# Patient Record
Sex: FEMALE | Race: BLACK OR AFRICAN AMERICAN | HISPANIC OR LATINO | Employment: FULL TIME | URBAN - METROPOLITAN AREA
[De-identification: names, ages, dates, MRNs, and addresses within clinical notes are randomized per-mention and may not be internally consistent; named-entity substitution may affect disease eponyms.]

---

## 2016-04-21 LAB
HPV 16/18: NOT DETECTED
PAP SMEAR: NORMAL

## 2017-06-25 ENCOUNTER — HOSPITAL ENCOUNTER (EMERGENCY)
Facility: OTHER | Age: 27
Discharge: HOME | End: 2017-06-25
Attending: EMERGENCY MEDICINE
Payer: COMMERCIAL

## 2017-06-25 VITALS
DIASTOLIC BLOOD PRESSURE: 59 MMHG | SYSTOLIC BLOOD PRESSURE: 111 MMHG | RESPIRATION RATE: 16 BRPM | WEIGHT: 195 LBS | BODY MASS INDEX: 32.49 KG/M2 | HEART RATE: 82 BPM | TEMPERATURE: 98 F | OXYGEN SATURATION: 98 % | HEIGHT: 65 IN

## 2017-06-25 DIAGNOSIS — N12 PYELONEPHRITIS: Primary | ICD-10-CM

## 2017-06-25 LAB
ALBUMIN SERPL BCP-MCNC: 3.6 G/DL
ALP SERPL-CCNC: 71 U/L
ALT SERPL W/O P-5'-P-CCNC: 14 U/L
ANION GAP SERPL CALC-SCNC: 13 MMOL/L
AST SERPL-CCNC: 14 U/L
B-HCG UR QL: NEGATIVE
BACTERIA #/AREA URNS HPF: ABNORMAL /HPF
BASOPHILS # BLD AUTO: 0.01 K/UL
BASOPHILS NFR BLD: 0.1 %
BILIRUB SERPL-MCNC: 1.5 MG/DL
BILIRUB UR QL STRIP: NEGATIVE
BUN SERPL-MCNC: 9 MG/DL
CALCIUM SERPL-MCNC: 8.9 MG/DL
CHLORIDE SERPL-SCNC: 101 MMOL/L
CLARITY UR: ABNORMAL
CO2 SERPL-SCNC: 21 MMOL/L
COLOR UR: YELLOW
CREAT SERPL-MCNC: 0.9 MG/DL
CTP QC/QA: YES
DIFFERENTIAL METHOD: ABNORMAL
EOSINOPHIL # BLD AUTO: 0 K/UL
EOSINOPHIL NFR BLD: 0 %
ERYTHROCYTE [DISTWIDTH] IN BLOOD BY AUTOMATED COUNT: 12.5 %
EST. GFR  (AFRICAN AMERICAN): >60 ML/MIN/1.73 M^2
EST. GFR  (NON AFRICAN AMERICAN): >60 ML/MIN/1.73 M^2
GLUCOSE SERPL-MCNC: 100 MG/DL
GLUCOSE UR QL STRIP: NEGATIVE
HCT VFR BLD AUTO: 35.4 %
HGB BLD-MCNC: 12.1 G/DL
HGB UR QL STRIP: ABNORMAL
HYALINE CASTS #/AREA URNS LPF: 0 /LPF
KETONES UR QL STRIP: ABNORMAL
LEUKOCYTE ESTERASE UR QL STRIP: ABNORMAL
LYMPHOCYTES # BLD AUTO: 0.9 K/UL
LYMPHOCYTES NFR BLD: 5.9 %
MCH RBC QN AUTO: 29.6 PG
MCHC RBC AUTO-ENTMCNC: 34.2 %
MCV RBC AUTO: 87 FL
MICROSCOPIC COMMENT: ABNORMAL
MONOCYTES # BLD AUTO: 1.5 K/UL
MONOCYTES NFR BLD: 10.5 %
NEUTROPHILS # BLD AUTO: 12.1 K/UL
NEUTROPHILS NFR BLD: 83.2 %
NITRITE UR QL STRIP: POSITIVE
PH UR STRIP: 6 [PH] (ref 5–8)
PLATELET # BLD AUTO: 234 K/UL
PMV BLD AUTO: 10.3 FL
POTASSIUM SERPL-SCNC: 3.5 MMOL/L
PROT SERPL-MCNC: 7.6 G/DL
PROT UR QL STRIP: ABNORMAL
RBC # BLD AUTO: 4.09 M/UL
RBC #/AREA URNS HPF: 3 /HPF (ref 0–4)
SODIUM SERPL-SCNC: 135 MMOL/L
SP GR UR STRIP: <=1.005 (ref 1–1.03)
URN SPEC COLLECT METH UR: ABNORMAL
UROBILINOGEN UR STRIP-ACNC: NEGATIVE EU/DL
WBC # BLD AUTO: 14.52 K/UL
WBC #/AREA URNS HPF: >100 /HPF (ref 0–5)
WBC CLUMPS URNS QL MICRO: ABNORMAL

## 2017-06-25 PROCEDURE — 96361 HYDRATE IV INFUSION ADD-ON: CPT

## 2017-06-25 PROCEDURE — 87086 URINE CULTURE/COLONY COUNT: CPT

## 2017-06-25 PROCEDURE — 87077 CULTURE AEROBIC IDENTIFY: CPT

## 2017-06-25 PROCEDURE — 96375 TX/PRO/DX INJ NEW DRUG ADDON: CPT

## 2017-06-25 PROCEDURE — 87088 URINE BACTERIA CULTURE: CPT

## 2017-06-25 PROCEDURE — 87186 SC STD MICRODIL/AGAR DIL: CPT

## 2017-06-25 PROCEDURE — 81000 URINALYSIS NONAUTO W/SCOPE: CPT

## 2017-06-25 PROCEDURE — 99284 EMERGENCY DEPT VISIT MOD MDM: CPT | Mod: 25

## 2017-06-25 PROCEDURE — 96365 THER/PROPH/DIAG IV INF INIT: CPT

## 2017-06-25 PROCEDURE — 85025 COMPLETE CBC W/AUTO DIFF WBC: CPT

## 2017-06-25 PROCEDURE — 96376 TX/PRO/DX INJ SAME DRUG ADON: CPT

## 2017-06-25 PROCEDURE — 80053 COMPREHEN METABOLIC PANEL: CPT

## 2017-06-25 PROCEDURE — 63600175 PHARM REV CODE 636 W HCPCS: Performed by: PHYSICIAN ASSISTANT

## 2017-06-25 PROCEDURE — 81025 URINE PREGNANCY TEST: CPT | Performed by: EMERGENCY MEDICINE

## 2017-06-25 PROCEDURE — 25500020 PHARM REV CODE 255: Performed by: EMERGENCY MEDICINE

## 2017-06-25 PROCEDURE — 25000003 PHARM REV CODE 250: Performed by: PHYSICIAN ASSISTANT

## 2017-06-25 RX ORDER — KETOROLAC TROMETHAMINE 30 MG/ML
15 INJECTION, SOLUTION INTRAMUSCULAR; INTRAVENOUS
Status: COMPLETED | OUTPATIENT
Start: 2017-06-25 | End: 2017-06-25

## 2017-06-25 RX ORDER — MORPHINE SULFATE 2 MG/ML
2 INJECTION, SOLUTION INTRAMUSCULAR; INTRAVENOUS
Status: COMPLETED | OUTPATIENT
Start: 2017-06-25 | End: 2017-06-25

## 2017-06-25 RX ORDER — SODIUM CHLORIDE 9 MG/ML
1000 INJECTION, SOLUTION INTRAVENOUS
Status: COMPLETED | OUTPATIENT
Start: 2017-06-25 | End: 2017-06-25

## 2017-06-25 RX ORDER — ONDANSETRON 2 MG/ML
4 INJECTION INTRAMUSCULAR; INTRAVENOUS
Status: COMPLETED | OUTPATIENT
Start: 2017-06-25 | End: 2017-06-25

## 2017-06-25 RX ORDER — CIPROFLOXACIN 500 MG/1
500 TABLET ORAL EVERY 12 HOURS
Qty: 20 TABLET | Refills: 0 | Status: SHIPPED | OUTPATIENT
Start: 2017-06-25 | End: 2017-07-09

## 2017-06-25 RX ORDER — ONDANSETRON 4 MG/1
4 TABLET, ORALLY DISINTEGRATING ORAL EVERY 8 HOURS PRN
Qty: 12 TABLET | Refills: 0 | Status: SHIPPED | OUTPATIENT
Start: 2017-06-25 | End: 2017-08-28

## 2017-06-25 RX ORDER — HYDROCODONE BITARTRATE AND ACETAMINOPHEN 5; 325 MG/1; MG/1
1 TABLET ORAL EVERY 4 HOURS PRN
Qty: 8 TABLET | Refills: 0 | Status: SHIPPED | OUTPATIENT
Start: 2017-06-25 | End: 2017-07-03

## 2017-06-25 RX ORDER — IBUPROFEN 600 MG/1
600 TABLET ORAL EVERY 6 HOURS PRN
Qty: 20 TABLET | Refills: 0 | Status: SHIPPED | OUTPATIENT
Start: 2017-06-25 | End: 2017-08-28

## 2017-06-25 RX ADMIN — MORPHINE SULFATE 2 MG: 2 INJECTION, SOLUTION INTRAMUSCULAR; INTRAVENOUS at 01:06

## 2017-06-25 RX ADMIN — SODIUM CHLORIDE 1000 ML: 0.9 INJECTION, SOLUTION INTRAVENOUS at 02:06

## 2017-06-25 RX ADMIN — IOHEXOL 100 ML: 350 INJECTION, SOLUTION INTRAVENOUS at 01:06

## 2017-06-25 RX ADMIN — CEFTRIAXONE 1 G: 1 INJECTION, SOLUTION INTRAVENOUS at 02:06

## 2017-06-25 RX ADMIN — MORPHINE SULFATE 2 MG: 2 INJECTION, SOLUTION INTRAMUSCULAR; INTRAVENOUS at 02:06

## 2017-06-25 RX ADMIN — ONDANSETRON 4 MG: 2 INJECTION, SOLUTION INTRAMUSCULAR; INTRAVENOUS at 12:06

## 2017-06-25 RX ADMIN — KETOROLAC TROMETHAMINE 15 MG: 30 INJECTION, SOLUTION INTRAMUSCULAR at 12:06

## 2017-06-25 RX ADMIN — SODIUM CHLORIDE 1000 ML: 0.9 INJECTION, SOLUTION INTRAVENOUS at 12:06

## 2017-06-25 NOTE — ED NOTES
Pt rounding complete.  Pain 6/10, not requesting medication at this time.  Restroom and comfort needs addressed.  Pt updated on plan of care.  Call light within reach.  Will continue to monitor.  Visitor at bedside.

## 2017-06-25 NOTE — ED TRIAGE NOTES
C/o dysuria with right flank pain radiating to RLQ abdomen x 4 days. + fever with vomiting x 2 days. Tylenol given at Urgent Care pta at clinic. No active vomiting at this time. Denies diarrhea.

## 2017-06-25 NOTE — ED PROVIDER NOTES
Encounter Date: 6/25/2017       History     Chief Complaint   Patient presents with    Dysuria     Patient c/o dysuria, right flank pain, urinary frequency for several days.  Patient went to an Urgent care today and was diagnosed with pylonephritis, told to come to ER and was given a tylenol for a fever of 102.     Patient is 27-year-old female history of lumbar fusion who presents with complaints of right sided flank and lower abdominal pain with fever, nausea, vomiting and dysuria for 2 days prior to arrival.  She reports dysuria symptoms started 4 days ago but 2 days later progressed to flank pain lower abdominal pain with the development of subjective fevers.  Yesterday she checked her temperature noting a fever of 101 at home.  She had an episode of vomiting.  She took a dose of Tylenol and presented to urgent care this morning.  There she was noted to have a temperature of 100.2 was given another dose of Tylenol and redirected to the emergency department for evaluation a pyelonephritis.  Patient has no history of frequent urinary tract infections or pyelonephritis.  Apart from the lumbar fusion with anterior approach she has had no other intra-abdominal surgeries.  She denies irregular vaginal bleeding or vaginal discharge.  She does report constipation over the last 4 days.  She denies chest pain, shortness of breath, headaches, dizziness, syncope.  Currently accompanied by male significant other who is at bedside.          Review of patient's allergies indicates:   Allergen Reactions    Lactose Diarrhea    Gluten protein Itching     Inflammation, bloating, diarrhea and pain all over body       Past Medical History:   Diagnosis Date    Asthma     Chronic back pain     Migraine headache      Past Surgical History:   Procedure Laterality Date    BACK SURGERY  2013     History reviewed. No pertinent family history.  Social History   Substance Use Topics    Smoking status: Never Smoker    Smokeless  tobacco: Never Used    Alcohol use Yes      Comment: 1-3 drinks every 2 weeks     Review of Systems   Constitutional: Positive for fatigue and fever.   HENT: Negative for sore throat.    Respiratory: Negative for shortness of breath.    Cardiovascular: Negative for chest pain.   Gastrointestinal: Positive for abdominal pain, nausea and vomiting. Negative for diarrhea.   Genitourinary: Negative for dysuria.        Right-sided flank pain and lower abdominal pain  Dysuria  Hematuria   Musculoskeletal: Negative for back pain.   Skin: Negative for rash.   Neurological: Negative for weakness.   Hematological: Does not bruise/bleed easily.       Physical Exam     Initial Vitals [06/25/17 1123]   BP Pulse Resp Temp SpO2   112/62 (!) 121 14 99.5 °F (37.5 °C) 95 %      MAP       78.67         Physical Exam    Nursing note and vitals reviewed.  Constitutional: She appears well-developed and well-nourished. She is not diaphoretic. No distress.   Diaphoretic feels hot to touch  Healthy appearing female who appears to be feeling ill.  She is in no acute distress.  She makes good eye contact, speaks in clear full sentences.  She is seen ambulating to the bathroom slowly but without assistance.   HENT:   Head: Normocephalic and atraumatic.   Eyes: Conjunctivae and EOM are normal. Pupils are equal, round, and reactive to light. Right eye exhibits no discharge. Left eye exhibits no discharge. No scleral icterus.   Neck: Normal range of motion. Neck supple.   Cardiovascular: Normal rate, regular rhythm and normal heart sounds. Exam reveals no friction rub.    No murmur heard.  Pulmonary/Chest: Breath sounds normal. She has no wheezes. She has no rhonchi. She has no rales.   Abdominal: Soft. Bowel sounds are normal. She exhibits no mass. There is tenderness. There is no rebound and no guarding.   Right-sided CVA tenderness to percussion  Right lower quadrant tenderness to deep palpation with guarding  Suprapubic tenderness to  palpation  Left-sided tenderness to deep palpation with no guarding  Normal bowel sounds in all 4 quadrants     Musculoskeletal: Normal range of motion. She exhibits no edema or tenderness.   Lymphadenopathy:     She has no cervical adenopathy.   Neurological: She is alert and oriented to person, place, and time. She has normal strength.   Skin: Skin is warm and dry. Capillary refill takes less than 2 seconds. No rash and no abscess noted. No erythema.   Psychiatric: She has a normal mood and affect. Her behavior is normal. Thought content normal.         ED Course   Procedures  Labs Reviewed   URINALYSIS   POCT URINE PREGNANCY         Labs Reviewed   URINALYSIS - Abnormal; Notable for the following:        Result Value    Appearance, UA Hazy (*)     Specific Gravity, UA <=1.005 (*)     Protein, UA 1+ (*)     Ketones, UA 3+ (*)     Occult Blood UA 2+ (*)     Nitrite, UA Positive (*)     Leukocytes, UA 3+ (*)     All other components within normal limits   CBC W/ AUTO DIFFERENTIAL - Abnormal; Notable for the following:     WBC 14.52 (*)     Hematocrit 35.4 (*)     Gran # 12.1 (*)     Lymph # 0.9 (*)     Mono # 1.5 (*)     Gran% 83.2 (*)     Lymph% 5.9 (*)     All other components within normal limits   COMPREHENSIVE METABOLIC PANEL - Abnormal; Notable for the following:     Sodium 135 (*)     CO2 21 (*)     Total Bilirubin 1.5 (*)     All other components within normal limits   URINALYSIS MICROSCOPIC - Abnormal; Notable for the following:     WBC, UA >100 (*)     WBC Clumps, UA Occasional (*)     Bacteria, UA Moderate (*)     All other components within normal limits   CULTURE, URINE   CULTURE, URINE   POCT URINE PREGNANCY     Imaging Results          CT Abdomen Pelvis With Contrast (Final result)  Result time 06/25/17 13:50:48    Final result by Gustavo Tony DO (06/25/17 13:50:48)                 Impression:        1. Findings most consistent with pyelonephritis of the right kidney.    2.  Small amount of  pelvic free fluid which is thought to be physiologic.          Electronically signed by: ANKITA MUNOZ D.O.  Date:     06/25/17  Time:    13:50              Narrative:    CT of the abdomen and pelvis with contrast.    History: Right lower quadrant pain    Comparison: None    Technique:CT of the abdomen and pelvis was acquired helically from the lung bases through the ischial tuberosities status post administration of 100 cc of Omnipaque 350. No oral contrast was administered. Axial and reformatted images were reviewed.     Findings:    ABDOMEN    Lung bases:Unremarkable    Liver/gallbladder/biliary:The liver demonstrates no focal abnormality. The gallbladder is present and unremarkable.No biliary ductal dilation.    Pancreas:The pancreas is unremarkable in appearance.    Spleen:The spleen is not enlarged.    Adrenals:Unremarkable    Kidneys:There is some heterogeneous enhancement and associated with the parenchyma of the upper pole to interpolar region of the right kidney most consistent with pyelonephritis..     Bowel/Mesentery:There is no evidence of bowel obstruction. The appendix is not well-seen although there are no definitive inflammatory changes in the expected location of the appendix. No mesenteric stranding or adenopathy.    Retroperitoneum:No adenopathy.The aorta demonstrates a normal caliber.    PELVIS:    Genitourinary/Reproductive organs:Unremarkable    Adenopathy:None    Free Fluid:A small amount of pelvic free fluid is present which is thought to be in the range of physiologic.    [place] Structures/Soft tissues:Postoperative changes associated with the lower lumbar spine.                                   Medical Decision Making:   ED Management:  Urgent evaluation a 27-year-old female who presents with complaints concerning for pyelonephritis versus acute intra-abdominal infection including appendicitis.  She is afebrile currently at 99.5, nontoxic appearing, tachycardic with heart rate of 121  and blood pressure 112/62.  Physical exam outlined above and reveals concern for right flank pain with CVA tenderness to percussion as well as right lower quadrant pain concerning for possible appendicitis.  Diagnostic labs pending with IV fluids, Toradol, anti-medic given intravenously.  Urinalysis and urine culture pending.  Low threshold for CT of abdomen.  We'll continue to monitor.    UPDATE: CT obtained and reveals evidence of pyelo- no concern for other acute intra-abdominal infection.  She is given a gram of Rocephin IV here in the emergency department and will be discharged with Cipro.  She is given short course of narcotic analgesia as well as NSAIDs and anti-medic.  She is educated on return precautions and verbalizes understanding.  She is encouraged to follow-up with primary care provider one to 2 days for symptom recheck. Case discussed with attending who agrees with plan.  Other:   I have discussed this case with another health care provider.       <> Summary of the Discussion: [facility]                    ED Course     Clinical Impression:   The encounter diagnosis was Pyelonephritis.                           Alana Rodriguez PA-C  06/25/17 1552

## 2017-06-27 LAB — BACTERIA UR CULT: NORMAL

## 2017-06-28 ENCOUNTER — NURSE TRIAGE (OUTPATIENT)
Dept: ADMINISTRATIVE | Facility: CLINIC | Age: 27
End: 2017-06-28

## 2017-06-28 NOTE — TELEPHONE ENCOUNTER
"  Reason for Disposition   [1] Taking antibiotic > 24 hours for UTI (urinary tract or bladder infection) AND [2] fever persists    Answer Assessment - Initial Assessment Questions  1. ANTIBIOTIC: "What antibiotic are you taking?" "How many times per day?"      cipro  2. DURATION: "When was the antibiotic started?"      6/24/17  3. MAIN SYMPTOM: "What is the main symptom you are concerned about?"      Chills, nausea, urine still dark  4. FEVER: "Do you have a fever?" If so, ask: "What is it, how was it measured, and when did it start?"      Chills and temp yesterday of 101  5. OTHER SYMPTOMS: "Do you have any other symptoms?" (e.g., flank pain, vaginal discharge, blood in urine)      As above    Protocols used: ST URINARY TRACT INFECTION ON ANTIBIOTIC FOLLOW-UP CALL - FEMALE-A-    "

## 2017-08-28 ENCOUNTER — HOSPITAL ENCOUNTER (EMERGENCY)
Facility: OTHER | Age: 27
Discharge: HOME | End: 2017-08-28
Attending: EMERGENCY MEDICINE
Payer: MEDICAID

## 2017-08-28 VITALS
RESPIRATION RATE: 18 BRPM | HEIGHT: 66 IN | HEART RATE: 98 BPM | DIASTOLIC BLOOD PRESSURE: 59 MMHG | WEIGHT: 185 LBS | SYSTOLIC BLOOD PRESSURE: 116 MMHG | OXYGEN SATURATION: 100 % | BODY MASS INDEX: 29.73 KG/M2 | TEMPERATURE: 99 F

## 2017-08-28 DIAGNOSIS — N12 PYELONEPHRITIS: Primary | ICD-10-CM

## 2017-08-28 LAB
ALBUMIN SERPL BCP-MCNC: 3.7 G/DL
ALP SERPL-CCNC: 69 U/L
ALT SERPL W/O P-5'-P-CCNC: 16 U/L
ANION GAP SERPL CALC-SCNC: 9 MMOL/L
AST SERPL-CCNC: 17 U/L
B-HCG UR QL: NEGATIVE
BACTERIA #/AREA URNS HPF: ABNORMAL /HPF
BASOPHILS # BLD AUTO: 0.01 K/UL
BASOPHILS NFR BLD: 0.1 %
BILIRUB SERPL-MCNC: 1.4 MG/DL
BILIRUB UR QL STRIP: NEGATIVE
BUN SERPL-MCNC: 9 MG/DL
CALCIUM SERPL-MCNC: 9.4 MG/DL
CHLORIDE SERPL-SCNC: 105 MMOL/L
CLARITY UR: ABNORMAL
CO2 SERPL-SCNC: 23 MMOL/L
COLOR UR: YELLOW
CREAT SERPL-MCNC: 0.9 MG/DL
CTP QC/QA: YES
DIFFERENTIAL METHOD: ABNORMAL
EOSINOPHIL # BLD AUTO: 0 K/UL
EOSINOPHIL NFR BLD: 0.1 %
ERYTHROCYTE [DISTWIDTH] IN BLOOD BY AUTOMATED COUNT: 13 %
EST. GFR  (AFRICAN AMERICAN): >60 ML/MIN/1.73 M^2
EST. GFR  (NON AFRICAN AMERICAN): >60 ML/MIN/1.73 M^2
GLUCOSE SERPL-MCNC: 116 MG/DL
GLUCOSE UR QL STRIP: NEGATIVE
HCT VFR BLD AUTO: 36.6 %
HGB BLD-MCNC: 12.2 G/DL
HGB UR QL STRIP: ABNORMAL
HYALINE CASTS #/AREA URNS LPF: 0 /LPF
KETONES UR QL STRIP: ABNORMAL
LEUKOCYTE ESTERASE UR QL STRIP: ABNORMAL
LIPASE SERPL-CCNC: 26 U/L
LYMPHOCYTES # BLD AUTO: 1.3 K/UL
LYMPHOCYTES NFR BLD: 10.9 %
MCH RBC QN AUTO: 29.5 PG
MCHC RBC AUTO-ENTMCNC: 33.3 G/DL
MCV RBC AUTO: 89 FL
MICROSCOPIC COMMENT: ABNORMAL
MONOCYTES # BLD AUTO: 0.6 K/UL
MONOCYTES NFR BLD: 4.8 %
NEUTROPHILS # BLD AUTO: 9.9 K/UL
NEUTROPHILS NFR BLD: 83.8 %
NITRITE UR QL STRIP: NEGATIVE
PH UR STRIP: 6 [PH] (ref 5–8)
PLATELET # BLD AUTO: 249 K/UL
PMV BLD AUTO: 10.1 FL
POTASSIUM SERPL-SCNC: 3.7 MMOL/L
PROT SERPL-MCNC: 8.2 G/DL
PROT UR QL STRIP: ABNORMAL
RBC # BLD AUTO: 4.13 M/UL
RBC #/AREA URNS HPF: 9 /HPF (ref 0–4)
SODIUM SERPL-SCNC: 137 MMOL/L
SP GR UR STRIP: 1.02 (ref 1–1.03)
SQUAMOUS #/AREA URNS HPF: 11 /HPF
URN SPEC COLLECT METH UR: ABNORMAL
UROBILINOGEN UR STRIP-ACNC: NEGATIVE EU/DL
WBC # BLD AUTO: 11.84 K/UL
WBC #/AREA URNS HPF: 59 /HPF (ref 0–5)

## 2017-08-28 PROCEDURE — 25000003 PHARM REV CODE 250: Performed by: EMERGENCY MEDICINE

## 2017-08-28 PROCEDURE — 81025 URINE PREGNANCY TEST: CPT | Performed by: EMERGENCY MEDICINE

## 2017-08-28 PROCEDURE — 96365 THER/PROPH/DIAG IV INF INIT: CPT

## 2017-08-28 PROCEDURE — 81000 URINALYSIS NONAUTO W/SCOPE: CPT

## 2017-08-28 PROCEDURE — 87088 URINE BACTERIA CULTURE: CPT

## 2017-08-28 PROCEDURE — 63600175 PHARM REV CODE 636 W HCPCS: Performed by: EMERGENCY MEDICINE

## 2017-08-28 PROCEDURE — 87077 CULTURE AEROBIC IDENTIFY: CPT

## 2017-08-28 PROCEDURE — 25500020 PHARM REV CODE 255: Performed by: EMERGENCY MEDICINE

## 2017-08-28 PROCEDURE — 85025 COMPLETE CBC W/AUTO DIFF WBC: CPT

## 2017-08-28 PROCEDURE — 99285 EMERGENCY DEPT VISIT HI MDM: CPT | Mod: 25

## 2017-08-28 PROCEDURE — 83690 ASSAY OF LIPASE: CPT

## 2017-08-28 PROCEDURE — 87186 SC STD MICRODIL/AGAR DIL: CPT

## 2017-08-28 PROCEDURE — 80053 COMPREHEN METABOLIC PANEL: CPT

## 2017-08-28 PROCEDURE — 87086 URINE CULTURE/COLONY COUNT: CPT

## 2017-08-28 PROCEDURE — 96375 TX/PRO/DX INJ NEW DRUG ADDON: CPT

## 2017-08-28 RX ORDER — HYDROCODONE BITARTRATE AND ACETAMINOPHEN 5; 325 MG/1; MG/1
1 TABLET ORAL EVERY 4 HOURS PRN
Qty: 12 TABLET | Refills: 0 | Status: SHIPPED | OUTPATIENT
Start: 2017-08-28 | End: 2019-09-11 | Stop reason: ALTCHOICE

## 2017-08-28 RX ORDER — KETOROLAC TROMETHAMINE 30 MG/ML
15 INJECTION, SOLUTION INTRAMUSCULAR; INTRAVENOUS
Status: COMPLETED | OUTPATIENT
Start: 2017-08-28 | End: 2017-08-28

## 2017-08-28 RX ORDER — FLUCONAZOLE 200 MG/1
200 TABLET ORAL DAILY
Qty: 1 TABLET | Refills: 0 | Status: SHIPPED | OUTPATIENT
Start: 2017-08-28 | End: 2017-08-29

## 2017-08-28 RX ORDER — AMOXICILLIN AND CLAVULANATE POTASSIUM 875; 125 MG/1; MG/1
1 TABLET, FILM COATED ORAL 2 TIMES DAILY
Qty: 14 TABLET | Refills: 0 | Status: SHIPPED | OUTPATIENT
Start: 2017-08-28 | End: 2019-09-11 | Stop reason: ALTCHOICE

## 2017-08-28 RX ORDER — HYDROMORPHONE HYDROCHLORIDE 1 MG/ML
0.5 INJECTION, SOLUTION INTRAMUSCULAR; INTRAVENOUS; SUBCUTANEOUS
Status: COMPLETED | OUTPATIENT
Start: 2017-08-28 | End: 2017-08-28

## 2017-08-28 RX ADMIN — HYDROMORPHONE HYDROCHLORIDE 0.5 MG: 1 INJECTION, SOLUTION INTRAMUSCULAR; INTRAVENOUS; SUBCUTANEOUS at 07:08

## 2017-08-28 RX ADMIN — KETOROLAC TROMETHAMINE 15 MG: 30 INJECTION, SOLUTION INTRAMUSCULAR at 06:08

## 2017-08-28 RX ADMIN — IOHEXOL 100 ML: 350 INJECTION, SOLUTION INTRAVENOUS at 07:08

## 2017-08-28 RX ADMIN — CEFTRIAXONE 2 G: 2 INJECTION, SOLUTION INTRAVENOUS at 06:08

## 2017-08-28 RX ADMIN — SODIUM CHLORIDE 1000 ML: 0.9 INJECTION, SOLUTION INTRAVENOUS at 06:08

## 2017-08-28 NOTE — ED PROVIDER NOTES
"Encounter Date: 8/28/2017    SCRIBE #1 NOTE: I, Toni Fuller, am scribing for, and in the presence of, Dr. Bazan.       History     Chief Complaint   Patient presents with    Flank Pain     + constant right sided flank pain since Wednesday with pain increasing Saturday, + fever 101.9 yesterday reports taking Tylenol with relief. "I recently had a UTI and a kindey infection and this feels like the same pain". + dysuria . denies hematuria at this time.      5:25 PM    Patient is a 27 year old female who presents to the ED with complaint of abdominal pain, which began two days ago. Pain is located throughout abdomen, but is worse in the right upper quadrant. She reports associated fever, chills, headache, nausea, and burning on urination. She denies hematuria, diarrhea, constipation, or shortness of breath. She notes pain is worse with breath. She has a history of kidney infections, and notes that current symptoms feel like past episodes. She reports she had a back surgery that was performed through the abdomen four years ago. She is allergic to dairy and gluten.       The history is provided by the patient.     Review of patient's allergies indicates:   Allergen Reactions    Lactose Diarrhea    Gluten protein Itching     Inflammation, bloating, diarrhea and pain all over body       Past Medical History:   Diagnosis Date    Asthma     Chronic back pain     Migraine headache     Pyelonephritis      Past Surgical History:   Procedure Laterality Date    BACK SURGERY  2013     History reviewed. No pertinent family history.  Social History   Substance Use Topics    Smoking status: Never Smoker    Smokeless tobacco: Never Used    Alcohol use Yes      Comment: 1-3 drinks every 2 weeks     Review of Systems   Constitutional: Positive for chills and fever.   HENT: Negative for sore throat.    Respiratory: Negative for cough and shortness of breath.    Cardiovascular: Negative for chest pain.   Gastrointestinal: " Positive for abdominal pain and nausea.   Genitourinary: Positive for dysuria. Negative for hematuria.   Musculoskeletal: Negative for back pain.   Skin: Negative for rash.   Neurological: Positive for headaches. Negative for weakness.   Psychiatric/Behavioral: Negative for confusion.       Physical Exam     Initial Vitals [08/28/17 1718]   BP Pulse Resp Temp SpO2   126/70 105 18 98.4 °F (36.9 °C) 100 %      MAP       88.67         Physical Exam    Nursing note and vitals reviewed.  Constitutional: She appears well-developed and well-nourished.   Appears non-toxic.   HENT:   Head: Normocephalic and atraumatic.   Mouth/Throat: Oropharynx is clear and moist.   Eyes: Conjunctivae and EOM are normal. Pupils are equal, round, and reactive to light.   Neck: Normal range of motion. Neck supple.   Cardiovascular: Normal rate, regular rhythm and normal heart sounds. Exam reveals no gallop and no friction rub.    No murmur heard.  Pulmonary/Chest: Breath sounds normal. No respiratory distress. She has no wheezes. She has no rhonchi. She has no rales.   Abdominal: Soft.   Tenderness to RUQ, no guarding or rebound.   Musculoskeletal: Normal range of motion.   Extremities are atraumatic.   Neurological: She is alert and oriented to person, place, and time. She has normal strength.   Skin: Skin is warm and dry.   Psychiatric: She has a normal mood and affect. Her behavior is normal. Judgment and thought content normal.         ED Course   Procedures  Labs Reviewed   CBC W/ AUTO DIFFERENTIAL - Abnormal; Notable for the following:        Result Value    Hematocrit 36.6 (*)     Gran # 9.9 (*)     Gran% 83.8 (*)     Lymph% 10.9 (*)     All other components within normal limits   COMPREHENSIVE METABOLIC PANEL - Abnormal; Notable for the following:     Glucose 116 (*)     Total Bilirubin 1.4 (*)     All other components within normal limits   URINALYSIS - Abnormal; Notable for the following:     Appearance, UA Hazy (*)     Protein, UA  1+ (*)     Ketones, UA Trace (*)     Occult Blood UA 1+ (*)     Leukocytes, UA 2+ (*)     All other components within normal limits   URINALYSIS MICROSCOPIC - Abnormal; Notable for the following:     RBC, UA 9 (*)     WBC, UA 59 (*)     Bacteria, UA Moderate (*)     All other components within normal limits   CULTURE, URINE   LIPASE   POCT URINE PREGNANCY             Medical Decision Making:   Initial Assessment:   Patient with history of flank pain and dysuria, but also some RUQ tenderness. I will check basic labs, chest x-ray, and urine. I will reassess.   Clinical Tests:   Lab Tests: Reviewed and Ordered  Radiological Study: Ordered and Reviewed  ED Management:  6:15 PM -Chest x-ray reveals normal heart and normal lungs. Urine infected and white count elevated, I will give dose of rocephin. With her elevated white count and diffuse abdominal tenderness, I will get CT of abdomen.            Scribe Attestation:   Scribe #1: I performed the above scribed service and the documentation accurately describes the services I performed. I attest to the accuracy of the note.    Attending Attestation:           Physician Attestation for Scribe:  Physician Attestation Statement for Scribe #1: I, Dr. Bazan, reviewed documentation, as scribed by Toni Fuller in my presence, and it is both accurate and complete.                 ED Course     Clinical Impression:     1. Pyelonephritis          Disposition:   Disposition: Discharged  Condition: Stable                        Braulio Bazan MD  08/28/17 2057

## 2017-08-28 NOTE — ED TRIAGE NOTES
C/o right flank pain with dysuria onset 6 days ago. Reports intermittent fever at home, last tylenol taken yesterday. Hx of pyelonephritis. Safety precautions established, NAD.

## 2017-08-30 LAB — BACTERIA UR CULT: NORMAL

## 2018-09-15 ENCOUNTER — OFFICE VISIT (OUTPATIENT)
Dept: URGENT CARE | Facility: CLINIC | Age: 28
End: 2018-09-15
Payer: COMMERCIAL

## 2018-09-15 VITALS
OXYGEN SATURATION: 99 % | SYSTOLIC BLOOD PRESSURE: 116 MMHG | HEIGHT: 66 IN | HEART RATE: 75 BPM | TEMPERATURE: 98 F | WEIGHT: 175 LBS | BODY MASS INDEX: 28.12 KG/M2 | DIASTOLIC BLOOD PRESSURE: 82 MMHG | RESPIRATION RATE: 18 BRPM

## 2018-09-15 DIAGNOSIS — J01.40 SUBACUTE PANSINUSITIS: Primary | ICD-10-CM

## 2018-09-15 LAB
CTP QC/QA: YES
S PYO RRNA THROAT QL PROBE: NEGATIVE

## 2018-09-15 PROCEDURE — 99203 OFFICE O/P NEW LOW 30 MIN: CPT | Mod: S$GLB,,, | Performed by: FAMILY MEDICINE

## 2018-09-15 PROCEDURE — 3008F BODY MASS INDEX DOCD: CPT | Mod: CPTII,S$GLB,, | Performed by: FAMILY MEDICINE

## 2018-09-15 PROCEDURE — 87880 STREP A ASSAY W/OPTIC: CPT | Mod: QW,S$GLB,, | Performed by: FAMILY MEDICINE

## 2018-09-15 RX ORDER — PREDNISONE 20 MG/1
20 TABLET ORAL DAILY
Qty: 3 TABLET | Refills: 0 | Status: SHIPPED | OUTPATIENT
Start: 2018-09-15 | End: 2018-09-18

## 2018-09-15 RX ORDER — AMOXICILLIN AND CLAVULANATE POTASSIUM 875; 125 MG/1; MG/1
1 TABLET, FILM COATED ORAL EVERY 12 HOURS
Qty: 14 TABLET | Refills: 0 | Status: SHIPPED | OUTPATIENT
Start: 2018-09-15 | End: 2018-09-22

## 2018-09-15 NOTE — PATIENT INSTRUCTIONS
PLEASE READ YOUR DISCHARGE INSTRUCTIONS ENTIRELY AS IT CONTAINS IMPORTANT INFORMATION.    Try over the counter afrin, but for NO LONGER than 3 days as it can cause rebound congestion. Then you can switch to flonase if you find the nasal sprays are working well.     If you find this dries your nose out or your nose bleeds, try using over the counter nasal saline a few minutes prior to using the flonase to moisten the lining of your nose and throughout the day as needed.     Please take an over the counter antihistamine medication (allegra/Claritin/Zyrtec) of your choice as directed and mucinex-D (if it gives you funny heartbeats you can switch to regular mucinex).     You can try breathe right strips at night to help you breathe.  A cool mist humidifier in bedroom may help with cough and relieve stuffy nose.     Sore throat recommendations: Warm fluids, warm salt water gargles, throat lozenges, tea, honey, soup, rest, hydration.     Sinus rinses DO NOT USE TAP WATER, if you must, water must be a rolling boil for 1 minute, let it cool, then use.  May use distilled water, or over the counter nasal saline rinses.  Vics vapor rub in shower to help open nasal passages.  May use nasal gel to keep passages moisturized.  May use Nasal saline sprays during the day for added relief of congestion.   For those who go to the gym, please do not use the sauna or steam room now to clear sinuses.    During pollen season, change shirt if you are outside for a while when you go in.  Also wash your face.  Do not touch your face with your hands.  Wash your hands often in general while ill, avoid face contact with hands. Good nutrition. Lots of rest. Plenty of fluids    Over the counter you can use Tylenol (acetominophen) or Ibuprofen for your minor aches and pains as long as you have no contraindications.        Please return or see your primary care doctor if you develop new or worsening symptoms.     You must understand that you have  received an Urgent Care treatment only and that you may be released before all of your medical problems are known or treated.                Sinusitis (Antibiotic Treatment)    The sinuses are air-filled spaces within the bones of the face. They connect to the inside of the nose. Sinusitis is an inflammation of the tissue lining the sinus cavity. Sinus inflammation can occur during a cold. It can also be due to allergies to pollens and other particles in the air. Sinusitis can cause symptoms of sinus congestion and fullness. A sinus infection causes fever, headache and facial pain. There is often green or yellow drainage from the nose or into the back of the throat (post-nasal drip). You have been given antibiotics to treat this condition.  Home care:  · Take the full course of antibiotics as instructed. Do not stop taking them, even if you feel better.  · Drink plenty of water, hot tea, and other liquids. This may help thin mucus. It also may promote sinus drainage.  · Heat may help soothe painful areas of the face. Use a towel soaked in hot water. Or,  the shower and direct the hot spray onto your face. Using a vaporizer along with a menthol rub at night may also help.   · An expectorant containing guaifenesin may help thin the mucus and promote drainage from the sinuses.  · Over-the-counter decongestants may be used unless a similar medicine was prescribed. Nasal sprays work the fastest. Use one that contains phenylephrine or oxymetazoline. First blow the nose gently. Then use the spray. Do not use these medicines more often than directed on the label or symptoms may get worse. You may also use tablets containing pseudoephedrine. Avoid products that combine ingredients, because side effects may be increased. Read labels. You can also ask the pharmacist for help. (NOTE: Persons with high blood pressure should not use decongestants. They can raise blood pressure.)  · Over-the-counter antihistamines may help  if allergies contributed to your sinusitis.    · Do not use nasal rinses or irrigation during an acute sinus infection, unless told to by your health care provider. Rinsing may spread the infection to other sinuses.  · Use acetaminophen or ibuprofen to control pain, unless another pain medicine was prescribed. (If you have chronic liver or kidney disease or ever had a stomach ulcer, talk with your doctor before using these medicines. Aspirin should never be used in anyone under 18 years of age who is ill with a fever. It may cause severe liver damage.)  · Don't smoke. This can worsen symptoms.  Follow-up care  Follow up with your healthcare provider or our staff if you are not improving within the next week.  When to seek medical advice  Call your healthcare provider if any of these occur:  · Facial pain or headache becoming more severe  · Stiff neck  · Unusual drowsiness or confusion  · Swelling of the forehead or eyelids  · Vision problems, including blurred or double vision  · Fever of 100.4ºF (38ºC) or higher, or as directed by your healthcare provider  · Seizure  · Breathing problems  · Symptoms not resolving within 10 days  Date Last Reviewed: 4/13/2015  © 2825-7610 [employer]. [address] [place] [address], [place] [address]. All rights reserved. This information is not intended as a substitute for professional medical care. Always follow your healthcare professional's instructions.

## 2018-09-15 NOTE — PROGRESS NOTES
"Subjective:       Patient ID: Carlos Snell is a 28 y.o. female.    Vitals:  height is 5' 6" (1.676 m) and weight is 79.4 kg (175 lb). Her oral temperature is 97.8 °F (36.6 °C). Her blood pressure is 116/82 and her pulse is 75. Her respiration is 18 and oxygen saturation is 99%.     Chief Complaint: URI    Pt states she is experiencing sore throat, cough, sinus pressure, headaches, ear pain for two weeks. Pt states she has taken mucinex and theraflu without relief.       URI    This is a new problem. The current episode started 1 to 4 weeks ago. The problem has been gradually worsening. Associated symptoms include congestion, coughing, ear pain, headaches, sinus pain, a sore throat and swollen glands. Pertinent negatives include no abdominal pain, chest pain, nausea or wheezing.     Review of Systems   Constitution: Negative for chills, fever and malaise/fatigue.   HENT: Positive for congestion, ear pain, sinus pain and sore throat. Negative for hoarse voice.    Eyes: Negative for discharge and redness.   Cardiovascular: Negative for chest pain, dyspnea on exertion and leg swelling.   Respiratory: Positive for cough and sputum production. Negative for shortness of breath and wheezing.    Musculoskeletal: Negative for myalgias.   Gastrointestinal: Negative for abdominal pain and nausea.   Neurological: Positive for headaches.       Objective:      Physical Exam   Constitutional: She is oriented to person, place, and time. She appears well-developed and well-nourished. She is cooperative.  Non-toxic appearance. She does not appear ill. No distress.   HENT:   Head: Normocephalic and atraumatic.   Right Ear: Hearing, tympanic membrane, external ear and ear canal normal. No middle ear effusion.   Left Ear: Hearing, tympanic membrane, external ear and ear canal normal.  No middle ear effusion.   Nose: Mucosal edema present. No rhinorrhea or nasal deformity. No epistaxis. Right sinus exhibits maxillary sinus tenderness " and frontal sinus tenderness. Left sinus exhibits maxillary sinus tenderness and frontal sinus tenderness.   Mouth/Throat: Uvula is midline and mucous membranes are normal. No trismus in the jaw. Normal dentition. No uvula swelling. Posterior oropharyngeal erythema present. No oropharyngeal exudate.   Eyes: Conjunctivae and lids are normal. No scleral icterus.   Sclera clear bilat   Neck: Trachea normal, full passive range of motion without pain and phonation normal. Neck supple.   Cardiovascular: Normal rate, regular rhythm, normal heart sounds, intact distal pulses and normal pulses.   Pulmonary/Chest: Effort normal and breath sounds normal. No respiratory distress. She has no wheezes.   Abdominal: Soft. Normal appearance and bowel sounds are normal. She exhibits no distension. There is no tenderness.   Musculoskeletal: Normal range of motion. She exhibits no edema or deformity.   Neurological: She is alert and oriented to person, place, and time. She exhibits normal muscle tone. Coordination normal.   Skin: Skin is warm, dry and intact. She is not diaphoretic. No pallor.   Psychiatric: She has a normal mood and affect. Her speech is normal and behavior is normal. Judgment and thought content normal. Cognition and memory are normal.   Nursing note and vitals reviewed.      Assessment:       1. Subacute pansinusitis        Plan:         Subacute pansinusitis  -     POCT rapid strep A  -     Cancel: POCT Influenza A/B  -     amoxicillin-clavulanate 875-125mg (AUGMENTIN) 875-125 mg per tablet; Take 1 tablet by mouth every 12 (twelve) hours. for 7 days  Dispense: 14 tablet; Refill: 0  -     predniSONE (DELTASONE) 20 MG tablet; Take 1 tablet (20 mg total) by mouth once daily. for 3 days  Dispense: 3 tablet; Refill: 0      Patient Instructions   PLEASE READ YOUR DISCHARGE INSTRUCTIONS ENTIRELY AS IT CONTAINS IMPORTANT INFORMATION.    Try over the counter afrin, but for NO LONGER than 3 days as it can cause rebound  congestion. Then you can switch to flonase if you find the nasal sprays are working well.     If you find this dries your nose out or your nose bleeds, try using over the counter nasal saline a few minutes prior to using the flonase to moisten the lining of your nose and throughout the day as needed.     Please take an over the counter antihistamine medication (allegra/Claritin/Zyrtec) of your choice as directed and mucinex-D (if it gives you funny heartbeats you can switch to regular mucinex).     You can try breathe right strips at night to help you breathe.  A cool mist humidifier in bedroom may help with cough and relieve stuffy nose.     Sore throat recommendations: Warm fluids, warm salt water gargles, throat lozenges, tea, honey, soup, rest, hydration.     Sinus rinses DO NOT USE TAP WATER, if you must, water must be a rolling boil for 1 minute, let it cool, then use.  May use distilled water, or over the counter nasal saline rinses.  Vics vapor rub in shower to help open nasal passages.  May use nasal gel to keep passages moisturized.  May use Nasal saline sprays during the day for added relief of congestion.   For those who go to the gym, please do not use the sauna or steam room now to clear sinuses.    During pollen season, change shirt if you are outside for a while when you go in.  Also wash your face.  Do not touch your face with your hands.  Wash your hands often in general while ill, avoid face contact with hands. Good nutrition. Lots of rest. Plenty of fluids    Over the counter you can use Tylenol (acetominophen) or Ibuprofen for your minor aches and pains as long as you have no contraindications.        Please return or see your primary care doctor if you develop new or worsening symptoms.     You must understand that you have received an Urgent Care treatment only and that you may be released before all of your medical problems are known or treated.                Sinusitis (Antibiotic  Treatment)    The sinuses are air-filled spaces within the bones of the face. They connect to the inside of the nose. Sinusitis is an inflammation of the tissue lining the sinus cavity. Sinus inflammation can occur during a cold. It can also be due to allergies to pollens and other particles in the air. Sinusitis can cause symptoms of sinus congestion and fullness. A sinus infection causes fever, headache and facial pain. There is often green or yellow drainage from the nose or into the back of the throat (post-nasal drip). You have been given antibiotics to treat this condition.  Home care:  · Take the full course of antibiotics as instructed. Do not stop taking them, even if you feel better.  · Drink plenty of water, hot tea, and other liquids. This may help thin mucus. It also may promote sinus drainage.  · Heat may help soothe painful areas of the face. Use a towel soaked in hot water. Or,  the shower and direct the hot spray onto your face. Using a vaporizer along with a menthol rub at night may also help.   · An expectorant containing guaifenesin may help thin the mucus and promote drainage from the sinuses.  · Over-the-counter decongestants may be used unless a similar medicine was prescribed. Nasal sprays work the fastest. Use one that contains phenylephrine or oxymetazoline. First blow the nose gently. Then use the spray. Do not use these medicines more often than directed on the label or symptoms may get worse. You may also use tablets containing pseudoephedrine. Avoid products that combine ingredients, because side effects may be increased. Read labels. You can also ask the pharmacist for help. (NOTE: Persons with high blood pressure should not use decongestants. They can raise blood pressure.)  · Over-the-counter antihistamines may help if allergies contributed to your sinusitis.    · Do not use nasal rinses or irrigation during an acute sinus infection, unless told to by your health care  provider. Rinsing may spread the infection to other sinuses.  · Use acetaminophen or ibuprofen to control pain, unless another pain medicine was prescribed. (If you have chronic liver or kidney disease or ever had a stomach ulcer, talk with your doctor before using these medicines. Aspirin should never be used in anyone under 18 years of age who is ill with a fever. It may cause severe liver damage.)  · Don't smoke. This can worsen symptoms.  Follow-up care  Follow up with your healthcare provider or our staff if you are not improving within the next week.  When to seek medical advice  Call your healthcare provider if any of these occur:  · Facial pain or headache becoming more severe  · Stiff neck  · Unusual drowsiness or confusion  · Swelling of the forehead or eyelids  · Vision problems, including blurred or double vision  · Fever of 100.4ºF (38ºC) or higher, or as directed by your healthcare provider  · Seizure  · Breathing problems  · Symptoms not resolving within 10 days  Date Last Reviewed: 4/13/2015  © 7911-7172 [employer]. [address] [place] [address], [place] [address]. All rights reserved. This information is not intended as a substitute for professional medical care. Always follow your healthcare professional's instructions.

## 2019-08-28 ENCOUNTER — TELEPHONE (OUTPATIENT)
Dept: OBSTETRICS AND GYNECOLOGY | Facility: CLINIC | Age: 29
End: 2019-08-28

## 2019-08-28 NOTE — TELEPHONE ENCOUNTER
Contacted the patient to r/s 9/24 appointment at 3:45PM. No answer, left voicemail message for the patient to call the clinic back.

## 2019-09-11 ENCOUNTER — OFFICE VISIT (OUTPATIENT)
Dept: URGENT CARE | Facility: CLINIC | Age: 29
End: 2019-09-11
Payer: COMMERCIAL

## 2019-09-11 VITALS
RESPIRATION RATE: 18 BRPM | BODY MASS INDEX: 29.73 KG/M2 | OXYGEN SATURATION: 99 % | HEIGHT: 66 IN | HEART RATE: 91 BPM | WEIGHT: 185 LBS | DIASTOLIC BLOOD PRESSURE: 86 MMHG | SYSTOLIC BLOOD PRESSURE: 131 MMHG | TEMPERATURE: 99 F

## 2019-09-11 DIAGNOSIS — R49.0 DYSPHONIA: ICD-10-CM

## 2019-09-11 DIAGNOSIS — J01.90 ACUTE NON-RECURRENT SINUSITIS, UNSPECIFIED LOCATION: Primary | ICD-10-CM

## 2019-09-11 PROCEDURE — 99214 OFFICE O/P EST MOD 30 MIN: CPT | Mod: S$GLB,,, | Performed by: FAMILY MEDICINE

## 2019-09-11 PROCEDURE — 3008F BODY MASS INDEX DOCD: CPT | Mod: CPTII,S$GLB,, | Performed by: FAMILY MEDICINE

## 2019-09-11 PROCEDURE — 99214 PR OFFICE/OUTPT VISIT, EST, LEVL IV, 30-39 MIN: ICD-10-PCS | Mod: S$GLB,,, | Performed by: FAMILY MEDICINE

## 2019-09-11 PROCEDURE — 3008F PR BODY MASS INDEX (BMI) DOCUMENTED: ICD-10-PCS | Mod: CPTII,S$GLB,, | Performed by: FAMILY MEDICINE

## 2019-09-11 RX ORDER — AMOXICILLIN 500 MG/1
1000 TABLET, FILM COATED ORAL EVERY 12 HOURS
Qty: 40 TABLET | Refills: 0 | Status: SHIPPED | OUTPATIENT
Start: 2019-09-11 | End: 2019-09-21

## 2019-09-11 NOTE — PROGRESS NOTES
"Subjective:       Patient ID: Carlos Snell is a 29 y.o. female.    Vitals:  height is 5' 6" (1.676 m) and weight is 83.9 kg (185 lb). Her tympanic temperature is 98.5 °F (36.9 °C). Her blood pressure is 131/86 and her pulse is 91. Her respiration is 18 and oxygen saturation is 99%.     Chief Complaint: Sinus Problem    Patient presents with c/o sore throat x 2 weeks. Patient complains of congestion and thick yellow sinus drainage. Patient thinks she has a sinus infection. Patient has tried otc theraflu, nsaids, and cough drops-not much improvement. Patient with hx of asthma but she has run out of her inhaler.     Sinus Problem   Associated symptoms include congestion, coughing, sinus pressure and a sore throat. Pertinent negatives include no chills, diaphoresis, ear pain or shortness of breath.       Constitution: Negative for chills, sweating, fatigue and fever.   HENT: Positive for congestion, sinus pain, sinus pressure, sore throat and voice change. Negative for ear pain.    Neck: Positive for painful lymph nodes.   Eyes: Negative for eye redness.   Respiratory: Positive for cough and sputum production. Negative for chest tightness, bloody sputum, COPD, shortness of breath, stridor, wheezing and asthma.    Gastrointestinal: Positive for nausea. Negative for vomiting.   Musculoskeletal: Negative for muscle ache.   Skin: Negative for rash.   Allergic/Immunologic: Negative for seasonal allergies and asthma.   Hematologic/Lymphatic: Positive for swollen lymph nodes.       Objective:      Physical Exam   Constitutional: She is oriented to person, place, and time. She appears well-developed and well-nourished.   HENT:   Head: Normocephalic and atraumatic.   Right Ear: External ear normal.   Left Ear: External ear normal.   Mild erythema of pharynx, Turbinate edema and congestion   Eyes: Pupils are equal, round, and reactive to light. Conjunctivae and EOM are normal.   Neck: Normal range of motion. Neck supple. No " thyromegaly present.   Cardiovascular: Normal rate, regular rhythm, normal heart sounds and intact distal pulses.   Pulmonary/Chest: Effort normal and breath sounds normal.   Lymphadenopathy:     She has no cervical adenopathy.   Neurological: She is alert and oriented to person, place, and time.   Psychiatric: She has a normal mood and affect. Her behavior is normal. Judgment and thought content normal.   Vitals reviewed.      Assessment:       1. Acute non-recurrent sinusitis, unspecified location    2. Dysphonia        Plan:         Acute non-recurrent sinusitis, unspecified location    Dysphonia      Patient Instructions     Sinusitis (Antibiotic Treatment)    The sinuses are air-filled spaces within the bones of the face. They connect to the inside of the nose. Sinusitis is an inflammation of the tissue lining the sinus cavity. Sinus inflammation can occur during a cold. It can also be due to allergies to pollens and other particles in the air. Sinusitis can cause symptoms of sinus congestion and fullness. A sinus infection causes fever, headache and facial pain. There is often green or yellow drainage from the nose or into the back of the throat (post-nasal drip). You have been given antibiotics to treat this condition.  Home care:  · Take the full course of antibiotics as instructed. Do not stop taking them, even if you feel better.  · Drink plenty of water, hot tea, and other liquids. This may help thin mucus. It also may promote sinus drainage.  · Heat may help soothe painful areas of the face. Use a towel soaked in hot water. Or,  the shower and direct the hot spray onto your face. Using a vaporizer along with a menthol rub at night may also help.   · An expectorant containing guaifenesin may help thin the mucus and promote drainage from the sinuses.  · Over-the-counter decongestants may be used unless a similar medicine was prescribed. Nasal sprays work the fastest. Use one that contains  phenylephrine or oxymetazoline. First blow the nose gently. Then use the spray. Do not use these medicines more often than directed on the label or symptoms may get worse. You may also use tablets containing pseudoephedrine. Avoid products that combine ingredients, because side effects may be increased. Read labels. You can also ask the pharmacist for help. (NOTE: Persons with high blood pressure should not use decongestants. They can raise blood pressure.)  · Over-the-counter antihistamines may help if allergies contributed to your sinusitis.    · Do not use nasal rinses or irrigation during an acute sinus infection, unless told to by your health care provider. Rinsing may spread the infection to other sinuses.  · Use acetaminophen or ibuprofen to control pain, unless another pain medicine was prescribed. (If you have chronic liver or kidney disease or ever had a stomach ulcer, talk with your doctor before using these medicines. Aspirin should never be used in anyone under 18 years of age who is ill with a fever. It may cause severe liver damage.)  · Don't smoke. This can worsen symptoms.  Follow-up care  Follow up with your healthcare provider or our staff if you are not improving within the next week.  When to seek medical advice  Call your healthcare provider if any of these occur:  · Facial pain or headache becoming more severe  · Stiff neck  · Unusual drowsiness or confusion  · Swelling of the forehead or eyelids  · Vision problems, including blurred or double vision  · Fever of 100.4ºF (38ºC) or higher, or as directed by your healthcare provider  · Seizure  · Breathing problems  · Symptoms not resolving within 10 days  Date Last Reviewed: 4/13/2015  © 9401-1027 [employer]. [address] [place] [address], [place] [address]. All rights reserved. This information is not intended as a substitute for professional medical care. Always follow your healthcare professional's instructions.

## 2019-09-11 NOTE — LETTER
September 11, 2019      [facility]  [address] [address]  Phone: [phone]  Fax: [phone]       Patient: Carlos Snell   YOB: 1990  Date of Visit: 09/11/2019    To Whom It May Concern:    Hernandez Snell  was at [facility] on 09/11/2019. She may return to work/school on 09/13/19 without restrictions. If you have any questions or concerns, or if I can be of further assistance, please do not hesitate to contact me.    Sincerely,    Taty CORONA MA

## 2019-09-11 NOTE — PATIENT INSTRUCTIONS
Sinusitis (Antibiotic Treatment)    The sinuses are air-filled spaces within the bones of the face. They connect to the inside of the nose. Sinusitis is an inflammation of the tissue lining the sinus cavity. Sinus inflammation can occur during a cold. It can also be due to allergies to pollens and other particles in the air. Sinusitis can cause symptoms of sinus congestion and fullness. A sinus infection causes fever, headache and facial pain. There is often green or yellow drainage from the nose or into the back of the throat (post-nasal drip). You have been given antibiotics to treat this condition.  Home care:  · Take the full course of antibiotics as instructed. Do not stop taking them, even if you feel better.  · Drink plenty of water, hot tea, and other liquids. This may help thin mucus. It also may promote sinus drainage.  · Heat may help soothe painful areas of the face. Use a towel soaked in hot water. Or,  the shower and direct the hot spray onto your face. Using a vaporizer along with a menthol rub at night may also help.   · An expectorant containing guaifenesin may help thin the mucus and promote drainage from the sinuses.  · Over-the-counter decongestants may be used unless a similar medicine was prescribed. Nasal sprays work the fastest. Use one that contains phenylephrine or oxymetazoline. First blow the nose gently. Then use the spray. Do not use these medicines more often than directed on the label or symptoms may get worse. You may also use tablets containing pseudoephedrine. Avoid products that combine ingredients, because side effects may be increased. Read labels. You can also ask the pharmacist for help. (NOTE: Persons with high blood pressure should not use decongestants. They can raise blood pressure.)  · Over-the-counter antihistamines may help if allergies contributed to your sinusitis.    · Do not use nasal rinses or irrigation during an acute sinus infection, unless told to by  your health care provider. Rinsing may spread the infection to other sinuses.  · Use acetaminophen or ibuprofen to control pain, unless another pain medicine was prescribed. (If you have chronic liver or kidney disease or ever had a stomach ulcer, talk with your doctor before using these medicines. Aspirin should never be used in anyone under 18 years of age who is ill with a fever. It may cause severe liver damage.)  · Don't smoke. This can worsen symptoms.  Follow-up care  Follow up with your healthcare provider or our staff if you are not improving within the next week.  When to seek medical advice  Call your healthcare provider if any of these occur:  · Facial pain or headache becoming more severe  · Stiff neck  · Unusual drowsiness or confusion  · Swelling of the forehead or eyelids  · Vision problems, including blurred or double vision  · Fever of 100.4ºF (38ºC) or higher, or as directed by your healthcare provider  · Seizure  · Breathing problems  · Symptoms not resolving within 10 days  Date Last Reviewed: 4/13/2015  © 5173-8042 [employer]. [address] [place] [address], [place] [address]. All rights reserved. This information is not intended as a substitute for professional medical care. Always follow your healthcare professional's instructions.

## 2019-11-01 ENCOUNTER — OFFICE VISIT (OUTPATIENT)
Dept: URGENT CARE | Facility: CLINIC | Age: 29
End: 2019-11-01
Payer: COMMERCIAL

## 2019-11-01 VITALS
OXYGEN SATURATION: 99 % | SYSTOLIC BLOOD PRESSURE: 113 MMHG | HEART RATE: 82 BPM | DIASTOLIC BLOOD PRESSURE: 78 MMHG | BODY MASS INDEX: 29.73 KG/M2 | TEMPERATURE: 99 F | RESPIRATION RATE: 16 BRPM | HEIGHT: 66 IN | WEIGHT: 185 LBS

## 2019-11-01 DIAGNOSIS — J32.0 CHRONIC MAXILLARY SINUSITIS: ICD-10-CM

## 2019-11-01 DIAGNOSIS — L01.00 IMPETIGO: Primary | ICD-10-CM

## 2019-11-01 PROCEDURE — 3008F PR BODY MASS INDEX (BMI) DOCUMENTED: ICD-10-PCS | Mod: CPTII,S$GLB,, | Performed by: PHYSICIAN ASSISTANT

## 2019-11-01 PROCEDURE — 99214 OFFICE O/P EST MOD 30 MIN: CPT | Mod: S$GLB,,, | Performed by: PHYSICIAN ASSISTANT

## 2019-11-01 PROCEDURE — 99214 PR OFFICE/OUTPT VISIT, EST, LEVL IV, 30-39 MIN: ICD-10-PCS | Mod: S$GLB,,, | Performed by: PHYSICIAN ASSISTANT

## 2019-11-01 PROCEDURE — 3008F BODY MASS INDEX DOCD: CPT | Mod: CPTII,S$GLB,, | Performed by: PHYSICIAN ASSISTANT

## 2019-11-01 RX ORDER — CETIRIZINE HYDROCHLORIDE 10 MG/1
TABLET ORAL
COMMUNITY
End: 2022-07-06

## 2019-11-01 RX ORDER — MUPIROCIN 20 MG/G
OINTMENT TOPICAL 3 TIMES DAILY
Qty: 22 G | Refills: 0 | Status: SHIPPED | OUTPATIENT
Start: 2019-11-01 | End: 2019-11-26

## 2019-11-01 RX ORDER — SULFAMETHOXAZOLE AND TRIMETHOPRIM 800; 160 MG/1; MG/1
1 TABLET ORAL 2 TIMES DAILY
Qty: 14 TABLET | Refills: 0 | Status: SHIPPED | OUTPATIENT
Start: 2019-11-01 | End: 2019-11-08

## 2019-11-01 NOTE — PROGRESS NOTES
"Subjective:       Patient ID: Carlos Snell is a 29 y.o. female.    Vitals:  height is 5' 6" (1.676 m) and weight is 83.9 kg (185 lb). Her temperature is 98.6 °F (37 °C). Her blood pressure is 113/78 and her pulse is 82. Her respiration is 16 and oxygen saturation is 99%.     Chief Complaint: Sinus Problem    Patient c/o sinus congestion, PND, and hoarse voice for 3 months. Patient says her symptoms started when the school year started and have been persistent. She was seen last month for the same issue and was put on Amoxil. States that she never got better after taking the antibiotics. About 1 week ago, she noticed a small lesion in her right nostril that has been getting worse. Also has a scab on her chin and chest.    Sinus Problem   This is a recurrent problem. Episode onset: 3 months  The problem has been waxing and waning since onset. There has been no fever. Associated symptoms include congestion, coughing, sinus pressure and a sore throat. Pertinent negatives include no chills, diaphoresis, ear pain or shortness of breath. Past treatments include antibiotics (Mucinex, theraflu, ).       Constitution: Negative for chills, sweating, fatigue and fever.   HENT: Positive for congestion, postnasal drip, sinus pressure, sore throat and voice change. Negative for ear pain and sinus pain.    Neck: Negative for painful lymph nodes.   Eyes: Negative for eye redness.   Respiratory: Positive for cough. Negative for chest tightness, sputum production, bloody sputum, COPD, shortness of breath, stridor, wheezing and asthma.    Gastrointestinal: Negative for nausea and vomiting.   Musculoskeletal: Negative for muscle ache.   Skin: Positive for erythema. Negative for rash.   Allergic/Immunologic: Negative for seasonal allergies and asthma.   Hematologic/Lymphatic: Negative for swollen lymph nodes.       Objective:      Physical Exam   Constitutional: She is oriented to person, place, and time. She appears well-developed and " well-nourished. She is cooperative.  Non-toxic appearance. She does not have a sickly appearance. She does not appear ill. No distress.   HENT:   Head: Normocephalic and atraumatic.       Right Ear: Hearing, tympanic membrane, external ear and ear canal normal.   Left Ear: Hearing, tympanic membrane, external ear and ear canal normal.   Nose: Mucosal edema present. No rhinorrhea or nasal deformity. No epistaxis. Right sinus exhibits maxillary sinus tenderness. Right sinus exhibits no frontal sinus tenderness. Left sinus exhibits maxillary sinus tenderness. Left sinus exhibits no frontal sinus tenderness.       Mouth/Throat: Uvula is midline, oropharynx is clear and moist and mucous membranes are normal. No trismus in the jaw. Normal dentition. No uvula swelling. No oropharyngeal exudate, posterior oropharyngeal edema or posterior oropharyngeal erythema.   Eyes: Conjunctivae and lids are normal. No scleral icterus.   Neck: Trachea normal, full passive range of motion without pain and phonation normal. Neck supple. No neck rigidity. No edema and no erythema present.   Cardiovascular: Normal rate, regular rhythm, normal heart sounds, intact distal pulses and normal pulses.   Pulmonary/Chest: Effort normal and breath sounds normal. No respiratory distress. She has no decreased breath sounds. She has no rhonchi.       Abdominal: Normal appearance.   Musculoskeletal: Normal range of motion. She exhibits no edema or deformity.   Neurological: She is alert and oriented to person, place, and time. She exhibits normal muscle tone. Coordination normal.   Skin: Skin is warm, dry, intact, not diaphoretic, not pale and rash. Lesions:  erythema  Psychiatric: She has a normal mood and affect. Her speech is normal and behavior is normal. Judgment and thought content normal. Cognition and memory are normal.   Nursing note and vitals reviewed.        Assessment:       1. Impetigo    2. Chronic maxillary sinusitis        Plan:      "    Impetigo  -     sulfamethoxazole-trimethoprim 800-160mg (BACTRIM DS) 800-160 mg Tab; Take 1 tablet by mouth 2 (two) times daily. for 7 days  Dispense: 14 tablet; Refill: 0  -     mupirocin (BACTROBAN) 2 % ointment; Apply topically 3 (three) times daily.  Dispense: 22 g; Refill: 0    Chronic maxillary sinusitis  -     Ambulatory referral to ENT          Patient Instructions   General Discharge Instructions   If you were prescribed a narcotic or controlled medication, do not drive or operate heavy equipment or machinery while taking these medications.  If you were prescribed antibiotics, please take them to completion.  You must understand that you've received an Urgent Care treatment only and that you may be released before all your medical problems are known or treated. You, the patient, will arrange for follow up care as instructed.  Follow up with your PCP or specialty clinic as directed in the next 1-2 weeks if not improved or as needed.  You can call [phone] to schedule an appointment with the appropriate provider.  If your condition worsens we recommend that you receive another evaluation at the emergency room immediately or contact your primary medical clinics after hours call service to discuss your concerns.  Please return here or go to the Emergency Department for any concerns or worsening of condition.      Impetigo  Impetigo is a common bacterial infection of the skin that can appear on many parts of the body. It can happen to anyone, of any age, but is more common in children. For this reason, it used to be called "school sores."  Causes  Its normal to get scrapes on your body from activity or from scratching your skin. The skin normally has bacteria on it. Sometimes an impetigo infection can start on healthy skin. But it usually starts when there is an injury to the skin, or break in the skin. Although nothing usually happens, the bacteria normally on the skin can cause infection. This is " the most common way people get impetigo.  Impetigo is very contagious. So once there is an infection, it needs to be treated so it doesn't get worse, spread to other areas, or to other people. Impetigo can easily be passed to other family members, friends, schoolmates, or co-workers, through scratching, rubbing, or touching an infected area. Common causes include:  · After a cold  · Bites  · From another infected person  · Injury to skin  · Insect bites  · Other skin problems that are infected, such as eczema  · Scratches  Symptoms  There is often a skin injury like a scratch, scrape, or insect bite that may have gone unnoticed or been ignored before the infection began. Symptoms of impetigo include:  · Red, inflamed area or rash  · One or many red bumps  · Bumps that turn into blisters filled with yellow fluid or pus  · Blisters break or leak causing honey-colored crusting or scabbing over the area  · Skin sores that spread to other surrounding areas  Home care  The following guidelines will help you care for your infection at home.  Wound care  · Trim fingernails and cover sores with an adhesive bandage, if needed, to prevent scratching. Picking at the sores may leave a scar.  · If the infection is on or around your lips, don't lick or chew on the sores. This will make the infection worse.  · If a bandage or dressing is used, you can put a nonstick dressing over it.  · Wash your hands and your childs hands often. This will avoid spreading the infection to other parts of the body and to other people. Do not share the infected persons washcloths, towels, pillows, sheets, or clothes with others. Wash these items in hot water before using again.  · Clean the area several times a day. You dont want to scrub the area. The best way to do this is to soak the sores in warm, soapy water until they get soft enough to be wiped away. This will help remove the crust that forms from the dried liquid. In areas that you cant  soak, like the mouth or face, you can put a clean, warm washcloth over the infected are for 5 to 10 minutes at a time, until the scabs soften enough to remove.  Medicines  · You can use over-the-counter medicine as directed based on age and weight for pain, fever, fussiness, or discomfort, unless another medicine was prescribed. In infants ages 6 months and older, you may use ibuprofen as well as acetaminophen. You can alternate them, or use both together. They work differently and are a different class of medicines, so taking them together is not an overdose. If you or your child has chronic liver or kidney disease or ever had a stomach ulcer or gastrointestinal bleeding, talk with your healthcare provider before using these medicines. Also talk with your healthcare provider if your child is taking blood-thinner medicines.  · Do not give aspirin to your child. Aspirin should never be used in children ages 18 and younger who is ill with a fever. It may cause severe disease or death.   · Impetigo can often be cured with topical creams. Apply these as directed by your healthcare provider.  · If you were given oral antibiotics, take them until they are used up. It is important to finish the antibiotics even if the wound looks better to make sure the infection has cleared.  Follow-up care  Follow up with your healthcare provider if the sores continue to spread after 3 days of treatment. It will take about 7 to 10 days to heal completely.  Your child should stay out of school until completing 2 full days of antibiotic treatment.  When to seek medical advice  Call your healthcare provider right away if any of the following occur:  · Fever of 100.4°F (38°C) or higher, or as directed  · Increased amounts of fluid or pus coming from the sores  · Increasing number of sores or spreading areas of redness after 2 days of treatment with antibiotics  · Increasing swelling or pain  · Loss of appetite or vomiting  · Unusual  drowsiness, weakness, or change in behavior  Date Last Reviewed: 8/1/2016  © 7867-8576 [employer]. [address] [place] [address], [place] [address]. All rights reserved. This information is not intended as a substitute for professional medical care. Always follow your healthcare professional's instructions.

## 2019-11-01 NOTE — PATIENT INSTRUCTIONS
"General Discharge Instructions   If you were prescribed a narcotic or controlled medication, do not drive or operate heavy equipment or machinery while taking these medications.  If you were prescribed antibiotics, please take them to completion.  You must understand that you've received an Urgent Care treatment only and that you may be released before all your medical problems are known or treated. You, the patient, will arrange for follow up care as instructed.  Follow up with your PCP or specialty clinic as directed in the next 1-2 weeks if not improved or as needed.  You can call [phone] to schedule an appointment with the appropriate provider.  If your condition worsens we recommend that you receive another evaluation at the emergency room immediately or contact your primary medical clinics after hours call service to discuss your concerns.  Please return here or go to the Emergency Department for any concerns or worsening of condition.      Impetigo  Impetigo is a common bacterial infection of the skin that can appear on many parts of the body. It can happen to anyone, of any age, but is more common in children. For this reason, it used to be called "school sores."  Causes  Its normal to get scrapes on your body from activity or from scratching your skin. The skin normally has bacteria on it. Sometimes an impetigo infection can start on healthy skin. But it usually starts when there is an injury to the skin, or break in the skin. Although nothing usually happens, the bacteria normally on the skin can cause infection. This is the most common way people get impetigo.  Impetigo is very contagious. So once there is an infection, it needs to be treated so it doesn't get worse, spread to other areas, or to other people. Impetigo can easily be passed to other family members, friends, schoolmates, or co-workers, through scratching, rubbing, or touching an infected area. Common causes include:  · After a " cold  · Bites  · From another infected person  · Injury to skin  · Insect bites  · Other skin problems that are infected, such as eczema  · Scratches  Symptoms  There is often a skin injury like a scratch, scrape, or insect bite that may have gone unnoticed or been ignored before the infection began. Symptoms of impetigo include:  · Red, inflamed area or rash  · One or many red bumps  · Bumps that turn into blisters filled with yellow fluid or pus  · Blisters break or leak causing honey-colored crusting or scabbing over the area  · Skin sores that spread to other surrounding areas  Home care  The following guidelines will help you care for your infection at home.  Wound care  · Trim fingernails and cover sores with an adhesive bandage, if needed, to prevent scratching. Picking at the sores may leave a scar.  · If the infection is on or around your lips, don't lick or chew on the sores. This will make the infection worse.  · If a bandage or dressing is used, you can put a nonstick dressing over it.  · Wash your hands and your childs hands often. This will avoid spreading the infection to other parts of the body and to other people. Do not share the infected persons washcloths, towels, pillows, sheets, or clothes with others. Wash these items in hot water before using again.  · Clean the area several times a day. You dont want to scrub the area. The best way to do this is to soak the sores in warm, soapy water until they get soft enough to be wiped away. This will help remove the crust that forms from the dried liquid. In areas that you cant soak, like the mouth or face, you can put a clean, warm washcloth over the infected are for 5 to 10 minutes at a time, until the scabs soften enough to remove.  Medicines  · You can use over-the-counter medicine as directed based on age and weight for pain, fever, fussiness, or discomfort, unless another medicine was prescribed. In infants ages 6 months and older, you may use  ibuprofen as well as acetaminophen. You can alternate them, or use both together. They work differently and are a different class of medicines, so taking them together is not an overdose. If you or your child has chronic liver or kidney disease or ever had a stomach ulcer or gastrointestinal bleeding, talk with your healthcare provider before using these medicines. Also talk with your healthcare provider if your child is taking blood-thinner medicines.  · Do not give aspirin to your child. Aspirin should never be used in children ages 18 and younger who is ill with a fever. It may cause severe disease or death.   · Impetigo can often be cured with topical creams. Apply these as directed by your healthcare provider.  · If you were given oral antibiotics, take them until they are used up. It is important to finish the antibiotics even if the wound looks better to make sure the infection has cleared.  Follow-up care  Follow up with your healthcare provider if the sores continue to spread after 3 days of treatment. It will take about 7 to 10 days to heal completely.  Your child should stay out of school until completing 2 full days of antibiotic treatment.  When to seek medical advice  Call your healthcare provider right away if any of the following occur:  · Fever of 100.4°F (38°C) or higher, or as directed  · Increased amounts of fluid or pus coming from the sores  · Increasing number of sores or spreading areas of redness after 2 days of treatment with antibiotics  · Increasing swelling or pain  · Loss of appetite or vomiting  · Unusual drowsiness, weakness, or change in behavior  Date Last Reviewed: 8/1/2016  © 1685-8401 [employer]. [address] [place] [address], [place] [address]. All rights reserved. This information is not intended as a substitute for professional medical care. Always follow your healthcare professional's instructions.

## 2019-11-01 NOTE — LETTER
November 1, 2019      [facility] [address] [place] [address]  Phone: [phone]  Fax: [phone]       Patient: Carlos Snell   YOB: 1990  Date of Visit: 11/01/2019    To Whom It May Concern:    Hernandez Snell  was at [facility] on 11/01/2019. She may return to work/school on 11/4/2019 with no restrictions. If you have any questions or concerns, or if I can be of further assistance, please do not hesitate to contact me.    Sincerely,      John Hernandez PA-C

## 2019-11-08 ENCOUNTER — OFFICE VISIT (OUTPATIENT)
Dept: OTOLARYNGOLOGY | Facility: CLINIC | Age: 29
End: 2019-11-08
Payer: COMMERCIAL

## 2019-11-08 DIAGNOSIS — J31.0 CHRONIC RHINITIS: ICD-10-CM

## 2019-11-08 DIAGNOSIS — J01.91 ACUTE RECURRENT SINUSITIS, UNSPECIFIED LOCATION: Primary | ICD-10-CM

## 2019-11-08 DIAGNOSIS — R49.0 HOARSENESS: ICD-10-CM

## 2019-11-08 PROCEDURE — 99999 PR PBB SHADOW E&M-EST. PATIENT-LVL II: CPT | Mod: PBBFAC,,, | Performed by: NURSE PRACTITIONER

## 2019-11-08 PROCEDURE — 99214 PR OFFICE/OUTPT VISIT, EST, LEVL IV, 30-39 MIN: ICD-10-PCS | Mod: S$GLB,,, | Performed by: NURSE PRACTITIONER

## 2019-11-08 PROCEDURE — 99999 PR PBB SHADOW E&M-EST. PATIENT-LVL II: ICD-10-PCS | Mod: PBBFAC,,, | Performed by: NURSE PRACTITIONER

## 2019-11-08 PROCEDURE — 99214 OFFICE O/P EST MOD 30 MIN: CPT | Mod: S$GLB,,, | Performed by: NURSE PRACTITIONER

## 2019-11-08 RX ORDER — AMOXICILLIN AND CLAVULANATE POTASSIUM 875; 125 MG/1; MG/1
1 TABLET, FILM COATED ORAL 2 TIMES DAILY
Qty: 20 TABLET | Refills: 0 | Status: SHIPPED | OUTPATIENT
Start: 2019-11-08 | End: 2019-11-18

## 2019-11-08 RX ORDER — FLUTICASONE PROPIONATE 50 MCG
2 SPRAY, SUSPENSION (ML) NASAL DAILY
Qty: 1 BOTTLE | Refills: 5 | Status: SHIPPED | OUTPATIENT
Start: 2019-11-08 | End: 2019-12-08

## 2019-11-08 RX ORDER — METHYLPREDNISOLONE 4 MG/1
TABLET ORAL
Qty: 1 PACKAGE | Refills: 0 | Status: SHIPPED | OUTPATIENT
Start: 2019-11-08 | End: 2019-11-26

## 2019-11-08 NOTE — LETTER
November 8, 2019      John Hernandez PA-C  [address] [facility]  [address]  [facility] [address]           [facility] Otorhinol[facility]  [address]  [facility] [address]  Phone: [phone]  Fax: [phone]          Patient: Carlos Snell   MR Number: [id number]   YOB: 1990   Date of Visit: 11/8/2019       Dear John Hernandez:    Thank you for referring Carlos Snell to me for evaluation. Attached you will find relevant portions of my assessment and plan of care.    If you have questions, please do not hesitate to call me. I look forward to following Carlos Snell along with you.    Sincerely,    Chad Norman, NP    Enclosure  CC:  No Recipients    If you would like to receive this communication electronically, please contact [email or web address] or [phone] to request more information on [employer] access.    For providers and/or their staff who would like to refer a patient to [facility], please contact us through our one-stop-shop provider referral line, [facility], at [phone].    If you feel you have received this communication in error or would no longer like to receive these types of communications, please e-mail [email or web address]

## 2019-11-08 NOTE — PROGRESS NOTES
Subjective:      Carlos Snell is a 29 y.o. female who was referred to me by John Hernandez in consultation for sinusitis.    Ms. Snell presents today with sinus pressure with associated yellow nasal drainage, post-nasal drip, fever, productive cough for the past week and a half. She reports a long history of rhinorrhea, itchy nose, sneezing and hoarseness that comes and goes throughout the year. She has tried allegra with limited benefit. She states she had just completed a course of Bactrim for nasal vestibulitis. She also reports frequent hoarseness that occurs several times throughout the year that is exacerbated when she get sick.    Current sinonasal medications as above.  She does not regularly use nasal decongestant sprays.    She does not recall previously having allergy testing.    She relates a history of asthma which is currently managed with Albuterol and Advair.    She relates a history of reflux symptoms which is currently managed with antacids.  She has not previously had an EGD.    She denies have a diagnosis of obstructive sleep apnea.     She has not had sinonasal surgery.    She does not recall a prior history of nasal trauma.      Past Medical History  She has a past medical history of Asthma, Chronic back pain, Migraine headache, and Pyelonephritis.    Past Surgical History  She has a past surgical history that includes Back surgery (2013).    Family History  Her family history includes No Known Problems in her father and mother.    Social History  She reports that she has never smoked. She has never used smokeless tobacco. She reports that she drinks alcohol. She reports that she does not use drugs.    Allergies  She is allergic to lactose and gluten protein.    Medications   She has a current medication list which includes the following prescription(s): cetirizine, mupirocin, sulfamethoxazole-trimethoprim 800-160mg, amoxicillin-clavulanate 875-125mg, fluticasone propionate, and  methylprednisolone.    Review of Systems  Review of Systems   Constitutional: Positive for fatigue and fever. Negative for chills.   HENT: Positive for congestion, postnasal drip, rhinorrhea, sinus pressure, sinus pain, sore throat and voice change. Negative for ear discharge, ear pain, facial swelling, nosebleeds, sneezing, tinnitus and trouble swallowing.    Eyes: Positive for itching. Negative for photophobia, redness and visual disturbance.   Respiratory: Positive for cough (productive). Negative for apnea, shortness of breath, wheezing and stridor.    Cardiovascular: Negative for chest pain and palpitations.   Gastrointestinal: Negative for diarrhea, nausea and vomiting.   Endocrine: Negative.    Genitourinary: Negative for decreased urine volume, dysuria and frequency.   Musculoskeletal: Negative for arthralgias, myalgias and neck stiffness.   Skin: Negative for rash and wound.   Allergic/Immunologic: Positive for environmental allergies. Negative for food allergies and immunocompromised state.   Neurological: Negative for dizziness, syncope, weakness, light-headedness and headaches.   Hematological: Negative for adenopathy. Does not bruise/bleed easily.   Psychiatric/Behavioral: Negative for confusion, decreased concentration and sleep disturbance.          Objective:     LMP 10/18/2019 (Exact Date)        Constitutional:   She is oriented to person, place, and time. Vital signs are normal. She appears well-developed and well-nourished. She appears alert. Normal speech.      Head:  Normocephalic and atraumatic.     Ears:    Right Ear: No lacerations. No drainage, swelling or tenderness. No foreign bodies. No mastoid tenderness. Tympanic membrane is not injected, not scarred, not perforated, not erythematous, not retracted and not bulging. Tympanic membrane mobility is normal. No middle ear effusion. No hemotympanum.   Left Ear: No lacerations. No drainage, swelling or tenderness. No foreign bodies. No mastoid  tenderness. Tympanic membrane is not injected, not scarred, not perforated, not erythematous, not retracted and not bulging. Tympanic membrane mobility is normal.  No middle ear effusion. No hemotympanum.     Nose:  Mucosal edema and rhinorrhea present. No nose lacerations, sinus tenderness, septal deviation, nasal septal hematoma or polyps. No epistaxis.  No foreign bodies. No turbinate hypertrophy.  Right sinus exhibits maxillary sinus tenderness. Right sinus exhibits no frontal sinus tenderness. Left sinus exhibits maxillary sinus tenderness and frontal sinus tenderness.     Mouth/Throat  Oropharynx clear and moist without lesions or asymmetry, normal uvula midline and lips, teeth, and gums normal. No uvula swelling, oral lesions, trismus, mucous membrane lesions or xerostomia. No oropharyngeal exudate, posterior oropharyngeal edema or posterior oropharyngeal erythema.     Neck:  Neck normal without thyromegaly masses, asymmetry, normal tracheal structure, crepitus, and tenderness.     She has cervical adenopathy.        Right cervical: Superficial cervical adenopathy present.        Left cervical: Superficial cervical adenopathy present.     Psychiatric:   She has a normal mood and affect. Her speech is normal and behavior is normal.     Neurological:   She is alert and oriented to person, place, and time. No cranial nerve deficit.     Skin:   No abrasions, lacerations, lesions, or rashes.       Procedure    None        Data Reviewed    WBC (K/uL)   Date Value   08/28/2017 11.84     Eosinophil% (%)   Date Value   08/28/2017 0.1     Eos # (K/uL)   Date Value   08/28/2017 0.0     Platelets (K/uL)   Date Value   08/28/2017 249     Glucose (mg/dL)   Date Value   08/28/2017 116 (H)     No results found for: IGE    No sinus imaging available.       Assessment:     1. Acute recurrent sinusitis, unspecified location    2. Chronic rhinitis    3. Hoarseness         Plan:     I had a long discussion with the patient  regarding her condition and the further workup and management options.    Carlos was seen today for sore throat.    Diagnoses and all orders for this visit:    Acute recurrent sinusitis, unspecified location  -     methylPREDNISolone (MEDROL DOSEPACK) 4 mg tablet; Use as directed  -     amoxicillin-clavulanate 875-125mg (AUGMENTIN) 875-125 mg per tablet; Take 1 tablet by mouth 2 (two) times daily. for 10 days    Chronic rhinitis  -     fluticasone propionate (FLONASE) 50 mcg/actuation nasal spray; 2 sprays (100 mcg total) by Each Nostril route once daily.    Hoarseness          -    Continue Allegra daily      Follow up in about 3 months (around 2/8/2020), or if symptoms worsen or fail to improve.

## 2019-11-11 DIAGNOSIS — L01.00 IMPETIGO: ICD-10-CM

## 2019-11-11 RX ORDER — SULFAMETHOXAZOLE AND TRIMETHOPRIM 800; 160 MG/1; MG/1
TABLET ORAL
Qty: 14 TABLET | Refills: 0 | OUTPATIENT
Start: 2019-11-11

## 2019-11-11 RX ORDER — MUPIROCIN 20 MG/G
OINTMENT TOPICAL
Qty: 22 G | Refills: 0 | OUTPATIENT
Start: 2019-11-11

## 2019-11-23 ENCOUNTER — OFFICE VISIT (OUTPATIENT)
Dept: OBSTETRICS AND GYNECOLOGY | Facility: CLINIC | Age: 29
End: 2019-11-23
Payer: COMMERCIAL

## 2019-11-23 VITALS
BODY MASS INDEX: 32.87 KG/M2 | WEIGHT: 204.5 LBS | DIASTOLIC BLOOD PRESSURE: 80 MMHG | SYSTOLIC BLOOD PRESSURE: 120 MMHG | HEIGHT: 66 IN

## 2019-11-23 DIAGNOSIS — N76.0 ACUTE VAGINITIS: Primary | ICD-10-CM

## 2019-11-23 PROCEDURE — 99203 OFFICE O/P NEW LOW 30 MIN: CPT | Mod: S$GLB,,, | Performed by: NURSE PRACTITIONER

## 2019-11-23 PROCEDURE — 99203 PR OFFICE/OUTPT VISIT, NEW, LEVL III, 30-44 MIN: ICD-10-PCS | Mod: S$GLB,,, | Performed by: NURSE PRACTITIONER

## 2019-11-23 PROCEDURE — 3008F BODY MASS INDEX DOCD: CPT | Mod: CPTII,S$GLB,, | Performed by: NURSE PRACTITIONER

## 2019-11-23 PROCEDURE — 99999 PR PBB SHADOW E&M-EST. PATIENT-LVL III: ICD-10-PCS | Mod: PBBFAC,,, | Performed by: NURSE PRACTITIONER

## 2019-11-23 PROCEDURE — 99999 PR PBB SHADOW E&M-EST. PATIENT-LVL III: CPT | Mod: PBBFAC,,, | Performed by: NURSE PRACTITIONER

## 2019-11-23 PROCEDURE — 87661 TRICHOMONAS VAGINALIS AMPLIF: CPT

## 2019-11-23 PROCEDURE — 3008F PR BODY MASS INDEX (BMI) DOCUMENTED: ICD-10-PCS | Mod: CPTII,S$GLB,, | Performed by: NURSE PRACTITIONER

## 2019-11-23 RX ORDER — FLUCONAZOLE 150 MG/1
150 TABLET ORAL ONCE
Qty: 1 TABLET | Refills: 0 | Status: SHIPPED | OUTPATIENT
Start: 2019-11-23 | End: 2019-11-23

## 2019-11-23 NOTE — PROGRESS NOTES
Chief Complaint   Patient presents with    Vaginal Discharge       History of Present Illness: Carlos Snell is a 29 y.o. female that presents today 2019   Pt presents [facility] c/p vaginal itching and clumpy white discharge x 1 week. She denies any odor or burning. She tried using Monistat 1 day 5 days ago, but denies getting any relief. She denies using any scented products in the vaginal area or changing any detergents or hygiene products. No other complaints or concerns noted.     Past Medical History:   Diagnosis Date    Asthma     Chronic back pain     Migraine headache     Pyelonephritis        Past Surgical History:   Procedure Laterality Date    BACK SURGERY         Current Outpatient Medications   Medication Sig Dispense Refill    cetirizine (ZYRTEC) 10 MG tablet cetirizine 10 mg tablet      fluticasone propionate (FLONASE) 50 mcg/actuation nasal spray 2 sprays (100 mcg total) by Each Nostril route once daily. 1 Bottle 5    mupirocin (BACTROBAN) 2 % ointment Apply topically 3 (three) times daily. 22 g 0    fluconazole (DIFLUCAN) 150 MG Tab Take 1 tablet (150 mg total) by mouth once. for 1 dose 1 tablet 0    methylPREDNISolone (MEDROL DOSEPACK) 4 mg tablet Use as directed (Patient not taking: Reported on 2019) 1 Package 0     No current facility-administered medications for this visit.        Review of patient's allergies indicates:   Allergen Reactions    Lactose Diarrhea    Gluten protein Itching     Inflammation, bloating, diarrhea and pain all over body         Family History   Problem Relation Age of Onset    No Known Problems Mother     No Known Problems Father        Social History     Tobacco Use    Smoking status: Never Smoker    Smokeless tobacco: Never Used   Substance Use Topics    Alcohol use: Yes     Comment: 1-3 drinks every 2 weeks    Drug use: No       OB History    Para Term  AB Living   0 0 0 0 0 0   SAB TAB Ectopic  "Multiple Live Births   0 0 0 0 0       Review of Symptoms:  GENERAL: Denies weight gain or weight loss. Feeling well overall.   SKIN: Denies rash or lesions.   HEAD: Denies head injury or headache.   NODES: Denies enlarged lymph nodes.   CHEST: Denies chest pain or shortness of breath.   CARDIOVASCULAR: Denies palpitations or left sided chest pain.   ABDOMEN: No abdominal pain, constipation, diarrhea, nausea, vomiting or rectal bleeding.   URINARY: No frequency, dysuria, hematuria, or burning on urination.    /80   Ht 5' 6" (1.676 m)   Wt 92.8 kg (204 lb 7.6 oz)   LMP 11/13/2019   Physical Exam:  APPEARANCE: Well nourished, well developed, in no acute distress.  SKIN: Normal skin turgor, no lesions.  NECK: Neck symmetric without masses   RESPIRATORY: Normal respiratory effort with no retractions or use of accessory muscles  CARDIOVASCULAR: Peripheral vascular system with no swelling no varicosities and palpation of pulses normal  LYMPHATIC: No enlargements of the lymph nodes noted in the neck, axillae, or groin  ABDOMEN: Soft. No tenderness or masses. No hepatosplenomegaly. No hernias.  PELVIC: Normal external female genitalia without lesions. Normal hair distribution. Adequate perineal body, normal urethral meatus. Urethra with no masses.  Bladder nontender. Vagina moist and well rugated without lesions, + thick white clumpy discharge. Cervix pink and without lesions. No significant cystocele or rectocele.     ASSESSMENT/PLAN:  Acute vaginitis  -     Vaginosis Screen by DNA Probe    Other orders  -     Discontinue: fluconazole (DIFLUCAN) 150 MG Tab; Take 1 tablet (150 mg total) by mouth once. for 1 dose  Dispense: 1 tablet; Refill: 0  -     fluconazole (DIFLUCAN) 150 MG Tab; Take 1 tablet (150 mg total) by mouth once. for 1 dose  Dispense: 1 tablet; Refill: 0        -Reinforced to avoid any scented products in the vaginal area such as bubble baths, bath bombs, scented soaps/body washes, douches, feminine " washes, etc... Also wear cotton underwear and change out of wet/sweaty clothing as soon as possible. Pt verbalized understanding.      Follow-up:  Will f/u with results  RTC if symptoms worsen or do not improve  RTC as needed

## 2019-11-25 ENCOUNTER — PATIENT MESSAGE (OUTPATIENT)
Dept: OBSTETRICS AND GYNECOLOGY | Facility: CLINIC | Age: 29
End: 2019-11-25

## 2019-11-25 ENCOUNTER — OFFICE VISIT (OUTPATIENT)
Dept: OTOLARYNGOLOGY | Facility: CLINIC | Age: 29
End: 2019-11-25
Payer: COMMERCIAL

## 2019-11-25 DIAGNOSIS — R49.0 PERSISTENT HOARSENESS: ICD-10-CM

## 2019-11-25 DIAGNOSIS — F44.4 FUNCTIONAL DYSPHONIA: Primary | ICD-10-CM

## 2019-11-25 PROBLEM — D64.9 NORMOCYTIC ANEMIA: Status: ACTIVE | Noted: 2019-11-25

## 2019-11-25 PROBLEM — N83.202 LEFT OVARIAN CYST: Status: ACTIVE | Noted: 2019-11-25

## 2019-11-25 PROCEDURE — 31575 DIAGNOSTIC LARYNGOSCOPY: CPT | Mod: S$GLB,,, | Performed by: NURSE PRACTITIONER

## 2019-11-25 PROCEDURE — 99999 PR PBB SHADOW E&M-EST. PATIENT-LVL III: CPT | Mod: PBBFAC,,, | Performed by: NURSE PRACTITIONER

## 2019-11-25 PROCEDURE — 99213 PR OFFICE/OUTPT VISIT, EST, LEVL III, 20-29 MIN: ICD-10-PCS | Mod: 25,S$GLB,, | Performed by: NURSE PRACTITIONER

## 2019-11-25 PROCEDURE — 31575 LARYNGOSCOPY: ICD-10-PCS | Mod: S$GLB,,, | Performed by: NURSE PRACTITIONER

## 2019-11-25 PROCEDURE — 99999 PR PBB SHADOW E&M-EST. PATIENT-LVL III: ICD-10-PCS | Mod: PBBFAC,,, | Performed by: NURSE PRACTITIONER

## 2019-11-25 PROCEDURE — 99213 OFFICE O/P EST LOW 20 MIN: CPT | Mod: 25,S$GLB,, | Performed by: NURSE PRACTITIONER

## 2019-11-25 RX ORDER — TINIDAZOLE 500 MG/1
2 TABLET ORAL
Qty: 8 TABLET | Refills: 0 | Status: SHIPPED | OUTPATIENT
Start: 2019-11-25 | End: 2019-11-26

## 2019-11-25 NOTE — PROGRESS NOTES
Subjective:      Carlos is a 29 y.o. female who comes for follow-up of sinusitis.  Her last visit with me was on 11/8/2019.  Ms. Snell presents reports significant improvement of sinus symptoms following course of antibiotic and steroids. She continues with hoarseness. She states the hoarseness is worse in the mornings, but she will stay hoarse throughout the day. She denies shortness of breath or difficulty with swallowing.    HPI (11/8/19):  Carlos Snell is a 29 y.o. female who was referred to me by John Hernandez in consultation for sinusitis. Ms. Snell presents today with sinus pressure with associated yellow nasal drainage, post-nasal drip, fever, productive cough for the past week and a half. She reports a long history of rhinorrhea, itchy nose, sneezing and hoarseness that comes and goes throughout the year. She has tried allegra with limited benefit. She states she had just completed a course of Bactrim for nasal vestibulitis. She also reports frequent hoarseness that occurs several times throughout the year that is exacerbated when she get sick.  Current sinonasal medications as above.  She does not regularly use nasal decongestant sprays. She does not recall previously having allergy testing. She relates a history of asthma which is currently managed with Albuterol and Advair. She relates a history of reflux symptoms which is currently managed with antacids.  She has not previously had an EGD. She denies have a diagnosis of obstructive sleep apnea.  She has not had sinonasal surgery. She does not recall a prior history of nasal trauma.      The patient's medications, allergies, past medical, surgical, social and family histories were reviewed and updated as appropriate.    A detailed review of systems was obtained with pertinent positives as per the above HPI, and otherwise negative.        Objective:     LMP 11/13/2019        Constitutional:   She is oriented to person, place, and time. Vital  signs are normal. She appears well-developed and well-nourished. She appears alert. Normal speech.      Head:  Normocephalic and atraumatic.     Ears:    Right Ear: No lacerations. No drainage, swelling or tenderness. No foreign bodies. No mastoid tenderness. Tympanic membrane is not injected, not scarred, not perforated, not erythematous, not retracted and not bulging. Tympanic membrane mobility is normal. No middle ear effusion. No hemotympanum.   Left Ear: No lacerations. No drainage, swelling or tenderness. No foreign bodies. No mastoid tenderness. Tympanic membrane is not injected, not scarred, not perforated, not erythematous, not retracted and not bulging. Tympanic membrane mobility is normal.  No middle ear effusion. No hemotympanum.     Nose:  No mucosal edema, rhinorrhea, nose lacerations, sinus tenderness, septal deviation, nasal septal hematoma or polyps. No epistaxis.  No foreign bodies. No turbinate hypertrophy.  Right sinus exhibits no maxillary sinus tenderness and no frontal sinus tenderness. Left sinus exhibits no maxillary sinus tenderness and no frontal sinus tenderness.     Mouth/Throat  Oropharynx clear and moist without lesions or asymmetry, normal uvula midline and lips, teeth, and gums normal. No uvula swelling, oral lesions, trismus, mucous membrane lesions or xerostomia. No oropharyngeal exudate, posterior oropharyngeal edema or posterior oropharyngeal erythema.     Neck:  Neck normal without thyromegaly masses, asymmetry, normal tracheal structure, crepitus, and tenderness and no adenopathy.     Psychiatric:   She has a normal mood and affect. Her speech is normal and behavior is normal.     Neurological:   She is alert and oriented to person, place, and time. No cranial nerve deficit.     Skin:   No abrasions, lacerations, lesions, or rashes.     Voice: rough, forced, high pitched    Procedure    Flexible laryngoscopy performed.  See procedure note.      Data Reviewed    WBC (K/uL)    Date Value   08/28/2017 11.84     Eosinophil% (%)   Date Value   08/28/2017 0.1     Eos # (K/uL)   Date Value   08/28/2017 0.0     Platelets (K/uL)   Date Value   08/28/2017 249     Glucose (mg/dL)   Date Value   08/28/2017 116 (H)     No results found for: IGE    No sinus imaging available.      Assessment:     1. Functional dysphonia    2. Persistent hoarseness         Plan:     Sinusitis is now resolved, but she continues with dysphonia. I believe this to be functional in nature.  I placed referral to speech pathology for evaluation.  Follow up if symptoms worsen or fail to improve.

## 2019-11-25 NOTE — PROCEDURES
"Laryngoscopy  Date/Time: 11/25/2019 1:00 PM  Performed by: Chad Norman NP  Authorized by: Chad Norman NP     Time out: Immediately prior to procedure a "time out" was called to verify the correct patient, procedure, equipment, support staff and site/side marked as required.    Consent Done?:  Yes (Verbal)  Anesthesia:     Local anesthetic:  Lidocaine 4% and Steve-Synephrine 1/2%    Patient tolerance:  Patient tolerated the procedure well with no immediate complications  Laryngoscopy:     Areas examined:  Nasal cavities, nasopharynx, oropharynx, hypopharynx, larynx and vocal cords    Laryngoscope size:  4 mm  Nose Intranasal:      Mucosa no polyps     Mucosa ulcers not present     No mucosa lesions present     No septum gross deformity     Turbinates not enlarged  Nasopharynx:      No mucosa lesions     Adenoids not present     Posterior choanae patent     Eustachian tube not patent  Larynx/hypopharynx:      No epiglottis lesions     No epiglottis edema     No AE folds lesions     No vocal cord polyps     Not equal and normal bilateral     No hypopharynx lesions     No piriform sinus pooling     No piriform sinus lesions     No post cricoid edema     No post cricoid erythema     Normal exam findings.  Examined with scope # [id number]      "

## 2019-11-26 ENCOUNTER — OFFICE VISIT (OUTPATIENT)
Dept: INTERNAL MEDICINE | Facility: CLINIC | Age: 29
End: 2019-11-26
Payer: COMMERCIAL

## 2019-11-26 VITALS
DIASTOLIC BLOOD PRESSURE: 84 MMHG | BODY MASS INDEX: 32.59 KG/M2 | SYSTOLIC BLOOD PRESSURE: 118 MMHG | WEIGHT: 202.81 LBS | HEART RATE: 100 BPM | OXYGEN SATURATION: 98 % | HEIGHT: 66 IN

## 2019-11-26 DIAGNOSIS — N83.202 LEFT OVARIAN CYST: ICD-10-CM

## 2019-11-26 DIAGNOSIS — Z13.220 ENCOUNTER FOR LIPID SCREENING FOR CARDIOVASCULAR DISEASE: ICD-10-CM

## 2019-11-26 DIAGNOSIS — G43.909 MIGRAINE WITHOUT STATUS MIGRAINOSUS, NOT INTRACTABLE, UNSPECIFIED MIGRAINE TYPE: ICD-10-CM

## 2019-11-26 DIAGNOSIS — Z13.6 ENCOUNTER FOR LIPID SCREENING FOR CARDIOVASCULAR DISEASE: ICD-10-CM

## 2019-11-26 DIAGNOSIS — M54.9 CHRONIC BACK PAIN, UNSPECIFIED BACK LOCATION, UNSPECIFIED BACK PAIN LATERALITY: ICD-10-CM

## 2019-11-26 DIAGNOSIS — F33.1 MODERATE EPISODE OF RECURRENT MAJOR DEPRESSIVE DISORDER: Primary | ICD-10-CM

## 2019-11-26 DIAGNOSIS — E66.9 OBESITY (BMI 30.0-34.9): ICD-10-CM

## 2019-11-26 DIAGNOSIS — Z13.1 ENCOUNTER FOR SCREENING FOR DIABETES MELLITUS: ICD-10-CM

## 2019-11-26 DIAGNOSIS — R49.0 DYSPHONIA: ICD-10-CM

## 2019-11-26 DIAGNOSIS — K21.9 GASTROESOPHAGEAL REFLUX DISEASE, ESOPHAGITIS PRESENCE NOT SPECIFIED: ICD-10-CM

## 2019-11-26 DIAGNOSIS — Z12.4 CERVICAL CANCER SCREENING: ICD-10-CM

## 2019-11-26 DIAGNOSIS — N80.9 ENDOMETRIOSIS: ICD-10-CM

## 2019-11-26 DIAGNOSIS — D64.9 NORMOCYTIC ANEMIA: ICD-10-CM

## 2019-11-26 DIAGNOSIS — Z23 FLU VACCINE NEED: ICD-10-CM

## 2019-11-26 DIAGNOSIS — G89.29 CHRONIC BACK PAIN, UNSPECIFIED BACK LOCATION, UNSPECIFIED BACK PAIN LATERALITY: ICD-10-CM

## 2019-11-26 DIAGNOSIS — J45.40 MODERATE PERSISTENT ASTHMA WITHOUT COMPLICATION: ICD-10-CM

## 2019-11-26 PROBLEM — J45.20 MILD INTERMITTENT ASTHMA WITHOUT COMPLICATION: Status: ACTIVE | Noted: 2019-11-26

## 2019-11-26 PROBLEM — E66.811 OBESITY (BMI 30.0-34.9): Status: ACTIVE | Noted: 2019-11-26

## 2019-11-26 PROCEDURE — 99999 PR PBB SHADOW E&M-EST. PATIENT-LVL IV: CPT | Mod: PBBFAC,,, | Performed by: INTERNAL MEDICINE

## 2019-11-26 PROCEDURE — 3008F PR BODY MASS INDEX (BMI) DOCUMENTED: ICD-10-PCS | Mod: CPTII,S$GLB,, | Performed by: INTERNAL MEDICINE

## 2019-11-26 PROCEDURE — 99999 PR PBB SHADOW E&M-EST. PATIENT-LVL IV: ICD-10-PCS | Mod: PBBFAC,,, | Performed by: INTERNAL MEDICINE

## 2019-11-26 PROCEDURE — 3008F BODY MASS INDEX DOCD: CPT | Mod: CPTII,S$GLB,, | Performed by: INTERNAL MEDICINE

## 2019-11-26 PROCEDURE — 99205 OFFICE O/P NEW HI 60 MIN: CPT | Mod: S$GLB,,, | Performed by: INTERNAL MEDICINE

## 2019-11-26 PROCEDURE — 99205 PR OFFICE/OUTPT VISIT, NEW, LEVL V, 60-74 MIN: ICD-10-PCS | Mod: S$GLB,,, | Performed by: INTERNAL MEDICINE

## 2019-11-26 RX ORDER — BUPROPION HYDROCHLORIDE 150 MG/1
150 TABLET ORAL DAILY
Qty: 30 TABLET | Refills: 11 | Status: SHIPPED | OUTPATIENT
Start: 2019-11-26 | End: 2019-12-19 | Stop reason: SDUPTHER

## 2019-11-26 RX ORDER — ALBUTEROL SULFATE 90 UG/1
2 AEROSOL, METERED RESPIRATORY (INHALATION) EVERY 6 HOURS PRN
Qty: 18 G | Refills: 11 | Status: SHIPPED | OUTPATIENT
Start: 2019-11-26 | End: 2021-10-05

## 2019-11-26 RX ORDER — FLUTICASONE PROPIONATE AND SALMETEROL 250; 50 UG/1; UG/1
1 POWDER RESPIRATORY (INHALATION) 2 TIMES DAILY
Qty: 180 EACH | Refills: 3 | Status: SHIPPED | OUTPATIENT
Start: 2019-11-26 | End: 2021-10-05

## 2019-11-26 RX ORDER — SERTRALINE HYDROCHLORIDE 50 MG/1
50 TABLET, FILM COATED ORAL DAILY
Qty: 30 TABLET | Refills: 3 | Status: CANCELLED | OUTPATIENT
Start: 2019-11-26 | End: 2020-11-25

## 2019-11-26 RX ORDER — PANTOPRAZOLE SODIUM 40 MG/1
40 TABLET, DELAYED RELEASE ORAL EVERY MORNING
Qty: 30 TABLET | Refills: 3 | Status: SHIPPED | OUTPATIENT
Start: 2019-11-26 | End: 2019-12-19 | Stop reason: SDUPTHER

## 2019-11-26 RX ORDER — IBUPROFEN 800 MG/1
800 TABLET ORAL EVERY 6 HOURS PRN
Qty: 30 TABLET | Refills: 5 | Status: SHIPPED | OUTPATIENT
Start: 2019-11-26 | End: 2020-11-25

## 2019-11-26 NOTE — PROGRESS NOTES
Subjective:       Patient ID: Carlos Snell is a 29 y.o. female who  has a past medical history of Asthma, Chronic back pain, Migraine headache, and Pyelonephritis.    Chief Complaint: Establish Care and Depression     History was obtained from the patient and supplemented through chart review  The patient has not seen a PCP in our system.  Saw OBGYN for vaginal itching, discharge. Prescribed Diflucan.  -Reviewed outside records from [facility] for ?h/o Pyelonephritis.  Flank pain was thought to be MSK and was referred to [facility].  Recommend catheterization specimen in the future.    Works as a teacher at [employer].    HPI    Depression, anxiety:  Chronic.  Has worsened lately.  Also worse during menstrual cycle.  Never been on meds before. Sees an OSH therapy.  FHx Mom with depression, anxiety on unknown med.      Anxiety:  Worries a lot, hard time relaxing    1. Little interest or pleasure in doing things? More than half of days  = 2  .  Doesn't find alisa in her work as a teacher. Prefers to be at home.  2. Feeling down, depressed, or hopeless? Nearly every day           = 3. Feels stuck, not happy, sadness.  Negative thoughts.  3. Trouble falling or staying asleep, or sleeping too much? Several days                = 1  Both, 1/month, racing thoughts.  4. Feeling tired or having little energy? Nearly every day           = 3  5. Poor appetite or overeating? Several days                = 1 overeating.  Can control eating  6. Feeling bad about yourself- or that you are a failure or have let yourself or your family down? Nearly every day           = 3 feels stuck  7. Trouble concentrating on things, such as reading the newspaper or watching TV? Nearly every day           = 3 yes  8. Moving or speaking so slowly that other people could have noticed? Or the opposite- being so fidgety or restless that you have been moving around a lot more than usual? Nearly every day           = 3 restless  9. Thoughts that you would  be better off dead or of hurting yourself in some way? Not at all                       = 0    Total Score: 19     How difficult have these problems made it for you to do your work, take care of things at home, or get along with other people? Has affected work    Dysphonia, sinusitis:  Was treated with antibiotics, steroids, but continued to have hoarseness in the morning.  No SOB, dysphagia.  Following with ENT.  Performed laryngoscopy.  Thought to be functional.  has appt with SLP.  Follow-up with ENT p.r.n.  GERD as below.    GERD: Not daily.  Sour taste in her throat, some vomit.  No particular food triggers.    Asthma:    Has been out of advair (was using it daily), rescue inhaler.  Has been using rescue inhaler 1/week lately d/t weather changes.  Reports wheezing.  The patient does not smoke.      Normocytic anemia:   Hematocrit slightly decreased.  +Menorrhagia, endometriosis.  Reports regular cycles.  Was on OCPs, IUD, but stopped due to weight gain.  Uses super tampons every 1.5 hours per day; uses tampons + pads sometimes.  No results found for: IRON, TIBC, FERRITIN, SATURATEDIRO  No results found for: [id number]  No results found for: FOLATE    Endometriosis:  Stopped birth control as above due to concerns for weight.    Left ovarian cyst:    CT abdomen pelvis performed  due to abdominal pain suggesting left hemorrhagic cyst.    Chronic back pain d/t DDD:  Sx in 2013.  Mild.  Was taking ibuprofen 800 PRN if severe pain or menstrual cramps.    Obesity:  BMI 32.  Is very conscious of her weight in concern that SSRIs will cause weight gain.    Review of Systems   Constitutional: Negative for fever and unexpected weight change.   HENT: Positive for voice change. Negative for rhinorrhea and sneezing.    Eyes: Negative for redness and itching.   Respiratory: Positive for wheezing. Negative for shortness of breath.    Cardiovascular: Negative for chest pain and palpitations.   Gastrointestinal: Negative  for abdominal pain and vomiting.   Genitourinary: Positive for menstrual problem. Negative for dysuria.   Musculoskeletal: Positive for back pain. Negative for gait problem.   Skin: Negative for color change and rash.   Neurological: Negative for dizziness and light-headedness.   Hematological: Negative for adenopathy.   Psychiatric/Behavioral: Positive for dysphoric mood. Negative for self-injury. The patient is nervous/anxious.          Past Medical History:   Diagnosis Date    Asthma     Chronic back pain     Migraine headache     Pyelonephritis      Past Surgical History:   Procedure Laterality Date    BACK SURGERY  2013     Family History   Problem Relation Age of Onset    Depression Mother     Anxiety disorder Mother     No Known Problems Father     Diabetes Sister     Hypertension Maternal Grandmother     Breast cancer Neg Hx     Colon cancer Neg Hx      Social History     Socioeconomic History    Marital status: Single     Spouse name: Not on file    Number of children: Not on file    Years of education: Not on file    Highest education level: Not on file   Occupational History    Not on file   Social Needs    Financial resource strain: Not on file    Food insecurity:     Worry: Not on file     Inability: Not on file    Transportation needs:     Medical: Not on file     Non-medical: Not on file   Tobacco Use    Smoking status: Never Smoker    Smokeless tobacco: Never Used   Substance and Sexual Activity    Alcohol use: Yes     Comment: 1-3 drinks every 2 weeks    Drug use: No    Sexual activity: Not on file   Lifestyle    Physical activity:     Days per week: Not on file     Minutes per session: Not on file    Stress: Not on file   Relationships    Social connections:     Talks on phone: Not on file     Gets together: Not on file     Attends [employer] service: Not on file     Active member of club or organization: Not on file     Attends meetings of clubs or organizations: Not on  "file     Relationship status: Not on file   Other Topics Concern    Not on file   Social History Narrative    Not on file     Objective:      Vitals:    11/26/19 1537   BP: 118/84   Pulse: 100   SpO2: 98%   Weight: 92 kg (202 lb 13.2 oz)   Height: 5' 6" (1.676 m)      Physical Exam   Constitutional: She appears well-developed and well-nourished. No distress.   HENT:   Head: Normocephalic and atraumatic.   Nose: Nose normal.   Mouth/Throat: Oropharynx is clear and moist. No oropharyngeal exudate.   Hoarseness   Eyes: Pupils are equal, round, and reactive to light. EOM are normal. Right eye exhibits no discharge. Left eye exhibits no discharge. No scleral icterus.   Neck: Neck supple. No tracheal deviation present. No thyromegaly present.   Cardiovascular: Normal rate, regular rhythm, normal heart sounds and intact distal pulses.   No murmur heard.  Pulmonary/Chest: Effort normal and breath sounds normal. No respiratory distress. She has no wheezes.   Abdominal: Soft. Bowel sounds are normal. She exhibits no distension. There is no tenderness.   Musculoskeletal: She exhibits no edema or deformity.   Lymphadenopathy:     She has no cervical adenopathy.   Neurological: She is alert. No cranial nerve deficit. Gait normal.   Skin: Skin is warm and dry. She is not diaphoretic. No erythema.   Psychiatric: She has a normal mood and affect. Her behavior is normal.   tearful         Lab Results   Component Value Date    WBC 11.84 08/28/2017    HGB 12.2 08/28/2017    HCT 36.6 (L) 08/28/2017     08/28/2017    ALT 16 08/28/2017    AST 17 08/28/2017     08/28/2017    K 3.7 08/28/2017     08/28/2017    CREATININE 0.9 08/28/2017    BUN 9 08/28/2017    CO2 23 08/28/2017       The ASCVD Risk score ([place] CHANG Jr., et al., 2013) failed to calculate for the following reasons:    The 2013 ASCVD risk score is only valid for ages 40 to 79    (Imaging have been independently reviewed)  CT abdomen in 2017 suggesting left " hemorrhagic cyst.  Suggesting recurrent renal infections.    Assessment:       1. Moderate episode of recurrent major depressive disorder    2. Dysphonia    3. Gastroesophageal reflux disease, esophagitis presence not specified    4. Moderate persistent asthma without complication    5. Normocytic anemia    6. Endometriosis    7. Left ovarian cyst    8. Cervical cancer screening    9. Chronic back pain, unspecified back location, unspecified back pain laterality    10. Obesity (BMI 30.0-34.9)    11. Encounter for lipid screening for cardiovascular disease    12. Encounter for screening for diabetes mellitus    13. Flu vaccine need    14. Migraine without status migrainosus, not intractable, unspecified migraine type          Plan:       Carlos was seen today for establish care and depression.    Diagnoses and all orders for this visit:    Moderate episode of recurrent major depressive disorder  Comments:  PHQ 19. Pt declined SSRI d/t potential weight gain. Start Wellbutrin. Discussed SE, ED prompts. RTC 1 mo for titration. Cont OSH therapy.  Orders:  -     buPROPion (WELLBUTRIN XL) 150 MG TB24 tablet; Take 1 tablet (150 mg total) by mouth once daily.    Dysphonia  Comments:  Laryngoscopy normal.  Thought to be functional.  Will see FLP.  Trial of PPI.  Orders:  -     pantoprazole (PROTONIX) 40 MG tablet; Take 1 tablet (40 mg total) by mouth every morning.    Gastroesophageal reflux disease, esophagitis presence not specified  Comments:  Maybe worsening asthma, dysphonia.  Trial of PPI q.a.m..  Orders:  -     pantoprazole (PROTONIX) 40 MG tablet; Take 1 tablet (40 mg total) by mouth every morning.    Moderate persistent asthma without complication  Comments:  Worsened lately due to running out of meds, weather.  Restart Advair b.i.d., rescue inhaler p.r.n..  Orders:  -     pantoprazole (PROTONIX) 40 MG tablet; Take 1 tablet (40 mg total) by mouth every morning.  -     albuterol (PROAIR HFA) 90 mcg/actuation  inhaler; Inhale 2 puffs into the lungs every 6 (six) hours as needed for Wheezing. Rescue  -     fluticasone-salmeterol diskus inhaler 250-50 mcg; Inhale 1 puff into the lungs 2 (two) times daily.    Normocytic anemia  Comments:  Likely 2/2 endometriosis/menorrhagia.  Check iron panel.  Refer to OBGYN.  Orders:  -     CBC auto differential; Future  -     Ferritin; Future  -     Iron and TIBC; Future  -     Vitamin B12; Future    Endometriosis  Comments:  Discontinued birth control d/t concerns for weight.  Refer to OBGYN to discuss birth control options.  Orders:  -     Ambulatory Referral to Obstetrics / Gynecology  -     ibuprofen (ADVIL,MOTRIN) 800 MG tablet; Take 1 tablet (800 mg total) by mouth every 6 (six) hours as needed for Pain.    Left ovarian cyst  Comments:  Refer to OBGYN.  Orders:  -     Ambulatory Referral to Obstetrics / Gynecology    Cervical cancer screening  -     Ambulatory Referral to Obstetrics / Gynecology    Chronic back pain, unspecified back location, unspecified back pain laterality  Comments:  Mild.  Is taking ibuprofen sparingly.  She declined referral to [facility] Clinic.    Obesity (BMI 30.0-34.9)  Comments:  Starting Wellbutrin for depression.  Will discuss lifestyle changes during future visit.  Orders:  -     buPROPion (WELLBUTRIN XL) 150 MG TB24 tablet; Take 1 tablet (150 mg total) by mouth once daily.    Encounter for lipid screening for cardiovascular disease  -     Lipid panel; Future    Encounter for screening for diabetes mellitus  -     Hemoglobin A1c; Future    Flu vaccine need  Comments:  Can get vaccine later when she is feeling better.    Migraine without status migrainosus, not intractable, unspecified migraine type  Comments:  Referred to Neurology per her request.  Orders:  -     Ambulatory Referral to Neurology    Other orders  -     Cancel: sertraline (ZOLOFT) 50 MG tablet; Take 1 tablet (50 mg total) by mouth once daily.         Side effects of medication(s) were  discussed in detail and patient voiced understanding.  Patient will call back for any issues or complications.     RTC in 1 month(s) or sooner PRN for depression.

## 2019-11-27 ENCOUNTER — TELEPHONE (OUTPATIENT)
Dept: NEUROLOGY | Facility: CLINIC | Age: 29
End: 2019-11-27

## 2019-11-27 LAB
BACTERIAL VAGINOSIS DNA: ABNORMAL
CANDIDA RRNA VAG QL PROBE: NEGATIVE
G VAGINALIS RRNA GENITAL QL PROBE: POSITIVE
T VAGINALIS RRNA GENITAL QL PROBE: NEGATIVE

## 2019-11-27 NOTE — TELEPHONE ENCOUNTER
----- Message from Pat Turner MA sent at 11/26/2019  4:29 PM CST -----  Please call and assist patient with scheduling

## 2019-11-30 ENCOUNTER — LAB VISIT (OUTPATIENT)
Dept: LAB | Facility: HOSPITAL | Age: 29
End: 2019-11-30
Attending: INTERNAL MEDICINE
Payer: COMMERCIAL

## 2019-11-30 DIAGNOSIS — Z13.220 ENCOUNTER FOR LIPID SCREENING FOR CARDIOVASCULAR DISEASE: ICD-10-CM

## 2019-11-30 DIAGNOSIS — Z13.6 ENCOUNTER FOR LIPID SCREENING FOR CARDIOVASCULAR DISEASE: ICD-10-CM

## 2019-11-30 DIAGNOSIS — Z13.1 ENCOUNTER FOR SCREENING FOR DIABETES MELLITUS: ICD-10-CM

## 2019-11-30 DIAGNOSIS — D64.9 NORMOCYTIC ANEMIA: ICD-10-CM

## 2019-11-30 LAB
BASOPHILS # BLD AUTO: 0.01 K/UL (ref 0–0.2)
BASOPHILS NFR BLD: 0.2 % (ref 0–1.9)
CHOLEST SERPL-MCNC: 177 MG/DL (ref 120–199)
CHOLEST/HDLC SERPL: 3.5 {RATIO} (ref 2–5)
DIFFERENTIAL METHOD: ABNORMAL
EOSINOPHIL # BLD AUTO: 0.2 K/UL (ref 0–0.5)
EOSINOPHIL NFR BLD: 3.4 % (ref 0–8)
ERYTHROCYTE [DISTWIDTH] IN BLOOD BY AUTOMATED COUNT: 12.4 % (ref 11.5–14.5)
ESTIMATED AVG GLUCOSE: 105 MG/DL (ref 68–131)
FERRITIN SERPL-MCNC: 43 NG/ML (ref 20–300)
HBA1C MFR BLD HPLC: 5.3 % (ref 4–5.6)
HCT VFR BLD AUTO: 36.8 % (ref 37–48.5)
HDLC SERPL-MCNC: 50 MG/DL (ref 40–75)
HDLC SERPL: 28.2 % (ref 20–50)
HGB BLD-MCNC: 12.6 G/DL (ref 12–16)
IRON SERPL-MCNC: 78 UG/DL (ref 30–160)
LDLC SERPL CALC-MCNC: 115.4 MG/DL (ref 63–159)
LYMPHOCYTES # BLD AUTO: 1.2 K/UL (ref 1–4.8)
LYMPHOCYTES NFR BLD: 23 % (ref 18–48)
MCH RBC QN AUTO: 29.5 PG (ref 27–31)
MCHC RBC AUTO-ENTMCNC: 34.2 G/DL (ref 32–36)
MCV RBC AUTO: 86 FL (ref 82–98)
MONOCYTES # BLD AUTO: 0.4 K/UL (ref 0.3–1)
MONOCYTES NFR BLD: 7.6 % (ref 4–15)
NEUTROPHILS # BLD AUTO: 3.5 K/UL (ref 1.8–7.7)
NEUTROPHILS NFR BLD: 65.8 % (ref 38–73)
NONHDLC SERPL-MCNC: 127 MG/DL
PLATELET # BLD AUTO: 261 K/UL (ref 150–350)
PMV BLD AUTO: 10.1 FL (ref 9.2–12.9)
RBC # BLD AUTO: 4.27 M/UL (ref 4–5.4)
SATURATED IRON: 18 % (ref 20–50)
TOTAL IRON BINDING CAPACITY: 432 UG/DL (ref 250–450)
TRANSFERRIN SERPL-MCNC: 292 MG/DL (ref 200–375)
TRIGL SERPL-MCNC: 58 MG/DL (ref 30–150)
VIT B12 SERPL-MCNC: 295 PG/ML (ref 210–950)
WBC # BLD AUTO: 5.26 K/UL (ref 3.9–12.7)

## 2019-11-30 PROCEDURE — 82728 ASSAY OF FERRITIN: CPT

## 2019-11-30 PROCEDURE — 85025 COMPLETE CBC W/AUTO DIFF WBC: CPT

## 2019-11-30 PROCEDURE — 82607 VITAMIN B-12: CPT

## 2019-11-30 PROCEDURE — 83540 ASSAY OF IRON: CPT

## 2019-11-30 PROCEDURE — 80061 LIPID PANEL: CPT

## 2019-11-30 PROCEDURE — 36415 COLL VENOUS BLD VENIPUNCTURE: CPT

## 2019-11-30 PROCEDURE — 83036 HEMOGLOBIN GLYCOSYLATED A1C: CPT

## 2019-12-10 ENCOUNTER — OFFICE VISIT (OUTPATIENT)
Dept: OTOLARYNGOLOGY | Facility: CLINIC | Age: 29
End: 2019-12-10
Payer: COMMERCIAL

## 2019-12-10 ENCOUNTER — CLINICAL SUPPORT (OUTPATIENT)
Dept: SPEECH THERAPY | Facility: HOSPITAL | Age: 29
End: 2019-12-10
Payer: COMMERCIAL

## 2019-12-10 DIAGNOSIS — R49.0 DYSPHONIA: Primary | ICD-10-CM

## 2019-12-10 DIAGNOSIS — R49.0 PERSISTENT HOARSENESS: ICD-10-CM

## 2019-12-10 DIAGNOSIS — F44.4 FUNCTIONAL DYSPHONIA: Primary | ICD-10-CM

## 2019-12-10 PROCEDURE — 31575 PR LARYNGOSCOPY, FLEXIBLE; DIAGNOSTIC: ICD-10-PCS | Mod: S$GLB,,, | Performed by: NURSE PRACTITIONER

## 2019-12-10 PROCEDURE — 31575 DIAGNOSTIC LARYNGOSCOPY: CPT | Mod: S$GLB,,, | Performed by: NURSE PRACTITIONER

## 2019-12-10 PROCEDURE — 99999 PR PBB SHADOW E&M-EST. PATIENT-LVL II: CPT | Mod: PBBFAC,,, | Performed by: NURSE PRACTITIONER

## 2019-12-10 PROCEDURE — 99999 PR PBB SHADOW E&M-EST. PATIENT-LVL II: ICD-10-PCS | Mod: PBBFAC,,, | Performed by: NURSE PRACTITIONER

## 2019-12-10 PROCEDURE — 99213 PR OFFICE/OUTPT VISIT, EST, LEVL III, 20-29 MIN: ICD-10-PCS | Mod: 25,S$GLB,, | Performed by: NURSE PRACTITIONER

## 2019-12-10 PROCEDURE — 99213 OFFICE O/P EST LOW 20 MIN: CPT | Mod: 25,S$GLB,, | Performed by: NURSE PRACTITIONER

## 2019-12-10 PROCEDURE — 92524 BEHAVRAL QUALIT ANALYS VOICE: CPT | Mod: GN

## 2019-12-10 NOTE — PROGRESS NOTES
"Referring provider: Chad Norman  Reason for visit:  Behavioral and qualitative analysis of voice and resonance (CPT 66712)    Subjective / History    Carlos Snell is a 29 y.o. female referred for voice evaluation (CPT 65317) by ANIBAL Norman. She presents with complaints of hoarseness which is ongoing for the past two months. She reports that it is painful to talk and swallow. She reports recurrent laryngitis with voice loss every year; however, she states that it hasn't ever lasted this long. She was prescribed steroids and antibiotics, but did not notice any improvement in her vocal quality. She reports that her throat pain is worse in the morning. She notes that toward the end of the day, her voice will become high-pitched and "squeaky." She also reports that her voice improved over  break. However, when she returned to work, her vocal quality diminished. She has previously undergone one prior voice evaluation in 2016 for a similar issue. 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 Her school recently purchased a microphone for her to use throughout the day.      Swallowing: Painful swallowing.  Breathing: conversation dyspnea    Smokin packs/day   Caffeine: 0 cups/day   Reflux: yes. Takes Protonix for indigestion.      Laryngoscopy findings (per ANIBAL Norman on 2019  -Normal exam findings.    Past Medical History:   Diagnosis Date    Asthma     Chronic back pain     Migraine headache     Pyelonephritis      Current Outpatient Medications on File Prior to Visit   Medication Sig Dispense Refill    albuterol (PROAIR HFA) 90 mcg/actuation inhaler Inhale 2 puffs into the lungs every 6 (six) hours as needed for Wheezing. Rescue 18 g 11    buPROPion (WELLBUTRIN XL) 150 MG TB24 tablet Take 1 tablet (150 mg total) by mouth once daily. 30 tablet 11    cetirizine (ZYRTEC) 10 MG tablet cetirizine 10 mg tablet      fluticasone-salmeterol " "diskus inhaler 250-50 mcg Inhale 1 puff into the lungs 2 (two) times daily. 180 each 3    ibuprofen (ADVIL,MOTRIN) 800 MG tablet Take 1 tablet (800 mg total) by mouth every 6 (six) hours as needed for Pain. 30 tablet 5    pantoprazole (PROTONIX) 40 MG tablet Take 1 tablet (40 mg total) by mouth every morning. 30 tablet 3     No current facility-administered medications on file prior to visit.        Objective    Perceptual/behavioral assessment  -CAPE-V Overall Score: 95  -Quality: intermittent aphonia, strained and wilkerson/pulse mode phonation  -Volume: decreased projection  -Pitch: low F0  -Flexibility: diminished  -Habitual respiratory pattern: breath holding    Acoustic/aerodynamic assessment  Multi-Dimensional Voice Program (MDVP) Analysis (sustained "ah")   Baseline Gender-matched norms (F)   Average fundamental frequency (Fo) 409.891 Hz Norm/SD: 243.97 / 27.46   Relative average perturbation 6.337 % Norm/SD: 0.378 / 0.21   Shimmer percent 36.152 % Norm/SD: 1.997 / 0.79   Noise to harmonic ratio 0.516 Norm/SD: 0.112 / 0.01   Voice turbulence index 0.079 Norm/SD: 0.046 / 0.012     Education / Stimulability Trials   Discussed importance of vocal hygiene including: hydration, conservation and reducing throat clearing, coughing, other phonotraumatic behaviors. Provided patient with strategies to decrease coughing including taking a hard swallow of water or saliva. Patient was not stimulable for improved voice using straw phonation on /u/ SOVT exercises, resonant /m/ humming exercises, lip trills, or cup bubble with straw SOVT exercises. Patient modestly stimulable for improved vocal quality with cup bubble SOVT exercises; however, very limited carryover in phrases >3 words despite max cuing for frontal resonance. Encouraged practicing exercises several times daily in isolation and into short phrases to solidify muscle memory patterns and reduce extralaryngeal tension during speech tasks.  She was amenable to all " suggestions.     Functional goals  Length Status Goal   Long term Initiated Patient and clinician will facilitate changes in vocal function in order to restore functional use of voice for daily occupational, social, and emotional demands.    Long term Initiated Patient will re-establish phonation with adequate balance of airflow and resonance with decreased muscle tension.    Long term Initiated Patient will achieve improved/normal voice assessed with perceptual scales, acoustic and/or aerodynamic measures within 12 weeks.   Short term Initiated Patient will improve the quality, efficiency, and ease of phonation through resonant and/or airflow exercises from the syllable to conversation level with 80% accuracy.   Short term Initiated Patient will discriminate between easy and strained phonation with 80% accuracy.    Short term Initiated Patient will identify the sensations associated with muscle relaxation in the abdominal, thoracic, neck and facial areas during efficient phonation with minimal clinician cue.    Short term Initiated Patient will demonstrate the ability to increase awareness of voicing behavior through self-monitoring to facilitate generalization in functional speaking situations with 80% accuracy.      Assessment     Carlos nSell presents with severe dysphonia. 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 Prognosis for continued improvement is good/fair.    Recommendations / POC    Recommend 2-4 sessions of voice/speech therapy over 8-10 weeks with a speech-language pathologist to optimize glottal postures for improved vocal function, vocal efficiency, and ease of phonation.  She should continue the exercises as discussed in session and should contact me with any further questions.

## 2019-12-11 NOTE — ASSESSMENT & PLAN NOTE
29-year-old female with dysphonia.  Flexible laryngoscopy and exam is normal.  We discussed that her problem is functional in nature.  She should continue with voice therapy.  Return to clinic as needed.

## 2019-12-11 NOTE — PROGRESS NOTES
"Subjective:       Patient ID: Carlos Snell is a 29 y.o. female.    Chief Complaint: Hoarse    HPI     Carlos Snell is a 29-year-old female who presents to the [facility] for a hoarse voice.  She states this has been a problem for 2 months following a sinus infection.  She was treated with steroids and antibiotics, but has not noticed any improvement in her voice.  She reports that it is painful to talk and swallow.  She reports that her throat pain is worse in the morning. She notes that toward the end of the day, her voice will become high-pitched and "squeaky." She also reports that her voice improved over Thanksgiving break. However, when she returned to work, her vocal quality diminished.  She denies dysphagia, odynophagia, or otalgia.  She has an occasional cough.    Past Medical History:   Diagnosis Date    Asthma     Chronic back pain     Migraine headache     Pyelonephritis        Past Surgical History:   Procedure Laterality Date    BACK SURGERY  2013         Current Outpatient Medications:     albuterol (PROAIR HFA) 90 mcg/actuation inhaler, Inhale 2 puffs into the lungs every 6 (six) hours as needed for Wheezing. Rescue, Disp: 18 g, Rfl: 11    buPROPion (WELLBUTRIN XL) 150 MG TB24 tablet, Take 1 tablet (150 mg total) by mouth once daily., Disp: 30 tablet, Rfl: 11    cetirizine (ZYRTEC) 10 MG tablet, cetirizine 10 mg tablet, Disp: , Rfl:     fluticasone-salmeterol diskus inhaler 250-50 mcg, Inhale 1 puff into the lungs 2 (two) times daily., Disp: 180 each, Rfl: 3    ibuprofen (ADVIL,MOTRIN) 800 MG tablet, Take 1 tablet (800 mg total) by mouth every 6 (six) hours as needed for Pain., Disp: 30 tablet, Rfl: 5    pantoprazole (PROTONIX) 40 MG tablet, Take 1 tablet (40 mg total) by mouth every morning., Disp: 30 tablet, Rfl: 3    Review of patient's allergies indicates:   Allergen Reactions    Lactose Diarrhea    Gluten protein Itching     Inflammation, bloating, diarrhea and pain all over " body         Social History     Socioeconomic History    Marital status: Single     Spouse name: Not on file    Number of children: Not on file    Years of education: Not on file    Highest education level: Not on file   Occupational History    Not on file   Social Needs    Financial resource strain: Not on file    Food insecurity:     Worry: Not on file     Inability: Not on file    Transportation needs:     Medical: Not on file     Non-medical: Not on file   Tobacco Use    Smoking status: Never Smoker    Smokeless tobacco: Never Used   Substance and Sexual Activity    Alcohol use: Yes     Comment: 1-3 drinks every 2 weeks    Drug use: No    Sexual activity: Not on file   Lifestyle    Physical activity:     Days per week: Not on file     Minutes per session: Not on file    Stress: Not on file   Relationships    Social connections:     Talks on phone: Not on file     Gets together: Not on file     Attends Latter day service: Not on file     Active member of club or organization: Not on file     Attends meetings of clubs or organizations: Not on file     Relationship status: Not on file   Other Topics Concern    Not on file   Social History Narrative    Not on file       Family History   Problem Relation Age of Onset    Depression Mother     Anxiety disorder Mother     No Known Problems Father     Diabetes Sister     Hypertension Maternal Grandmother     Breast cancer Neg Hx     Colon cancer Neg Hx        Review of Systems   Constitutional: Negative for appetite change, chills, diaphoresis, fatigue, fever and unexpected weight change.   HENT: Positive for congestion, postnasal drip, rhinorrhea, sore throat and voice change. Negative for dental problem, drooling, ear discharge, ear pain, facial swelling, hearing loss, mouth sores, nosebleeds, sinus pressure, sneezing, tinnitus and trouble swallowing.    Eyes: Negative for pain, discharge, redness and itching.   Respiratory: Positive for cough.  Negative for shortness of breath.    Cardiovascular: Negative for chest pain.   Gastrointestinal: Negative for abdominal distention, abdominal pain, diarrhea, nausea and vomiting.   Endocrine: Negative for cold intolerance and heat intolerance.   Genitourinary: Negative for difficulty urinating.   Musculoskeletal: Negative for neck pain and neck stiffness.   Skin: Negative for rash.   Neurological: Negative for dizziness, weakness and headaches.   Hematological: Negative for adenopathy.       Objective:      Physical Exam   Constitutional: She is oriented to person, place, and time. She appears well-developed and well-nourished. She is cooperative. She does not appear ill. No distress.   HENT:   Head: Normocephalic and atraumatic.   Right Ear: Hearing and external ear normal.   Left Ear: Hearing and external ear normal.   Nose: Nose normal. No mucosal edema, rhinorrhea or nasal deformity. No epistaxis.  No foreign bodies. Right sinus exhibits no maxillary sinus tenderness and no frontal sinus tenderness. Left sinus exhibits no maxillary sinus tenderness and no frontal sinus tenderness.   Mouth/Throat: Uvula is midline, oropharynx is clear and moist and mucous membranes are normal. Mucous membranes are not pale, not dry and not cyanotic. She does not have dentures. No oral lesions. No trismus in the jaw. Normal dentition. No uvula swelling or dental caries. No oropharyngeal exudate, posterior oropharyngeal edema, posterior oropharyngeal erythema or tonsillar abscesses.   Procedure: Flexible laryngoscopy  In order to fully examine the upper aerodigestive tract, including the larynx, in a patient with a hyperactive gag reflex, flexible endoscopy is required.  After explaining the procedure and obtaining verbal consent, a timeout was performed with the patient's participation according to the universal protocol. Both nasal cavities were anesthetized with 4% Xylocaine spray mixed with Steve-Synephrine. The flexible  laryngoscope (#[id number]) was inserted into the nasal cavity and advanced to visualize the nasal cavity, nasopharynx, the posterior oropharynx, hypopharynx, and the endolarynx with the above findings noted. The scope was removed and the procedure terminated. The patient tolerated this procedure well without apparent complication.      FINDINGS  Nasopharynx - the torus is clear. There are no lesions of the posterior wall.   Oropharynx - no lesions of the tongue base. There is no obvious fullness or asymmetry.  Hypopharynx - there are no lesions of the pyriform sinuses or postcricoid region    Larynx - there are no lesions of the supraglottic or glottic larynx. Vocal fold mobility is normal with complete closure.      Eyes: Conjunctivae are normal. Right eye exhibits no discharge. Left eye exhibits no discharge. No scleral icterus.   Neck: Trachea normal, normal range of motion and phonation normal. Neck supple. No JVD present. No tracheal tenderness present. No tracheal deviation present. No thyroid mass and no thyromegaly present.   Salivary glands - there are no lesions or asymmetric findings in the submandibular or parotid glands     Cardiovascular: Normal rate.   Pulmonary/Chest: Effort normal. No stridor. No respiratory distress.   Lymphadenopathy:        Head (right side): No submental, no submandibular, no tonsillar and no preauricular adenopathy present.        Head (left side): No submental, no submandibular, no tonsillar and no preauricular adenopathy present.     She has no cervical adenopathy.   Neurological: She is alert and oriented to person, place, and time. No cranial nerve deficit.   Skin: Skin is warm and dry. No rash noted. She is not diaphoretic. No erythema. No pallor.   Psychiatric: She has a normal mood and affect. Her behavior is normal. Thought content normal.   Vitals reviewed.      Assessment:       1. Dysphonia        Plan:       Problem List Items Addressed This Visit        ENT     Dysphonia - Primary     29-year-old female with dysphonia.  Flexible laryngoscopy and exam is normal.  We discussed that her problem is functional in nature.  She should continue with voice therapy.  Return to clinic as needed.

## 2019-12-12 ENCOUNTER — PATIENT MESSAGE (OUTPATIENT)
Dept: OTOLARYNGOLOGY | Facility: CLINIC | Age: 29
End: 2019-12-12

## 2019-12-17 ENCOUNTER — CLINICAL SUPPORT (OUTPATIENT)
Dept: SPEECH THERAPY | Facility: HOSPITAL | Age: 29
End: 2019-12-17
Payer: COMMERCIAL

## 2019-12-17 DIAGNOSIS — F44.4 FUNCTIONAL DYSPHONIA: Primary | ICD-10-CM

## 2019-12-17 DIAGNOSIS — R49.0 PERSISTENT HOARSENESS: ICD-10-CM

## 2019-12-17 PROCEDURE — 92507 TX SP LANG VOICE COMM INDIV: CPT | Mod: GN

## 2019-12-17 NOTE — PROGRESS NOTES
"Referring provider: Chad Norman  Reason for visit:  Voice treatment (CPT 96391)  Session #1    History / Subjective   I had the pleasure of seeing Carlos Snell for her first treatment session following complete voice evaluation on 12/10.2019.  During that time, improvements were noted on cup bubble SOVT voice exercises.  Since evaluation, she reports little improvement when practicing the exercises. Upon further questioning, she reports immediate improvement after completing SOVT exercises, but states "I can't talk with a cup for the rest of my life."      Objective   The primary goal of todays session was to improve carryover of patient's treatment plan. This was targeted using cup bubble SOVT exercises, chin down posturing, and circumlaryngeal massage techniques.    Perceptual/behavioral assessment  -CAPE-V Overall Score: 75  -Quality: intermittent aphonia, strained and wilkerson/pulse mode phonation  -Volume: decreased projection  -Pitch: low F0  -Flexibility: diminished  -Habitual respiratory pattern: breath holding     Education / Stimulability Trials   Patient stimulable for decreased strain and improved forward-focus with cup bubble SOVT exercises to resonant /u/ exercises with improvement of carryover from last session. Also noted modest improvements when cuing patient to tuck her chin and phonate. Also reviewed circumlaryngeal massage techniques with patient. She required maximal cuing to generalize improved vocal quality in to structured reading passages. She was instructed to practice the exercises frequently to improve carryover until her holiday break. Due to patient reported improvement in vocal quality when she rests her voice for 4-5 days, she was encouraged to rest her voice 4-5 days during her holiday break. Following this, she was instructed to complete the exercises frequently prior to speaking to encourage carryover to conversation. She was counseled regarding     Functional goals  Length " Status Goal   Long term Ongoing Patient and clinician will facilitate changes in vocal function in order to restore functional use of voice for daily occupational, social, and emotional demands.    Long term Ongoing Patient will re-establish phonation with adequate balance of airflow and resonance with decreased muscle tension.    Long term Ongoing Patient will achieve improved/normal voice assessed with perceptual scales, acoustic and/or aerodynamic measures within 12 weeks.   Short term Ongoing Patient will improve the quality, efficiency, and ease of phonation through resonant and/or airflow exercises from the syllable to conversation level with 80% accuracy.   Short term Ongoing Patient will discriminate between easy and strained phonation with 80% accuracy.    Short term Ongoing Patient will identify the sensations associated with muscle relaxation in the abdominal, thoracic, neck and facial areas during efficient phonation with minimal clinician cue.    Short term Ongoing Patient will demonstrate the ability to increase awareness of voicing behavior through self-monitoring to facilitate generalization in functional speaking situations with 80% accuracy.       Assessment     Carlos Snell presents with severe dysphonia. 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 Prognosis for continued improvement is good/fair.     Recommendations / POC    Recommend 2-4 sessions of voice/speech therapy over 8-10 weeks with a speech-language pathologist to optimize glottal postures for improved vocal function, vocal efficiency, and ease of phonation. She should continue the exercises as discussed in session and should contact me with any further questions.

## 2019-12-18 ENCOUNTER — TELEPHONE (OUTPATIENT)
Dept: OTOLARYNGOLOGY | Facility: CLINIC | Age: 29
End: 2019-12-18

## 2019-12-19 ENCOUNTER — OFFICE VISIT (OUTPATIENT)
Dept: INTERNAL MEDICINE | Facility: CLINIC | Age: 29
End: 2019-12-19
Payer: COMMERCIAL

## 2019-12-19 VITALS
HEART RATE: 79 BPM | OXYGEN SATURATION: 98 % | WEIGHT: 211 LBS | BODY MASS INDEX: 33.91 KG/M2 | DIASTOLIC BLOOD PRESSURE: 89 MMHG | SYSTOLIC BLOOD PRESSURE: 122 MMHG | HEIGHT: 66 IN

## 2019-12-19 DIAGNOSIS — Z23 FLU VACCINE NEED: ICD-10-CM

## 2019-12-19 DIAGNOSIS — E66.9 OBESITY (BMI 30.0-34.9): ICD-10-CM

## 2019-12-19 DIAGNOSIS — Z23 NEED FOR TETANUS, DIPHTHERIA, AND ACELLULAR PERTUSSIS (TDAP) VACCINE: ICD-10-CM

## 2019-12-19 DIAGNOSIS — R49.0 DYSPHONIA: ICD-10-CM

## 2019-12-19 DIAGNOSIS — E53.8 CYANOCOBALAMIN DEFICIENCY: ICD-10-CM

## 2019-12-19 DIAGNOSIS — F33.1 MODERATE EPISODE OF RECURRENT MAJOR DEPRESSIVE DISORDER: Primary | ICD-10-CM

## 2019-12-19 DIAGNOSIS — K21.9 GASTROESOPHAGEAL REFLUX DISEASE, ESOPHAGITIS PRESENCE NOT SPECIFIED: ICD-10-CM

## 2019-12-19 DIAGNOSIS — J45.40 MODERATE PERSISTENT ASTHMA WITHOUT COMPLICATION: ICD-10-CM

## 2019-12-19 PROCEDURE — 99215 PR OFFICE/OUTPT VISIT, EST, LEVL V, 40-54 MIN: ICD-10-PCS | Mod: S$GLB,,, | Performed by: INTERNAL MEDICINE

## 2019-12-19 PROCEDURE — 99999 PR PBB SHADOW E&M-EST. PATIENT-LVL III: ICD-10-PCS | Mod: PBBFAC,,, | Performed by: INTERNAL MEDICINE

## 2019-12-19 PROCEDURE — 3008F PR BODY MASS INDEX (BMI) DOCUMENTED: ICD-10-PCS | Mod: CPTII,S$GLB,, | Performed by: INTERNAL MEDICINE

## 2019-12-19 PROCEDURE — 99999 PR PBB SHADOW E&M-EST. PATIENT-LVL III: CPT | Mod: PBBFAC,,, | Performed by: INTERNAL MEDICINE

## 2019-12-19 PROCEDURE — 3008F BODY MASS INDEX DOCD: CPT | Mod: CPTII,S$GLB,, | Performed by: INTERNAL MEDICINE

## 2019-12-19 PROCEDURE — 99215 OFFICE O/P EST HI 40 MIN: CPT | Mod: S$GLB,,, | Performed by: INTERNAL MEDICINE

## 2019-12-19 RX ORDER — PNV NO.95/FERROUS FUM/FOLIC AC 28MG-0.8MG
100 TABLET ORAL DAILY
Qty: 90 TABLET | Refills: 3 | Status: SHIPPED | OUTPATIENT
Start: 2019-12-19 | End: 2022-04-19

## 2019-12-19 RX ORDER — BUPROPION HYDROCHLORIDE 300 MG/1
300 TABLET ORAL DAILY
Qty: 90 TABLET | Refills: 3 | Status: SHIPPED | OUTPATIENT
Start: 2019-12-19 | End: 2020-06-22 | Stop reason: SDUPTHER

## 2019-12-19 RX ORDER — PANTOPRAZOLE SODIUM 40 MG/1
40 TABLET, DELAYED RELEASE ORAL EVERY MORNING
Qty: 90 TABLET | Refills: 3 | Status: SHIPPED | OUTPATIENT
Start: 2019-12-19 | End: 2022-04-19

## 2019-12-19 NOTE — PATIENT INSTRUCTIONS
Tips to Control Acid Reflux    To control acid reflux, youll need to make some basic diet and lifestyle changes. The simple steps outlined below may be all youll need to ease discomfort.  Watch what you eat  · Avoid fatty foods and spicy foods.  · Eat fewer acidic foods, such as citrus and tomato-based foods. These can increase symptoms.  · Limit drinking alcohol, caffeine, and fizzy beverages. All increase acid reflux.  · Try limiting chocolate, peppermint, and spearmint. These can worsen acid reflux in some people.  Watch when you eat  · Avoid lying down for 3 hours after eating.  · Do not snack before going to bed.  Raise your head  Raising your head and upper body by 4 to 6 inches helps limit reflux when youre lying down. Put blocks under the head of your bed frame to raise it.  Other changes  · Lose weight, if you need to  · Dont exercise near bedtime  · Avoid tight-fitting clothes  · Limit aspirin and ibuprofen  · Stop smoking   Date Last Reviewed: 7/1/2016  © 9886-6403 [employer]. [address] [place] [address], [place] [address]. All rights reserved. This information is not intended as a substitute for professional medical care. Always follow your healthcare professional's instructions.

## 2019-12-19 NOTE — PROGRESS NOTES
"Subjective:       Patient ID: Carlos Snell is a 29 y.o. female who  has a past medical history of Asthma, Chronic back pain, Migraine headache, and Pyelonephritis.    Chief Complaint: Follow-up (depression)     History was obtained from the patient and supplemented through chart review  -saw ENT for dysphonia.    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    HPI    Depression, anxiety:  Chronic.  Worse during her menstrual cycle.  Sees an OSH therapy.  FHx Mom with depression, anxiety on unknown med.  Feels "stuck" because she doesn't want to be a teacher anymore and fear of switching careers.  Worries a lot, hard time relaxing.  PHQ 19.  She declined SSRI due to concerns for weight gain.      Started Wellbutrin 150 and likes it.  Feels more positive, improved focus/concentration.  Forgot to take it x 2 days with worsened sx.  No insomnia, palpitations.  Slight decreased appetite, which is good.      Obesity:  BMI 34.     Dysphonia, sinusitis:  Was treated with antibiotics, steroids, but continued to have hoarseness in the morning.  No SOB, dysphagia.  Following with ENT.  Performed laryngoscopy.  Thought to be functional.  Rec SLP.  Follow-up with ENT p.r.n.  GERD as below.  Started Protonix q.a.m.     GERD:   Drinks coffee daily.  Not daily.  Sour taste in her throat, some vomit.  No particular food triggers.  Started Protonix q.a.m. with improvement.  Takes ibuprofen sparingly for back pain.      Asthma:    Worsened with weather changes.  Improved with Advair BID, but admits to forgetting some doses.  Will set an alarm to take meds. Has not exercised in a while.  The patient does not smoke.      Cyanocobalamin deficiency:    Lab Results   Component Value Date    WSPPTEPC78 295 11/30/2019             Not addressed today.  Normocytic anemia:   Hematocrit slightly decreased.  +Menorrhagia, endometriosis.  Reports regular cycles.  Was on OCPs, IUD, but stopped due to weight gain.  Uses super tampons every 1.5 hours " "per day; uses tampons + pads sometimes.  Has referral to OBGYN.  Likely 2/2 endometriosis/menorrhagia.  Iron panel wnl.  Has appt with OBGYN.  Lab Results   Component Value Date    IRON 78 11/30/2019    TIBC 432 11/30/2019    FERRITIN 43 11/30/2019     Lab Results   Component Value Date    WLRPQZCW77 295 11/30/2019     No results found for: FOLATE    Endometriosis:  Stopped birth control as above due to concerns for weight.  Has appt with OBGYN to discuss birth control options.    Left ovarian cyst:    CT abdomen pelvis performed  due to abdominal pain suggesting left hemorrhagic cyst.  Refer to OBGYN.    Chronic back pain d/t DDD:  Sx in 2013.  Mild.  Was taking ibuprofen 800 PRN if severe pain or menstrual cramps.  Mild.  Is taking ibuprofen sparingly.  She declined referral to Spine Clinic.    Migraine:  Has referral to Neurology.    Review of Systems   Constitutional: Negative for fever and unexpected weight change.   HENT: Positive for voice change. Negative for rhinorrhea and sneezing.    Eyes: Negative for redness and itching.   Respiratory: Negative for shortness of breath and wheezing.    Cardiovascular: Negative for chest pain and palpitations.   Gastrointestinal: Negative for abdominal pain and vomiting.   Genitourinary: Positive for menstrual problem. Negative for dysuria.   Musculoskeletal: Positive for back pain. Negative for gait problem.   Skin: Negative for color change and rash.   Neurological: Negative for dizziness and light-headedness.   Hematological: Negative for adenopathy.   Psychiatric/Behavioral: Positive for dysphoric mood. The patient is nervous/anxious.        I personally reviewed Past Medical History, Past Surgical History, Social History, and Family History.    Objective:      Vitals:    12/19/19 1523   BP: 122/89   Pulse: 79   SpO2: 98%   Weight: 95.7 kg (210 lb 15.7 oz)   Height: 5' 6" (1.676 m)      Physical Exam   Constitutional: She appears well-developed and " well-nourished. No distress.   HENT:   Head: Normocephalic and atraumatic.   Nose: Nose normal.   Mouth/Throat: Oropharynx is clear and moist. No oropharyngeal exudate.   Hoarseness   Eyes: Pupils are equal, round, and reactive to light. EOM are normal. Right eye exhibits no discharge. Left eye exhibits no discharge. No scleral icterus.   Neck: Neck supple. No tracheal deviation present. No thyromegaly present.   Cardiovascular: Normal rate, regular rhythm, normal heart sounds and intact distal pulses.   No murmur heard.  Pulmonary/Chest: Effort normal and breath sounds normal. No respiratory distress. She has no wheezes.   Abdominal: Soft. Bowel sounds are normal. She exhibits no distension. There is no tenderness.   Musculoskeletal: She exhibits no edema or deformity.   Lymphadenopathy:     She has no cervical adenopathy.   Neurological: She is alert. No cranial nerve deficit. Gait normal.   Skin: Skin is warm and dry. She is not diaphoretic. No erythema.   Psychiatric: She has a normal mood and affect. Her behavior is normal.         Lab Results   Component Value Date    WBC 5.26 11/30/2019    HGB 12.6 11/30/2019    HCT 36.8 (L) 11/30/2019     11/30/2019    CHOL 177 11/30/2019    TRIG 58 11/30/2019    HDL 50 11/30/2019    ALT 16 08/28/2017    AST 17 08/28/2017     08/28/2017    K 3.7 08/28/2017     08/28/2017    CREATININE 0.9 08/28/2017    BUN 9 08/28/2017    CO2 23 08/28/2017    HGBA1C 5.3 11/30/2019       The ASCVD Risk score (Curtis DC Jr., et al., 2013) failed to calculate for the following reasons:    The 2013 ASCVD risk score is only valid for ages 40 to 79    (Imaging have been independently reviewed)  CT abdomen in 2017 suggesting left hemorrhagic cyst.  Suggesting recurrent renal infections.    Assessment:       1. Moderate episode of recurrent major depressive disorder    2. Obesity (BMI 30.0-34.9)    3. Dysphonia    4. Gastroesophageal reflux disease, esophagitis presence not  specified    5. Moderate persistent asthma without complication    6. Cyanocobalamin deficiency    7. Flu vaccine need    8. Need for tetanus, diphtheria, and acellular pertussis (Tdap) vaccine          Plan:       Carlos was seen today for follow-up.    Diagnoses and all orders for this visit:    Moderate episode of recurrent major depressive disorder  Comments:  Improved on Wellbutrin. Increase to 300mg. Can decrease back to 150 if needed. Cont OSH therapy.  Orders:  -     buPROPion (WELLBUTRIN XL) 300 MG 24 hr tablet; Take 1 tablet (300 mg total) by mouth once daily.    Obesity (BMI 30.0-34.9)  Comments:  Worsened. Cont Wellbutrin for depression.  Will discuss lifestyle changes during future visit.  Orders:  -     buPROPion (WELLBUTRIN XL) 300 MG 24 hr tablet; Take 1 tablet (300 mg total) by mouth once daily.    Dysphonia  Comments:  Persistent. Laryngoscopy normal.  Thought to be functional.  Following with SLP.  Cont PPI; consider weaning after dysphonia improves.  Orders:  -     pantoprazole (PROTONIX) 40 MG tablet; Take 1 tablet (40 mg total) by mouth every morning.    Gastroesophageal reflux disease, esophagitis presence not specified  Comments:  Improved with Protonix; continue. Likely worsening asthma, dysphonia.  RTC 3 mo and consider weaning. Discussed smaller meals, avoiding acidic food.  Orders:  -     pantoprazole (PROTONIX) 40 MG tablet; Take 1 tablet (40 mg total) by mouth every morning.    Moderate persistent asthma without complication  Comments:  Improved.  Cont Advair b.i.d., rescue inhaler p.r.n..   Orders:  -     pantoprazole (PROTONIX) 40 MG tablet; Take 1 tablet (40 mg total) by mouth every morning.    Cyanocobalamin deficiency  Comments:  Start low dose B12.  Orders:  -     cyanocobalamin (VITAMIN B-12) 100 MCG tablet; Take 1 tablet (100 mcg total) by mouth once daily.    Flu vaccine need  Comments:  She will consider it. Advised to obtain vaccine at Pharmacy.    Need for tetanus,  diphtheria, and acellular pertussis (Tdap) vaccine  Comments:  Advised to obtain vaccine at Pharmacy.         Side effects of medication(s) were discussed in detail and patient voiced understanding.  Patient will call back for any issues or complications.     RTC in 3 month(s) or sooner PRN for depression, dysphonia. Consider weaning off PPI if dysphonia improves.

## 2019-12-23 ENCOUNTER — TELEPHONE (OUTPATIENT)
Dept: OBSTETRICS AND GYNECOLOGY | Facility: CLINIC | Age: 29
End: 2019-12-23

## 2019-12-23 ENCOUNTER — PATIENT OUTREACH (OUTPATIENT)
Dept: ADMINISTRATIVE | Facility: OTHER | Age: 29
End: 2019-12-23

## 2020-01-06 ENCOUNTER — CLINICAL SUPPORT (OUTPATIENT)
Dept: SPEECH THERAPY | Facility: HOSPITAL | Age: 30
End: 2020-01-06
Payer: COMMERCIAL

## 2020-01-06 DIAGNOSIS — F44.4 FUNCTIONAL DYSPHONIA: Primary | ICD-10-CM

## 2020-01-06 DIAGNOSIS — R49.0 PERSISTENT HOARSENESS: ICD-10-CM

## 2020-01-06 PROCEDURE — 92507 TX SP LANG VOICE COMM INDIV: CPT | Mod: GN

## 2020-01-06 NOTE — PROGRESS NOTES
"Referring provider: Chad Norman  Reason for visit:  Voice treatment (CPT 49223)  Session #2    History / Subjective   I had the pleasure of seeing Carlos Snell for her second treatment session following complete voice evaluation on 12/10.2019.  During that time, improvements were noted on cup bubble SOVT voice exercises.  Since evaluation, she reports that her voice has improved and words will come out "normal". However, this is only when she is talking quietly. She is practicing the exercises only 1-2 times per day and has minimally used circumlaryngeal massage techniques.    Objective   The primary goal of todays session was to improve carryover of patient's treatment plan. This was targeted using cup to mouth phonation, cup bubble SOVT exercises, and circumlaryngeal massage techniques.    Perceptual/behavioral assessment  -CAPE-V Overall Score: 63  -Quality: intermittent aphonia, strained and wilkerson/pulse mode phonation  -Volume: decreased projection  -Pitch: low F0  -Flexibility: diminished  -Habitual respiratory pattern: breath holding     Education / Stimulability Trials   Patient stimulable for decreased strain and improved forward-focus with cup bubble SOVT exercises to resonant /u/ exercises with improvement of carryover from last session. Improved carryover to structured reading passages was observed with cup to mouth phonation. In addition, carryover to structured reading passages was achieved by cuing patient to keep voice "on" and not pause in between words. This aided in reducing aphonic vocal quality. She required moderate cuing to maintain forward-focus and decrease strain when not completing exercises; however, noted improvement in carryover from last session. She was instructed to practice the exercises as frequently as possible (most importantly prior to conversation) for 30 seconds to 1 minute at a time to encourage generalization to conversation.     Functional goals  Length Status Goal "   Long term Ongoing Patient and clinician will facilitate changes in vocal function in order to restore functional use of voice for daily occupational, social, and emotional demands.    Long term Ongoing Patient will re-establish phonation with adequate balance of airflow and resonance with decreased muscle tension.    Long term Ongoing Patient will achieve improved/normal voice assessed with perceptual scales, acoustic and/or aerodynamic measures within 12 weeks.   Short term Ongoing Patient will improve the quality, efficiency, and ease of phonation through resonant and/or airflow exercises from the syllable to conversation level with 80% accuracy.   Short term Ongoing Patient will discriminate between easy and strained phonation with 80% accuracy.    Short term Ongoing Patient will identify the sensations associated with muscle relaxation in the abdominal, thoracic, neck and facial areas during efficient phonation with minimal clinician cue.    Short term Ongoing Patient will demonstrate the ability to increase awareness of voicing behavior through self-monitoring to facilitate generalization in functional speaking situations with 80% accuracy.       Assessment     Carlos Snell presents with severe dysphonia. 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 Prognosis for continued improvement is good/fair.     Recommendations / POC    Recommend 4-6 sessions of voice/speech therapy over 8-12 weeks with a speech-language pathologist to optimize glottal postures for improved vocal function, vocal efficiency, and ease of phonation. She should continue the exercises as discussed in session and should contact me with any further questions.

## 2020-01-21 NOTE — PROGRESS NOTES
New Patient     SUBJECTIVE:  Patient ID: Carlos Snell   MRN: [id number]  Referred By: Dr. Azalea Mireles  Chief Complaint: Migraine      History of Present Illness:   29 y.o. female with a PMHx of asthma, chronic back pain, migraines, pyelonephritis, dysphonia, depression, vitamin b 12 deficiency, anemia, endometriosis, obesity, GERD, lumbar spinal fusion, fibromyalgia, who presents to clinic alone for evaluation of headaches.   PMHx negative for TBI, Meningitis, Aneurysms, Kidney Stones, GI bleed, osteoporosis, CAD/MI, CVA/TIA, DM, cancer, pregnancy   Family Hx Positive for sister and paternal grandmother with migraines    Headache History:  Onset - middle school  Location/Radiation - frontal, temporal, and occipital  Quality - like a gigantic hammer and lightning, fiery  Duration - 15 minutes - 48 hours  Intensity (range) - 6-10/10  Frequency - 30/30 ha days per month x 12 years, 1/30 are debilitating  Triggers - menstrual cycle  Aggravating Factors - light, sound  Alleviating Factors - dark room  Recent Changes - no  Prodrome/Aura - dots  HA today? - no  Time of day of most headaches- anytime   Sleep - sometimes snores, doesn't wake feeling refreshed, resolution of headache with sleep, wakes frequently throuthout the night, trouble falling asleep    Associated symptoms with the headache: nausea, photo/phonophobia    Treatments Tried   Ibuprofen and advil - once a day  Ketorolac inj  zofran  prednsione  Medrol dose pack  relpax - chest heaviness  lyrica - helped  Bilateral occipital nerve block - helped    Social History  Alcohol - socially  Smoke - denies  Recreational Drug Use- denies   at [employer]    Current Medications:    Current Outpatient Medications:     albuterol (PROAIR HFA) 90 mcg/actuation inhaler, Inhale 2 puffs into the lungs every 6 (six) hours as needed for Wheezing. Rescue, Disp: 18 g, Rfl: 11    buPROPion (WELLBUTRIN XL) 300 MG 24 hr tablet, Take 1 tablet (300 mg total) by  "mouth once daily., Disp: 90 tablet, Rfl: 3    cetirizine (ZYRTEC) 10 MG tablet, cetirizine 10 mg tablet, Disp: , Rfl:     cyanocobalamin (VITAMIN B-12) 100 MCG tablet, Take 1 tablet (100 mcg total) by mouth once daily., Disp: 90 tablet, Rfl: 3    fluticasone-salmeterol diskus inhaler 250-50 mcg, Inhale 1 puff into the lungs 2 (two) times daily., Disp: 180 each, Rfl: 3    ibuprofen (ADVIL,MOTRIN) 800 MG tablet, Take 1 tablet (800 mg total) by mouth every 6 (six) hours as needed for Pain., Disp: 30 tablet, Rfl: 5    pantoprazole (PROTONIX) 40 MG tablet, Take 1 tablet (40 mg total) by mouth every morning., Disp: 90 tablet, Rfl: 3    rizatriptan (MAXALT-MLT) 5 MG disintegrating tablet, Take at onset of migraine, can repeat in 2 hrs if needed.  No more than 2 tabs per day or 3 days/wk., Disp: 12 tablet, Rfl: 3    topiramate (TOPAMAX) 25 MG tablet, Take 1 tablet (25 mg total) by mouth every evening., Disp: 30 tablet, Rfl: 3    Review of Systems - as per HPI, otherwise a balanced 10 systems review is negative.    OBJECTIVE:  Vitals:  BP (!) 120/91   Pulse 90   Ht 5' 6" (1.676 m)   Wt 88.5 kg (195 lb)   BMI 31.47 kg/m²     Physical Exam   Constitutional: she appears well-developed and well-nourished. she is well groomed. NAD  HENT:    Head: Normocephalic and atraumatic, oral and nasal mucosa intact.  Frontalis was TTP, temporalis was TTP   Eyes: Conjunctivae and EOM are normal. Pupils are equal, round, and reactive to light   Neck: Neck supple. Occiput and trapezius TTP, +tinnel on right   Musculoskeletal: Normal range of motion. No joint stiffness. No vertebral point tenderness.  Skin: Skin is warm and dry.  Psychiatric: Normal mood and affect.     Neuro exam:    Mental status:  The patient is alert and oriented to person, place and time.  Language is intact and fluent  Remote and recent memory are intact  Normal attention and concentration  Mood is stable    Cranial Nerves:  Pupils are equal and reactive to " light.    Extraocular movements are intact and without nystagmus.    Visual fields are full to confrontation testing.   Facial movement is symmetric.  Facial sensation is intact.    Hearing is intact to finger rub   Uvula in midline. Tongue in midline without fasciculation.   FROM of neck in all (6) directions with pain  Shoulder shrug symmetrical.    Coordination:     Finger to nose - normal and symmetric bilaterally   Heel to shin test - normal and symmetric bilaterally     Motor:  Normal muscle bulk and symmetry. No fasciculations were noted.   Tremor not apparent   Pronator drift not apparent.    strength was strong and symmetric  Finger extension strength was strong and symmetric  RUE:appropriate against gravity and medium force as tested 5/5  LUE: appropriate against gravity and medium force as tested 5/5  RLE:appropriate against gravity and medium force as tested 5/5              LLE: appropriate against gravity and medium force as tested 5/5    Reflexes:  Right Brachioradialis 2+  Left Brachioradialis 2+  Right Biceps 2+  Left Biceps 2+  Right Patellar2+  Left Patellar 2+  Right Achilles 2+  Left Achilles 2+                          Brock was negative    Sensory:  RUE  intact light touch  LUE intact light touch  RLE intact light touch  LLE intact light touch    Gait:   Romberg - negative  Normal gait  Tandem, Heel, and Toe Walk - able to perform without difficulty    Review of Data:   Notes from pcp reviewed   Labs:  Lab Visit on 11/30/2019   Component Date Value Ref Range Status    WBC 11/30/2019 5.26  3.90 - 12.70 K/uL Final    RBC 11/30/2019 4.27  4.00 - 5.40 M/uL Final    Hemoglobin 11/30/2019 12.6  12.0 - 16.0 g/dL Final    Hematocrit 11/30/2019 36.8* 37.0 - 48.5 % Final    Mean Corpuscular Volume 11/30/2019 86  82 - 98 fL Final    Mean Corpuscular Hemoglobin 11/30/2019 29.5  27.0 - 31.0 pg Final    Mean Corpuscular Hemoglobin Conc 11/30/2019 34.2  32.0 - 36.0 g/dL Final    RDW 11/30/2019  12.4  11.5 - 14.5 % Final    Platelets 11/30/2019 261  150 - 350 K/uL Final    MPV 11/30/2019 10.1  9.2 - 12.9 fL Final    Gran # (ANC) 11/30/2019 3.5  1.8 - 7.7 K/uL Final    Lymph # 11/30/2019 1.2  1.0 - 4.8 K/uL Final    Mono # 11/30/2019 0.4  0.3 - 1.0 K/uL Final    Eos # 11/30/2019 0.2  0.0 - 0.5 K/uL Final    Baso # 11/30/2019 0.01  0.00 - 0.20 K/uL Final    Gran% 11/30/2019 65.8  38.0 - 73.0 % Final    Lymph% 11/30/2019 23.0  18.0 - 48.0 % Final    Mono% 11/30/2019 7.6  4.0 - 15.0 % Final    Eosinophil% 11/30/2019 3.4  0.0 - 8.0 % Final    Basophil% 11/30/2019 0.2  0.0 - 1.9 % Final    Differential Method 11/30/2019 Automated   Final    Hemoglobin A1C 11/30/2019 5.3  4.0 - 5.6 % Final    Estimated Avg Glucose 11/30/2019 105  68 - 131 mg/dL Final    Cholesterol 11/30/2019 177  120 - 199 mg/dL Final    Triglycerides 11/30/2019 58  30 - 150 mg/dL Final    HDL 11/30/2019 50  40 - 75 mg/dL Final    LDL Cholesterol 11/30/2019 115.4  63.0 - 159.0 mg/dL Final    Hdl/Cholesterol Ratio 11/30/2019 28.2  20.0 - 50.0 % Final    Total Cholesterol/HDL Ratio 11/30/2019 3.5  2.0 - 5.0 Final    Non-HDL Cholesterol 11/30/2019 127  mg/dL Final    Ferritin 11/30/2019 43  20.0 - 300.0 ng/mL Final    Iron 11/30/2019 78  30 - 160 ug/dL Final    Transferrin 11/30/2019 292  200 - 375 mg/dL Final    TIBC 11/30/2019 432  250 - 450 ug/dL Final    Saturated Iron 11/30/2019 18* 20 - 50 % Final    Vitamin B-12 11/30/2019 295  210 - 950 pg/mL Final   Office Visit on 11/23/2019   Component Date Value Ref Range Status    Trichomonas vaginalis 11/23/2019 Negative  Negative Final    Gardnerella vaginalis 11/23/2019 Positive* Negative Final    Candida sp 11/23/2019 Negative  Negative Final    Bacterial vaginosis DNA 11/23/2019 CANCELED   Final     Imaging:  No results found for this or any previous visit.  Note: I have independently reviewed any/all imaging/labs/tests and agree with the report (s) as documented.   Any discrepancies will be as noted/demarcated by free text.  KENNETH LUNSFORD 1/22/2020    ASSESSMENT:  1. Migraine with aura and without status migrainosus, not intractable    2. Medication overuse headache    3. Moderate episode of recurrent major depressive disorder    4. Moderate persistent asthma without complication    5. Cyanocobalamin deficiency          PLAN:  - Discussed symptoms appear to be consistent with migraine and med overuse HA, discussed treatment options and patient agreed with the following plan:  - ppx - topamax  - abortive - maxalt  - med overuse- avoid ibuprofen and advil  - bilateral greater occipital nerve block at next visit  - avoid bb - hx of asthma and depression  - vitamin b12 deficiency - continue supplementation  - risks, benefits, and potential side effects of topamax, maxalt, relpax, nsaids, botox, cgrp inhibitors discussed   - alternative treatment options offered   - importance of healthy diet, regular exercise and sleep hygiene in the treatment of headaches    - Start tracking headaches via Migraine Buddy lacho on phone   - RTC in 4 wks for nerve block     Orders Placed This Encounter    Nerve block    topiramate (TOPAMAX) 25 MG tablet    rizatriptan (MAXALT-MLT) 5 MG disintegrating tablet       I have discussed realistic goals of care with patient at length as well as medication options, and need for lifestyle adjustment. I have explained that treatment will take time. We have agreed that the goal will be to reduce frequency/intensity/quantity of HA, not to be completely HA free. I have explained my non narcotic policy regarding headache treatment.    Patient agreeable to work on lifestyle adjustments.    Discussed potential for teratogenicity with treatment, patient understands if her family planning status should change she will contact office immediately and we will safely adjust medications as needed.     Questions and concerns were sought and answered to the patient's stated  verbal satisfaction.  The patient verbalizes understanding and agreement with the above stated treatment plan.     CC: MD Jeanette Gay PA-C  [facility]  [phone]    Dr. Velasco was available during today's encounter.

## 2020-01-22 ENCOUNTER — OFFICE VISIT (OUTPATIENT)
Dept: NEUROLOGY | Facility: CLINIC | Age: 30
End: 2020-01-22
Payer: COMMERCIAL

## 2020-01-22 ENCOUNTER — PATIENT OUTREACH (OUTPATIENT)
Dept: ADMINISTRATIVE | Facility: OTHER | Age: 30
End: 2020-01-22

## 2020-01-22 VITALS
SYSTOLIC BLOOD PRESSURE: 120 MMHG | DIASTOLIC BLOOD PRESSURE: 91 MMHG | WEIGHT: 195 LBS | HEART RATE: 90 BPM | HEIGHT: 66 IN | BODY MASS INDEX: 31.34 KG/M2

## 2020-01-22 DIAGNOSIS — E53.8 CYANOCOBALAMIN DEFICIENCY: ICD-10-CM

## 2020-01-22 DIAGNOSIS — F33.1 MODERATE EPISODE OF RECURRENT MAJOR DEPRESSIVE DISORDER: ICD-10-CM

## 2020-01-22 DIAGNOSIS — G44.40 MEDICATION OVERUSE HEADACHE: ICD-10-CM

## 2020-01-22 DIAGNOSIS — G43.109 MIGRAINE WITH AURA AND WITHOUT STATUS MIGRAINOSUS, NOT INTRACTABLE: Primary | ICD-10-CM

## 2020-01-22 DIAGNOSIS — J45.40 MODERATE PERSISTENT ASTHMA WITHOUT COMPLICATION: ICD-10-CM

## 2020-01-22 PROCEDURE — 3008F PR BODY MASS INDEX (BMI) DOCUMENTED: ICD-10-PCS | Mod: CPTII,S$GLB,, | Performed by: PHYSICIAN ASSISTANT

## 2020-01-22 PROCEDURE — 99999 PR PBB SHADOW E&M-EST. PATIENT-LVL III: ICD-10-PCS | Mod: PBBFAC,,, | Performed by: PHYSICIAN ASSISTANT

## 2020-01-22 PROCEDURE — 3008F BODY MASS INDEX DOCD: CPT | Mod: CPTII,S$GLB,, | Performed by: PHYSICIAN ASSISTANT

## 2020-01-22 PROCEDURE — 99204 OFFICE O/P NEW MOD 45 MIN: CPT | Mod: S$GLB,,, | Performed by: PHYSICIAN ASSISTANT

## 2020-01-22 PROCEDURE — 99204 PR OFFICE/OUTPT VISIT, NEW, LEVL IV, 45-59 MIN: ICD-10-PCS | Mod: S$GLB,,, | Performed by: PHYSICIAN ASSISTANT

## 2020-01-22 PROCEDURE — 99999 PR PBB SHADOW E&M-EST. PATIENT-LVL III: CPT | Mod: PBBFAC,,, | Performed by: PHYSICIAN ASSISTANT

## 2020-01-22 RX ORDER — TOPIRAMATE 25 MG/1
25 TABLET ORAL NIGHTLY
Qty: 30 TABLET | Refills: 3 | Status: SHIPPED | OUTPATIENT
Start: 2020-01-22 | End: 2020-04-14

## 2020-01-22 RX ORDER — RIZATRIPTAN BENZOATE 5 MG/1
TABLET, ORALLY DISINTEGRATING ORAL
Qty: 12 TABLET | Refills: 3 | Status: SHIPPED | OUTPATIENT
Start: 2020-01-22 | End: 2020-04-14

## 2020-01-22 NOTE — LETTER
January 22, 2020      Azalea Mireles MD  [address]  [address]  [facility] [address]           [facility]  [address]  [place] [address]  Phone: [phone]  Fax: [phone]          Patient: Carlos Snell   MR Number: [id number]   YOB: 1990   Date of Visit: 1/22/2020       Dear Dr. Azalea Mireles:    Thank you for referring Carlos Snell to me for evaluation. Attached you will find relevant portions of my assessment and plan of care.    If you have questions, please do not hesitate to call me. I look forward to following Carlos Snell along with you.    Sincerely,    Jeanette Quezada PA-C    Enclosure  CC:  No Recipients    If you would like to receive this communication electronically, please contact [email or web address] or [phone] to request more information on [employer] access.    For providers and/or their staff who would like to refer a patient to [facility], please contact us through our one-stop-shop provider referral line, [facility], at [phone].    If you feel you have received this communication in error or would no longer like to receive these types of communications, please e-mail [email or web address]

## 2020-01-22 NOTE — PATIENT INSTRUCTIONS
Supplements for Migraine:  1. Magnesium Oxide - 400mg by mouth daily  2. Riboflavin (Vitamin B2) - 400mg by mouth daily  3. Coenzyme Q10 - 200mg tablet by mouth daily    - Please download Migraine Matthew lacho on phone and begin tracking your headaches

## 2020-01-23 ENCOUNTER — TELEPHONE (OUTPATIENT)
Dept: NEUROLOGY | Facility: CLINIC | Age: 30
End: 2020-01-23

## 2020-02-10 ENCOUNTER — TELEPHONE (OUTPATIENT)
Dept: INTERNAL MEDICINE | Facility: CLINIC | Age: 30
End: 2020-02-10

## 2020-02-26 ENCOUNTER — PATIENT OUTREACH (OUTPATIENT)
Dept: ADMINISTRATIVE | Facility: OTHER | Age: 30
End: 2020-02-26

## 2020-02-26 ENCOUNTER — TELEPHONE (OUTPATIENT)
Dept: NEUROLOGY | Facility: CLINIC | Age: 30
End: 2020-02-26

## 2020-03-19 ENCOUNTER — TELEPHONE (OUTPATIENT)
Dept: UROGYNECOLOGY | Facility: CLINIC | Age: 30
End: 2020-03-19

## 2020-03-19 ENCOUNTER — OFFICE VISIT (OUTPATIENT)
Dept: INTERNAL MEDICINE | Facility: CLINIC | Age: 30
End: 2020-03-19
Payer: COMMERCIAL

## 2020-03-19 DIAGNOSIS — K21.9 GASTROESOPHAGEAL REFLUX DISEASE, ESOPHAGITIS PRESENCE NOT SPECIFIED: ICD-10-CM

## 2020-03-19 DIAGNOSIS — R49.0 DYSPHONIA: ICD-10-CM

## 2020-03-19 DIAGNOSIS — F32.81 PMDD (PREMENSTRUAL DYSPHORIC DISORDER): ICD-10-CM

## 2020-03-19 DIAGNOSIS — F33.1 MODERATE EPISODE OF RECURRENT MAJOR DEPRESSIVE DISORDER: Primary | ICD-10-CM

## 2020-03-19 PROCEDURE — 99214 OFFICE O/P EST MOD 30 MIN: CPT | Mod: 95,,, | Performed by: INTERNAL MEDICINE

## 2020-03-19 PROCEDURE — 99214 PR OFFICE/OUTPT VISIT, EST, LEVL IV, 30-39 MIN: ICD-10-PCS | Mod: 95,,, | Performed by: INTERNAL MEDICINE

## 2020-03-19 RX ORDER — PAROXETINE HYDROCHLORIDE HEMIHYDRATE 12.5 MG/1
12.5 TABLET, FILM COATED, EXTENDED RELEASE ORAL EVERY MORNING
Qty: 30 TABLET | Refills: 11 | Status: SHIPPED | OUTPATIENT
Start: 2020-03-19 | End: 2020-06-22

## 2020-03-19 NOTE — PROGRESS NOTES
"The patient location is: home  The chief complaint leading to consultation is: depression/anxiety.  Elected for virtual visit d/t risk of exposure to Coronavirus.  Visit type: Virtual visit with synchronous audio and video  Total time spent with patient: 30 minutes  Each patient to whom he or she provides medical services by telemedicine is:  (1) informed of the relationship between the physician and patient and the respective role of any other health care provider with respect to management of the patient; and (2) notified that he or she may decline to receive medical services by telemedicine and may withdraw from such care at any time.    Subjective:       Patient ID: Carlos Snell is a 30 y.o. female who  has a past medical history of Asthma, Chronic back pain, Migraine headache, and Pyelonephritis.    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    HPI    Depression, anxiety:  Chronic.  Worse during her menstrual cycle.  Sees an OSH therapist.  FHx Mom with depression, anxiety on unknown med.  Feels "stuck" because she doesn't want to be a teacher anymore and fear of switching careers.  Worries a lot, hard time relaxing.  PHQ 19.  She initially declined SSRI due to concerns for weight gain.      Started Wellbutrin and feels more positive, improved focus/concentration.  Is less emotional, but still anxious at times, worse for 2 weeks during her menstrual cycle (preceeded by menstrual cramps).  Mood varies, but overall improved.  No insomnia, palpitations, tremors, good appetite.        Dysphonia, sinusitis:  Was treated with antibiotics, steroids, but continued to have hoarseness in the morning.  No SOB, dysphagia.  Following with ENT.  Performed laryngoscopy.  Thought to be functional.  Rec SLP.  Follow-up with ENT p.r.n.  GERD as below.  Started Protonix q.a.m. with great " improvement.  Also had a break from teaching d/t coronavirus.    GERD:   Drinks coffee daily.  No epigastric pain.  Not daily.  Sour taste in her throat, some vomit.  No particular food triggers.  Started Protonix q.a.m. with improvement.  Takes ibuprofen sparingly for back pain.                Not addressed today.  Obesity:  BMI 34.   Cont Wellbutrin for depression.  Will discuss lifestyle changes during future visit.    Asthma:    Cough, occurs with weather changes.  Improved with Advair BID, but admits to forgetting some doses.  Will set an alarm to take meds. Has not exercised in a while.  The patient does not smoke.    Cont Advair b.i.d., rescue inhaler p.r.n..     Cyanocobalamin deficiency:    Started low dose B12.  Lab Results   Component Value Date    [id number] 295 11/30/2019     Normocytic anemia:   Hematocrit slightly decreased.  +Menorrhagia, endometriosis.  Reports regular cycles.  Was on OCPs, IUD, but stopped due to weight gain.  Uses super tampons every 1.5 hours per day; uses tampons + pads sometimes.  Has referral to OBGYN.  Likely 2/2 endometriosis/menorrhagia.  Iron panel wnl.  Referred to OBGYN.  Lab Results   Component Value Date    IRON 78 11/30/2019    TIBC 432 11/30/2019    FERRITIN 43 11/30/2019     Lab Results   Component Value Date    [id number] 295 11/30/2019     No results found for: FOLATE    Endometriosis:  Stopped birth control as above due to concerns for weight.  Referred to OBGYN to discuss birth control options.    Left ovarian cyst:    CT abdomen pelvis performed  due to abdominal pain suggesting left hemorrhagic cyst.  Referred to OBGYN.    Chronic back pain d/t DDD:  Sx in 2013.  Mild.  Was taking ibuprofen 800 PRN if severe pain or menstrual cramps.  Mild.  Is taking ibuprofen sparingly.  She declined referral to [facility].    Migraine:    Saw Neurology. C/w med overuse. Discussed avoiding NSAIDs. On Topamax for PPX, Maxalt for abortive. Planning on occipital nerve  block.    Review of Systems   Constitutional: Negative for activity change, fever and unexpected weight change.   HENT: Negative for hearing loss, rhinorrhea, sneezing, trouble swallowing and voice change.    Eyes: Negative for discharge, redness, itching and visual disturbance.   Respiratory: Negative for chest tightness, shortness of breath and wheezing.    Cardiovascular: Negative for chest pain and palpitations.   Gastrointestinal: Negative for abdominal pain, blood in stool, constipation, diarrhea and vomiting.   Endocrine: Negative for polydipsia and polyuria.   Genitourinary: Positive for menstrual problem. Negative for difficulty urinating, dysuria and hematuria.   Musculoskeletal: Positive for back pain. Negative for arthralgias, gait problem, joint swelling and neck pain.   Skin: Negative for color change and rash.   Neurological: Positive for headaches. Negative for dizziness, weakness and light-headedness.   Hematological: Negative for adenopathy.   Psychiatric/Behavioral: Positive for dysphoric mood. Negative for confusion. The patient is nervous/anxious.        I personally reviewed Past Medical History, Past Surgical History, Social History, and Family History.    Objective:      There were no vitals filed for this visit.   Physical Exam   Constitutional: She appears well-developed and well-nourished. No distress.   HENT:   Hoarseness has greatly improved.   Eyes: Right eye exhibits no discharge. Left eye exhibits no discharge.   Pulmonary/Chest: Effort normal. No respiratory distress.   Speaking in complete sentences.   Neurological: She is alert.   Skin: She is not diaphoretic.   Psychiatric: She has a normal mood and affect. Her behavior is normal.         Lab Results   Component Value Date    WBC 5.26 11/30/2019    HGB 12.6 11/30/2019    HCT 36.8 (L) 11/30/2019     11/30/2019    CHOL 177 11/30/2019    TRIG 58 11/30/2019    HDL 50 11/30/2019    ALT 16 08/28/2017    AST 17 08/28/2017    NA  137 08/28/2017    K 3.7 08/28/2017     08/28/2017    CREATININE 0.9 08/28/2017    BUN 9 08/28/2017    CO2 23 08/28/2017    HGBA1C 5.3 11/30/2019       The ASCVD Risk score (Curtis DOWNING Jr., et al., 2013) failed to calculate for the following reasons:    The 2013 ASCVD risk score is only valid for ages 40 to 79    (Imaging have been independently reviewed)  CT abdomen in 2017 suggesting left hemorrhagic cyst.  Suggesting recurrent renal infections.    Assessment:       1. Moderate episode of recurrent major depressive disorder    2. PMDD (premenstrual dysphoric disorder)    3. Dysphonia    4. Gastroesophageal reflux disease, esophagitis presence not specified          Plan:       Carlos was seen today for depression.    Diagnoses and all orders for this visit:    Moderate episode of recurrent major depressive disorder  Comments:  Improved, but persistent anxiety, PMDD as below. Cont Wellbutrin 300mg, OSH therapy. Add Paxil for PMDD as below.  Orders:  -     paroxetine (PAXIL-CR) 12.5 MG 24 hr tablet; Take 1 tablet (12.5 mg total) by mouth every morning.    PMDD (premenstrual dysphoric disorder)  Comments:  Persistent. Start Paxil 12.5 daily for anxiety and PMDD. Discussed SE, ED prompts.    Dysphonia  Comments:  Much improved on PPI! Cont PPI while she is having vocal rest from work. Consider weaning in 3 mo. Laryngoscopy normal.  Following with SLP.      Gastroesophageal reflux disease, esophagitis presence not specified  Comments:  Improved with Protonix; continue as above. Likely worsening asthma, dysphonia.  RTC 3 mo and consider weaning during the summer off work.         Side effects of medication(s) were discussed in detail and patient voiced understanding.  Patient will call back for any issues or complications.     RTC in 3 month(s) or sooner PRN for depression, dysphonia. Consider weaning off PPI if dysphonia improves. Can be virtual visit.

## 2020-03-19 NOTE — TELEPHONE ENCOUNTER
Attempted to reach patient to schedule my chart video visit. VM left to call office.  NICO Astudillo-BC

## 2020-04-13 NOTE — PROGRESS NOTES
Established Patient     The patient location is: patients home in [place]  The chief complaint leading to consultation is: headache follow up  Visit type: Virtual visit with synchronous audio and video  Total time spent with patient: 12 minutes  Each patient to whom he or she provides medical services by telemedicine is:  (1) informed of the relationship between the physician and patient and the respective role of any other health care provider with respect to management of the patient; and (2) notified that he or she may decline to receive medical services by telemedicine and may withdraw from such care at any time.    Notes:     SUBJECTIVE:  Patient ID: Carlos Snell   Chief Complaint: Headache    History of Present Illness:  Carlos Snell is a 30 y.o. female with a PMHx of migraines, med overuse HA, asthma, chronic back pain, pyelonephritis, dysphonia, depression, vitamin b 12 deficiency, anemia, endometriosis, obesity, GERD, lumbar spinal fusion, fibromyalgia who presents via virtual visit alone for follow-up of headaches.       Recommendations made at last Office Visit on 1/22/20:  - Discussed symptoms appear to be consistent with migraine and med overuse HA, discussed treatment options and patient agreed with the following plan:  - ppx - topamax  - abortive - maxalt  - med overuse- avoid ibuprofen and advil  - bilateral greater occipital nerve block at next visit  - avoid bb - hx of asthma and depression  - vitamin b12 deficiency - continue supplementation  - risks, benefits, and potential side effects of topamax, maxalt, relpax, nsaids, botox, cgrp inhibitors discussed   - alternative treatment options offered   - importance of healthy diet, regular exercise and sleep hygiene in the treatment of headaches    - Start tracking headaches via Migraine Matthew lacho on phone   - RTC in 4 wks for nerve block     04/14/2020 - Interval History:  Last visit pt presented w/ migraines and med overuse HA. Started topamax  and maxalt. Will avoid ibuprofen and advil. ONB denied.   Today, reports improvement in Ha's due to decrease in stress right now. Trigger includes menstrual cycle. Frequency is 15/30 ha days per month, 1/30 were debilitating. Severity ranges 4-9/10. Duration includes 2 hours - 6 days. When she has a HA, takes maxalt which she states makes her feel better than relpax and does not cause chest pain like relpax did but only dulls the HA. Has stopped ibuprofen and advil, taking tylenol now but takes it infrequently.   Plan: increase topamax and maxalt, continue to limit otc analgeiscs, f/u 6 wks    Treatments Tried:  Ibuprofen and advil - once a day  Ketorolac inj  zofran  prednsione  Medrol dose pack  relpax - chest heaviness  lyrica - helped  Bilateral occipital nerve block - helped  topamax  maxalt    Current Medications:    Current Outpatient Medications:     albuterol (PROAIR HFA) 90 mcg/actuation inhaler, Inhale 2 puffs into the lungs every 6 (six) hours as needed for Wheezing. Rescue, Disp: 18 g, Rfl: 11    buPROPion (WELLBUTRIN XL) 300 MG 24 hr tablet, Take 1 tablet (300 mg total) by mouth once daily., Disp: 90 tablet, Rfl: 3    cetirizine (ZYRTEC) 10 MG tablet, cetirizine 10 mg tablet, Disp: , Rfl:     cyanocobalamin (VITAMIN B-12) 100 MCG tablet, Take 1 tablet (100 mcg total) by mouth once daily., Disp: 90 tablet, Rfl: 3    fluticasone-salmeterol diskus inhaler 250-50 mcg, Inhale 1 puff into the lungs 2 (two) times daily., Disp: 180 each, Rfl: 3    ibuprofen (ADVIL,MOTRIN) 800 MG tablet, Take 1 tablet (800 mg total) by mouth every 6 (six) hours as needed for Pain., Disp: 30 tablet, Rfl: 5    pantoprazole (PROTONIX) 40 MG tablet, Take 1 tablet (40 mg total) by mouth every morning., Disp: 90 tablet, Rfl: 3    paroxetine (PAXIL-CR) 12.5 MG 24 hr tablet, Take 1 tablet (12.5 mg total) by mouth every morning., Disp: 30 tablet, Rfl: 11    rizatriptan (MAXALT-MLT) 10 MG disintegrating tablet, Take at onset of  migraine, can repeat in 2 hrs if needed.  No more than 2 tabs per day or 3 days/wk., Disp: 12 tablet, Rfl: 3    topiramate (TOPAMAX) 50 MG tablet, Take 1 tablet (50 mg total) by mouth every evening., Disp: 30 tablet, Rfl: 3    Review of Systems - as per HPI, otherwise a balanced 10 systems review is negative.    OBJECTIVE:  Vitals:  There were no vitals taken for this visit.     Physical Exam:  Constitutional: she appears well-developed and well-nourished. she is well groomed. NAD   HENT:    Head: Normocephalic and atraumatic  Eyes: Conjunctivae and EOM are normal  Musculoskeletal: Normal range of motion. No joint stiffness.   Psychiatric: Mood and affect are normal    Neuro: Patient is alert and oriented to person, place, and time. Language is intact and fluent. Speech is clear and fluent. Recent and remote memory are intact.  Normal attention and concentration.  Facial movement is symmetric. Moves all 4 extremities against gravity.      Review of Data:   Notes from pcp and neuro reviewed   Labs:  No visits with results within 3 Month(s) from this visit.   Latest known visit with results is:   Lab Visit on 11/30/2019   Component Date Value Ref Range Status    WBC 11/30/2019 5.26  3.90 - 12.70 K/uL Final    RBC 11/30/2019 4.27  4.00 - 5.40 M/uL Final    Hemoglobin 11/30/2019 12.6  12.0 - 16.0 g/dL Final    Hematocrit 11/30/2019 36.8* 37.0 - 48.5 % Final    Mean Corpuscular Volume 11/30/2019 86  82 - 98 fL Final    Mean Corpuscular Hemoglobin 11/30/2019 29.5  27.0 - 31.0 pg Final    Mean Corpuscular Hemoglobin Conc 11/30/2019 34.2  32.0 - 36.0 g/dL Final    RDW 11/30/2019 12.4  11.5 - 14.5 % Final    Platelets 11/30/2019 261  150 - 350 K/uL Final    MPV 11/30/2019 10.1  9.2 - 12.9 fL Final    Gran # (ANC) 11/30/2019 3.5  1.8 - 7.7 K/uL Final    Lymph # 11/30/2019 1.2  1.0 - 4.8 K/uL Final    Mono # 11/30/2019 0.4  0.3 - 1.0 K/uL Final    Eos # 11/30/2019 0.2  0.0 - 0.5 K/uL Final    Baso # 11/30/2019  0.01  0.00 - 0.20 K/uL Final    Gran% 11/30/2019 65.8  38.0 - 73.0 % Final    Lymph% 11/30/2019 23.0  18.0 - 48.0 % Final    Mono% 11/30/2019 7.6  4.0 - 15.0 % Final    Eosinophil% 11/30/2019 3.4  0.0 - 8.0 % Final    Basophil% 11/30/2019 0.2  0.0 - 1.9 % Final    Differential Method 11/30/2019 Automated   Final    Hemoglobin A1C 11/30/2019 5.3  4.0 - 5.6 % Final    Estimated Avg Glucose 11/30/2019 105  68 - 131 mg/dL Final    Cholesterol 11/30/2019 177  120 - 199 mg/dL Final    Triglycerides 11/30/2019 58  30 - 150 mg/dL Final    HDL 11/30/2019 50  40 - 75 mg/dL Final    LDL Cholesterol 11/30/2019 115.4  63.0 - 159.0 mg/dL Final    Hdl/Cholesterol Ratio 11/30/2019 28.2  20.0 - 50.0 % Final    Total Cholesterol/HDL Ratio 11/30/2019 3.5  2.0 - 5.0 Final    Non-HDL Cholesterol 11/30/2019 127  mg/dL Final    Ferritin 11/30/2019 43  20.0 - 300.0 ng/mL Final    Iron 11/30/2019 78  30 - 160 ug/dL Final    Transferrin 11/30/2019 292  200 - 375 mg/dL Final    TIBC 11/30/2019 432  250 - 450 ug/dL Final    Saturated Iron 11/30/2019 18* 20 - 50 % Final    Vitamin B-12 11/30/2019 295  210 - 950 pg/mL Final     Imaging:  No results found for this or any previous visit.  Note: I have independently reviewed any/all imaging/labs/tests and agree with the report (s) as documented.  Any discrepancies will be as noted/demarcated by free text.  KENNETH LUNSFORD 4/14/2020    ASSESSMENT:  1. Migraine with aura and without status migrainosus, not intractable    2. Medication overuse headache        PLAN:  - Discussed symptoms appear to be consistent with migraine and med overuse HA, discussed treatment options and patient agreed with the following plan:  - ppx - increase topamax  - abortive - increase maxalt  - med overuse- continue to avoid ibuprofen and advil  - bilateral greater occipital nerve block denied by insurance  - avoid bb - hx of asthma and depression  - vitamin b12 deficiency - continue supplementation  -  Continue tracking headaches   - Discussed goals of therapy are to decrease the frequency, intensity, and duration of headaches  - RTC in  6 wks     Orders Placed This Encounter    topiramate (TOPAMAX) 50 MG tablet    rizatriptan (MAXALT-MLT) 10 MG disintegrating tablet       Discussed potential for teratogenicity with treatment, patient understands if her family planning status should change she will contact office immediately and we will safely adjust medications as needed.     Questions and concerns were sought and answered to the patient's stated verbal satisfaction.  The patient verbalizes understanding and agreement with the above stated treatment plan.     CC: MD Jeanette Gay PA-C  [facility]   [phone]    Dr. Velasco was available during today's encounter.

## 2020-04-14 ENCOUNTER — OFFICE VISIT (OUTPATIENT)
Dept: NEUROLOGY | Facility: CLINIC | Age: 30
End: 2020-04-14
Payer: COMMERCIAL

## 2020-04-14 DIAGNOSIS — G43.109 MIGRAINE WITH AURA AND WITHOUT STATUS MIGRAINOSUS, NOT INTRACTABLE: Primary | ICD-10-CM

## 2020-04-14 DIAGNOSIS — G44.40 MEDICATION OVERUSE HEADACHE: ICD-10-CM

## 2020-04-14 PROCEDURE — 99214 OFFICE O/P EST MOD 30 MIN: CPT | Mod: 95,,, | Performed by: PHYSICIAN ASSISTANT

## 2020-04-14 PROCEDURE — 99214 PR OFFICE/OUTPT VISIT, EST, LEVL IV, 30-39 MIN: ICD-10-PCS | Mod: 95,,, | Performed by: PHYSICIAN ASSISTANT

## 2020-04-14 RX ORDER — TOPIRAMATE 50 MG/1
50 TABLET, FILM COATED ORAL NIGHTLY
Qty: 30 TABLET | Refills: 3 | Status: SHIPPED | OUTPATIENT
Start: 2020-04-14 | End: 2022-04-19

## 2020-04-14 RX ORDER — RIZATRIPTAN BENZOATE 10 MG/1
TABLET, ORALLY DISINTEGRATING ORAL
Qty: 12 TABLET | Refills: 3 | Status: SHIPPED | OUTPATIENT
Start: 2020-04-14 | End: 2022-07-06

## 2020-05-25 ENCOUNTER — PATIENT OUTREACH (OUTPATIENT)
Dept: ADMINISTRATIVE | Facility: OTHER | Age: 30
End: 2020-05-25

## 2020-05-26 ENCOUNTER — PATIENT OUTREACH (OUTPATIENT)
Dept: ADMINISTRATIVE | Facility: HOSPITAL | Age: 30
End: 2020-05-26

## 2020-06-22 ENCOUNTER — OFFICE VISIT (OUTPATIENT)
Dept: INTERNAL MEDICINE | Facility: CLINIC | Age: 30
End: 2020-06-22
Payer: COMMERCIAL

## 2020-06-22 DIAGNOSIS — F32.81 PMDD (PREMENSTRUAL DYSPHORIC DISORDER): ICD-10-CM

## 2020-06-22 DIAGNOSIS — F33.1 MODERATE EPISODE OF RECURRENT MAJOR DEPRESSIVE DISORDER: Primary | ICD-10-CM

## 2020-06-22 PROCEDURE — 99214 OFFICE O/P EST MOD 30 MIN: CPT | Mod: 95,,, | Performed by: INTERNAL MEDICINE

## 2020-06-22 PROCEDURE — 99214 PR OFFICE/OUTPT VISIT, EST, LEVL IV, 30-39 MIN: ICD-10-PCS | Mod: 95,,, | Performed by: INTERNAL MEDICINE

## 2020-06-22 RX ORDER — BUPROPION HYDROCHLORIDE 450 MG/1
450 TABLET, FILM COATED, EXTENDED RELEASE ORAL DAILY
Qty: 30 TABLET | Refills: 5 | Status: SHIPPED | OUTPATIENT
Start: 2020-06-22 | End: 2020-07-31

## 2020-06-22 RX ORDER — CITALOPRAM 10 MG/1
10 TABLET ORAL DAILY PRN
Qty: 30 TABLET | Refills: 5 | Status: SHIPPED | OUTPATIENT
Start: 2020-06-22 | End: 2021-10-05

## 2020-06-22 RX ORDER — PANTOPRAZOLE SODIUM 40 MG/1
40 TABLET, DELAYED RELEASE ORAL EVERY MORNING
Qty: 90 TABLET | Refills: 3 | Status: CANCELLED | OUTPATIENT
Start: 2020-06-22

## 2020-06-22 RX ORDER — PAROXETINE HYDROCHLORIDE HEMIHYDRATE 12.5 MG/1
12.5 TABLET, FILM COATED, EXTENDED RELEASE ORAL EVERY MORNING
Qty: 30 TABLET | Refills: 11 | Status: CANCELLED | OUTPATIENT
Start: 2020-06-22 | End: 2021-06-22

## 2020-06-22 NOTE — PROGRESS NOTES
"The patient location is: home  The chief complaint leading to consultation is: depression/anxiety.  Elected for virtual visit d/t risk of exposure to Coronavirus.  Visit type: Virtual visit with synchronous audio and video  Total time spent with patient: 15 minutes  Each patient to whom he or she provides medical services by telemedicine is:  (1) informed of the relationship between the physician and patient and the respective role of any other health care provider with respect to management of the patient; and (2) notified that he or she may decline to receive medical services by telemedicine and may withdraw from such care at any time.    Subjective:       Patient ID: Carlos Snell is a 30 y.o. female who  has a past medical history of Asthma, Chronic back pain, Migraine headache, and Pyelonephritis.    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    HPI    Depression, PMDD, anxiety:  Chronic.  Starts the week before her menstrual cycle and lasts for 2 weeks.  Preceeded by menstrual cramps.  Has regular menstrual cycles.  Is interested in luteal rather than continuous SSRI.  Has been hesitant to be on OCPs in the past with OBGYN.    Sees an OSH therapist.  FHx Mom with depression, anxiety on unknown med.  Worries a lot, hard time relaxing.  PHQ 19.  Has concerns for weight gain.      Is on Topamax for migraine prophylaxis. Started Wellbutrin, and feels more positive, improved focus/concentration.  Is less emotional.  Feels well and  "balanced".  Mood varies, but overall improved.      Added Paxil for PMDD, but felt sick with severe nausea for multiple days, so quickly discontinued it.            Not addressed today.  Dysphonia, sinusitis:  Was treated with antibiotics, steroids, but continued to have hoarseness in the morning.  No SOB, dysphagia.  Following with ENT.  Performed " laryngoscopy.  Thought to be functional.  Rec SLP.  Follow-up with ENT p.r.n.  GERD as below.  Started Protonix q.a.m. with great improvement.  Also had a break from teaching d/t coronavirus.  Much improved on PPI. Cont PPI while she is having vocal rest from work. Consider weaning.  Laryngoscopy normal.  Following with SLP.      GERD:   Drinks coffee daily.  No epigastric pain.  Not daily.  Sour taste in her throat, some vomit.  No particular food triggers.  Started Protonix q.a.m. with improvement.  Takes ibuprofen sparingly for back pain.    Improved with Protonix; continue as above. Likely worsening asthma, dysphonia.  RTC and consider weaning during the summer off work.    Cyanocobalamin deficiency:    Started low dose B12.  Lab Results   Component Value Date    [id number] 295 11/30/2019     Normocytic anemia:   Hematocrit slightly decreased.  +Menorrhagia, endometriosis.  Reports regular cycles.  Was on OCPs, IUD, but stopped due to weight gain.  Uses super tampons every 1.5 hours per day; uses tampons + pads sometimes.  Folows with OBGYN.    Mild. Likely 2/2 endometriosis/menorrhagia.  Iron panel wnl.  f/u with OBGYN.  Lab Results   Component Value Date    IRON 78 11/30/2019    TIBC 432 11/30/2019    FERRITIN 43 11/30/2019     Lab Results   Component Value Date    [id number] 295 11/30/2019     No results found for: FOLATE    Obesity:  BMI 34.   Cont Wellbutrin for depression.  Will discuss lifestyle changes during future visit.    Asthma:    Cough, occurs with weather changes.  Improved with Advair BID, but admits to forgetting some doses.  Will set an alarm to take meds. Has not exercised in a while.  The patient does not smoke.    Cont Advair b.i.d., rescue inhaler p.r.n..     Endometriosis:  Stopped birth control as above due to concerns for weight.  Referred to OBGYN to discuss birth control options.    Left ovarian cyst:    CT abdomen pelvis performed  due to abdominal pain suggesting left  hemorrhagic cyst.  Referred to OBGYN.    Chronic back pain d/t DDD:  Sx in 2013.  Mild.  Was taking ibuprofen 800 PRN if severe pain or menstrual cramps.  Mild.  Is taking ibuprofen sparingly.  She declined referral to [facility].    Migraine:    Saw Neurology. C/w med overuse. Discussed avoiding NSAIDs. On Topamax for PPX, Maxalt for abortive. Planning on occipital nerve block.    Review of Systems   Constitutional: Negative for activity change, fever and unexpected weight change.   HENT: Negative for hearing loss, rhinorrhea, sneezing, trouble swallowing and voice change.    Eyes: Negative for discharge, redness, itching and visual disturbance.   Respiratory: Negative for chest tightness, shortness of breath and wheezing.    Cardiovascular: Negative for chest pain and palpitations.   Gastrointestinal: Negative for abdominal pain, blood in stool, constipation, diarrhea and vomiting.   Endocrine: Negative for polydipsia and polyuria.   Genitourinary: Positive for menstrual problem. Negative for difficulty urinating, dysuria and hematuria.   Musculoskeletal: Positive for back pain. Negative for arthralgias, gait problem, joint swelling and neck pain.   Skin: Negative for color change and rash.   Neurological: Positive for headaches. Negative for dizziness, weakness and light-headedness.   Hematological: Negative for adenopathy.   Psychiatric/Behavioral: Positive for dysphoric mood. Negative for confusion. The patient is nervous/anxious.        I personally reviewed Past Medical History, Past Surgical History, Social History, and Family History.    Objective:      There were no vitals filed for this visit.   Physical Exam  Constitutional:       General: She is not in acute distress.     Appearance: She is well-developed. She is not diaphoretic.   Eyes:      General:         Right eye: No discharge.         Left eye: No discharge.   Neck:      Comments: No hoarseness  Pulmonary:      Effort: Pulmonary effort is  normal. No respiratory distress.   Neurological:      Mental Status: She is alert.   Psychiatric:         Behavior: Behavior normal.           Lab Results   Component Value Date    WBC 5.26 11/30/2019    HGB 12.6 11/30/2019    HCT 36.8 (L) 11/30/2019     11/30/2019    CHOL 177 11/30/2019    TRIG 58 11/30/2019    HDL 50 11/30/2019    ALT 16 08/28/2017    AST 17 08/28/2017     08/28/2017    K 3.7 08/28/2017     08/28/2017    CREATININE 0.9 08/28/2017    BUN 9 08/28/2017    CO2 23 08/28/2017    HGBA1C 5.3 11/30/2019       The ASCVD Risk score ([place]vic[place] DOWNING Jr., et al., 2013) failed to calculate for the following reasons:    The 2013 ASCVD risk score is only valid for ages 40 to 79    (Imaging have been independently reviewed)  CT abdomen in 2017 suggesting left hemorrhagic cyst.  Suggesting recurrent renal infections.    Assessment:       1. Moderate episode of recurrent major depressive disorder    2. PMDD (premenstrual dysphoric disorder)          Plan:       Carlos was seen today for depression.    Diagnoses and all orders for this visit:    Moderate episode of recurrent major depressive disorder  Comments:  Persistent. Increase to Wellbutrin 450. Discussed SE. Cont OSH therapy. Add Celexa PRN for PMDD as below.  Orders:  -     buPROPion 450 mg Tb24; Take 450 mg by mouth once daily.  -     citalopram (CELEXA) 10 MG tablet; Take 1 tablet (10 mg total) by mouth daily as needed. Start taking on day 14 of your menstrual cycle and stop on your period.    PMDD (premenstrual dysphoric disorder)  Comments:  Persistent. Unable to tolerate Paxil. Has regular menstrual cycles. She elects for luteal rather than continuous SSRI. Start Celexa on day 14 of cycle.  Orders:  -     citalopram (CELEXA) 10 MG tablet; Take 1 tablet (10 mg total) by mouth daily as needed. Start taking on day 14 of your menstrual cycle and stop on your period.    Other orders  -     Cancel: Vitamin B12; Future  -     Cancel: Comprehensive  metabolic panel; Future  -     Cancel: CBC auto differential; Future  -     Cancel: HIV 1/2 Ag/Ab (4th Gen); Future  -     Cancel: paroxetine (PAXIL-CR) 12.5 MG 24 hr tablet; Take 1 tablet (12.5 mg total) by mouth every morning.  -     Cancel: Ferritin; Future  -     Cancel: Iron and TIBC; Future  -     Cancel: pantoprazole (PROTONIX) 40 MG tablet; Take 1 tablet (40 mg total) by mouth every morning.         Side effects of medication(s) were discussed in detail and patient voiced understanding.  Patient will call back for any issues or complications.     RTC in 3 month(s) or sooner PRN for depression. Virtual visit.

## 2020-06-30 ENCOUNTER — TELEPHONE (OUTPATIENT)
Dept: INTERNAL MEDICINE | Facility: CLINIC | Age: 30
End: 2020-06-30

## 2020-06-30 ENCOUNTER — PATIENT MESSAGE (OUTPATIENT)
Dept: INTERNAL MEDICINE | Facility: CLINIC | Age: 30
End: 2020-06-30

## 2020-06-30 NOTE — TELEPHONE ENCOUNTER
----- Message from Azalea Mireles MD sent at 6/22/2020  1:37 PM CDT -----  1. Please schedule virtual visit in 3 months for depression  Thanks!

## 2020-07-31 ENCOUNTER — TELEPHONE (OUTPATIENT)
Dept: INTERNAL MEDICINE | Facility: CLINIC | Age: 30
End: 2020-07-31

## 2020-07-31 DIAGNOSIS — F33.1 MODERATE EPISODE OF RECURRENT MAJOR DEPRESSIVE DISORDER: Primary | ICD-10-CM

## 2020-07-31 RX ORDER — BUPROPION HYDROCHLORIDE 150 MG/1
450 TABLET ORAL DAILY
Qty: 90 TABLET | Refills: 5 | Status: SHIPPED | OUTPATIENT
Start: 2020-07-31 | End: 2021-04-09 | Stop reason: SDUPTHER

## 2020-07-31 NOTE — TELEPHONE ENCOUNTER
Spoke to the Pt and told her a new rx was sent in that is covered after speaking to the pharmacy  Pt verbally understood it is wellbutrin 450 mg total daily

## 2020-07-31 NOTE — TELEPHONE ENCOUNTER
Switched Wellbutrin 450 mg tablets to 150 mg tablets.  She may take 3 tablets at a time for a total of 450 mg tablets once a day.

## 2020-07-31 NOTE — TELEPHONE ENCOUNTER
Fax received on 07/08 by kelsie stating bupropion xl 450 mg is not covered  Alternatives: buproprion hcl, duloxetine hcl, fluoxetine hcl, paroxetine hcl, venlafaxine hcl er, sertraline hcl     Hx  Has tried paroxetine in march 2020  Also buproprion hcl from dec-June 2020

## 2020-09-02 ENCOUNTER — PATIENT MESSAGE (OUTPATIENT)
Dept: INTERNAL MEDICINE | Facility: CLINIC | Age: 30
End: 2020-09-02

## 2020-09-04 ENCOUNTER — PATIENT MESSAGE (OUTPATIENT)
Dept: INTERNAL MEDICINE | Facility: CLINIC | Age: 30
End: 2020-09-04

## 2020-09-09 ENCOUNTER — PATIENT OUTREACH (OUTPATIENT)
Dept: ADMINISTRATIVE | Facility: OTHER | Age: 30
End: 2020-09-09

## 2020-09-09 ENCOUNTER — OFFICE VISIT (OUTPATIENT)
Dept: ENDOCRINOLOGY | Facility: CLINIC | Age: 30
End: 2020-09-09
Payer: COMMERCIAL

## 2020-09-09 VITALS
DIASTOLIC BLOOD PRESSURE: 71 MMHG | HEIGHT: 66 IN | SYSTOLIC BLOOD PRESSURE: 134 MMHG | OXYGEN SATURATION: 98 % | WEIGHT: 215.38 LBS | BODY MASS INDEX: 34.61 KG/M2 | HEART RATE: 80 BPM | TEMPERATURE: 98 F

## 2020-09-09 DIAGNOSIS — E66.9 OBESITY (BMI 30.0-34.9): ICD-10-CM

## 2020-09-09 DIAGNOSIS — R63.5 WEIGHT GAIN: ICD-10-CM

## 2020-09-09 DIAGNOSIS — R53.83 FATIGUE, UNSPECIFIED TYPE: ICD-10-CM

## 2020-09-09 DIAGNOSIS — R20.2 NUMBNESS AND TINGLING: ICD-10-CM

## 2020-09-09 DIAGNOSIS — R20.0 NUMBNESS AND TINGLING: ICD-10-CM

## 2020-09-09 DIAGNOSIS — R89.9 ABNORMAL LABORATORY TEST: Primary | ICD-10-CM

## 2020-09-09 PROCEDURE — 99204 OFFICE O/P NEW MOD 45 MIN: CPT | Mod: S$GLB,,, | Performed by: INTERNAL MEDICINE

## 2020-09-09 PROCEDURE — 99204 PR OFFICE/OUTPT VISIT, NEW, LEVL IV, 45-59 MIN: ICD-10-PCS | Mod: S$GLB,,, | Performed by: INTERNAL MEDICINE

## 2020-09-09 NOTE — ASSESSMENT & PLAN NOTE
bmi 34.76   - weight increased a lot over the last year or so   - hasn't made any large changes in meds, not needing frequent steroids (does have inhaled steroids though)   - lab evaluation as above   - if normal, discussed options for medications to try and help with weight loss. Most likely would use phentermine. Reviewed some side effects to watch for, gave list of other options too in case pt insurance covers anything else. Will need labs first though

## 2020-09-09 NOTE — PROGRESS NOTES
Subjective:      Chief Complaint: Establish Care (low DHEA levels) and Abnormal Lab    HPI: Carlos Snell is a 30 y.o. female who is here for an initial evaluation for abnormal lab.    Saw ob/gyn in June for weight gain, other symptoms, had labs. Pt has lab sheet with her today, reviewed:   From 6/19/2020, pt thinks AM labs but time on lab says midnight which is probably not accurate.  DHEA-S was 64.4 (normal 84.8-378).  SHBG 28, Testosterone 17, 17-hydroxy progesterone 165  Albumin 4.4, A1C 5.4    Diet: vegan diet  24 hr recall   b: smoothie. Coconut milk, spinach, blueberries, nuts/seeds   lunch: leftover zenaida food, noodles   dinner: skipped yesterday. Usually salad or veggie noodles   snacks: almonds, protein bars.    sodas: avoids. Sometimes lemonade or sweet tea.    Today, pt reports fatigue. Has hx fibromyalgia, but feels like fatigue is worse. Sometimes even just walking around the house. Gained about 20 lbs in the last year. Working on diet, vegan, without much weight loss. Walking 3.5 miles/day. Sometimes regular pace, sometimes faster. Does get headaches often, sees neurology. Sometimes numbness/tingling down arms. Numbness in feet. Some numbness/shooting pain down back of legs. Increased wheezing lately.  Abd cramping around cycles. Noted cysts in the past. No excess hair growth. Does have acne issues over the last year and a half or so. Cycles fairly regular. Plus/minus 3 days.    Hasn't had steroids lately.  No medication changes.  On wellbutrin and topiramate.  Also on celexa.  Not on birth control.    Reviewed past medical, family, social history and updated as appropriate.    Review of Systems   Constitutional: Positive for unexpected weight change (gained 20 lbs in the last year).   HENT: Negative for trouble swallowing.    Eyes: Negative for visual disturbance.   Respiratory: Negative for shortness of breath.    Cardiovascular: Negative for palpitations.   Gastrointestinal: Negative for diarrhea.    Endocrine: Negative for polydipsia.   Genitourinary: Negative for frequency.   Musculoskeletal: Positive for back pain.   Skin:        Red area on abd, denies new/wide stretch marks   Neurological: Positive for numbness and headaches.     Objective:     Vitals:    09/09/20 1009   BP: 134/71   Pulse: 80   Temp: 98.4 °F (36.9 °C)     BP Readings from Last 5 Encounters:   09/09/20 134/71   09/08/20 124/78   01/22/20 (!) 120/91   12/19/19 122/89   11/26/19 118/84     Physical Exam  Vitals signs reviewed.   Constitutional:       Appearance: She is well-developed. She is obese.   HENT:      Head: Normocephalic and atraumatic.   Neck:      Musculoskeletal: Normal range of motion and neck supple.      Thyroid: No thyromegaly.   Cardiovascular:      Rate and Rhythm: Normal rate and regular rhythm.      Heart sounds: No murmur.   Pulmonary:      Effort: Pulmonary effort is normal. No respiratory distress.      Breath sounds: Wheezing present.   Abdominal:      General: There is no distension.      Palpations: Abdomen is soft. There is no mass.      Tenderness: There is no abdominal tenderness.   Musculoskeletal: Normal range of motion.         General: No tenderness.   Neurological:      Mental Status: She is alert and oriented to person, place, and time.   Psychiatric:         Judgment: Judgment normal.         Wt Readings from Last 5 Encounters:   09/09/20 1009 97.7 kg (215 lb 6.2 oz)   09/08/20 1120 89.6 kg (197 lb 8.5 oz)   01/22/20 1514 88.5 kg (195 lb)   12/19/19 1523 95.7 kg (210 lb 15.7 oz)   11/26/19 1537 92 kg (202 lb 13.2 oz)       Lab Results   Component Value Date    HGBA1C 5.3 11/30/2019     Lab Results   Component Value Date    CHOL 177 11/30/2019    HDL 50 11/30/2019    LDLCALC 115.4 11/30/2019    TRIG 58 11/30/2019    CHOLHDL 28.2 11/30/2019     Lab Results   Component Value Date     08/28/2017    K 3.7 08/28/2017     08/28/2017    CO2 23 08/28/2017     (H) 08/28/2017    BUN 9 08/28/2017     CREATININE 0.9 08/28/2017    CALCIUM 9.4 08/28/2017    PROT 8.2 08/28/2017    ALBUMIN 3.7 08/28/2017    BILITOT 1.4 (H) 08/28/2017    ALKPHOS 69 08/28/2017    AST 17 08/28/2017    ALT 16 08/28/2017    ANIONGAP 9 08/28/2017    ESTGFRAFRICA >60 08/28/2017    EGFRNONAA >60 08/28/2017      No results found for: MICALBCREAT    Assessment/Plan:     Abnormal laboratory test  Pt with labs at ob/gyn to check for PCOS, mostly normal except low DHEA-S   - clinically, pt with weight gain, fatigue as main symptoms. Also some aches/pains (fibromyalgia history) as well as numbness, few other symptoms   - discussed significance of DHEA-S. An andorgen. Generally more worried about excess than deficiency. In her case, testosterone and the rest of the hormones checked were normal   - no reason to worry about pituitary function. Having cycles pretty close to regular so unlikely ovarian issues in this case   - check labs to confirm and ensure no adrenal deficiency. Which would cause fatigue but more often weight loss rather than weight gain. She is on fluticasone BID, which in theory doesn't have much systemic absorption but there have been case reports with that medication causing AI. Again though, weight loss not gain would be seen.    Otherwise, labs didn't really suggest PCOS. Clinically, regular cycles and doesn't have excess hair growth her only potentially androgen related symptom is adult acne.   - will see how repeat labs are looking, 8am/fasting, and go from there    Obesity (BMI 30.0-34.9)  bmi 34.76   - weight increased a lot over the last year or so   - hasn't made any large changes in meds, not needing frequent steroids (does have inhaled steroids though)   - lab evaluation as above   - if normal, discussed options for medications to try and help with weight loss. Most likely would use phentermine. Reviewed some side effects to watch for, gave list of other options too in case pt insurance covers anything else. Will  need labs first though    Numbness and tingling  With other labs will also check b12 which could contribute   - otherwise, keep f/u with PCP/neurology        Follow up in about 5 weeks (around 10/14/2020), or if symptoms worsen or fail to improve, for lab review, further monitoring.      Estevan Wise MD  Endocrinology

## 2020-09-09 NOTE — PATIENT INSTRUCTIONS
Need 8am fasting labs to confirm DHEA-S abnormality and investigate other related hormones, and check a few other things that can cause your symptoms.    If interested, options for weight loss include:    Phentermine once a day. Most likely would use this one.  Others include:   Saxenda. Injection   Contrave (naltrexone plus wellbutrin)   Orlistat   Qsymia (Phentermine plus topiramate)

## 2020-09-09 NOTE — ASSESSMENT & PLAN NOTE
With other labs will also check b12 which could contribute   - otherwise, keep f/u with PCP/neurology

## 2020-09-09 NOTE — PROGRESS NOTES
Health Maintenance Due   Topic Date Due    Hepatitis C Screening  1990    HIV Screening  03/18/2007    TETANUS VACCINE  03/18/2010    Pneumococcal Vaccine (Medium Risk) (1 of 1 - PPSV23) 03/18/2011    Influenza Vaccine (1) 08/01/2020     Updates were requested from care everywhere.  Chart was reviewed for overdue Proactive [facility] Encounters ([facility]) topics (CRS, Breast Cancer Screening, Eye exam)  Health Maintenance has been updated.  [facility] immunization registry triggered.  Immunizations were reconciled.

## 2020-09-09 NOTE — ASSESSMENT & PLAN NOTE
Pt with labs at ob/gyn to check for PCOS, mostly normal except low DHEA-S   - clinically, pt with weight gain, fatigue as main symptoms. Also some aches/pains (fibromyalgia history) as well as numbness, few other symptoms   - discussed significance of DHEA-S. An andorgen. Generally more worried about excess than deficiency. In her case, testosterone and the rest of the hormones checked were normal   - no reason to worry about pituitary function. Having cycles pretty close to regular so unlikely ovarian issues in this case   - check labs to confirm and ensure no adrenal deficiency. Which would cause fatigue but more often weight loss rather than weight gain. She is on fluticasone BID, which in theory doesn't have much systemic absorption but there have been case reports with that medication causing AI. Again though, weight loss not gain would be seen.    Otherwise, labs didn't really suggest PCOS. Clinically, regular cycles and doesn't have excess hair growth her only potentially androgen related symptom is adult acne.   - will see how repeat labs are looking, 8am/fasting, and go from there

## 2020-09-09 NOTE — LETTER
September 9, 2020        Azalea Mireles MD  [address] [address]             [id number]  [address] [place] [id number]  Phone: [phone]  Fax: [phone]   Patient: Carlos Snell   MR Number: [id number]   YOB: 1990   Date of Visit: 9/9/2020       Dear Dr. Mireles:    I saw your patient, Carlos Snell, today for evaluation. Attached you will find relevant portions of my assessment and plan of care.       If you have questions, please do not hesitate to call me. I look forward to following Carlos Snell along with you.    Sincerely,      Estevan Wise MD            CC  No Recipients    Enclosure

## 2020-10-28 ENCOUNTER — LAB VISIT (OUTPATIENT)
Dept: LAB | Facility: HOSPITAL | Age: 30
End: 2020-10-28
Attending: INTERNAL MEDICINE
Payer: COMMERCIAL

## 2020-10-28 DIAGNOSIS — R20.2 NUMBNESS AND TINGLING: ICD-10-CM

## 2020-10-28 DIAGNOSIS — R20.0 NUMBNESS AND TINGLING: ICD-10-CM

## 2020-10-28 DIAGNOSIS — R89.9 ABNORMAL LABORATORY TEST: ICD-10-CM

## 2020-10-28 DIAGNOSIS — R53.83 FATIGUE, UNSPECIFIED TYPE: ICD-10-CM

## 2020-10-28 DIAGNOSIS — R63.5 WEIGHT GAIN: ICD-10-CM

## 2020-10-28 LAB
ALBUMIN SERPL BCP-MCNC: 3.9 G/DL (ref 3.5–5.2)
ALP SERPL-CCNC: 64 U/L (ref 55–135)
ALT SERPL W/O P-5'-P-CCNC: 20 U/L (ref 10–44)
ANION GAP SERPL CALC-SCNC: 8 MMOL/L (ref 8–16)
AST SERPL-CCNC: 19 U/L (ref 10–40)
BASOPHILS # BLD AUTO: 0.03 K/UL (ref 0–0.2)
BASOPHILS NFR BLD: 0.5 % (ref 0–1.9)
BILIRUB SERPL-MCNC: 0.6 MG/DL (ref 0.1–1)
BUN SERPL-MCNC: 9 MG/DL (ref 6–20)
CALCIUM SERPL-MCNC: 9.1 MG/DL (ref 8.7–10.5)
CHLORIDE SERPL-SCNC: 103 MMOL/L (ref 95–110)
CO2 SERPL-SCNC: 27 MMOL/L (ref 23–29)
CORTIS SERPL-MCNC: 6.9 UG/DL (ref 4.3–22.4)
CREAT SERPL-MCNC: 0.8 MG/DL (ref 0.5–1.4)
DHEA-S SERPL-MCNC: 72.3 UG/DL (ref 95.8–511.7)
DIFFERENTIAL METHOD: ABNORMAL
EOSINOPHIL # BLD AUTO: 0.2 K/UL (ref 0–0.5)
EOSINOPHIL NFR BLD: 2.3 % (ref 0–8)
ERYTHROCYTE [DISTWIDTH] IN BLOOD BY AUTOMATED COUNT: 13 % (ref 11.5–14.5)
EST. GFR  (AFRICAN AMERICAN): >60 ML/MIN/1.73 M^2
EST. GFR  (NON AFRICAN AMERICAN): >60 ML/MIN/1.73 M^2
GLUCOSE SERPL-MCNC: 102 MG/DL (ref 70–110)
HCT VFR BLD AUTO: 37 % (ref 37–48.5)
HGB BLD-MCNC: 11.5 G/DL (ref 12–16)
IMM GRANULOCYTES # BLD AUTO: 0.01 K/UL (ref 0–0.04)
IMM GRANULOCYTES NFR BLD AUTO: 0.2 % (ref 0–0.5)
LYMPHOCYTES # BLD AUTO: 1.5 K/UL (ref 1–4.8)
LYMPHOCYTES NFR BLD: 23.3 % (ref 18–48)
MCH RBC QN AUTO: 28.1 PG (ref 27–31)
MCHC RBC AUTO-ENTMCNC: 31.1 G/DL (ref 32–36)
MCV RBC AUTO: 91 FL (ref 82–98)
MONOCYTES # BLD AUTO: 0.5 K/UL (ref 0.3–1)
MONOCYTES NFR BLD: 7.2 % (ref 4–15)
NEUTROPHILS # BLD AUTO: 4.3 K/UL (ref 1.8–7.7)
NEUTROPHILS NFR BLD: 66.5 % (ref 38–73)
NRBC BLD-RTO: 0 /100 WBC
PLATELET # BLD AUTO: 318 K/UL (ref 150–350)
PMV BLD AUTO: 10.8 FL (ref 9.2–12.9)
POTASSIUM SERPL-SCNC: 4 MMOL/L (ref 3.5–5.1)
PROT SERPL-MCNC: 7.6 G/DL (ref 6–8.4)
RBC # BLD AUTO: 4.09 M/UL (ref 4–5.4)
SODIUM SERPL-SCNC: 138 MMOL/L (ref 136–145)
T4 FREE SERPL-MCNC: 0.88 NG/DL (ref 0.71–1.51)
TSH SERPL DL<=0.005 MIU/L-ACNC: 0.8 UIU/ML (ref 0.4–4)
VIT B12 SERPL-MCNC: 334 PG/ML (ref 210–950)
WBC # BLD AUTO: 6.51 K/UL (ref 3.9–12.7)

## 2020-10-28 PROCEDURE — 80053 COMPREHEN METABOLIC PANEL: CPT

## 2020-10-28 PROCEDURE — 82627 DEHYDROEPIANDROSTERONE: CPT

## 2020-10-28 PROCEDURE — 84439 ASSAY OF FREE THYROXINE: CPT

## 2020-10-28 PROCEDURE — 82607 VITAMIN B-12: CPT

## 2020-10-28 PROCEDURE — 85025 COMPLETE CBC W/AUTO DIFF WBC: CPT

## 2020-10-28 PROCEDURE — 36415 COLL VENOUS BLD VENIPUNCTURE: CPT | Mod: PN

## 2020-10-28 PROCEDURE — 82533 TOTAL CORTISOL: CPT

## 2020-10-28 PROCEDURE — 84443 ASSAY THYROID STIM HORMONE: CPT

## 2020-10-28 PROCEDURE — 82024 ASSAY OF ACTH: CPT

## 2020-10-29 ENCOUNTER — PATIENT MESSAGE (OUTPATIENT)
Dept: ENDOCRINOLOGY | Facility: CLINIC | Age: 30
End: 2020-10-29

## 2020-10-29 NOTE — TELEPHONE ENCOUNTER
Labs mostly normal.    TSh, free T4, b12, CMP all normal  CBC with mild anemia, hemoglobin 11.5, MCV 91    Cortisol 6.9    DHEA-S low    ACTH pending.    Low DHEA-S could be:   1. Nothing   2. Sign of adrenal insufficiency (hence pending ACTH)   3. Most likely from inhaled steroid (fluticasone)    But, if ACTH also abnormal would consider stim test to r/o adrenal issues.

## 2020-10-30 LAB — ACTH PLAS-MCNC: 35 PG/ML (ref 0–46)

## 2020-11-05 NOTE — TELEPHONE ENCOUNTER
Acth came back normal. So not concerned for adrenal issue, mildly low DHEA-S more likely from steroids.

## 2020-11-13 ENCOUNTER — PATIENT MESSAGE (OUTPATIENT)
Dept: ENDOCRINOLOGY | Facility: CLINIC | Age: 30
End: 2020-11-13

## 2020-11-13 RX ORDER — PHENTERMINE HYDROCHLORIDE 37.5 MG/1
37.5 TABLET ORAL
Qty: 30 TABLET | Refills: 3 | Status: SHIPPED | OUTPATIENT
Start: 2020-11-13 | End: 2020-11-17 | Stop reason: SDUPTHER

## 2020-11-17 ENCOUNTER — PATIENT MESSAGE (OUTPATIENT)
Dept: ENDOCRINOLOGY | Facility: CLINIC | Age: 30
End: 2020-11-17

## 2020-11-17 RX ORDER — PHENTERMINE HYDROCHLORIDE 37.5 MG/1
37.5 TABLET ORAL
Qty: 30 TABLET | Refills: 3 | Status: SHIPPED | OUTPATIENT
Start: 2020-11-17 | End: 2021-06-09

## 2020-11-17 NOTE — TELEPHONE ENCOUNTER
Date/Time Signed: 11/13/2020 16:19       E-Prescribing Status: Receipt confirmed by pharmacy (11/13/2020  4:19 PM CST)     Sent again.

## 2020-12-15 ENCOUNTER — PATIENT OUTREACH (OUTPATIENT)
Dept: ADMINISTRATIVE | Facility: OTHER | Age: 30
End: 2020-12-15

## 2020-12-15 NOTE — PROGRESS NOTES
Care Everywhere: updated  Immunization: updated, links delay  Health Maintenance: updated  Media Review:   Legacy Review:   Order placed:   Upcoming appts:

## 2020-12-17 ENCOUNTER — OFFICE VISIT (OUTPATIENT)
Dept: OBSTETRICS AND GYNECOLOGY | Facility: CLINIC | Age: 30
End: 2020-12-17
Payer: COMMERCIAL

## 2020-12-17 VITALS
DIASTOLIC BLOOD PRESSURE: 86 MMHG | BODY MASS INDEX: 34.47 KG/M2 | HEIGHT: 66 IN | WEIGHT: 214.5 LBS | SYSTOLIC BLOOD PRESSURE: 122 MMHG

## 2020-12-17 DIAGNOSIS — N89.8 VAGINAL DISCHARGE: Primary | ICD-10-CM

## 2020-12-17 DIAGNOSIS — Z11.3 SCREEN FOR STD (SEXUALLY TRANSMITTED DISEASE): ICD-10-CM

## 2020-12-17 PROCEDURE — 1126F PR PAIN SEVERITY QUANTIFIED, NO PAIN PRESENT: ICD-10-PCS | Mod: S$GLB,,, | Performed by: OBSTETRICS & GYNECOLOGY

## 2020-12-17 PROCEDURE — 3008F BODY MASS INDEX DOCD: CPT | Mod: CPTII,S$GLB,, | Performed by: OBSTETRICS & GYNECOLOGY

## 2020-12-17 PROCEDURE — 3008F PR BODY MASS INDEX (BMI) DOCUMENTED: ICD-10-PCS | Mod: CPTII,S$GLB,, | Performed by: OBSTETRICS & GYNECOLOGY

## 2020-12-17 PROCEDURE — 99999 PR PBB SHADOW E&M-EST. PATIENT-LVL II: CPT | Mod: PBBFAC,,, | Performed by: OBSTETRICS & GYNECOLOGY

## 2020-12-17 PROCEDURE — 1126F AMNT PAIN NOTED NONE PRSNT: CPT | Mod: S$GLB,,, | Performed by: OBSTETRICS & GYNECOLOGY

## 2020-12-17 PROCEDURE — 99213 PR OFFICE/OUTPT VISIT, EST, LEVL III, 20-29 MIN: ICD-10-PCS | Mod: S$GLB,,, | Performed by: OBSTETRICS & GYNECOLOGY

## 2020-12-17 PROCEDURE — 99999 PR PBB SHADOW E&M-EST. PATIENT-LVL II: ICD-10-PCS | Mod: PBBFAC,,, | Performed by: OBSTETRICS & GYNECOLOGY

## 2020-12-17 PROCEDURE — 99213 OFFICE O/P EST LOW 20 MIN: CPT | Mod: S$GLB,,, | Performed by: OBSTETRICS & GYNECOLOGY

## 2020-12-17 RX ORDER — METRONIDAZOLE 500 MG/1
500 TABLET ORAL
COMMUNITY
Start: 2020-12-11 | End: 2020-12-18

## 2020-12-17 NOTE — PROGRESS NOTES
"CC: Vaginal discharge    Carlos Snell is a 30 y.o. female  presents with complaint of vaginal discharge for 4 weeks.  She took diflucan and the itching and irritation got better. She had dysuria but that also got better. She had odor and that got better. She is almost done with diflucan for BV now. Still having some discharge so she is worried. Has h/o recurrent VVC.      Past Medical History:   Diagnosis Date    Asthma     Chronic back pain     Migraine headache     Pyelonephritis      Past Surgical History:   Procedure Laterality Date    BACK SURGERY       Social History     Tobacco Use    Smoking status: Never Smoker    Smokeless tobacco: Never Used   Substance Use Topics    Alcohol use: Yes     Frequency: 2-3 times a week     Drinks per session: 1 or 2     Binge frequency: Never     Comment: 1-3 drinks every 2 weeks    Drug use: No     Family History   Problem Relation Age of Onset    Depression Mother     Anxiety disorder Mother     Ovarian cancer Mother     No Known Problems Father     Diabetes Sister     Hypertension Maternal Grandmother     Breast cancer Neg Hx     Colon cancer Neg Hx      OB History    Para Term  AB Living   0 0 0 0 0 0   SAB TAB Ectopic Multiple Live Births   0 0 0 0 0       /86   Ht 5' 6" (1.676 m)   Wt 97.3 kg (214 lb 8.1 oz)   LMP 12/10/2020   BMI 34.62 kg/m²     ROS:  GENERAL: No fever, chills, fatigability or weight loss.  VULVAR: No pain, no lesions and no itching.  VAGINAL: No relaxation, no itching, + discharge, no abnormal bleeding and no lesions.  ABDOMEN: No abdominal pain. Denies nausea. Denies vomiting. No diarrhea. No constipation  BREAST: Denies pain. No lumps. No discharge.  URINARY: No incontinence, no nocturia, no frequency and no dysuria.  CARDIOVASCULAR: No chest pain. No shortness of breath. No leg cramps.  NEUROLOGICAL: No headaches. No vision changes.    PHYSICAL EXAM:  GEN: NAD  Resp: nl effort  Abd: " soft,NT  VULVA: normal appearing vulva with no masses, tenderness or lesions, VAGINA: normal appearing vagina with normal color and discharge, no lesions, CERVIX: normal appearing cervix without discharge or lesions, UTERUS: uterus is normal size, shape, consistency and nontender, ADNEXA: normal adnexa in size, nontender and no masses  Psych: nl affect  Neuro: no focal deficits  Skin: warm and dry    ASSESSMENT and PLAN:    ICD-10-CM ICD-9-CM    1. Vaginal discharge  N89.8 623.5 NuSwab Vaginitis Plus (VG+)   2. Screen for STD (sexually transmitted disease)  Z11.3 V74.5 NuSwab Vaginitis Plus (VG+)     -Boric acid sent to pharmacy. Will message with swab results.     Patient was counseled today on vaginitis prevention including :  a. avoiding feminine products such as deoderant soaps, body wash, bubble bath, douches, scented toilet paper, deoderant tampons or pads, feminine wipes, chronic pad use, etc.  b. avoiding other vulvovaginal irritants such as long hot baths, humidity, tight, synthetic clothing, chlorine and sitting around in wet bathing suits  c. wearing cotton underwear, avoiding thong underwear and no underwear to bed  d. taking showers instead of baths and use a hair dryer on cool setting afterwards to dry  e. wearing cotton to exercise and shower immediately after exercise and change clothes  f. using polyurethane condoms without spermicide if sexually active and symptoms are triggered by intercourse    FOLLOW UP: PRN lack of improvement.

## 2020-12-23 ENCOUNTER — PATIENT OUTREACH (OUTPATIENT)
Dept: ADMINISTRATIVE | Facility: HOSPITAL | Age: 30
End: 2020-12-23

## 2020-12-23 ENCOUNTER — PATIENT MESSAGE (OUTPATIENT)
Dept: ADMINISTRATIVE | Facility: HOSPITAL | Age: 30
End: 2020-12-23

## 2020-12-23 PROBLEM — E66.3 OVERWEIGHT WITH BODY MASS INDEX (BMI) 25.0-29.9: Status: ACTIVE | Noted: 2018-01-04

## 2020-12-24 LAB
A VAGINAE DNA VAG QL NAA+PROBE: NORMAL SCORE
BVAB2 DNA VAG QL NAA+PROBE: NORMAL SCORE
C ALBICANS DNA VAG QL NAA+PROBE: NEGATIVE
C GLABRATA DNA VAG QL NAA+PROBE: NEGATIVE
C TRACH DNA VAG QL NAA+PROBE: NEGATIVE
MEGA1 DNA VAG QL NAA+PROBE: NORMAL SCORE
N GONORRHOEA DNA VAG QL NAA+PROBE: NEGATIVE
T VAGINALIS DNA VAG QL NAA+PROBE: NEGATIVE

## 2021-01-04 ENCOUNTER — PATIENT MESSAGE (OUTPATIENT)
Dept: ADMINISTRATIVE | Facility: HOSPITAL | Age: 31
End: 2021-01-04

## 2021-01-28 ENCOUNTER — PATIENT MESSAGE (OUTPATIENT)
Dept: OBSTETRICS AND GYNECOLOGY | Facility: CLINIC | Age: 31
End: 2021-01-28

## 2021-02-12 ENCOUNTER — PATIENT OUTREACH (OUTPATIENT)
Dept: ADMINISTRATIVE | Facility: OTHER | Age: 31
End: 2021-02-12

## 2021-02-17 ENCOUNTER — OFFICE VISIT (OUTPATIENT)
Dept: OBSTETRICS AND GYNECOLOGY | Facility: CLINIC | Age: 31
End: 2021-02-17
Payer: COMMERCIAL

## 2021-02-17 VITALS
WEIGHT: 211.19 LBS | BODY MASS INDEX: 33.94 KG/M2 | DIASTOLIC BLOOD PRESSURE: 88 MMHG | SYSTOLIC BLOOD PRESSURE: 118 MMHG | HEIGHT: 66 IN

## 2021-02-17 DIAGNOSIS — N76.0 RECURRENT VAGINITIS: Primary | ICD-10-CM

## 2021-02-17 PROCEDURE — 99999 PR PBB SHADOW E&M-EST. PATIENT-LVL III: CPT | Mod: PBBFAC,,, | Performed by: OBSTETRICS & GYNECOLOGY

## 2021-02-17 PROCEDURE — 3008F PR BODY MASS INDEX (BMI) DOCUMENTED: ICD-10-PCS | Mod: CPTII,S$GLB,, | Performed by: OBSTETRICS & GYNECOLOGY

## 2021-02-17 PROCEDURE — 99213 PR OFFICE/OUTPT VISIT, EST, LEVL III, 20-29 MIN: ICD-10-PCS | Mod: S$GLB,,, | Performed by: OBSTETRICS & GYNECOLOGY

## 2021-02-17 PROCEDURE — 3008F BODY MASS INDEX DOCD: CPT | Mod: CPTII,S$GLB,, | Performed by: OBSTETRICS & GYNECOLOGY

## 2021-02-17 PROCEDURE — 1126F AMNT PAIN NOTED NONE PRSNT: CPT | Mod: S$GLB,,, | Performed by: OBSTETRICS & GYNECOLOGY

## 2021-02-17 PROCEDURE — 1126F PR PAIN SEVERITY QUANTIFIED, NO PAIN PRESENT: ICD-10-PCS | Mod: S$GLB,,, | Performed by: OBSTETRICS & GYNECOLOGY

## 2021-02-17 PROCEDURE — 99213 OFFICE O/P EST LOW 20 MIN: CPT | Mod: S$GLB,,, | Performed by: OBSTETRICS & GYNECOLOGY

## 2021-02-17 PROCEDURE — 99999 PR PBB SHADOW E&M-EST. PATIENT-LVL III: ICD-10-PCS | Mod: PBBFAC,,, | Performed by: OBSTETRICS & GYNECOLOGY

## 2021-03-02 ENCOUNTER — PATIENT MESSAGE (OUTPATIENT)
Dept: OBSTETRICS AND GYNECOLOGY | Facility: CLINIC | Age: 31
End: 2021-03-02

## 2021-04-02 ENCOUNTER — PATIENT MESSAGE (OUTPATIENT)
Dept: OBSTETRICS AND GYNECOLOGY | Facility: CLINIC | Age: 31
End: 2021-04-02

## 2021-04-02 ENCOUNTER — PATIENT MESSAGE (OUTPATIENT)
Dept: INTERNAL MEDICINE | Facility: CLINIC | Age: 31
End: 2021-04-02

## 2021-04-02 DIAGNOSIS — F33.1 MODERATE EPISODE OF RECURRENT MAJOR DEPRESSIVE DISORDER: ICD-10-CM

## 2021-04-05 ENCOUNTER — PATIENT MESSAGE (OUTPATIENT)
Dept: ADMINISTRATIVE | Facility: HOSPITAL | Age: 31
End: 2021-04-05

## 2021-04-09 RX ORDER — BUPROPION HYDROCHLORIDE 150 MG/1
450 TABLET ORAL DAILY
Qty: 270 TABLET | Refills: 1 | Status: SHIPPED | OUTPATIENT
Start: 2021-04-09 | End: 2021-04-12 | Stop reason: SDUPTHER

## 2021-04-12 ENCOUNTER — PATIENT MESSAGE (OUTPATIENT)
Dept: INTERNAL MEDICINE | Facility: CLINIC | Age: 31
End: 2021-04-12

## 2021-04-12 DIAGNOSIS — F33.1 MODERATE EPISODE OF RECURRENT MAJOR DEPRESSIVE DISORDER: ICD-10-CM

## 2021-04-12 RX ORDER — BUPROPION HYDROCHLORIDE 450 MG/1
450 TABLET, FILM COATED, EXTENDED RELEASE ORAL DAILY
Qty: 90 TABLET | Refills: 0 | Status: SHIPPED | OUTPATIENT
Start: 2021-04-12 | End: 2022-04-19

## 2021-04-19 ENCOUNTER — PATIENT OUTREACH (OUTPATIENT)
Dept: ADMINISTRATIVE | Facility: OTHER | Age: 31
End: 2021-04-19

## 2021-06-01 ENCOUNTER — PATIENT MESSAGE (OUTPATIENT)
Dept: ADMINISTRATIVE | Facility: HOSPITAL | Age: 31
End: 2021-06-01

## 2021-06-01 ENCOUNTER — PATIENT OUTREACH (OUTPATIENT)
Dept: ADMINISTRATIVE | Facility: HOSPITAL | Age: 31
End: 2021-06-01

## 2021-06-01 DIAGNOSIS — Z11.59 NEED FOR HEPATITIS C SCREENING TEST: Primary | ICD-10-CM

## 2021-06-08 ENCOUNTER — PATIENT MESSAGE (OUTPATIENT)
Dept: ENDOCRINOLOGY | Facility: CLINIC | Age: 31
End: 2021-06-08

## 2021-07-20 ENCOUNTER — PATIENT MESSAGE (OUTPATIENT)
Dept: INTERNAL MEDICINE | Facility: CLINIC | Age: 31
End: 2021-07-20

## 2021-07-20 DIAGNOSIS — F33.1 MODERATE EPISODE OF RECURRENT MAJOR DEPRESSIVE DISORDER: ICD-10-CM

## 2021-07-20 DIAGNOSIS — Z13.39 ADHD (ATTENTION DEFICIT HYPERACTIVITY DISORDER) EVALUATION: Primary | ICD-10-CM

## 2021-08-04 ENCOUNTER — PATIENT MESSAGE (OUTPATIENT)
Dept: ORTHOPEDICS | Facility: CLINIC | Age: 31
End: 2021-08-04

## 2021-09-20 ENCOUNTER — PATIENT MESSAGE (OUTPATIENT)
Dept: ORTHOPEDICS | Facility: CLINIC | Age: 31
End: 2021-09-20

## 2021-09-24 ENCOUNTER — OFFICE VISIT (OUTPATIENT)
Dept: URGENT CARE | Facility: CLINIC | Age: 31
End: 2021-09-24
Payer: COMMERCIAL

## 2021-09-24 VITALS
HEART RATE: 98 BPM | TEMPERATURE: 98 F | SYSTOLIC BLOOD PRESSURE: 115 MMHG | RESPIRATION RATE: 18 BRPM | OXYGEN SATURATION: 97 % | DIASTOLIC BLOOD PRESSURE: 81 MMHG | WEIGHT: 211 LBS | HEIGHT: 66 IN | BODY MASS INDEX: 33.91 KG/M2

## 2021-09-24 DIAGNOSIS — N39.0 COMPLICATED UTI (URINARY TRACT INFECTION): Primary | ICD-10-CM

## 2021-09-24 LAB
B-HCG UR QL: NEGATIVE
BILIRUB UR QL STRIP: NEGATIVE
CTP QC/QA: YES
GLUCOSE UR QL STRIP: NEGATIVE
KETONES UR QL STRIP: NEGATIVE
LEUKOCYTE ESTERASE UR QL STRIP: NEGATIVE
PH, POC UA: 7.5 (ref 5–8)
POC BLOOD, URINE: POSITIVE
POC NITRATES, URINE: NEGATIVE
PROT UR QL STRIP: POSITIVE
SP GR UR STRIP: 1.01 (ref 1–1.03)
UROBILINOGEN UR STRIP-ACNC: NORMAL (ref 0.1–1.1)

## 2021-09-24 PROCEDURE — 3074F SYST BP LT 130 MM HG: CPT | Mod: CPTII,S$GLB,, | Performed by: FAMILY MEDICINE

## 2021-09-24 PROCEDURE — 81025 POCT URINE PREGNANCY: ICD-10-PCS | Mod: S$GLB,,, | Performed by: FAMILY MEDICINE

## 2021-09-24 PROCEDURE — 87186 SC STD MICRODIL/AGAR DIL: CPT | Performed by: FAMILY MEDICINE

## 2021-09-24 PROCEDURE — 81025 URINE PREGNANCY TEST: CPT | Mod: S$GLB,,, | Performed by: FAMILY MEDICINE

## 2021-09-24 PROCEDURE — 3008F BODY MASS INDEX DOCD: CPT | Mod: CPTII,S$GLB,, | Performed by: FAMILY MEDICINE

## 2021-09-24 PROCEDURE — 87088 URINE BACTERIA CULTURE: CPT | Performed by: FAMILY MEDICINE

## 2021-09-24 PROCEDURE — 1159F PR MEDICATION LIST DOCUMENTED IN MEDICAL RECORD: ICD-10-PCS | Mod: CPTII,S$GLB,, | Performed by: FAMILY MEDICINE

## 2021-09-24 PROCEDURE — 81003 POCT URINALYSIS, DIPSTICK, AUTOMATED, W/O SCOPE: ICD-10-PCS | Mod: QW,S$GLB,, | Performed by: FAMILY MEDICINE

## 2021-09-24 PROCEDURE — 3079F PR MOST RECENT DIASTOLIC BLOOD PRESSURE 80-89 MM HG: ICD-10-PCS | Mod: CPTII,S$GLB,, | Performed by: FAMILY MEDICINE

## 2021-09-24 PROCEDURE — 1159F MED LIST DOCD IN RCRD: CPT | Mod: CPTII,S$GLB,, | Performed by: FAMILY MEDICINE

## 2021-09-24 PROCEDURE — 3079F DIAST BP 80-89 MM HG: CPT | Mod: CPTII,S$GLB,, | Performed by: FAMILY MEDICINE

## 2021-09-24 PROCEDURE — 87077 CULTURE AEROBIC IDENTIFY: CPT | Performed by: FAMILY MEDICINE

## 2021-09-24 PROCEDURE — 87086 URINE CULTURE/COLONY COUNT: CPT | Performed by: FAMILY MEDICINE

## 2021-09-24 PROCEDURE — 96372 THER/PROPH/DIAG INJ SC/IM: CPT | Mod: S$GLB,,, | Performed by: FAMILY MEDICINE

## 2021-09-24 PROCEDURE — 96372 PR INJECTION,THERAP/PROPH/DIAG2ST, IM OR SUBCUT: ICD-10-PCS | Mod: S$GLB,,, | Performed by: FAMILY MEDICINE

## 2021-09-24 PROCEDURE — 81003 URINALYSIS AUTO W/O SCOPE: CPT | Mod: QW,S$GLB,, | Performed by: FAMILY MEDICINE

## 2021-09-24 PROCEDURE — 1160F RVW MEDS BY RX/DR IN RCRD: CPT | Mod: CPTII,S$GLB,, | Performed by: FAMILY MEDICINE

## 2021-09-24 PROCEDURE — 99214 PR OFFICE/OUTPT VISIT, EST, LEVL IV, 30-39 MIN: ICD-10-PCS | Mod: 25,S$GLB,, | Performed by: FAMILY MEDICINE

## 2021-09-24 PROCEDURE — 3008F PR BODY MASS INDEX (BMI) DOCUMENTED: ICD-10-PCS | Mod: CPTII,S$GLB,, | Performed by: FAMILY MEDICINE

## 2021-09-24 PROCEDURE — 1160F PR REVIEW ALL MEDS BY PRESCRIBER/CLIN PHARMACIST DOCUMENTED: ICD-10-PCS | Mod: CPTII,S$GLB,, | Performed by: FAMILY MEDICINE

## 2021-09-24 PROCEDURE — 99214 OFFICE O/P EST MOD 30 MIN: CPT | Mod: 25,S$GLB,, | Performed by: FAMILY MEDICINE

## 2021-09-24 PROCEDURE — 3074F PR MOST RECENT SYSTOLIC BLOOD PRESSURE < 130 MM HG: ICD-10-PCS | Mod: CPTII,S$GLB,, | Performed by: FAMILY MEDICINE

## 2021-09-24 RX ORDER — CEFTRIAXONE 1 G/1
1 INJECTION, POWDER, FOR SOLUTION INTRAMUSCULAR; INTRAVENOUS
Status: COMPLETED | OUTPATIENT
Start: 2021-09-24 | End: 2021-09-24

## 2021-09-24 RX ORDER — CIPROFLOXACIN 500 MG/1
500 TABLET ORAL 2 TIMES DAILY
Qty: 14 TABLET | Refills: 0 | Status: SHIPPED | OUTPATIENT
Start: 2021-09-24 | End: 2021-10-05

## 2021-09-24 RX ORDER — LIDOCAINE HYDROCHLORIDE 10 MG/ML
1 INJECTION INFILTRATION; PERINEURAL
Status: COMPLETED | OUTPATIENT
Start: 2021-09-24 | End: 2021-09-24

## 2021-09-24 RX ADMIN — CEFTRIAXONE 1 G: 1 INJECTION, POWDER, FOR SOLUTION INTRAMUSCULAR; INTRAVENOUS at 10:09

## 2021-09-24 RX ADMIN — LIDOCAINE HYDROCHLORIDE 1 ML: 10 INJECTION INFILTRATION; PERINEURAL at 10:09

## 2021-09-26 ENCOUNTER — PATIENT MESSAGE (OUTPATIENT)
Dept: INTERNAL MEDICINE | Facility: CLINIC | Age: 31
End: 2021-09-26

## 2021-09-26 ENCOUNTER — PATIENT MESSAGE (OUTPATIENT)
Dept: OBSTETRICS AND GYNECOLOGY | Facility: CLINIC | Age: 31
End: 2021-09-26

## 2021-09-26 DIAGNOSIS — B37.9 YEAST INFECTION: Primary | ICD-10-CM

## 2021-09-27 ENCOUNTER — TELEPHONE (OUTPATIENT)
Dept: URGENT CARE | Facility: CLINIC | Age: 31
End: 2021-09-27

## 2021-09-27 LAB — BACTERIA UR CULT: ABNORMAL

## 2021-09-28 ENCOUNTER — PATIENT MESSAGE (OUTPATIENT)
Dept: INTERNAL MEDICINE | Facility: CLINIC | Age: 31
End: 2021-09-28

## 2021-09-28 DIAGNOSIS — N39.0 RECURRENT UTI: Primary | ICD-10-CM

## 2021-09-28 DIAGNOSIS — B37.9 YEAST INFECTION: ICD-10-CM

## 2021-09-28 RX ORDER — FLUCONAZOLE 150 MG/1
150 TABLET ORAL DAILY
Qty: 1 TABLET | Refills: 0 | Status: SHIPPED | OUTPATIENT
Start: 2021-09-28 | End: 2021-09-29

## 2021-10-03 ENCOUNTER — OFFICE VISIT (OUTPATIENT)
Dept: URGENT CARE | Facility: CLINIC | Age: 31
End: 2021-10-03
Payer: COMMERCIAL

## 2021-10-03 VITALS
HEART RATE: 90 BPM | HEIGHT: 66 IN | BODY MASS INDEX: 33.91 KG/M2 | TEMPERATURE: 98 F | OXYGEN SATURATION: 96 % | WEIGHT: 211 LBS | DIASTOLIC BLOOD PRESSURE: 90 MMHG | SYSTOLIC BLOOD PRESSURE: 131 MMHG | RESPIRATION RATE: 18 BRPM

## 2021-10-03 DIAGNOSIS — B37.9 YEAST INFECTION: ICD-10-CM

## 2021-10-03 DIAGNOSIS — M54.9 ACUTE BILATERAL BACK PAIN, UNSPECIFIED BACK LOCATION: ICD-10-CM

## 2021-10-03 DIAGNOSIS — R30.0 DYSURIA: Primary | ICD-10-CM

## 2021-10-03 LAB
B-HCG UR QL: NEGATIVE
BILIRUB UR QL STRIP: NEGATIVE
CTP QC/QA: YES
GLUCOSE UR QL STRIP: NEGATIVE
KETONES UR QL STRIP: NEGATIVE
LEUKOCYTE ESTERASE UR QL STRIP: NEGATIVE
PH, POC UA: 7 (ref 5–8)
POC BLOOD, URINE: NEGATIVE
POC NITRATES, URINE: NEGATIVE
PROT UR QL STRIP: NEGATIVE
SP GR UR STRIP: 1.02 (ref 1–1.03)
UROBILINOGEN UR STRIP-ACNC: NORMAL (ref 0.1–1.1)

## 2021-10-03 PROCEDURE — 1159F MED LIST DOCD IN RCRD: CPT | Mod: CPTII,S$GLB,, | Performed by: PHYSICIAN ASSISTANT

## 2021-10-03 PROCEDURE — 1160F PR REVIEW ALL MEDS BY PRESCRIBER/CLIN PHARMACIST DOCUMENTED: ICD-10-PCS | Mod: CPTII,S$GLB,, | Performed by: PHYSICIAN ASSISTANT

## 2021-10-03 PROCEDURE — 81003 POCT URINALYSIS, DIPSTICK, AUTOMATED, W/O SCOPE: ICD-10-PCS | Mod: QW,S$GLB,, | Performed by: PHYSICIAN ASSISTANT

## 2021-10-03 PROCEDURE — 1160F RVW MEDS BY RX/DR IN RCRD: CPT | Mod: CPTII,S$GLB,, | Performed by: PHYSICIAN ASSISTANT

## 2021-10-03 PROCEDURE — 3075F SYST BP GE 130 - 139MM HG: CPT | Mod: CPTII,S$GLB,, | Performed by: PHYSICIAN ASSISTANT

## 2021-10-03 PROCEDURE — 3075F PR MOST RECENT SYSTOLIC BLOOD PRESS GE 130-139MM HG: ICD-10-PCS | Mod: CPTII,S$GLB,, | Performed by: PHYSICIAN ASSISTANT

## 2021-10-03 PROCEDURE — 99214 PR OFFICE/OUTPT VISIT, EST, LEVL IV, 30-39 MIN: ICD-10-PCS | Mod: 25,S$GLB,, | Performed by: PHYSICIAN ASSISTANT

## 2021-10-03 PROCEDURE — 81025 POCT URINE PREGNANCY: ICD-10-PCS | Mod: S$GLB,,, | Performed by: PHYSICIAN ASSISTANT

## 2021-10-03 PROCEDURE — 99214 OFFICE O/P EST MOD 30 MIN: CPT | Mod: 25,S$GLB,, | Performed by: PHYSICIAN ASSISTANT

## 2021-10-03 PROCEDURE — 3008F BODY MASS INDEX DOCD: CPT | Mod: CPTII,S$GLB,, | Performed by: PHYSICIAN ASSISTANT

## 2021-10-03 PROCEDURE — 1159F PR MEDICATION LIST DOCUMENTED IN MEDICAL RECORD: ICD-10-PCS | Mod: CPTII,S$GLB,, | Performed by: PHYSICIAN ASSISTANT

## 2021-10-03 PROCEDURE — 3080F PR MOST RECENT DIASTOLIC BLOOD PRESSURE >= 90 MM HG: ICD-10-PCS | Mod: CPTII,S$GLB,, | Performed by: PHYSICIAN ASSISTANT

## 2021-10-03 PROCEDURE — 3008F PR BODY MASS INDEX (BMI) DOCUMENTED: ICD-10-PCS | Mod: CPTII,S$GLB,, | Performed by: PHYSICIAN ASSISTANT

## 2021-10-03 PROCEDURE — 81003 URINALYSIS AUTO W/O SCOPE: CPT | Mod: QW,S$GLB,, | Performed by: PHYSICIAN ASSISTANT

## 2021-10-03 PROCEDURE — 3080F DIAST BP >= 90 MM HG: CPT | Mod: CPTII,S$GLB,, | Performed by: PHYSICIAN ASSISTANT

## 2021-10-03 PROCEDURE — 81025 URINE PREGNANCY TEST: CPT | Mod: S$GLB,,, | Performed by: PHYSICIAN ASSISTANT

## 2021-10-03 RX ORDER — CYCLOBENZAPRINE HCL 10 MG
10 TABLET ORAL 3 TIMES DAILY PRN
Qty: 30 TABLET | Refills: 0 | Status: SHIPPED | OUTPATIENT
Start: 2021-10-03 | End: 2021-10-13

## 2021-10-03 RX ORDER — FLUCONAZOLE 200 MG/1
200 TABLET ORAL DAILY
Qty: 1 TABLET | Refills: 0 | Status: SHIPPED | OUTPATIENT
Start: 2021-10-03 | End: 2021-10-05

## 2021-10-03 RX ORDER — NAPROXEN 500 MG/1
500 TABLET ORAL 2 TIMES DAILY
Qty: 20 TABLET | Refills: 1 | Status: SHIPPED | OUTPATIENT
Start: 2021-10-03 | End: 2021-10-28

## 2021-10-04 ENCOUNTER — PATIENT MESSAGE (OUTPATIENT)
Dept: ADMINISTRATIVE | Facility: HOSPITAL | Age: 31
End: 2021-10-04

## 2021-10-04 ENCOUNTER — PATIENT MESSAGE (OUTPATIENT)
Dept: ORTHOPEDICS | Facility: CLINIC | Age: 31
End: 2021-10-04

## 2021-10-05 ENCOUNTER — OFFICE VISIT (OUTPATIENT)
Dept: OBSTETRICS AND GYNECOLOGY | Facility: CLINIC | Age: 31
End: 2021-10-05
Payer: COMMERCIAL

## 2021-10-05 VITALS
HEIGHT: 66 IN | WEIGHT: 215.38 LBS | DIASTOLIC BLOOD PRESSURE: 86 MMHG | BODY MASS INDEX: 34.61 KG/M2 | SYSTOLIC BLOOD PRESSURE: 112 MMHG

## 2021-10-05 DIAGNOSIS — B37.9 YEAST INFECTION: ICD-10-CM

## 2021-10-05 DIAGNOSIS — R30.0 DYSURIA: Primary | ICD-10-CM

## 2021-10-05 DIAGNOSIS — N39.0 RECURRENT UTI: ICD-10-CM

## 2021-10-05 DIAGNOSIS — R10.2 PELVIC PAIN: ICD-10-CM

## 2021-10-05 LAB
BILIRUB SERPL-MCNC: NORMAL MG/DL
BLOOD URINE, POC: NORMAL
CLARITY, POC UA: CLEAR
COLOR, POC UA: NORMAL
GLUCOSE UR QL STRIP: NORMAL
KETONES UR QL STRIP: NORMAL
LEUKOCYTE ESTERASE URINE, POC: NORMAL
NITRITE, POC UA: NORMAL
PH, POC UA: 6
PROTEIN, POC: NORMAL
SPECIFIC GRAVITY, POC UA: 1.01
UROBILINOGEN, POC UA: NORMAL

## 2021-10-05 PROCEDURE — 1159F MED LIST DOCD IN RCRD: CPT | Mod: CPTII,S$GLB,, | Performed by: OBSTETRICS & GYNECOLOGY

## 2021-10-05 PROCEDURE — 99999 PR PBB SHADOW E&M-EST. PATIENT-LVL III: ICD-10-PCS | Mod: PBBFAC,,, | Performed by: OBSTETRICS & GYNECOLOGY

## 2021-10-05 PROCEDURE — 3074F SYST BP LT 130 MM HG: CPT | Mod: CPTII,S$GLB,, | Performed by: OBSTETRICS & GYNECOLOGY

## 2021-10-05 PROCEDURE — 1160F PR REVIEW ALL MEDS BY PRESCRIBER/CLIN PHARMACIST DOCUMENTED: ICD-10-PCS | Mod: CPTII,S$GLB,, | Performed by: OBSTETRICS & GYNECOLOGY

## 2021-10-05 PROCEDURE — 3079F DIAST BP 80-89 MM HG: CPT | Mod: CPTII,S$GLB,, | Performed by: OBSTETRICS & GYNECOLOGY

## 2021-10-05 PROCEDURE — 99999 PR PBB SHADOW E&M-EST. PATIENT-LVL III: CPT | Mod: PBBFAC,,, | Performed by: OBSTETRICS & GYNECOLOGY

## 2021-10-05 PROCEDURE — 3008F BODY MASS INDEX DOCD: CPT | Mod: CPTII,S$GLB,, | Performed by: OBSTETRICS & GYNECOLOGY

## 2021-10-05 PROCEDURE — 81002 URINALYSIS NONAUTO W/O SCOPE: CPT | Mod: S$GLB,,, | Performed by: OBSTETRICS & GYNECOLOGY

## 2021-10-05 PROCEDURE — 87086 URINE CULTURE/COLONY COUNT: CPT | Performed by: OBSTETRICS & GYNECOLOGY

## 2021-10-05 PROCEDURE — 1159F PR MEDICATION LIST DOCUMENTED IN MEDICAL RECORD: ICD-10-PCS | Mod: CPTII,S$GLB,, | Performed by: OBSTETRICS & GYNECOLOGY

## 2021-10-05 PROCEDURE — 3079F PR MOST RECENT DIASTOLIC BLOOD PRESSURE 80-89 MM HG: ICD-10-PCS | Mod: CPTII,S$GLB,, | Performed by: OBSTETRICS & GYNECOLOGY

## 2021-10-05 PROCEDURE — 3074F PR MOST RECENT SYSTOLIC BLOOD PRESSURE < 130 MM HG: ICD-10-PCS | Mod: CPTII,S$GLB,, | Performed by: OBSTETRICS & GYNECOLOGY

## 2021-10-05 PROCEDURE — 87591 N.GONORRHOEAE DNA AMP PROB: CPT | Mod: 59 | Performed by: OBSTETRICS & GYNECOLOGY

## 2021-10-05 PROCEDURE — 81002 POCT URINE DIPSTICK WITHOUT MICROSCOPE: ICD-10-PCS | Mod: S$GLB,,, | Performed by: OBSTETRICS & GYNECOLOGY

## 2021-10-05 PROCEDURE — 1160F RVW MEDS BY RX/DR IN RCRD: CPT | Mod: CPTII,S$GLB,, | Performed by: OBSTETRICS & GYNECOLOGY

## 2021-10-05 PROCEDURE — 87491 CHLMYD TRACH DNA AMP PROBE: CPT | Mod: 59 | Performed by: OBSTETRICS & GYNECOLOGY

## 2021-10-05 PROCEDURE — 3008F PR BODY MASS INDEX (BMI) DOCUMENTED: ICD-10-PCS | Mod: CPTII,S$GLB,, | Performed by: OBSTETRICS & GYNECOLOGY

## 2021-10-05 PROCEDURE — 99213 OFFICE O/P EST LOW 20 MIN: CPT | Mod: 25,S$GLB,, | Performed by: OBSTETRICS & GYNECOLOGY

## 2021-10-05 PROCEDURE — 87481 CANDIDA DNA AMP PROBE: CPT | Mod: 59 | Performed by: OBSTETRICS & GYNECOLOGY

## 2021-10-05 PROCEDURE — 99213 PR OFFICE/OUTPT VISIT, EST, LEVL III, 20-29 MIN: ICD-10-PCS | Mod: 25,S$GLB,, | Performed by: OBSTETRICS & GYNECOLOGY

## 2021-10-05 RX ORDER — FLUCONAZOLE 150 MG/1
150 TABLET ORAL ONCE
Qty: 1 TABLET | Refills: 0 | Status: SHIPPED | OUTPATIENT
Start: 2021-10-05 | End: 2021-10-05

## 2021-10-06 ENCOUNTER — HOSPITAL ENCOUNTER (OUTPATIENT)
Dept: RADIOLOGY | Facility: OTHER | Age: 31
Discharge: HOME | End: 2021-10-06
Attending: OBSTETRICS & GYNECOLOGY
Payer: COMMERCIAL

## 2021-10-06 DIAGNOSIS — R30.0 DYSURIA: ICD-10-CM

## 2021-10-06 DIAGNOSIS — R10.2 PELVIC PAIN: ICD-10-CM

## 2021-10-06 LAB
BACTERIA UR CULT: NORMAL
C TRACH DNA SPEC QL NAA+PROBE: NOT DETECTED
N GONORRHOEA DNA SPEC QL NAA+PROBE: NOT DETECTED

## 2021-10-06 PROCEDURE — 76856 US PELVIS COMP WITH TRANSVAG NON-OB (XPD): ICD-10-PCS | Mod: 26,,, | Performed by: RADIOLOGY

## 2021-10-06 PROCEDURE — 76830 US PELVIS COMP WITH TRANSVAG NON-OB (XPD): ICD-10-PCS | Mod: 26,,, | Performed by: RADIOLOGY

## 2021-10-06 PROCEDURE — 76830 TRANSVAGINAL US NON-OB: CPT | Mod: 26,,, | Performed by: RADIOLOGY

## 2021-10-06 PROCEDURE — 76856 US EXAM PELVIC COMPLETE: CPT | Mod: TC

## 2021-10-06 PROCEDURE — 76856 US EXAM PELVIC COMPLETE: CPT | Mod: 26,,, | Performed by: RADIOLOGY

## 2021-10-11 LAB
BACTERIAL VAGINOSIS DNA: NEGATIVE
CANDIDA GLABRATA DNA: NEGATIVE
CANDIDA KRUSEI DNA: NEGATIVE
CANDIDA RRNA VAG QL PROBE: NEGATIVE
T VAGINALIS RRNA GENITAL QL PROBE: NEGATIVE

## 2021-10-13 ENCOUNTER — HOSPITAL ENCOUNTER (EMERGENCY)
Facility: OTHER | Age: 31
Discharge: HOME | End: 2021-10-14
Attending: EMERGENCY MEDICINE
Payer: COMMERCIAL

## 2021-10-13 DIAGNOSIS — N20.1 URETEROLITHIASIS: Primary | ICD-10-CM

## 2021-10-13 LAB
B-HCG UR QL: NEGATIVE
BACTERIA #/AREA URNS HPF: ABNORMAL /HPF
BASOPHILS # BLD AUTO: 0.03 K/UL (ref 0–0.2)
BASOPHILS NFR BLD: 0.5 % (ref 0–1.9)
BILIRUB UR QL STRIP: NEGATIVE
CLARITY UR: CLEAR
COLOR UR: YELLOW
CTP QC/QA: YES
DIFFERENTIAL METHOD: ABNORMAL
EOSINOPHIL # BLD AUTO: 0.3 K/UL (ref 0–0.5)
EOSINOPHIL NFR BLD: 3.9 % (ref 0–8)
ERYTHROCYTE [DISTWIDTH] IN BLOOD BY AUTOMATED COUNT: 12.9 % (ref 11.5–14.5)
GLUCOSE UR QL STRIP: NEGATIVE
HCT VFR BLD AUTO: 35.1 % (ref 37–48.5)
HGB BLD-MCNC: 11.7 G/DL (ref 12–16)
HGB UR QL STRIP: ABNORMAL
IMM GRANULOCYTES # BLD AUTO: 0.01 K/UL (ref 0–0.04)
IMM GRANULOCYTES NFR BLD AUTO: 0.2 % (ref 0–0.5)
KETONES UR QL STRIP: NEGATIVE
LEUKOCYTE ESTERASE UR QL STRIP: NEGATIVE
LYMPHOCYTES # BLD AUTO: 1.9 K/UL (ref 1–4.8)
LYMPHOCYTES NFR BLD: 29.6 % (ref 18–48)
MCH RBC QN AUTO: 28.7 PG (ref 27–31)
MCHC RBC AUTO-ENTMCNC: 33.3 G/DL (ref 32–36)
MCV RBC AUTO: 86 FL (ref 82–98)
MICROSCOPIC COMMENT: ABNORMAL
MONOCYTES # BLD AUTO: 0.5 K/UL (ref 0.3–1)
MONOCYTES NFR BLD: 7 % (ref 4–15)
NEUTROPHILS # BLD AUTO: 3.8 K/UL (ref 1.8–7.7)
NEUTROPHILS NFR BLD: 58.8 % (ref 38–73)
NITRITE UR QL STRIP: NEGATIVE
NRBC BLD-RTO: 0 /100 WBC
PH UR STRIP: 7 [PH] (ref 5–8)
PLATELET # BLD AUTO: 315 K/UL (ref 150–450)
PMV BLD AUTO: 9.9 FL (ref 9.2–12.9)
PROT UR QL STRIP: NEGATIVE
RBC # BLD AUTO: 4.08 M/UL (ref 4–5.4)
RBC #/AREA URNS HPF: 5 /HPF (ref 0–4)
SP GR UR STRIP: 1.01 (ref 1–1.03)
SQUAMOUS #/AREA URNS HPF: 10 /HPF
URN SPEC COLLECT METH UR: ABNORMAL
UROBILINOGEN UR STRIP-ACNC: NEGATIVE EU/DL
WBC # BLD AUTO: 6.46 K/UL (ref 3.9–12.7)
WBC #/AREA URNS HPF: 0 /HPF (ref 0–5)

## 2021-10-13 PROCEDURE — 63600175 PHARM REV CODE 636 W HCPCS: Performed by: EMERGENCY MEDICINE

## 2021-10-13 PROCEDURE — 81025 URINE PREGNANCY TEST: CPT | Performed by: EMERGENCY MEDICINE

## 2021-10-13 PROCEDURE — 96361 HYDRATE IV INFUSION ADD-ON: CPT

## 2021-10-13 PROCEDURE — 99285 EMERGENCY DEPT VISIT HI MDM: CPT | Mod: 25

## 2021-10-13 PROCEDURE — 96374 THER/PROPH/DIAG INJ IV PUSH: CPT

## 2021-10-13 PROCEDURE — 81000 URINALYSIS NONAUTO W/SCOPE: CPT | Performed by: EMERGENCY MEDICINE

## 2021-10-13 PROCEDURE — 96375 TX/PRO/DX INJ NEW DRUG ADDON: CPT

## 2021-10-13 PROCEDURE — 25000003 PHARM REV CODE 250: Performed by: EMERGENCY MEDICINE

## 2021-10-13 PROCEDURE — 80048 BASIC METABOLIC PNL TOTAL CA: CPT | Performed by: EMERGENCY MEDICINE

## 2021-10-13 PROCEDURE — 85025 COMPLETE CBC W/AUTO DIFF WBC: CPT | Performed by: EMERGENCY MEDICINE

## 2021-10-13 RX ORDER — ONDANSETRON 2 MG/ML
4 INJECTION INTRAMUSCULAR; INTRAVENOUS
Status: COMPLETED | OUTPATIENT
Start: 2021-10-13 | End: 2021-10-13

## 2021-10-13 RX ORDER — KETOROLAC TROMETHAMINE 30 MG/ML
15 INJECTION, SOLUTION INTRAMUSCULAR; INTRAVENOUS
Status: COMPLETED | OUTPATIENT
Start: 2021-10-13 | End: 2021-10-13

## 2021-10-13 RX ADMIN — ONDANSETRON 4 MG: 2 INJECTION INTRAMUSCULAR; INTRAVENOUS at 11:10

## 2021-10-13 RX ADMIN — KETOROLAC TROMETHAMINE 15 MG: 30 INJECTION, SOLUTION INTRAMUSCULAR at 11:10

## 2021-10-13 RX ADMIN — SODIUM CHLORIDE 1000 ML: 0.9 INJECTION, SOLUTION INTRAVENOUS at 11:10

## 2021-10-14 ENCOUNTER — TELEPHONE (OUTPATIENT)
Dept: OBSTETRICS AND GYNECOLOGY | Facility: CLINIC | Age: 31
End: 2021-10-14

## 2021-10-14 VITALS
WEIGHT: 215 LBS | RESPIRATION RATE: 18 BRPM | DIASTOLIC BLOOD PRESSURE: 88 MMHG | HEIGHT: 66 IN | SYSTOLIC BLOOD PRESSURE: 127 MMHG | OXYGEN SATURATION: 98 % | HEART RATE: 89 BPM | TEMPERATURE: 98 F | BODY MASS INDEX: 34.55 KG/M2

## 2021-10-14 LAB
ANION GAP SERPL CALC-SCNC: 11 MMOL/L (ref 8–16)
BUN SERPL-MCNC: 10 MG/DL (ref 6–20)
CALCIUM SERPL-MCNC: 9.5 MG/DL (ref 8.7–10.5)
CHLORIDE SERPL-SCNC: 105 MMOL/L (ref 95–110)
CO2 SERPL-SCNC: 24 MMOL/L (ref 23–29)
CREAT SERPL-MCNC: 0.8 MG/DL (ref 0.5–1.4)
EST. GFR  (AFRICAN AMERICAN): >60 ML/MIN/1.73 M^2
EST. GFR  (NON AFRICAN AMERICAN): >60 ML/MIN/1.73 M^2
GLUCOSE SERPL-MCNC: 107 MG/DL (ref 70–110)
POTASSIUM SERPL-SCNC: 4.1 MMOL/L (ref 3.5–5.1)
SODIUM SERPL-SCNC: 140 MMOL/L (ref 136–145)

## 2021-10-14 RX ORDER — PROMETHAZINE HYDROCHLORIDE 25 MG/1
25 TABLET ORAL EVERY 6 HOURS PRN
Qty: 25 TABLET | Refills: 0 | Status: SHIPPED | OUTPATIENT
Start: 2021-10-14 | End: 2022-01-20

## 2021-10-14 RX ORDER — ONDANSETRON 4 MG/1
4 TABLET, ORALLY DISINTEGRATING ORAL EVERY 6 HOURS PRN
Qty: 20 TABLET | Refills: 0 | Status: SHIPPED | OUTPATIENT
Start: 2021-10-14 | End: 2022-01-20

## 2021-10-14 RX ORDER — TRAMADOL HYDROCHLORIDE 50 MG/1
50 TABLET ORAL EVERY 6 HOURS PRN
Qty: 12 TABLET | Refills: 0 | Status: SHIPPED | OUTPATIENT
Start: 2021-10-14 | End: 2021-10-17

## 2021-10-24 ENCOUNTER — HOSPITAL ENCOUNTER (EMERGENCY)
Facility: OTHER | Age: 31
Discharge: HOME | End: 2021-10-24
Attending: EMERGENCY MEDICINE
Payer: COMMERCIAL

## 2021-10-24 VITALS
SYSTOLIC BLOOD PRESSURE: 130 MMHG | TEMPERATURE: 98 F | RESPIRATION RATE: 17 BRPM | HEIGHT: 66 IN | OXYGEN SATURATION: 99 % | DIASTOLIC BLOOD PRESSURE: 91 MMHG | HEART RATE: 74 BPM | WEIGHT: 220 LBS | BODY MASS INDEX: 35.36 KG/M2

## 2021-10-24 DIAGNOSIS — S39.012A LUMBOSACRAL STRAIN, INITIAL ENCOUNTER: ICD-10-CM

## 2021-10-24 DIAGNOSIS — R10.9 FLANK PAIN: Primary | ICD-10-CM

## 2021-10-24 LAB
ALBUMIN SERPL BCP-MCNC: 3.9 G/DL (ref 3.5–5.2)
ALP SERPL-CCNC: 66 U/L (ref 55–135)
ALT SERPL W/O P-5'-P-CCNC: 13 U/L (ref 10–44)
ANION GAP SERPL CALC-SCNC: 12 MMOL/L (ref 8–16)
AST SERPL-CCNC: 19 U/L (ref 10–40)
B-HCG UR QL: NEGATIVE
BASOPHILS # BLD AUTO: 0.03 K/UL (ref 0–0.2)
BASOPHILS NFR BLD: 0.4 % (ref 0–1.9)
BILIRUB SERPL-MCNC: 0.3 MG/DL (ref 0.1–1)
BILIRUB UR QL STRIP: NEGATIVE
BUN SERPL-MCNC: 11 MG/DL (ref 6–20)
CALCIUM SERPL-MCNC: 9.7 MG/DL (ref 8.7–10.5)
CHLORIDE SERPL-SCNC: 105 MMOL/L (ref 95–110)
CLARITY UR: CLEAR
CO2 SERPL-SCNC: 22 MMOL/L (ref 23–29)
COLOR UR: YELLOW
CREAT SERPL-MCNC: 0.8 MG/DL (ref 0.5–1.4)
CTP QC/QA: YES
DIFFERENTIAL METHOD: NORMAL
EOSINOPHIL # BLD AUTO: 0.3 K/UL (ref 0–0.5)
EOSINOPHIL NFR BLD: 3.5 % (ref 0–8)
ERYTHROCYTE [DISTWIDTH] IN BLOOD BY AUTOMATED COUNT: 12.6 % (ref 11.5–14.5)
EST. GFR  (AFRICAN AMERICAN): >60 ML/MIN/1.73 M^2
EST. GFR  (NON AFRICAN AMERICAN): >60 ML/MIN/1.73 M^2
GLUCOSE SERPL-MCNC: 89 MG/DL (ref 70–110)
GLUCOSE UR QL STRIP: NEGATIVE
HCT VFR BLD AUTO: 38 % (ref 37–48.5)
HGB BLD-MCNC: 12.7 G/DL (ref 12–16)
HGB UR QL STRIP: NEGATIVE
IMM GRANULOCYTES # BLD AUTO: 0.01 K/UL (ref 0–0.04)
IMM GRANULOCYTES NFR BLD AUTO: 0.1 % (ref 0–0.5)
KETONES UR QL STRIP: NEGATIVE
LEUKOCYTE ESTERASE UR QL STRIP: NEGATIVE
LYMPHOCYTES # BLD AUTO: 2.1 K/UL (ref 1–4.8)
LYMPHOCYTES NFR BLD: 28.6 % (ref 18–48)
MCH RBC QN AUTO: 28.9 PG (ref 27–31)
MCHC RBC AUTO-ENTMCNC: 33.4 G/DL (ref 32–36)
MCV RBC AUTO: 86 FL (ref 82–98)
MONOCYTES # BLD AUTO: 0.6 K/UL (ref 0.3–1)
MONOCYTES NFR BLD: 8.1 % (ref 4–15)
NEUTROPHILS # BLD AUTO: 4.4 K/UL (ref 1.8–7.7)
NEUTROPHILS NFR BLD: 59.3 % (ref 38–73)
NITRITE UR QL STRIP: NEGATIVE
NRBC BLD-RTO: 0 /100 WBC
PH UR STRIP: 8 [PH] (ref 5–8)
PLATELET # BLD AUTO: 321 K/UL (ref 150–450)
PMV BLD AUTO: 10.7 FL (ref 9.2–12.9)
POTASSIUM SERPL-SCNC: 4.2 MMOL/L (ref 3.5–5.1)
PROT SERPL-MCNC: 7.9 G/DL (ref 6–8.4)
PROT UR QL STRIP: NEGATIVE
RBC # BLD AUTO: 4.4 M/UL (ref 4–5.4)
SODIUM SERPL-SCNC: 139 MMOL/L (ref 136–145)
SP GR UR STRIP: 1.01 (ref 1–1.03)
URN SPEC COLLECT METH UR: NORMAL
UROBILINOGEN UR STRIP-ACNC: NEGATIVE EU/DL
WBC # BLD AUTO: 7.39 K/UL (ref 3.9–12.7)

## 2021-10-24 PROCEDURE — 96361 HYDRATE IV INFUSION ADD-ON: CPT

## 2021-10-24 PROCEDURE — 96375 TX/PRO/DX INJ NEW DRUG ADDON: CPT

## 2021-10-24 PROCEDURE — 25000003 PHARM REV CODE 250: Performed by: EMERGENCY MEDICINE

## 2021-10-24 PROCEDURE — 85025 COMPLETE CBC W/AUTO DIFF WBC: CPT | Performed by: EMERGENCY MEDICINE

## 2021-10-24 PROCEDURE — 80053 COMPREHEN METABOLIC PANEL: CPT | Performed by: EMERGENCY MEDICINE

## 2021-10-24 PROCEDURE — 81003 URINALYSIS AUTO W/O SCOPE: CPT | Performed by: EMERGENCY MEDICINE

## 2021-10-24 PROCEDURE — 99285 EMERGENCY DEPT VISIT HI MDM: CPT | Mod: 25

## 2021-10-24 PROCEDURE — 63600175 PHARM REV CODE 636 W HCPCS: Performed by: EMERGENCY MEDICINE

## 2021-10-24 PROCEDURE — 96374 THER/PROPH/DIAG INJ IV PUSH: CPT

## 2021-10-24 PROCEDURE — 81025 URINE PREGNANCY TEST: CPT | Performed by: EMERGENCY MEDICINE

## 2021-10-24 RX ORDER — KETOROLAC TROMETHAMINE 30 MG/ML
15 INJECTION, SOLUTION INTRAMUSCULAR; INTRAVENOUS
Status: COMPLETED | OUTPATIENT
Start: 2021-10-24 | End: 2021-10-24

## 2021-10-24 RX ORDER — ONDANSETRON 2 MG/ML
4 INJECTION INTRAMUSCULAR; INTRAVENOUS
Status: COMPLETED | OUTPATIENT
Start: 2021-10-24 | End: 2021-10-24

## 2021-10-24 RX ORDER — ORPHENADRINE CITRATE 100 MG/1
100 TABLET, EXTENDED RELEASE ORAL DAILY PRN
Qty: 5 TABLET | Refills: 0 | Status: SHIPPED | OUTPATIENT
Start: 2021-10-24 | End: 2021-10-28

## 2021-10-24 RX ORDER — IBUPROFEN 600 MG/1
600 TABLET ORAL EVERY 6 HOURS PRN
Qty: 9 TABLET | Refills: 0 | Status: SHIPPED | OUTPATIENT
Start: 2021-10-24 | End: 2021-10-28

## 2021-10-24 RX ORDER — MORPHINE SULFATE 2 MG/ML
2 INJECTION, SOLUTION INTRAMUSCULAR; INTRAVENOUS
Status: COMPLETED | OUTPATIENT
Start: 2021-10-24 | End: 2021-10-24

## 2021-10-24 RX ADMIN — ONDANSETRON 4 MG: 2 INJECTION INTRAMUSCULAR; INTRAVENOUS at 08:10

## 2021-10-24 RX ADMIN — MORPHINE SULFATE 2 MG: 2 INJECTION, SOLUTION INTRAMUSCULAR; INTRAVENOUS at 08:10

## 2021-10-24 RX ADMIN — SODIUM CHLORIDE 1000 ML: 0.9 INJECTION, SOLUTION INTRAVENOUS at 08:10

## 2021-10-24 RX ADMIN — KETOROLAC TROMETHAMINE 15 MG: 30 INJECTION, SOLUTION INTRAMUSCULAR at 08:10

## 2021-10-28 ENCOUNTER — OFFICE VISIT (OUTPATIENT)
Dept: INTERNAL MEDICINE | Facility: CLINIC | Age: 31
End: 2021-10-28
Payer: COMMERCIAL

## 2021-10-28 DIAGNOSIS — R10.9 RIGHT FLANK PAIN: Primary | ICD-10-CM

## 2021-10-28 PROCEDURE — 1159F MED LIST DOCD IN RCRD: CPT | Mod: CPTII,95,, | Performed by: INTERNAL MEDICINE

## 2021-10-28 PROCEDURE — 1160F RVW MEDS BY RX/DR IN RCRD: CPT | Mod: CPTII,95,, | Performed by: INTERNAL MEDICINE

## 2021-10-28 PROCEDURE — 99215 OFFICE O/P EST HI 40 MIN: CPT | Mod: 95,,, | Performed by: INTERNAL MEDICINE

## 2021-10-28 PROCEDURE — 99215 PR OFFICE/OUTPT VISIT, EST, LEVL V, 40-54 MIN: ICD-10-PCS | Mod: 95,,, | Performed by: INTERNAL MEDICINE

## 2021-10-28 PROCEDURE — 1160F PR REVIEW ALL MEDS BY PRESCRIBER/CLIN PHARMACIST DOCUMENTED: ICD-10-PCS | Mod: CPTII,95,, | Performed by: INTERNAL MEDICINE

## 2021-10-28 PROCEDURE — 1159F PR MEDICATION LIST DOCUMENTED IN MEDICAL RECORD: ICD-10-PCS | Mod: CPTII,95,, | Performed by: INTERNAL MEDICINE

## 2021-10-28 RX ORDER — DICLOFENAC SODIUM 75 MG/1
75 TABLET, DELAYED RELEASE ORAL 2 TIMES DAILY
Qty: 28 TABLET | Refills: 0 | Status: SHIPPED | OUTPATIENT
Start: 2021-10-28 | End: 2022-01-20

## 2021-11-01 ENCOUNTER — HOSPITAL ENCOUNTER (OUTPATIENT)
Dept: RADIOLOGY | Facility: OTHER | Age: 31
Discharge: HOME | End: 2021-11-01
Attending: INTERNAL MEDICINE
Payer: COMMERCIAL

## 2021-11-01 DIAGNOSIS — R10.9 RIGHT FLANK PAIN: ICD-10-CM

## 2021-11-01 PROCEDURE — 76705 US ABDOMEN LIMITED_GALLBLADDER: ICD-10-PCS | Mod: 26,,, | Performed by: RADIOLOGY

## 2021-11-01 PROCEDURE — 76705 ECHO EXAM OF ABDOMEN: CPT | Mod: TC

## 2021-11-01 PROCEDURE — 76705 ECHO EXAM OF ABDOMEN: CPT | Mod: 26,,, | Performed by: RADIOLOGY

## 2021-11-08 ENCOUNTER — PATIENT MESSAGE (OUTPATIENT)
Dept: INTERNAL MEDICINE | Facility: CLINIC | Age: 31
End: 2021-11-08
Payer: COMMERCIAL

## 2021-11-08 DIAGNOSIS — R10.9 RIGHT FLANK PAIN: Primary | ICD-10-CM

## 2021-11-10 ENCOUNTER — PATIENT MESSAGE (OUTPATIENT)
Dept: INTERNAL MEDICINE | Facility: CLINIC | Age: 31
End: 2021-11-10
Payer: COMMERCIAL

## 2021-11-10 ENCOUNTER — TELEPHONE (OUTPATIENT)
Dept: INTERNAL MEDICINE | Facility: CLINIC | Age: 31
End: 2021-11-10
Payer: COMMERCIAL

## 2021-11-10 DIAGNOSIS — R10.9 RIGHT FLANK PAIN: ICD-10-CM

## 2021-11-10 DIAGNOSIS — G89.29 CHRONIC BACK PAIN, UNSPECIFIED BACK LOCATION, UNSPECIFIED BACK PAIN LATERALITY: Primary | ICD-10-CM

## 2021-11-10 DIAGNOSIS — M54.9 CHRONIC BACK PAIN, UNSPECIFIED BACK LOCATION, UNSPECIFIED BACK PAIN LATERALITY: Primary | ICD-10-CM

## 2021-11-11 ENCOUNTER — PATIENT MESSAGE (OUTPATIENT)
Dept: INTERNAL MEDICINE | Facility: CLINIC | Age: 31
End: 2021-11-11
Payer: COMMERCIAL

## 2021-11-22 ENCOUNTER — PATIENT OUTREACH (OUTPATIENT)
Dept: ADMINISTRATIVE | Facility: OTHER | Age: 31
End: 2021-11-22
Payer: COMMERCIAL

## 2021-11-23 ENCOUNTER — OFFICE VISIT (OUTPATIENT)
Dept: ORTHOPEDICS | Facility: CLINIC | Age: 31
End: 2021-11-23
Payer: COMMERCIAL

## 2021-11-23 ENCOUNTER — HOSPITAL ENCOUNTER (OUTPATIENT)
Dept: RADIOLOGY | Facility: HOSPITAL | Age: 31
Discharge: HOME | End: 2021-11-23
Attending: ORTHOPAEDIC SURGERY
Payer: COMMERCIAL

## 2021-11-23 VITALS — WEIGHT: 212.06 LBS | BODY MASS INDEX: 34.08 KG/M2 | HEIGHT: 66 IN

## 2021-11-23 DIAGNOSIS — M54.16 LUMBAR RADICULOPATHY, CHRONIC: ICD-10-CM

## 2021-11-23 DIAGNOSIS — M54.41 ACUTE MIDLINE LOW BACK PAIN WITH RIGHT-SIDED SCIATICA: Primary | ICD-10-CM

## 2021-11-23 DIAGNOSIS — M51.36 DDD (DEGENERATIVE DISC DISEASE), LUMBAR: ICD-10-CM

## 2021-11-23 PROCEDURE — 72110 X-RAY EXAM L-2 SPINE 4/>VWS: CPT | Mod: 26,,, | Performed by: RADIOLOGY

## 2021-11-23 PROCEDURE — 72110 XR LUMBAR SPINE AP AND LAT WITH FLEX/EXT: ICD-10-PCS | Mod: 26,,, | Performed by: RADIOLOGY

## 2021-11-23 PROCEDURE — 99999 PR PBB SHADOW E&M-EST. PATIENT-LVL III: CPT | Mod: PBBFAC,,, | Performed by: ORTHOPAEDIC SURGERY

## 2021-11-23 PROCEDURE — 99204 OFFICE O/P NEW MOD 45 MIN: CPT | Mod: S$GLB,,, | Performed by: ORTHOPAEDIC SURGERY

## 2021-11-23 PROCEDURE — 99999 PR PBB SHADOW E&M-EST. PATIENT-LVL III: ICD-10-PCS | Mod: PBBFAC,,, | Performed by: ORTHOPAEDIC SURGERY

## 2021-11-23 PROCEDURE — 99204 PR OFFICE/OUTPT VISIT, NEW, LEVL IV, 45-59 MIN: ICD-10-PCS | Mod: S$GLB,,, | Performed by: ORTHOPAEDIC SURGERY

## 2021-11-23 PROCEDURE — 72110 X-RAY EXAM L-2 SPINE 4/>VWS: CPT | Mod: TC

## 2021-11-23 RX ORDER — GABAPENTIN 300 MG/1
300 CAPSULE ORAL 3 TIMES DAILY
Qty: 90 CAPSULE | Refills: 11 | Status: SHIPPED | OUTPATIENT
Start: 2021-11-23 | End: 2022-04-19

## 2021-12-15 ENCOUNTER — HOSPITAL ENCOUNTER (OUTPATIENT)
Dept: RADIOLOGY | Facility: HOSPITAL | Age: 31
Discharge: HOME | End: 2021-12-15
Attending: ORTHOPAEDIC SURGERY
Payer: COMMERCIAL

## 2021-12-15 DIAGNOSIS — M54.16 LUMBAR RADICULOPATHY, CHRONIC: ICD-10-CM

## 2021-12-15 PROCEDURE — 72148 MRI LUMBAR SPINE W/O DYE: CPT | Mod: 26,,, | Performed by: RADIOLOGY

## 2021-12-15 PROCEDURE — 72148 MRI LUMBAR SPINE WITHOUT CONTRAST: ICD-10-PCS | Mod: 26,,, | Performed by: RADIOLOGY

## 2021-12-15 PROCEDURE — 72148 MRI LUMBAR SPINE W/O DYE: CPT | Mod: TC

## 2022-01-05 ENCOUNTER — OFFICE VISIT (OUTPATIENT)
Dept: ORTHOPEDICS | Facility: CLINIC | Age: 32
End: 2022-01-05
Payer: COMMERCIAL

## 2022-01-05 VITALS — WEIGHT: 224.13 LBS | HEIGHT: 66 IN | BODY MASS INDEX: 36.02 KG/M2

## 2022-01-05 DIAGNOSIS — M51.36 DDD (DEGENERATIVE DISC DISEASE), LUMBAR: Primary | ICD-10-CM

## 2022-01-05 DIAGNOSIS — M54.16 LUMBAR RADICULOPATHY, CHRONIC: ICD-10-CM

## 2022-01-05 PROCEDURE — 1159F MED LIST DOCD IN RCRD: CPT | Mod: CPTII,S$GLB,, | Performed by: ORTHOPAEDIC SURGERY

## 2022-01-05 PROCEDURE — 99999 PR PBB SHADOW E&M-EST. PATIENT-LVL III: CPT | Mod: PBBFAC,,, | Performed by: ORTHOPAEDIC SURGERY

## 2022-01-05 PROCEDURE — 99214 PR OFFICE/OUTPT VISIT, EST, LEVL IV, 30-39 MIN: ICD-10-PCS | Mod: S$GLB,,, | Performed by: ORTHOPAEDIC SURGERY

## 2022-01-05 PROCEDURE — 3008F BODY MASS INDEX DOCD: CPT | Mod: CPTII,S$GLB,, | Performed by: ORTHOPAEDIC SURGERY

## 2022-01-05 PROCEDURE — 99214 OFFICE O/P EST MOD 30 MIN: CPT | Mod: S$GLB,,, | Performed by: ORTHOPAEDIC SURGERY

## 2022-01-05 PROCEDURE — 3008F PR BODY MASS INDEX (BMI) DOCUMENTED: ICD-10-PCS | Mod: CPTII,S$GLB,, | Performed by: ORTHOPAEDIC SURGERY

## 2022-01-05 PROCEDURE — 99999 PR PBB SHADOW E&M-EST. PATIENT-LVL III: ICD-10-PCS | Mod: PBBFAC,,, | Performed by: ORTHOPAEDIC SURGERY

## 2022-01-05 PROCEDURE — 1159F PR MEDICATION LIST DOCUMENTED IN MEDICAL RECORD: ICD-10-PCS | Mod: CPTII,S$GLB,, | Performed by: ORTHOPAEDIC SURGERY

## 2022-01-05 NOTE — PROGRESS NOTES
"DATE: 1/5/2022  PATIENT: Carlos Snell    Attending Physician: Sanju Cee M.D.    HISTORY:  Carlos Snell is a 31 y.o. female who returns to me today for MRI review. Patient continues to have LBP that is associated with burning sensation in the posterior right thigh. Patient also complained of bilateral knee pain and wrist/elbow pain. Patient stated she has a half-sister who has rheumatoid arthritis. She is taking gabapentin with some relief.    PMH/PSH/FamHx/SocHx:  Unchanged from prior visit    ROS:  Positive for LBP and RLE pain  Denies perineal paresthesias, bowel or bladder incontinence    EXAM:  Ht 5' 6" (1.676 m)   Wt 101.6 kg (224 lb 1.6 oz)   BMI 36.17 kg/m²     My physical examination was notable for the following findings: motor BLE 5/5; SILT    IMAGING:  MRI lumbar showed no significant central or foraminal stenosis.    ASSESSMENT/PLAN:  Patient has stable L5-S1 ALIF without stenosis. There is no ortho spine surgical intervention indicated. I educated the patient on the importance of core/back strengthening, correct posture, bending/lifting ergonomics, and low-impact aerobic exercises (walking, elliptical, and aquatherapy). I referred patient to rheumatology for evaluation of multi-joint involvement. Patient will RTC prn.    Sanju Cee MD  Orthopaedic Spine Surgeon  Department of Orthopaedic Surgery  [phone]        "

## 2022-01-15 NOTE — PROGRESS NOTES
Subjective:     Patient ID: Carlos Snell is a 31 y.o. female sent by Dr. Sanju Cee for consultation on possible RA.    Chief Complaint: No chief complaint on file.       HPI   31 yr old lady with chronic LBP dx as lumbar DDD & lumbar radiculopathy by Dr. Cee sent for bilateral knee, wrist & elbow pain w concern for RA as patient has 1/2 sister w RA.     Worse pain in hips, knees and low & upper back for at least 1 year.  Also hand pain bilateral thumb pain & 2nd & 3rd MCP swelling  Brushing teeth is painful in wrists.    Dx w fibromyalgia as a teen by a neurologist in [place]    AM stiffness x all day.  Denies Raynaud's, dysphagia, tight skin, alopecia, oral ulcers, parotid gland swelling, dry eyes, dry mouth, pleurisy, pericarditis, miscarriages (0/0), thromboses,     Some hair thinning; some dry mouth;   R neck swelling.  + skin rashes--dry red spot- R hip, R armpit, chest.  + photophobia; gets instant headache;       Never on duloxetine.  Dr. Cee prescribed gabapentin but she hasn't taken it yet due to reading about side effects.  Lyrica prescribed in past helped but very expensive    Widespread pain index = 16  Shoulder-girdle, left  Shoulder-girdle, right  Lower arm left  Lower arm right  Hip (buttock, trochanter) left  Hip (buttock, trochanter) right  Upper leg, left  Upper leg, right  Lower leg, left  Lower leg, right  Jaw, left  Chest  Abdomen  Upper back  Lower back  Neck    Symptom severity score =8  Fatigue = 3  Waking Unrefreshed =2  Cognitive Symptoms = 3        CSI = 70 = Extreme      Current Outpatient Medications   Medication Sig Dispense Refill    buPROPion 450 mg Tb24 Take 450 mg by mouth once daily. 90 tablet 0    cetirizine (ZYRTEC) 10 MG tablet cetirizine 10 mg tablet      cyanocobalamin (VITAMIN B-12) 100 MCG tablet Take 1 tablet (100 mcg total) by mouth once daily. 90 tablet 3    dextroamphetamine-amphetamine (ADDERALL XR) 10 MG 24 hr capsule Take 10 mg by mouth every morning.       gabapentin (NEURONTIN) 300 MG capsule Take 1 capsule (300 mg total) by mouth 3 (three) times daily. 90 capsule 11    pantoprazole (PROTONIX) 40 MG tablet Take 1 tablet (40 mg total) by mouth every morning. 90 tablet 3    rizatriptan (MAXALT-MLT) 10 MG disintegrating tablet Take at onset of migraine, can repeat in 2 hrs if needed.  No more than 2 tabs per day or 3 days/wk. 12 tablet 3    topiramate (TOPAMAX) 50 MG tablet Take 1 tablet (50 mg total) by mouth every evening. 30 tablet 3     No current facility-administered medications for this visit.         Review of patient's allergies indicates:   Allergen Reactions    Lactose Diarrhea    Gluten protein Itching     Inflammation, bloating, diarrhea and pain all over body         Review of Systems   Constitutional: Positive for diaphoresis and fatigue.        Night sweats & hot flushes   HENT: Positive for postnasal drip, sore throat and voice change. Negative for mouth sores, tinnitus and trouble swallowing.         Dry mouth   Eyes: Positive for itching.   Respiratory: Positive for cough, shortness of breath and wheezing.         Sports induced asthma; but wheezes   Cardiovascular: Positive for chest pain, palpitations and leg swelling.   Gastrointestinal: Positive for constipation, diarrhea and nausea.        Dx w IBS  Had endoscopy which was ok; but had some mild gastritis     Endocrine: Positive for cold intolerance.   Genitourinary: Positive for dysuria, menstrual problem (very painful; clots) and vaginal discharge.   Musculoskeletal: Positive for arthralgias, back pain, joint swelling, myalgias, neck pain and neck stiffness.   Skin: Positive for rash.   Allergic/Immunologic: Positive for environmental allergies and food allergies.   Neurological: Positive for tremors (occasionally R hand), weakness (loses balance a lot; has not fallen), light-headedness, numbness (R leg & R foot) and headaches (migraines). Negative for dizziness, seizures and syncope.  "  Hematological: Bruises/bleeds easily.   Psychiatric/Behavioral: Positive for agitation, decreased concentration, dysphoric mood and sleep disturbance. Negative for behavioral problems. The patient is nervous/anxious.        Past Medical History:   Diagnosis Date    Asthma     Chronic back pain     Migraine headache     Pyelonephritis        Past Surgical History:   Procedure Laterality Date    BACK SURGERY  2013       Family History   Problem Relation Age of Onset    Depression Mother     Anxiety disorder Mother     Ovarian cancer Mother     No Known Problems Father     Diabetes Sister     Hypertension Maternal Grandmother     Breast cancer Neg Hx     Colon cancer Neg Hx    MGM: HLD  M: depression/anxiety; mom has thyroid issues; HTN;   F: hx unavailable.  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  Played [employer] in to be.  [employer].  Does not exercise; Tries to walk w back brace.  Tries to do chair yoga  Objective:     BP (!) 146/97   Pulse 92   Ht 5' 5" (1.651 m)   Wt 102.9 kg (226 lb 13.7 oz)   BMI 37.75 kg/m²   Physical Exam   Constitutional: She is oriented to person, place, and time and well-developed, well-nourished, and in no distress. No distress.   HENT:   Head: Normocephalic and atraumatic.   Mouth/Throat: Oropharynx is clear and moist. No oropharyngeal exudate.   No facial rashes  Parotids not enlarged  No oral ulcers   Eyes: Pupils are equal, round, and reactive to light. Conjunctivae and EOM are normal. Right eye exhibits no discharge. Left eye exhibits no discharge. No scleral icterus.   Neck: No JVD present. No tracheal deviation present. No thyromegaly present.   Cardiovascular: Normal rate, regular rhythm, normal heart sounds and intact " distal pulses. Exam reveals no gallop and no friction rub.   No murmur heard.  Pulmonary/Chest: Effort normal and breath sounds normal. No respiratory distress. She has no wheezes. She has no rales. She exhibits no tenderness.   Abdominal: Soft. Bowel sounds are normal. She exhibits no distension and no mass. There is no splenomegaly or hepatomegaly. There is no abdominal tenderness. There is no rebound and no guarding.   Musculoskeletal:         General: Tenderness (18/18 FM tender pts in addition to diffuse pain; ) present. No edema. Normal range of motion.      Cervical back: Neck supple.      Comments: Cspine FROM no tenderness  Tspine FROM no tenderness  Lspine FROM no tenderness.  TMJ: unremarkable  Shoulders: FROM; no synovitis;  Elbows: FROM; no synovitis; no tophi or nodules  Wrists: FROM; no synovitis;    MCPs: FROM; no synovitis; no metacarpalgia;  ok;  PIPs:FROM; no synovitis;   DIPs: FROM; no synovitis;   HIPS: FROM  Knees: FROM; no synovitis; no instability;  Ankles: FROM: no synovitis   Toes: ok;        Lymphadenopathy:     She has no cervical adenopathy.        Right: No inguinal adenopathy present.        Left: No inguinal adenopathy present.   Neurological: She is alert and oriented to person, place, and time. She has normal reflexes. No cranial nerve deficit. Gait normal.   Proximal and distal muscle strength 5/5.   Skin: Skin is warm and dry. No rash noted. She is not diaphoretic.   Psychiatric: Mood, memory, affect and judgment normal.   Vitals reviewed.         10/24/21: CBC ok; CMP ok;  Assessment:   Joint pain--multiple sites   1/2 sister w RA  Fibromyalgia associated w depression  Chronic LBP   S/P anterior lumbar fusion  Migraine HAs  Depression  S/P renal calculi      Plan:   In an abundance of caution will get labs, however, do not suspect RA or similar CTD.    The patient was educated on fibromyalgia/central sensitivity syndromes  Symptoms/presentations, non-pharmacologic and  pharmacologic treatments and their efficacies were reviewed.   Non-pharmacologic approaches were stressed.  ExPRESS was reviewed  Regular/daily dedicated, low impact aerobic exercise was especially emphasized. Strategies for success were offered.  Additional mind body exercise such as yoga or variations (chair yoga) and/or Darryn Chi have been validated.  Pharmacologic treatments approved and otherwise were reviewed.  She may benefit by switching to duloxetine.  She will discuss with her PCP  Patient was provided with written information and referred to several websites for further information.    She was referred to the [facility] after discussion.    As I do not manage or follow fibromyalgia, the patient may be followed by her PCP and/or her other clinicians.       CC: MD Azalea Smith MD      Addendum: TFTs ok; RF/CCP neg;

## 2022-01-20 ENCOUNTER — LAB VISIT (OUTPATIENT)
Dept: LAB | Facility: HOSPITAL | Age: 32
End: 2022-01-20
Attending: INTERNAL MEDICINE
Payer: COMMERCIAL

## 2022-01-20 ENCOUNTER — OFFICE VISIT (OUTPATIENT)
Dept: RHEUMATOLOGY | Facility: CLINIC | Age: 32
End: 2022-01-20
Payer: COMMERCIAL

## 2022-01-20 VITALS
HEART RATE: 92 BPM | SYSTOLIC BLOOD PRESSURE: 146 MMHG | BODY MASS INDEX: 37.8 KG/M2 | DIASTOLIC BLOOD PRESSURE: 97 MMHG | HEIGHT: 65 IN | WEIGHT: 226.88 LBS

## 2022-01-20 DIAGNOSIS — Z82.61 FAMILY HISTORY OF RHEUMATOID ARTHRITIS: ICD-10-CM

## 2022-01-20 DIAGNOSIS — R03.0 ELEVATED BLOOD PRESSURE READING: ICD-10-CM

## 2022-01-20 DIAGNOSIS — Z55.9 EDUCATIONAL CIRCUMSTANCE: ICD-10-CM

## 2022-01-20 DIAGNOSIS — M79.7 FIBROMYALGIA: ICD-10-CM

## 2022-01-20 DIAGNOSIS — K90.41 GLUTEN INTOLERANCE: ICD-10-CM

## 2022-01-20 DIAGNOSIS — M25.50 PAIN IN JOINT INVOLVING MULTIPLE SITES: Primary | ICD-10-CM

## 2022-01-20 DIAGNOSIS — M25.50 PAIN IN JOINT INVOLVING MULTIPLE SITES: ICD-10-CM

## 2022-01-20 DIAGNOSIS — E66.9 OBESITY (BMI 30-39.9): ICD-10-CM

## 2022-01-20 DIAGNOSIS — M54.16 LUMBAR RADICULOPATHY, CHRONIC: ICD-10-CM

## 2022-01-20 LAB
CCP AB SER IA-ACNC: 0.9 U/ML
RHEUMATOID FACT SERPL-ACNC: <13 IU/ML (ref 0–15)
T4 FREE SERPL-MCNC: 0.87 NG/DL (ref 0.71–1.51)
TSH SERPL DL<=0.005 MIU/L-ACNC: 0.48 UIU/ML (ref 0.4–4)

## 2022-01-20 PROCEDURE — 84439 ASSAY OF FREE THYROXINE: CPT | Performed by: INTERNAL MEDICINE

## 2022-01-20 PROCEDURE — 36415 COLL VENOUS BLD VENIPUNCTURE: CPT | Performed by: INTERNAL MEDICINE

## 2022-01-20 PROCEDURE — 99999 PR PBB SHADOW E&M-EST. PATIENT-LVL V: ICD-10-PCS | Mod: PBBFAC,,, | Performed by: INTERNAL MEDICINE

## 2022-01-20 PROCEDURE — 99999 PR PBB SHADOW E&M-EST. PATIENT-LVL V: CPT | Mod: PBBFAC,,, | Performed by: INTERNAL MEDICINE

## 2022-01-20 PROCEDURE — 3077F SYST BP >= 140 MM HG: CPT | Mod: CPTII,S$GLB,, | Performed by: INTERNAL MEDICINE

## 2022-01-20 PROCEDURE — 3077F PR MOST RECENT SYSTOLIC BLOOD PRESSURE >= 140 MM HG: ICD-10-PCS | Mod: CPTII,S$GLB,, | Performed by: INTERNAL MEDICINE

## 2022-01-20 PROCEDURE — 86200 CCP ANTIBODY: CPT | Performed by: INTERNAL MEDICINE

## 2022-01-20 PROCEDURE — 3080F DIAST BP >= 90 MM HG: CPT | Mod: CPTII,S$GLB,, | Performed by: INTERNAL MEDICINE

## 2022-01-20 PROCEDURE — 1159F PR MEDICATION LIST DOCUMENTED IN MEDICAL RECORD: ICD-10-PCS | Mod: CPTII,S$GLB,, | Performed by: INTERNAL MEDICINE

## 2022-01-20 PROCEDURE — 3008F BODY MASS INDEX DOCD: CPT | Mod: CPTII,S$GLB,, | Performed by: INTERNAL MEDICINE

## 2022-01-20 PROCEDURE — 1160F RVW MEDS BY RX/DR IN RCRD: CPT | Mod: CPTII,S$GLB,, | Performed by: INTERNAL MEDICINE

## 2022-01-20 PROCEDURE — 99204 OFFICE O/P NEW MOD 45 MIN: CPT | Mod: S$GLB,,, | Performed by: INTERNAL MEDICINE

## 2022-01-20 PROCEDURE — 3008F PR BODY MASS INDEX (BMI) DOCUMENTED: ICD-10-PCS | Mod: CPTII,S$GLB,, | Performed by: INTERNAL MEDICINE

## 2022-01-20 PROCEDURE — 1159F MED LIST DOCD IN RCRD: CPT | Mod: CPTII,S$GLB,, | Performed by: INTERNAL MEDICINE

## 2022-01-20 PROCEDURE — 86235 NUCLEAR ANTIGEN ANTIBODY: CPT | Performed by: INTERNAL MEDICINE

## 2022-01-20 PROCEDURE — 1160F PR REVIEW ALL MEDS BY PRESCRIBER/CLIN PHARMACIST DOCUMENTED: ICD-10-PCS | Mod: CPTII,S$GLB,, | Performed by: INTERNAL MEDICINE

## 2022-01-20 PROCEDURE — 86431 RHEUMATOID FACTOR QUANT: CPT | Performed by: INTERNAL MEDICINE

## 2022-01-20 PROCEDURE — 84443 ASSAY THYROID STIM HORMONE: CPT | Performed by: INTERNAL MEDICINE

## 2022-01-20 PROCEDURE — 3080F PR MOST RECENT DIASTOLIC BLOOD PRESSURE >= 90 MM HG: ICD-10-PCS | Mod: CPTII,S$GLB,, | Performed by: INTERNAL MEDICINE

## 2022-01-20 PROCEDURE — 86038 ANTINUCLEAR ANTIBODIES: CPT | Performed by: INTERNAL MEDICINE

## 2022-01-20 PROCEDURE — 99204 PR OFFICE/OUTPT VISIT, NEW, LEVL IV, 45-59 MIN: ICD-10-PCS | Mod: S$GLB,,, | Performed by: INTERNAL MEDICINE

## 2022-01-20 RX ORDER — DEXTROAMPHETAMINE SACCHARATE, AMPHETAMINE ASPARTATE MONOHYDRATE, DEXTROAMPHETAMINE SULFATE AND AMPHETAMINE SULFATE 2.5; 2.5; 2.5; 2.5 MG/1; MG/1; MG/1; MG/1
10 CAPSULE, EXTENDED RELEASE ORAL EVERY MORNING
COMMUNITY
End: 2022-04-19

## 2022-01-20 SDOH — SOCIAL DETERMINANTS OF HEALTH (SDOH): PROBLEMS RELATED TO EDUCATION AND LITERACY, UNSPECIFIED: Z55.9

## 2022-01-20 NOTE — PATIENT INSTRUCTIONS
Read on Fibromyalgia.  Also go to [email or web address] & [email or web address]    Begin a program of daily, low impact, dedicated exercise.    Discuss possibility of switching from buproprion to duloxetine as duloxetine is better for pain of fibromyalgia and may help stiffness as well.    Patient Education       Fibromyalgia   The Basics   Written by the doctors and editors at [facility]   What is fibromyalgia? -- Fibromyalgia is a condition that causes people to feel pain in the muscles and soft tissues all over their body. People with fibromyalgia also have many places on their body that hurt a lot when they are touched. No one knows what causes fibromyalgia.  Can fibromyalgia be cured? -- In some people, fibromyalgia seems to get better. But in most people it cannot be cured. Even so, people can learn to deal with the condition and lead fairly normal lives. Fibromyalgia does not get worse over time, and it is not life-threatening.  Does fibromyalgia cause symptoms besides muscle pain? -- Yes. People with fibromyalgia often say they feel tired all the time and that sleep does not help them feel rested. They can also have:  · Trouble thinking clearly  · Flu-like symptoms  · Headaches  · Depression and anxiety  · Stomach pain  · Too many or too few bowel movements (diarrhea or constipation)  · Pain in the bladder or the need to urinate in a hurry or often  · Problems with the jaw  Is there a test for fibromyalgia? -- No, there is no test. To diagnose it, doctors and nurses have to go by symptoms. First they look for other causes of the symptoms, such as arthritis or a hormone problem.  Doctors might diagnose fibromyalgia if you have pain in many parts of your body and they cannot find another cause. This is more likely if you also have other symptoms that can happen in fibromyalgia, such as trouble sleeping, feeling very tired, and trouble thinking clearly.  How is fibromyalgia treated? -- There are medicines and strategies to  "help with the symptoms of fibromyalgia. But there is no one treatment that works for everyone. You and your health care team will need to work together to find the right mix of treatments for you. In general, treatment can include:  · Medicines to relieve pain, improve sleep, or improve mood  · Physical therapy to learn exercises and stretches  · Relaxation therapy  · Working with a counselor  To get the best treatment, many people need a team that includes:  · A doctor  · A physical therapist  · Someone trained in mental health (such as a  or counselor)  Be open to medicines -- Your doctor or nurse might suggest that you take a medicine normally used to treat depression or seizures. If so, be open to trying it. Even if you are not depressed and do not have seizures, these medicines can help. That is because they work on the brain areas that deal with pain.  What can I do on my own? -- It is really important that you stay active. Walking, swimming, or biking can all help ease muscle pain. If you have not been active, it might hurt a little more when you start. But being active can help improve your symptoms.  It is also really important that you try not to be too negative about your life. Your outlook has a big effect on how you feel pain. Do your best to be positive.  All topics are updated as new evidence becomes available and our peer review process is complete.  This topic retrieved from [employer] on: Sep 21, 2021.  Topic 07018 Version 8.0  Release: 29.4.2 - C29.263  © 2021 UpToDate, Inc. and/or its affiliates. All rights reserved.  figure 1: Tender points in fibromyalgia     People with fibromyalgia usually have many "tender points" on their body that hurt when touched or pressed on. These tender points are bilateral, meaning that they are the same on both the left and right side of the body. Most people with fibromyalgia have tenderness on at least 11 of the 18 points shown in this picture. They " usually have also had pain throughout the body for at least three months.  Graphic 48616 Version 6.0    Consumer Information Use and Disclaimer   This information is not specific medical advice and does not replace information you receive from your health care provider. This is only a brief summary of general information. It does NOT include all information about conditions, illnesses, injuries, tests, procedures, treatments, therapies, discharge instructions or life-style choices that may apply to you. You must talk with your health care provider for complete information about your health and treatment options. This information should not be used to decide whether or not to accept your health care provider's advice, instructions or recommendations. Only your health care provider has the knowledge and training to provide advice that is right for you. The use of this information is governed by the [employer] End User License Agreement, available at https://www.Just [employer] Friends.IPG/en/solutions/[employer]/about/anne.The use of [employer] content is governed by the [employer] Terms of Use. ©2021 UpToDate, Inc. All rights reserved.  Copyright   © 2021 UpToDate, Inc. and/or its affiliates. All rights reserved.

## 2022-01-21 LAB — ANA SER QL IF: NORMAL

## 2022-01-24 LAB
ANTI-SSA ANTIBODY: 0.08 RATIO (ref 0–0.99)
ANTI-SSA INTERPRETATION: NEGATIVE

## 2022-01-26 ENCOUNTER — OFFICE VISIT (OUTPATIENT)
Dept: PAIN MEDICINE | Facility: OTHER | Age: 32
End: 2022-01-26
Payer: COMMERCIAL

## 2022-01-26 DIAGNOSIS — Z74.09 IMPAIRED MOBILITY AND ENDURANCE: ICD-10-CM

## 2022-01-26 DIAGNOSIS — G89.4 CHRONIC PAIN DISORDER: Primary | ICD-10-CM

## 2022-01-26 DIAGNOSIS — M79.7 FIBROMYALGIA: ICD-10-CM

## 2022-01-26 DIAGNOSIS — Z78.9 DECREASED ACTIVITIES OF DAILY LIVING (ADL): ICD-10-CM

## 2022-01-26 PROCEDURE — 1160F RVW MEDS BY RX/DR IN RCRD: CPT | Mod: CPTII,,, | Performed by: NURSE PRACTITIONER

## 2022-01-26 PROCEDURE — 1159F PR MEDICATION LIST DOCUMENTED IN MEDICAL RECORD: ICD-10-PCS | Mod: CPTII,,, | Performed by: NURSE PRACTITIONER

## 2022-01-26 PROCEDURE — 99215 PR OFFICE/OUTPT VISIT, EST, LEVL V, 40-54 MIN: ICD-10-PCS | Mod: ,,, | Performed by: NURSE PRACTITIONER

## 2022-01-26 PROCEDURE — 1160F PR REVIEW ALL MEDS BY PRESCRIBER/CLIN PHARMACIST DOCUMENTED: ICD-10-PCS | Mod: CPTII,,, | Performed by: NURSE PRACTITIONER

## 2022-01-26 PROCEDURE — 1159F MED LIST DOCD IN RCRD: CPT | Mod: CPTII,,, | Performed by: NURSE PRACTITIONER

## 2022-01-26 PROCEDURE — 99215 OFFICE O/P EST HI 40 MIN: CPT | Mod: ,,, | Performed by: NURSE PRACTITIONER

## 2022-01-26 NOTE — PROGRESS NOTES
[facility]    Initial Medical Screening Visit Note    Subjective:       Chief Complaint Requiring Rehabilitation: Chronic Pain    Consulted by: Dr. Garcia     HPI:     Carlos Snell is a 31 y.o. female who presents today for the [facility] Medical Screening Visit. She reports a hx of FM and DDD. Says she has had chronic pain for 15 years. Initial onset occurred freshman year of highschool - was playing basketball and was hit and fell backwards. Doctors thought it was a strain. Kept playing; also played other sports and was very active in sports in general. Ultimately found out she had a ruptured disc in lower back. Says ever since then says I have been in constant pain. Senior year of college she had to leave school due to pain and difficulty ambulating. She ended up having lumbar fusion surgery in 2014 due to extreme pain and right LE weakness (this was in [place]). Said she improved after surgery and no longer required AD to ambulate but pain persisted. Said she was hoping that surgery would 'make my life normal again' but only experienced a minor improvement in pain. 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 Says I have done tons of different things to try to manage pain- steroid shots, PT, chiropractic care, surgery, acupuncutre, medications (including opioids). Says its just been really difficult to manage. At the end of Aug/Sept she had severe right side pain- was dx w UTI and kidney stones. After stones passed still having lots of pain on right side. Went to different doctors and felt like she was being passed around- had gallbladder, kidneys checked; had MRI to check fusion; all normal results but still in extreme pain. Finally saw Dr. Garcia in Rheum. Thought maybe she had RA? Said she felt Dr. MONTERROSO was first one to really listen and talk to her. Felt very heard which was nice. Dr. MONTERROSO advised her pain was most consistent with FM and recommended FRP.  She was excited to hear there was something that could help because the pain affects most areas of her life.  She has also had trouble sleeping for years 2/2 pain; sometimes days without much sleep. Says it is very hard to do exercise but I would love to be able to move enough so I can lost weight and become more physically fit but everytime I try I have a flare- its like a cycle. Even walking can be challenging; has knee and back brace which help some. Says I have resigned my self to it not going away completely but I would like to move more freely without such bad pain and worry.     She also has migraines. Uses a migraine stick with essential oils which helps.     Also has IBS. Says she is predominantly gluten and dairy free.     1. Ambulatory status:  Walks independently. Prior to back surgery Has used a cane, walker, wheelchair prior to surgery. There are times I wish I had a cane but concerned about perception. Says I just walk really slow and sometimes limp due to pain and paresthesias.     2. Balance problems?  Does not fall but sometimes loses her balance. Says since im still relatively young feels like she can control falling. Balance issues tend to result from a sharp twinge in her back or feeling like parts of her body arent communicating. Gets GT bursa pain which can also alter balance. Stairs are difficult. Also has some positional dizziness.    3. Exercise routine:  Has not been exercising much since August due to pain flare and pain being unbearable. Before that, she was going to the gym most days and lifting light weights and biking; she flared in Aug then had the kidney issues which make things worse.    Lives near [facility] so can walk there. Got the [employer] program during covid and she would do pilates, yoga, strengthening; cardio was a lot on there. Has a tendency to push herself too far.     Got  in December and avoided doing too much so she wasn't in pain     4. Physical  Therapy?  Yes. Most recently has done PT at [facility]; went last Tuesday; can go for two more visits but waiting to see about FRP. Has also been to [facility].     Notes a hx of bad experience with dry needling in GT bursa region- caused increased pain.     5. Non-Interventional Therapies:    Massage? Yes. Has had this in PT. Did this a lot at [facility]. Also goes to massage parlor but has to be specific- must be gentle       TENS? Used to      Heat/Ice? Yes      Chiropractor? Yes      Acupuncture? Yes      Bracing? Yes- knee and back       6. Current pain medications:   Tramadol   Ibuprofen   topamax (tries not to take this too much)    Was given rx for gabapentin but has not taken it due to concern about weight gain as s/e       7. Pain management injections:  Yes- prior to back surgery.     8. Relevant surgeries:  Lumbar fusion surgery 2014          Prolonged illness or hospitalizations?  Notes chronic bronchitis as a child; chronic laryngitis as adult; at one time lost her voice for 5 months and had to use a microphone for teaching. Notes getting sick a lot.     9. Pain affecting function at work, home, and/or recreationally?  Yes     10. Work Hx:  . 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 Sleep Hx:  Does not sleep very well. Wakes feeling tired. has Pre-menstrual insomnia and pain flares. No hx ADELIA. She occasionally snores. Urinates 1-2 times per night. Feels tired, low energy most of the day. Says its a constant frausto to make it through the day. Takes melatonin, sleepy tea; sometimes benadryl.     12. Mental Health Hx:  Hx of Depression. Takes buproion and adderall. Both meds have helped with emotional regulation and feeling more calm. Just got dx in Oct with ADHD. Says I wish I got dx sooner because I understand myself better now. Sometimes wonders if depression is undx ADHD. Feels depression may result from pain issues.     Goes to therapy regularly.      No inpt hospitalizations for  psychiatric illness.    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 Feels like a different person when not at school/on break.     1/20/22 Rheum visit:  Plan:   In an abundance of caution will get labs, however, do not suspect RA or similar CTD.     The patient was educated on fibromyalgia/central sensitivity syndromes  Symptoms/presentations, non-pharmacologic and pharmacologic treatments and their efficacies were reviewed.   Non-pharmacologic approaches were stressed.  ExPRESS was reviewed  Regular/daily dedicated, low impact aerobic exercise was especially emphasized. Strategies for success were offered.  Additional mind body exercise such as yoga or variations (chair yoga) and/or Darryn Chi have been validated.  Pharmacologic treatments approved and otherwise were reviewed.  She may benefit by switching to duloxetine.  She will discuss with her PCP  Patient was provided with written information and referred to several websites for further information.     She was referred to the [facility] after discussion.         Past Medical History:   Diagnosis Date    Asthma     Chronic back pain     Migraine headache     Pyelonephritis        Past Surgical History:   Procedure Laterality Date    BACK SURGERY  2013       Review of patient's allergies indicates:   Allergen Reactions    Lactose Diarrhea    Gluten protein Itching     Inflammation, bloating, diarrhea and pain all over body         Current Outpatient Medications   Medication Sig Dispense Refill    buPROPion 450 mg Tb24 Take 450 mg by mouth once daily. 90 tablet 0    cetirizine (ZYRTEC) 10 MG tablet cetirizine 10 mg tablet      cyanocobalamin (VITAMIN B-12) 100 MCG tablet Take 1 tablet (100 mcg total) by mouth once daily. 90 tablet 3    dextroamphetamine-amphetamine (ADDERALL XR) 10 MG 24 hr capsule Take 10 mg by mouth every morning.      gabapentin (NEURONTIN)  300 MG capsule Take 1 capsule (300 mg total) by mouth 3 (three) times daily. 90 capsule 11    pantoprazole (PROTONIX) 40 MG tablet Take 1 tablet (40 mg total) by mouth every morning. 90 tablet 3    rizatriptan (MAXALT-MLT) 10 MG disintegrating tablet Take at onset of migraine, can repeat in 2 hrs if needed.  No more than 2 tabs per day or 3 days/wk. 12 tablet 3    topiramate (TOPAMAX) 50 MG tablet Take 1 tablet (50 mg total) by mouth every evening. 30 tablet 3     No current facility-administered medications for this visit.       Family History   Problem Relation Age of Onset    Depression Mother     Anxiety disorder Mother     Ovarian cancer Mother     No Known Problems Father     Diabetes Sister     Hypertension Maternal Grandmother     Breast cancer Neg Hx     Colon cancer Neg Hx        Social History     Socioeconomic History    Marital status:    Tobacco Use    Smoking status: Never Smoker    Smokeless tobacco: Never Used   Substance and Sexual Activity    Alcohol use: Yes     Comment: 1-3 drinks every 2 weeks    Drug use: No     Social Determinants of Health     Financial Resource Strain: Low Risk     Difficulty of Paying Living Expenses: Not hard at all   Food Insecurity: No Food Insecurity    Worried About Running Out of Food in the Last Year: Never true    Ran Out of Food in the Last Year: Never true   Transportation Needs: No Transportation Needs    Lack of Transportation (Medical): No    Lack of Transportation (Non-Medical): No   Physical Activity: Inactive    Days of Exercise per Week: 0 days    Minutes of Exercise per Session: 0 min   Stress: Stress Concern Present    Feeling of Stress : To some extent   Social Connections: Unknown    Frequency of Communication with Friends and Family: Twice a week    Frequency of Social Gatherings with Friends and Family: Once a week    Active Member of Clubs or Organizations: Yes    Attends Club or Organization Meetings: More than 4  times per year    Marital Status:    Housing Stability: Low Risk     Unable to Pay for Housing in the Last Year: No    Number of Places Lived in the Last Year: 1    Unstable Housing in the Last Year: No              Objective:        GEN: Well developed, well nourished. No acute distress. Fully alert, oriented, and appropriate. Speech normal lian.   PSYCH: Normal affect. Thought content appropriate.  CHEST: Breathing symmetric. No audible wheezing.  SKIN: Warm, dry. No rash or discoloration on exposed areas.   NEURO/MUSCULOSKELETAL:  Cervical: ROM full and painless; TTP neck and shoulder musculature ; 5/5 UE strength; gross sensation and reflexes intact bilaterally; Negative Brock's bilaterally.  Lumbar: ROM limited and painful. TTP lumbar musculature; 5/5 LE strength bilaterally; gross sensation and reflexes intact bilaterally; negative clonus bilaterally  SLR negative bilaterally (sitting)  ANNA negative bilaterally (sitting)           Imaging:      EXAMINATION:  MRI LUMBAR SPINE WITHOUT CONTRAST     CLINICAL HISTORY:  Lumbar radiculopathy, symptoms persist with conservative treatment; Radiculopathy, lumbar region     TECHNIQUE:  Multiplanar, multisequence MR images were acquired from the thoracolumbar junction to the sacrum without the administration of contrast.     COMPARISON:  Radiographs 11/23/2021     FINDINGS:  Status post L5-S1 disc arthroplasty and fusion.     The alignment of the lumbar spine is normal.     The disc spaces are well maintained.     The conus medullaris terminates in good position.     T12-L1: No disc abnormality, no canal stenosis or foraminal narrowing.  The facet joints appear normal.     L1-L2: No disc abnormality, no canal stenosis or foraminal narrowing.  The facet joints appear normal.     L2-L3: No disc abnormality, no canal stenosis or foraminal narrowing.  The facet joints appear normal.     L3-L4: No disc abnormality, no canal stenosis or foraminal narrowing.   The facet joints appear normal.     L4-5: No canal stenosis or foraminal narrowing.  No disc abnormality.     L5-S1: Postoperative change, no canal stenosis or foraminal narrowing.  The facet joints appear normal.     The upper sacrum and upper sacroiliac joints appear normal.     The paravertebral and paraspinal soft tissues appear normal.     Impression:     Postoperative changes L5-S1 disc arthroplasty and fusion.     No canal stenosis or foraminal narrowing at any level.        Electronically signed by: Zunilda Montiel MD  Date:                                            12/16/2021  Time:                                           09:06                  Narrative & Impression  EXAMINATION:  XR LUMBAR SPINE AP AND LAT WITH FLEX/EXT     CLINICAL HISTORY:  Other intervertebral disc degeneration, lumbar region     FINDINGS:  Lumbar spine four views: There is a levoconvex curvature.  There is postoperative change with hardware and a disc implant at L5-S1.  There is mild DJD.  No instability seen.  No trauma seen.  No fracture dislocation bone destruction seen.     Impression:     Chronic change as above.        Electronically signed by: Alvaro Delgado MD  Date:                                            11/23/2021  Time:                                           08:03    Assessment:     Encounter Diagnosis   Name Primary?    Fibromyalgia        Plan:     Diagnoses and all orders for this visit:    Fibromyalgia  -     Ambulatory referral/consult to [facility]          Carlos Snell is a 31 y.o. female with chronic pain for many years. Started as a teen with acute onset pain in hip region that persisted and has worsened and spread over the years. She has fibromyalgia. To manage pain she has tried PT, medications, injections, surgery, and some self-mgmt tools. Despite tx, pain is persistent and impacting daily functioning and QOL. I recommend FRP.     Education about pain and the chronic pain  cycle was provided today. Discussed the importance of multimodal and multidisciplinary management of chronic pain with a focus on both pain relief and function. Discussed how our team provides education and training aimed at improving physical function, emotional health, stress and pain coping skills.Treatment is designed to build confidence in physical activity and ADLs and in your ability to control and manage your pain.     Recommend proceeding with PT/OT screening visit for further evaluation of personal goals, functional status and limitations prior to enrollment in the program.     I spent a total of 60 minutes with the patient, and greater than 50% of the time was spent in counseling and education.     The above plan and management options were discussed at length with patient. Patient is in agreement with the above and verbalized understanding. It will be communicated with the referring physician via electronic record, fax, or mail.

## 2022-01-27 ENCOUNTER — PATIENT MESSAGE (OUTPATIENT)
Dept: PAIN MEDICINE | Facility: OTHER | Age: 32
End: 2022-01-27
Payer: COMMERCIAL

## 2022-01-27 NOTE — PROGRESS NOTES
"NOTE: This is a duplicate copy utilized for reassessment purposes & continuity of care.  See pt's plan of care for co-signed copy.    [facility]  Functional Restoration Program  Physical Therapy Initial Evaluation    Date: 1/28/2022   Name: Carlos Snell  Clinic Number: [id number]    Therapy Diagnosis:   Encounter Diagnoses   Name Primary?    Chronic pain disorder     Decreased activities of daily living (ADL)     Impaired mobility and endurance     Impaired functional mobility, balance, gait, and endurance     Weakness of both hips     Weakness of trunk musculature      Physician: Gwendolyn Zaidi, NP    Physician Orders: PT Eval and Treat   Medical Diagnosis from Referral:   G89.4 (ICD-10-CM) - Chronic pain disorder   Z78.9 (ICD-10-CM) - Decreased activities of daily living (ADL)   Z74.09 (ICD-10-CM) - Impaired mobility and endurance       Evaluation Date: 1/28/2022  Authorization Period Expiration: 01/27/23  Plan of Care Expiration: 04/22/22  Visit # / Visits authorized: 01 / 01   FOTO: completed 01 / 03     Precautions: Standard and Fall    Time In: 2:00 pm   Time Out: 3:00 pm   Total Appointment Time (timed & untimed codes): 60 minutes      Subjective     Date of onset: chronic (2006 sports injury LB)  /c Aug flare up     Patient reported history of current condition - Carlos reports initial injury/LBP 2006 from a fall playing basketball.  Pt note LBP continued leading to lumbar fusion surgery.  Pt report improvement /p surgery but noting some concern for her balance.  Pt report Sept LB flare up /c kidney stone /c no resolution /p passing stone.    Pt feels her core is weak, pain everyday at a constant level  Pt report "pins and needles" BLE /c progression to R foot and LB sx dull ache /c "fire" with excess movement.   Pt report sx exacerbation /c stair ascend/descend, prolonged activity - walking, sitting.   Pt report at work often using "rolling chair" and sig limiting " "her bending/lifting.   Pt report 1 x month HA leading to sig limited activity tolerance.  Pt report cont sleep disturbance and approx 30 min to fall asleep and waking /c poor sleep quality.  Pt note waking thru the night due to the pain/"pressure" in LB.    Pt report some relief /c lying down and ice.  Pt report intermittent Tramadol /c minor relief.    Pt believes she could amb 30 min /c pain before needing rest.  Pt report using back brace /c exercise (walk at [facility]) and /c household chores.    Pt reports having membership at local gym and [employer] but notes not working out recently due to flare up.  Pt report recent PT tx stating "it helps" for core strengthening.  Pt reports plan to d/c present PT.    Pt report difficulty /c stair climbing at work, walking for distance at work (to  kids)     Per MD note:       Carlos Snell is a 31 y.o. female who presents today for the Functional Restoration Program Medical Screening Visit. She reports a hx of FM and DDD. Says she has had chronic pain for 15 years. Initial onset occurred freshman year of highschool - was playing basketball and was hit and fell backwards. Doctors thought it was a strain. Kept playing; also played other sports and was very active in sports in general. Ultimately found out she had a ruptured disc in lower back. Says ever since then says I have been in constant pain. Senior year of college she had to leave school due to pain and difficulty ambulating. She ended up having lumbar fusion surgery in 2014 due to extreme pain and right LE weakness (this was in [place]). Said she improved after surgery and no longer required AD to ambulate but pain persisted. Said she was hoping that surgery would 'make my life normal again' but only experienced a minor improvement in pain. 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 Says I have done tons of different things to try to manage pain- steroid shots, PT, chiropractic care, surgery, " acupuncutre, medications (including opioids). Says its just been really difficult to manage. At the end of Aug/Sept she had severe right side pain- was dx w UTI and kidney stones. After stones passed still having lots of pain on right side. Went to different doctors and felt like she was being passed around- had gallbladder, kidneys checked; had MRI to check fusion; all normal results but still in extreme pain. Finally saw Dr. Garcia in [place]. Thought maybe she had RA? Said she felt Dr. MONTERROSO was first one to really listen and talk to her. Felt very heard which was nice. Dr. MONTERROSO advised her pain was most consistent with FM and recommended FRP. She was excited to hear there was something that could help because the pain affects most areas of her life.  She has also had trouble sleeping for years 2/2 pain; sometimes days without much sleep. Says it is very hard to do exercise but I would love to be able to move enough so I can lost weight and become more physically fit but everytime I try I have a flare- its like a cycle. Even walking can be challenging; has knee and back brace which help some. Says I have resigned my self to it not going away completely but I would like to move more freely without such bad pain and worry.   She also has migraines. Uses a migraine stick with essential oils which helps.        Imaging: MRI studies, X ray    EXAMINATION:  MRI LUMBAR SPINE WITHOUT CONTRAST     CLINICAL HISTORY:  Lumbar radiculopathy, symptoms persist with conservative treatment; Radiculopathy, lumbar region     TECHNIQUE:  Multiplanar, multisequence MR images were acquired from the thoracolumbar junction to the sacrum without the administration of contrast.     COMPARISON:  Radiographs 11/23/2021     FINDINGS:  Status post L5-S1 disc arthroplasty and fusion.     The alignment of the lumbar spine is normal.     The disc spaces are well maintained.     The conus medullaris terminates in good position.     T12-L1: No disc  abnormality, no canal stenosis or foraminal narrowing.  The facet joints appear normal.     L1-L2: No disc abnormality, no canal stenosis or foraminal narrowing.  The facet joints appear normal.     L2-L3: No disc abnormality, no canal stenosis or foraminal narrowing.  The facet joints appear normal.     L3-L4: No disc abnormality, no canal stenosis or foraminal narrowing.  The facet joints appear normal.     L4-5: No canal stenosis or foraminal narrowing.  No disc abnormality.     L5-S1: Postoperative change, no canal stenosis or foraminal narrowing.  The facet joints appear normal.     The upper sacrum and upper sacroiliac joints appear normal.     The paravertebral and paraspinal soft tissues appear normal.     Impression:     Postoperative changes L5-S1 disc arthroplasty and fusion.     No canal stenosis or foraminal narrowing at any level.        Electronically signed by: Zunilda Montiel MD  Date:                                            12/16/2021  Time:                                           09:06        Narrative & Impression  EXAMINATION:  XR LUMBAR SPINE AP AND LAT WITH FLEX/EXT     CLINICAL HISTORY:  Other intervertebral disc degeneration, lumbar region     FINDINGS:  Lumbar spine four views: There is a levoconvex curvature.  There is postoperative change with hardware and a disc implant at L5-S1.  There is mild DJD.  No instability seen.  No trauma seen.  No fracture dislocation bone destruction seen.     Impression:     Chronic change as above.        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   Prior Level of Function: prior to Aug - visiting  "gym, lifting weights, biking, walking  Current Level of Function: difficulty /c household chores, pain /c dressing, walking for distance    Pain:      Current 7/10, worst 10/10 /c kidney stones, best 5/10 /c rest/no work "relaxing day"    Location: bilateral back    Description: constant - dull ache, exacerbated - "fire"  Aggravating Factors: extended sitting, standing, walking, lifting   Easing Factors: side lying     Patient's FRP goals: "I would like to make peace /c [my] chronic illness"  Find exercises that I can do that will help me stay healthy     Medical History:   Past Medical History:   Diagnosis Date    Asthma     Chronic back pain     Migraine headache     Pyelonephritis        Surgical History:   Carlos Snell  has a past surgical history that includes Back surgery (2013).    Medications:   Carlos has a current medication list which includes the following prescription(s): bupropion hcl, cetirizine, cyanocobalamin, dextroamphetamine-amphetamine, gabapentin, pantoprazole, rizatriptan, and topiramate.    Allergies:   Review of patient's allergies indicates:   Allergen Reactions    Lactose Diarrhea    Gluten protein Itching     Inflammation, bloating, diarrhea and pain all over body          Objective     Postural examination:  Seated: slouched, fwd head, rounded shoulders  Standing: fwd head, rounded shoulders, slight ant pelvic tilt, decreased spinal dissociation w/ mobility    Range of Motion - Cervical   AROM AROM AROM    Evaluation Reassessment  Reassessment   Flexion WFL ° °   Extension Mod loss ° °   Side bending Right Mod loss ° °   Side bending Left Mod loss ° °   Rotation Right Min loss  ° °   Rotation Left Min loss ° °     Range of Motion - Lumbar         ROM Loss ROM Loss ROM Loss   Flexion moderate loss     Extension moderate loss     Side bending Right moderate loss     Side bending Left moderate loss     Rotation Right moderate loss     Rotation Left moderate loss P!       Strength " Testing   Evaluation Reassessment Reassessment   Motion Tested         Lower Extremity R L R L R L   Hip Flexion 3+/5 P! 3+/5 P!       Hip Extension 3/5 3/5       Hip Abduction 3+/5 3+/5       Hip IR 3+/5 3+/5       Hip ER 3+/5 P! 3+/5 P!       Knee Extension 4+/5 5/5       Knee Flexion 4+/5 5/5         Functional Testing     Evaluation Reassessment  Reassessment   Timed Up and Go* 12.76 sec sec sec   5 Time Sit to Stand w/out UE* 22.41 sec sec sec   Single Limb Stance R LE 15.47 sec sec sec   Single Limb Stance L LE 12.;86 sec sec sec   2 Minute Walk Test 261 ft = 79.43 m feet feet   6 Minute Walk Test 744 ft = 226.87 m feet feet   Self Selected Walking Speed 0.63 m/sec   (0.66 m/sec at 2 min) m/sec m/sec   *inc pain in B knee, hip         GAIT:  Assistive Device used: none  Level of Assistance: independent  Patient displays the following gait deviations:  unsteady gait, decreased step length and decreased weight shift. R hip drop, dec RLE stance time    Minimum standards/norms:    TUG:  < 13.5 seconds/ Males 7.3 sec, Females 8.1 sec  SLS:  26-29 sec  Ages 20-69  Self Selected Walking Speed:  .4-.8m/sec  Limited community ambulator                                                   .8- 1.2  Community ambulator                                                    1.2 m/sec and above  Able to safely cross streets      Limitation/Restriction for FOTO Lumbar Survey    Therapist reviewed FOTO scores for Carlos Snell on 1/28/2022.   FOTO documents entered into [employer] - see Media section.    Limitation Score: 71%    Goal: < 55%          TREATMENT     Total Treatment time (time-based codes) separate from Evaluation:  10 minutes     Carlos received the treatments listed below:      therapeutic activities to improve functional performance for 10 minutes, including:   PT education:  o Physical & psychological viscous pain cycles (see patient instructions 01/28/22  o Role of consistent activity (graded exposure) to break the  physical cycle  o Role of the  & education to encourage intrinsic patient motivation to help break the psychological cycle  o Impact on pt's functional goals when breaking each one of these cycles.    o Impact central sensitization has on the sensory system in the chronic pain population      PATIENT EDUCATION AND HOME EXERCISES     Education provided:   - pain cycle.  Pt issued handout     Written Home Exercises Provided: Patient instructed to cont prior HEP. Exercises were reviewed and Carlos was able to demonstrate them prior to the end of the session.  Carlos demonstrated fair  understanding of the education provided. See EMR under Patient Instructions for information provided during therapy sessions.    ASSESSMENT   Carlos is a 31 y.o. female referred to outpatient Physical Therapy with a medical diagnosis of G89.4 (ICD-10-CM) - Chronic pain disorder, Z78.9 (ICD-10-CM) - Decreased activities of daily living (ADL). Pt presents with pain, BLE weakness, impaired mobility, decreased balance, decreased endurance, gait deviations, fall risk, decreased AROM, increased psychosocial concerns, & inability to perform her ADL's as before due to her conditions. Pt increased psychosocial concerns including sig fear avoidance, co-morbidities & central sensitization present an unstable clinical presentation.  Pt is a good candidate for FRP.  Therefore, pt issued pain cycles handout and advised our AM spot is available.  However in POC, pt may need a great deal of encouragement to differentiate hurt vs harm and challenge her present fearfulness to activity.       Based on the above history and physical examination an active physical therapy program within a multi-disciplinary setting is recommended.  Pt will benefit from skilled outpatient physical therapy to address the deficits listed below in the chart, provide pt/family education and to maximize pt's level of independence in the home and community  environment.     Plan of care discussed with patient: Yes  Pt's spiritual, cultural and educational needs considered and patient is agreeable to the plan of care and goals as stated below:     Pt prognosis is Fair.   Anticipated Barriers for therapy: fearfulness to activity, work schedule     Medical Necessity is demonstrated by the following  History  Co-morbidities and personal factors that may impact the plan of care Co-morbidities:   COPD/asthma, coping style/mechanism, depression, difficulty sleeping and prior lumbar surgery    Personal Factors:   coping style     moderate   Examination  Body Structures and Functions, activity limitations and participation restrictions that may impact the plan of care Body Regions:   back  lower extremities  trunk    Body Systems:    gross symmetry  ROM  strength  gross coordinated movement  balance  gait  transfers  motor control    Participation Restrictions:   none    Activity limitations:   Learning and applying knowledge  no deficits    General Tasks and Commands  no deficits    Communication  no deficits    Mobility  lifting and carrying objects  walking    Self care  dressing    Domestic Life  shopping  cooking  doing house work (cleaning house, washing dishes, laundry)    Interactions/Relationships  no deficits    Life Areas  no deficits    Community and Social Life  community life  recreation and leisure         high   Clinical Presentation evolving clinical presentation with changing clinical characteristics moderate   Decision Making/ Complexity Score: high       GOALS: Pt is in agreement with the following goals:      Goal Progress Towards Goal   Short Term: In 3 weeks, pt will:    Verbalize & demonstrate good understanding of resisted training with 3-1-3 second rep for chaowtrqcm-gymayzivg-woyqoemor contraction to encourage full muscle recruitment for strength & endurance Progress towards goal: initiated 1/28/2022   Verbalize & demonstrate good understanding of  home stretch routine to improve AROM, help decrease pain & improve mobility Progress towards goal: initiated 1/28/2022   Demonstrate proper supine or seated diaphragmatic breathing with decreased chest excursion & emphasis on full abdominal excursion & increased expiration time to promote muscle relaxation & increased parasympathetic activity to improve patient tolerance to activities Progress towards goal: initiated 1/28/2022   Demonstrate proper static standing posture in frontal & sagittal plane per PT visual assessment to decrease postural strain in ADLs Progress towards goal: initiated 1/28/2022   Demonstrate good recall of names of resisted exercises w/ minimal to moderate verbal cues to promote independence w/ exercise at the end of the program Progress towards goal: initiated 1/28/2022   Verbalize plan for continuing resisted exercises w/ specific location (i.e. commercial gym, home, community fitness center) indicating preference for machine-based or free-weight exercises to incorporate all major muscle groups to maintain & progress therapeutic functional improvements Progress towards goal: initiated 1/28/2022           Long Term: In 8 weeks, pt will:    Verbalize good understanding of activities planning/scheduling (stretching, mindfulness, resisted training, & cardio) prescribed by PT/OT following the end of the program to sustain & progress functional improvements Progress towards goal: initiated 1/28/2022   Be independent w/ selected resisted training w/ minimal to no cuing while performing to continue w/ resisted strengthening independently at the end of the program Progress towards goal: initiated 1/28/2022   Improve 2 Minute Walk Test (MWT) to 375 feet and 6 MWT to 1125 feet or greater to improve gait speed and mobility and reach community ambulator status Progress towards goal: initiated 1/28/2022   Perform single leg stance 20 seconds or greater bilaterally to improve safety and balance in ADLs  Progress towards goal: initiated 1/28/2022   Demonstrate Timed Up & Go (with appropriate assistive device, if necessary) of 10 seconds or less to decrease fall risk and improve functional mobility Progress towards goal: initiated 1/28/2022   Demonstrate 5 time sit to stand test without upper extremity assist to 15 sec or less to improve general mobility and decrease fall risk Progress towards goal: initiated 1/28/2022   Score 55 % or less on Lumbar FOTO to indicate significant functional improvements in impaired functions secondary to low back pain Progress towards goal: initiated 1/28/2022                 PLAN   Plan of care Certification: 02/07/22 to 04/22/22.    Outpatient Physical Therapy 3 times weekly for 10 weeks to include the following interventions: Gait Training, Manual Therapy, Moist Heat/ Ice, Neuromuscular Re-ed, Patient Education, Self Care, Therapeutic Activities and Therapeutic Exercise.     Shady Bain, PT

## 2022-01-28 ENCOUNTER — CLINICAL SUPPORT (OUTPATIENT)
Dept: REHABILITATION | Facility: OTHER | Age: 32
End: 2022-01-28
Payer: COMMERCIAL

## 2022-01-28 DIAGNOSIS — Z78.9 DECREASED ACTIVITIES OF DAILY LIVING (ADL): ICD-10-CM

## 2022-01-28 DIAGNOSIS — R29.898 WEAKNESS OF BOTH HIPS: ICD-10-CM

## 2022-01-28 DIAGNOSIS — G89.4 CHRONIC PAIN DISORDER: ICD-10-CM

## 2022-01-28 DIAGNOSIS — Z74.09 IMPAIRED FUNCTIONAL MOBILITY, BALANCE, GAIT, AND ENDURANCE: ICD-10-CM

## 2022-01-28 DIAGNOSIS — Z74.09 IMPAIRED MOBILITY AND ENDURANCE: ICD-10-CM

## 2022-01-28 DIAGNOSIS — M62.81 WEAKNESS OF TRUNK MUSCULATURE: ICD-10-CM

## 2022-01-28 PROCEDURE — 97163 PT EVAL HIGH COMPLEX 45 MIN: CPT

## 2022-01-28 PROCEDURE — 97530 THERAPEUTIC ACTIVITIES: CPT

## 2022-01-28 PROCEDURE — 97167 OT EVAL HIGH COMPLEX 60 MIN: CPT

## 2022-01-28 NOTE — PLAN OF CARE
"[facility]   Occupational Therapy Initial Evaluation &  Goals and Physical Capacity for Functional Restoration Program    Date: 1/28/2022  Name: Carlos Snell  Clinic Number: [id number]    Therapy Diagnosis: No diagnosis found.  Physician: Gwendolyn Zaidi NP    Physician Orders: OT Eval and Treat  Medical Diagnosis: Chronic pain disorder  Evaluation Date: 1/28/2022  Insurance Authorization Period Expiration: 12/01/2022  Plan of Care Certification Period: 3/18/2022  Visit # / Visits authorized: 1/ 1  FOTO: evaluation: 52% limitation    Precautions:  Standard and Fall    Time In: 3:05 PM  Time Out: 4:05 PM    Total Appointment Time: 60 minutes    Subjective   Date of onset: 15 years hx of FM and DDD  Patient reported history of current condition - Carlos reports: ever since then says I have been in constant pain. Senior year of college she had to leave school due to pain and difficulty ambulating. She ended up having lumbar fusion surgery in 2014 due to extreme pain and right LE weakness. She notes "it wasn't a miracle cure, but I didn't have to use a cane/walker/wheelchair. Although sometimes I still want a cane for balance". Reports increased pain flares starting in sept 2021 beginning with kidney stones. Reports pain persisted after passing kidney stones. Finally saw Dr. Garcia in [place] Dr. MONTERROSO advised her pain was most consistent with FM and recommended FRP. Currently, she reports feeling unsteady and off balance, consistent pain from morning to night. She reports decreased stress and laying down help to alleviate pain. Uses ice and heat as well. Reports infrequent migraines with inability to function about 1x/month.   Statement: "one of the reasons I wanted to do this program is that i'm exhausted from the pain, and then teaching itself is exhausting on its own".   Prior Level of Function: activities patient can no longer perform/has great difficulty with LB dressing, " "vacumming, walking, household chores, working, walking up and down stairs  Reports she is an "over achiever" and "pushes herself", can walk about 15 min at a time.     Medical History:   Past Medical History:   Diagnosis Date    Asthma     Chronic back pain     Migraine headache     Pyelonephritis         Surgical History:   Carlos  has a past surgical history that includes Back surgery (2013).    Medications:   Carlos has a current medication list which includes the following prescription(s): bupropion hcl, cetirizine, cyanocobalamin, dextroamphetamine-amphetamine, gabapentin, pantoprazole, rizatriptan, and topiramate.    Allergies:   Review of patient's allergies indicates:   Allergen Reactions    Lactose Diarrhea    Gluten protein Itching     Inflammation, bloating, diarrhea and pain all over body         Prior Therapy: Yes. Most recently has done PT at [facility]; went last Tuesday; can go for two more visits but waiting to see about FRP. Has also been to [facility].   Reports "I think PT, I think it actually helps. I think it helps me build up my core".     Exercise: Reports she wears her back brace to exercise and complete home chores. Has not been exercising much since Sept due to pain flare and pain being unbearable. Before that, she was going to the gym most days and lifting light weights and biking. "I think that's why my pain flare got a lot worse because I pushed myself too hard". Also has the mirror fitness program at home, but has not been doing anything outside of yoga and meditation.     Falls: no recent falls, notes she scales walls for balance     Disturbed sleep: yes "I have to wait a while to get comfortable, I have trouble falling asleep (at least 30-40min), I wake up frequently because of pain and I dont feel energized when I wake up. Sometimes I cant go back to sleep when I do wake up and other times I cant fall asleep".      Mental health: "Its sliding scale,feel like I " "should be happier than I am, but physically it is exhausting to live in this body everyday. I have a therapist I see 2x/month.   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 Feels like a different person when not at school/on break.     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         Pain:  Pain Related Behaviors Observed: yes   Functional Pain Scale Rating 0-10: within the last 30 days  7/10 current  9/10 at worst  5/10 at best  Location: low back, B shoulders   Description: pins and needles, "fire", sharp, dull aching   Functional Exacerbations: ascending stairs, prolonged activity (walking, sitting)  Easing Factors: sidelying, ice/heat     Personal Functional Three Month Goals:   Vocational: "climbing the stairs without extreme pain, walking the building"    Recreational : "cooking, walking"   Daily Living Activities: "cleaning more often, especially for extended periods of time, lifting things, putting things away, doing my hair"     Social and ADL History:  Activities of Daily Living Checklist:   Key to Score:   0: Unable to do the activity  1: Can do the activity with great difficulty  2: Can do the activity with little difficulty   3: No problem with activity  N/A: This is not an activity I do  H/T: Have not tried yet    Personal Care:  Homemaking:     Dressing Upper Body:  2 Meal preparation: 2   Dressing Lower Body:  1 Kitchen Cleanup: 1   Bathin " Childcare:  na   Hair Care:  3 Grocery shoppin   Sleep:  1 Vacuuming/moppin     Emptying trash/ garbage bag: 3   General Mobility:   Bed making changin   Sittin Laundry  2   Bendin     Getting in/out of bed:  2 Home Maintenance:    Standin Mowing, rakin   Walkin Gardenin   Twistin Home Repairs:  na   Liftin Painting:  na         Getting around:       Getting in and out of car:  2     Buckling up you seat belt:  3     Opening heavy doors:  2     Climbing/descending stairs:  1             Objective     COGNITIVE Exam  Oriented: Person, Place, Time and Situation  Behaviors: Follows multistep  commands  Follows Commands/attention: Follows multistep  commands  Communication: clear/fluent  Memory: No Deficits noted  Safety awareness/insight to disability: good insight but with high fear and guarded movement   Coping skills/emotional control: Appropriate to situation    Active ROM:   UE RUE LUE Comments   Hands WFL WFL    Wrist WFL WFL    Elbows WFL WFL    Shoulders WFL WFL Noted increased pain in B shoulders with flexion       Dominant hand: left    Functional Strengthen Assessment: WFL    Strength Testing   Evaluation  2022 [facility]P  2 Month Follow Up   Motion Tested          R L R L R L   Upper Extremity         Shoulder Flexion 4/5 4+/5       Shoulder Extension 5/5 5/5       Shoulder Abduction 4/5 3+/5       Shoulder IR 5/5 5/5       Shoulder ER 4/5 4/5       Elbow Flexion 4/5 4/5       Elbow Extension 4/5 4/5            Evaluation   5 times sit-stand  (adults 18-65 y/o) 22.41 seconds  >12 sec= fall risk for general elderly  >16 sec= fall risk for Parkinson's disease  >10 sec= balance/vestibular dysfunction (<61 y/o)  >14.2 sec= balance/vestibular dysfunction (>61 y/o)  >12 sec= fall risk for CVA     Endurance Testing: Modified Ramp Treadmill Test   GAP Re-assessment Follow-up   Minutes Completed  2 mins 151 secs     % Grades  5 %     Speed (mph)  1.3 mph      METS 3      bpm     Heriberto Rating Perceived Exertion Scale   7     Reason for stop point: Pt reported posture changes, fatigue, noted decline in maintaining pace safely. Attempted 1.7 at 10% but asked to stop after 10 seconds 2/2 B hip pain     Functional Strength Test:  Pile Testing: Lifting  (Pounds/Heart rate)   GAP/Eval (#/HR/HERIBERTO)  Reassessment  Day 9   Follow-up   Appointment    Repetitive Floor to Waist    8#/97bpm/5          Repetitive Waist to Shoulder    10#/81bpm/4              1- Time Maximum     12#/105bpm/5           Carry-2 Handed (40ft)     12#/95bpm/3           Work demand Level     Sedentary-Light           Reason for Stop point: Increased time required for completing repetitions. During physical testing, participation level consistent.    No environmental, cultural, spiritual, developmental or education needs expressed or noted.      Limitation/Restriction for FOTO NOC Survey    Therapist reviewed FOTO scores for Carlos Snell on 1/28/2022  FOTO documents entered into [employer] - see Media section.    Limitation Score: 52%         Treatment   Home Exercises and Patient Education Provided    Education provided:   -Ms. Snell received education regarding proper posture and body mechanics. She received education regarding anticipated muscular soreness following lift testing and strategies for management were reviewed with patient including stretching, using ice, scheduled rest.  -Additional Education provided: heriberto scale, pain cycle, z-lie, functional lifting mechanics, pursed lip and diaphragmatic breathing techniques    Written Home Exercises Provided: yes.  Exercises were reviewed and Carlos was able to demonstrate them prior to the end of the session.    Carlos demonstrated good  understanding of the education provided.     Carlos given handout re: heriberto scale, pain cycle, z-lie, pursed lip and diaphragmatic breathing techniques  z-lie position, and received education on the Functional  Restoration Program. Details of the program were discussed.     Assessment     Carlos appears to be a good candidate for the Functional Restoration Program. She has tried PT, medications, injections, surgery, and some self-mgmt tools. Despite tx, pain is persistent and impacting daily functioning and QOL. She currently has fear of pain, fear of falling, symptoms of depression, high stress levels, and exhibits pain avoidance behavior. She presents with good motivation to make behavioral changes and readiness to participate in program. Carlos with mild affect. She was able to participate in all aspects of assessment. Discussed with Carlos how goal of chronic pain education program is to provide tools, education and training aimed at improving physical functioning, emotional health, stress and pain coping skills.The program is designed to build confidence in physical activity and improve independence with ADLs and IADLs and in your ability to control and manage your pain. She voiced understanding.    Carlos is unable to fully participate in work, recreational, and home-based activities because of decreased functional strength, decreased endurance, limited positional tolerance, fear of re-injury, and fear of increased pain. She will benefit from participating in a conditioning  program designed  to increase functional strength, flexibility, and endurance in order to meet functional goals.     Carlos's prognosis is Good due to good personal goals, willingness to make behavioral changes and good response to pain education and identification of current behaviors. She will benefit from skilled outpatient Occupational Therapy to address the limitations and goals stated above and in the chart below, provide patient/family education, and to maximize patient's level of functional independence.    Plan of care discussed with patient: Yes  Pt's spiritual, cultural and educational needs considered and patient is  agreeable to the plan of care and goals as stated below:     Medical necessity is demonstrated by the following problem list.   Profile and History Assessment of Occupational Performance Level of Clinical Decision Making Complexity Score   Occupational Profile:   Carlos Snell is a 31 y.o. female who lives with their spouse and is currently employed as a teacher. Carlos Snell has difficulty with  bathing, grooming and dressing  driving/transportation management, shopping, phone/computer use and housework/household chores  affecting her daily functional abilities. Her main goal for therapy is improved tolerance for work and home demands.     Comorbidities:   coping style/mechanism, depression, difficulty sleeping, level of undertstanding of current condition, young age and moderate asthma    Medical and Therapy History Review:   Expanded               Performance Deficits    Physical:  Muscle Power/Strength  Muscle Endurance  Proprioception Functions  Muscle Tone  Pain    Cognitive:  Safety Awareness/Insight to Disability    Psychosocial:   Pain avoidance, social isolation   Social Interaction  Habits  Routines  Rituals  Family Support  Group Participation     Clinical Decision Making:  moderate    Assessment Process:  Detailed Assessments    Modification/Need for Assistance:  Minimal-Moderate Modifications/Assistance    Intervention Selection:  Several Treatment Options       high  Based on PMHX, co morbidities , data from assessments and functional level of assistance required with task and clinical presentation directly impacting function.           FRP Goals: While working towards the long-term (2 month) functional goals listed above, Ms. Snell will accomplish the following short term goals during the 5-week intensive rehabilitation program. Ms. Snell will:     1. Develop a viable return to work plan.   2. Demonstrate physical capacities consistent with a light-medium work demand level.   3. Demonstrate  increased functional strength by lifting 20 lbs floor to waist and 20 lbs waist to shoulder in order to meet demand of work/home routine.   4. Increase her positional tolerance to the level needed for work and home activities.   5. Implement self-care strategies during the program and at home to control pain.   6.   Patient will demonstrate use of mindfulness, stress management, and coping  strategies for improved participation in daily routine.     Specific patient expressed goals and demand levels:  Current Capacity Limit/ Physical  Demand Level (PDL)   Demand Levels of Functional Recovery Goals    Performance/Satisfaction  Day 1 Performance/Satisfaction  Day 10 Performance/Satisfaction  Follow-up     Sedentary-Light Physical Demand  (Based above tested results)    Vocational:  Standing tolerance     Walking tolerance    Ascending stairs      Recreational:  Cooking     Exercise     Daily Living:  Hair care    Lower body dressing     Housechores (vacumming, dishes, etc)    Light-medium Physical Demand Level       The PDL listed above is based on today's physical performance screening test. More comprehensive physical  testing integrated with clinical findings would be required to derived an accurate work capacity.      Plan   Plan of care certification: 2/7/2022 to 3/18/2022.    Outpatient occupational therapy, 3 times a week for 5 weeks:     1. Functional conditioning daily to increase physical capacity and allow Ms. Snell to meet demands of vocation and home.   2. Individual counseling to develop return to work/daily routine plan and better understand the return to work process and/or daily routine restructure.   3. Daily Stretching, aerobic conditioning and walking to increase functional tolerance and allow Ms. Snell to meet demands of work and home.   4. Functional activities to increase ability to move fluidly and quickly and tolerate unguarded movements.   5. Re-education regarding proper postural, body  mechanics, and coping strategies for healthy roles and routine for managing daily stressors.    6. Any other treatment deemed necessary for patient to achieve personally driven goals to promote positive health and wellness and daily roles and routines    Juana Terry OT, JOSE RAMON, 1/28/2022  Occupational Therapy

## 2022-01-28 NOTE — PROGRESS NOTES
"[facility]   Occupational Therapy Initial Evaluation &  Goals and Physical Capacity for Functional Restoration Program  NOTE: This is a duplicate copy utilized for reassessment purposes & continuity of care.  See pt's plan of care for co-signed copy.    Date: 1/28/2022  Name: Carlos Snell  Clinic Number: [id number]    Therapy Diagnosis: No diagnosis found.  Physician: Gwendolyn Zaidi NP    Physician Orders: OT Eval and Treat  Medical Diagnosis: Chronic pain disorder  Evaluation Date: 1/28/2022  Insurance Authorization Period Expiration: 12/01/2022  Plan of Care Certification Period: 3/18/2022  Visit # / Visits authorized: 1/ 1  FOTO: evaluation: 52% limitation    Precautions:  Standard and Fall    Time In: 3:05 PM  Time Out: 4:05 PM    Total Appointment Time: 60 minutes    Subjective   Date of onset: 15 years hx of FM and DDD  Patient reported history of current condition - Carlos reports: ever since then says I have been in constant pain. Senior year of college she had to leave school due to pain and difficulty ambulating. She ended up having lumbar fusion surgery in 2014 due to extreme pain and right LE weakness. She notes "it wasn't a miracle cure, but I didn't have to use a cane/walker/wheelchair. Although sometimes I still want a cane for balance". Reports increased pain flares starting in sept 2021 beginning with kidney stones. Reports pain persisted after passing kidney stones. Finally saw Dr. Garcia in [place]. Dr. MONTERROSO advised her pain was most consistent with FM and recommended FRP. Currently, she reports feeling unsteady and off balance, consistent pain from morning to night. She reports decreased stress and laying down help to alleviate pain. Uses ice and heat as well. Reports infrequent migraines with inability to function about 1x/month.   Statement: "one of the reasons I wanted to do this program is that i'm exhausted from the pain, and then teaching itself is " "exhausting on its own".   Prior Level of Function: activities patient can no longer perform/has great difficulty with LB dressing, vacumming, walking, household chores, working, walking up and down stairs  Reports she is an "over achiever" and "pushes herself", can walk about 15 min at a time.     Medical History:   Past Medical History:   Diagnosis Date    Asthma     Chronic back pain     Migraine headache     Pyelonephritis         Surgical History:   Carlos  has a past surgical history that includes Back surgery (2013).    Medications:   Carlos has a current medication list which includes the following prescription(s): bupropion hcl, cetirizine, cyanocobalamin, dextroamphetamine-amphetamine, gabapentin, pantoprazole, rizatriptan, and topiramate.    Allergies:   Review of patient's allergies indicates:   Allergen Reactions    Lactose Diarrhea    Gluten protein Itching     Inflammation, bloating, diarrhea and pain all over body         Prior Therapy: Yes. Most recently has done PT at [facility]; went last Tuesday; can go for two more visits but waiting to see about FRP. Has also been to [facility].   Reports "I think PT, I think it actually helps. I think it helps me build up my core".     Exercise: Reports she wears her back brace to exercise and complete home chores. Has not been exercising much since Sept due to pain flare and pain being unbearable. Before that, she was going to the gym most days and lifting light weights and biking. "I think that's why my pain flare got a lot worse because I pushed myself too hard". Also has the mirror fitness program at home, but has not been doing anything outside of yoga and meditation.     Falls: no recent falls, notes she scales walls for balance     Disturbed sleep: yes "I have to wait a while to get comfortable, I have trouble falling asleep (at least 30-40min), I wake up frequently because of pain and I dont feel energized when I wake up. Sometimes I " "cant go back to sleep when I do wake up and other times I cant fall asleep".      Mental health: "Its sliding scale,feel like I should be happier than I am, but physically it is exhausting to live in this body everyday. I have a therapist I see 2x/month.   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 Feels like a different person when not at school/on break.     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         Pain:  Pain Related Behaviors Observed: yes   Functional Pain Scale Rating 0-10: within the last 30 days  7/10 current  9/10 at worst  5/10 at best  Location: low back, B shoulders   Description: pins and needles, "fire", sharp, dull aching   Functional Exacerbations: ascending stairs, prolonged activity (walking, sitting)  Easing Factors: sidelying, ice/heat     Personal Functional Three Month Goals:   Vocational: "climbing the stairs without extreme pain, walking the building"    Recreational : "cooking, walking"   Daily Living Activities: "cleaning more often, especially for extended periods of time, lifting things, putting things away, doing my hair"     Social and ADL History:  Activities of Daily Living Checklist:   Key to Score:   0: Unable to do the activity  1: Can do the activity with great difficulty  2: Can do the activity with little difficulty   3: No problem with activity  N/A: This is not an activity I do  H/T: Have not tried yet    Personal " Care:  Homemaking:     Dressing Upper Body:  2 Meal preparation: 2   Dressing Lower Body:  1 Kitchen Cleanup: 1   Bathin Childcare:  na   Hair Care:  3 Grocery shoppin   Sleep:  1 Vacuuming/moppin     Emptying trash/ garbage bag: 3   General Mobility:   Bed making changin   Sittin Laundry  2   Bendin     Getting in/out of bed:  2 Home Maintenance:    Standin Mowing, rakin   Walkin Gardenin   Twistin Home Repairs:  na   Liftin Painting:  na         Getting around:       Getting in and out of car:  2     Buckling up you seat belt:  3     Opening heavy doors:  2     Climbing/descending stairs:  1             Objective     COGNITIVE Exam  Oriented: Person, Place, Time and Situation  Behaviors: Follows multistep  commands  Follows Commands/attention: Follows multistep  commands  Communication: clear/fluent  Memory: No Deficits noted  Safety awareness/insight to disability: good insight but with high fear and guarded movement   Coping skills/emotional control: Appropriate to situation    Active ROM:   AMOS TINAJERO Comments   Hands WFL WFL    Wrist WFL WFL    Elbows WFL WFL    Shoulders WFL WFL Noted increased pain in B shoulders with flexion       Dominant hand: left    Functional Strengthen Assessment: WFL    Strength Testing   Evaluation  2022 [place] FRP  2 Month Follow Up   Motion Tested          R L R L R L   Upper Extremity         Shoulder Flexion 4/5 4+/5       Shoulder Extension 5/5 5/5       Shoulder Abduction 4/5 3+/5       Shoulder IR 5/5 5/5       Shoulder ER 4/5 4/5       Elbow Flexion 4/5 4/5       Elbow Extension 4/5 4/5            Evaluation   5 times sit-stand  (adults 18-65 y/o) 22.41 seconds  >12 sec= fall risk for general elderly  >16 sec= fall risk for Parkinson's disease  >10 sec= balance/vestibular dysfunction (<61 y/o)  >14.2 sec= balance/vestibular dysfunction (>61 y/o)  >12 sec= fall risk for CVA     Endurance Testing: Modified Ramp  Treadmill Test   GAP Re-assessment Follow-up   Minutes Completed  2 mins 151 secs     % Grades  5 %     Speed (mph)  1.3 mph     METS 3      bpm     Heriberto Rating Perceived Exertion Scale   7     Reason for stop point: Pt reported posture changes, fatigue, noted decline in maintaining pace safely. Attempted 1.7 at 10% but asked to stop after 10 seconds 2/2 B hip pain     Functional Strength Test:  Pile Testing: Lifting  (Pounds/Heart rate)   GAP/Eval (#/HR/HERIBERTO)  Reassessment  Day 9   Follow-up   Appointment    Repetitive Floor to Waist    8#/97bpm/5          Repetitive Waist to Shoulder    10#/81bpm/4              1- Time Maximum     12#/105bpm/5           Carry-2 Handed (40ft)     12#/95bpm/3           Work demand Level     Sedentary-Light           Reason for Stop point: Increased time required for completing repetitions. During physical testing, participation level consistent.    No environmental, cultural, spiritual, developmental or education needs expressed or noted.      Limitation/Restriction for FOTO NOC Survey    Therapist reviewed FOTO scores for Carlos Snell on 1/28/2022  FOTO documents entered into [employer] - see Media section.    Limitation Score: 52%         Treatment   Home Exercises and Patient Education Provided    Education provided:   -Ms. Snell received education regarding proper posture and body mechanics. She received education regarding anticipated muscular soreness following lift testing and strategies for management were reviewed with patient including stretching, using ice, scheduled rest.  -Additional Education provided: heriberto scale, pain cycle, z-lie, functional lifting mechanics, pursed lip and diaphragmatic breathing techniques    Written Home Exercises Provided: yes.  Exercises were reviewed and Carlos was able to demonstrate them prior to the end of the session.    Carlos demonstrated good  understanding of the education provided.     Carlos given handout re: heriberto scale, pain  cycle, z-lie, pursed lip and diaphragmatic breathing techniques  z-lie position, and received education on the Functional Restoration Program. Details of the program were discussed.     Assessment     Carlos appears to be a good candidate for the Functional Restoration Program. She has tried PT, medications, injections, surgery, and some self-mgmt tools. Despite tx, pain is persistent and impacting daily functioning and QOL. She currently has fear of pain, fear of falling, symptoms of depression, high stress levels, and exhibits pain avoidance behavior. She presents with good motivation to make behavioral changes and readiness to participate in program. Carlos with mild affect. She was able to participate in all aspects of assessment. Discussed with Carlos how goal of chronic pain education program is to provide tools, education and training aimed at improving physical functioning, emotional health, stress and pain coping skills.The program is designed to build confidence in physical activity and improve independence with ADLs and IADLs and in your ability to control and manage your pain. She voiced understanding.    Carlos is unable to fully participate in work, recreational, and home-based activities because of decreased functional strength, decreased endurance, limited positional tolerance, fear of re-injury, and fear of increased pain. She will benefit from participating in a conditioning  program designed  to increase functional strength, flexibility, and endurance in order to meet functional goals.     Carlos's prognosis is Good due to good personal goals, willingness to make behavioral changes and good response to pain education and identification of current behaviors. She will benefit from skilled outpatient Occupational Therapy to address the limitations and goals stated above and in the chart below, provide patient/family education, and to maximize patient's level of functional  independence.    Plan of care discussed with patient: Yes  Pt's spiritual, cultural and educational needs considered and patient is agreeable to the plan of care and goals as stated below:     Medical necessity is demonstrated by the following problem list.   Profile and History Assessment of Occupational Performance Level of Clinical Decision Making Complexity Score   Occupational Profile:   Carlos Snell is a 31 y.o. female who lives with their spouse and is currently employed as a teacher. Carlos Snell has difficulty with  bathing, grooming and dressing  driving/transportation management, shopping, phone/computer use and housework/household chores  affecting her daily functional abilities. Her main goal for therapy is improved tolerance for work and home demands.     Comorbidities:   coping style/mechanism, depression, difficulty sleeping, level of undertstanding of current condition, young age and moderate asthma    Medical and Therapy History Review:   Expanded               Performance Deficits    Physical:  Muscle Power/Strength  Muscle Endurance  Proprioception Functions  Muscle Tone  Pain    Cognitive:  Safety Awareness/Insight to Disability    Psychosocial:   Pain avoidance, social isolation   Social Interaction  Habits  Routines  Rituals  Family Support  Group Participation     Clinical Decision Making:  moderate    Assessment Process:  Detailed Assessments    Modification/Need for Assistance:  Minimal-Moderate Modifications/Assistance    Intervention Selection:  Several Treatment Options       high  Based on PMHX, co morbidities , data from assessments and functional level of assistance required with task and clinical presentation directly impacting function.           FRP Goals: While working towards the long-term (2 month) functional goals listed above, Ms. Snell will accomplish the following short term goals during the 5-week intensive rehabilitation program. Ms. Snell will:     1. Develop a viable  return to work plan.   2. Demonstrate physical capacities consistent with a light-medium work demand level.   3. Demonstrate increased functional strength by lifting 20 lbs floor to waist and 20 lbs waist to shoulder in order to meet demand of work/home routine.   4. Increase her positional tolerance to the level needed for work and home activities.   5. Implement self-care strategies during the program and at home to control pain.   6.   Patient will demonstrate use of mindfulness, stress management, and coping  strategies for improved participation in daily routine.     Specific patient expressed goals and demand levels:  Current Capacity Limit/ Physical  Demand Level (PDL)   Demand Levels of Functional Recovery Goals    Performance/Satisfaction  Day 1 Performance/Satisfaction  Day 10 Performance/Satisfaction  Follow-up     Sedentary-Light Physical Demand  (Based above tested results)    Vocational:  Standing tolerance     Walking tolerance    Ascending stairs      Recreational:  Cooking     Exercise     Daily Living:  Hair care    Lower body dressing     Housechores (vacumming, dishes, etc)    Light-medium Physical Demand Level       The PDL listed above is based on today's physical performance screening test. More comprehensive physical  testing integrated with clinical findings would be required to derived an accurate work capacity.      Plan   Plan of care certification: 2/7/2022 to 3/18/2022.    Outpatient occupational therapy, 3 times a week for 5 weeks:     1. Functional conditioning daily to increase physical capacity and allow Ms. Snell to meet demands of vocation and home.   2. Individual counseling to develop return to work/daily routine plan and better understand the return to work process and/or daily routine restructure.   3. Daily Stretching, aerobic conditioning and walking to increase functional tolerance and allow Ms. Snell to meet demands of work and home.   4. Functional activities to increase  ability to move fluidly and quickly and tolerate unguarded movements.   5. Re-education regarding proper postural, body mechanics, and coping strategies for healthy roles and routine for managing daily stressors.    6. Any other treatment deemed necessary for patient to achieve personally driven goals to promote positive health and wellness and daily roles and routines    Juana Terry OT, JOSE RAMON, 1/28/2022  Occupational Therapy

## 2022-01-29 PROBLEM — R29.898 WEAKNESS OF BOTH HIPS: Status: ACTIVE | Noted: 2022-01-29

## 2022-01-29 PROBLEM — Z74.09 IMPAIRED FUNCTIONAL MOBILITY, BALANCE, GAIT, AND ENDURANCE: Status: ACTIVE | Noted: 2022-01-29

## 2022-01-29 PROBLEM — M62.81 WEAKNESS OF TRUNK MUSCULATURE: Status: ACTIVE | Noted: 2022-01-29

## 2022-01-29 PROBLEM — G89.29 CHRONIC PAIN: Status: ACTIVE | Noted: 2022-01-29

## 2022-01-30 NOTE — PLAN OF CARE
" [facility]  Functional Restoration Program  Physical Therapy Initial Evaluation    Date: 1/28/2022   Name: Carlos Snell  Clinic Number: [id number]    Therapy Diagnosis:   Encounter Diagnoses   Name Primary?    Chronic pain disorder     Decreased activities of daily living (ADL)     Impaired mobility and endurance     Impaired functional mobility, balance, gait, and endurance     Weakness of both hips     Weakness of trunk musculature      Physician: Gwendolyn Zaidi NP    Physician Orders: PT Eval and Treat   Medical Diagnosis from Referral:   G89.4 (ICD-10-CM) - Chronic pain disorder   Z78.9 (ICD-10-CM) - Decreased activities of daily living (ADL)   Z74.09 (ICD-10-CM) - Impaired mobility and endurance       Evaluation Date: 1/28/2022  Authorization Period Expiration: 01/27/23  Plan of Care Expiration: 04/22/22  Visit # / Visits authorized: 01 / 01   FOTO: completed 01 / 03     Precautions: Standard and Fall    Time In: 2:00 pm   Time Out: 3:00 pm   Total Appointment Time (timed & untimed codes): 60 minutes      Subjective     Date of onset: chronic (2006 sports injury LB)  /c Aug flare up     Patient reported history of current condition - Carlos reports initial injury/LBP 2006 from a fall playing basketball.  Pt note LBP continued leading to lumbar fusion surgery.  Pt report improvement /p surgery but noting some concern for her balance.  Pt report Sept LB flare up /c kidney stone /c no resolution /p passing stone.    Pt feels her core is weak, pain everyday at a constant level  Pt report "pins and needles" BLE /c progression to R foot and LB sx dull ache /c "fire" with excess movement.   Pt report sx exacerbation /c stair ascend/descend, prolonged activity - walking, sitting.   Pt report at work often using "rolling chair" and sig limiting her bending/lifting.   Pt report 1 x month HA leading to sig limited activity tolerance.  Pt report cont sleep disturbance and approx " "30 min to fall asleep and waking /c poor sleep quality.  Pt note waking thru the night due to the pain/"pressure" in LB.    Pt report some relief /c lying down and ice.  Pt report intermittent Tramadol /c minor relief.    Pt believes she could amb 30 min /c pain before needing rest.  Pt report using back brace /c exercise ([place]) and /c household chores.    Pt reports having membership at local gym and [employer] but notes not working out recently due to flare up.  Pt report recent PT tx stating "it helps" for core strengthening.  Pt reports plan to d/c present PT.    Pt report difficulty /c stair climbing at work, walking for distance at work (to  kids)     Per MD note:       Carlos Snell is a 31 y.o. female who presents today for the Functional Restoration Program Medical Screening Visit. She reports a hx of FM and DDD. Says she has had chronic pain for 15 years. Initial onset occurred freshman year of highschool - was playing basketball and was hit and fell backwards. Doctors thought it was a strain. Kept playing; also played other sports and was very active in sports in general. Ultimately found out she had a ruptured disc in lower back. Says ever since then says I have been in constant pain. Senior year of college she had to leave school due to pain and difficulty ambulating. She ended up having lumbar fusion surgery in 2014 due to extreme pain and right LE weakness (this was in [place]). Said she improved after surgery and no longer required AD to ambulate but pain persisted. Said she was hoping that surgery would 'make my life normal again' but only experienced a minor improvement in pain. 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 Says I have done tons of different things to try to manage pain- steroid shots, PT, chiropractic care, surgery, acupuncutre, medications (including opioids). Says its just been really difficult to manage. At the end of Aug/Sept she had severe right " side pain- was dx w UTI and kidney stones. After stones passed still having lots of pain on right side. Went to different doctors and felt like she was being passed around- had gallbladder, kidneys checked; had MRI to check fusion; all normal results but still in extreme pain. Finally saw Dr. Garcia in [place]. Thought maybe she had RA? Said she felt Dr. MONTERROSO was first one to really listen and talk to her. Felt very heard which was nice. Dr. MONTERROSO advised her pain was most consistent with FM and recommended FRP. She was excited to hear there was something that could help because the pain affects most areas of her life.  She has also had trouble sleeping for years 2/2 pain; sometimes days without much sleep. Says it is very hard to do exercise but I would love to be able to move enough so I can lost weight and become more physically fit but everytime I try I have a flare- its like a cycle. Even walking can be challenging; has knee and back brace which help some. Says I have resigned my self to it not going away completely but I would like to move more freely without such bad pain and worry.   She also has migraines. Uses a migraine stick with essential oils which helps.        Imaging: MRI studies, X ray    EXAMINATION:  MRI LUMBAR SPINE WITHOUT CONTRAST     CLINICAL HISTORY:  Lumbar radiculopathy, symptoms persist with conservative treatment; Radiculopathy, lumbar region     TECHNIQUE:  Multiplanar, multisequence MR images were acquired from the thoracolumbar junction to the sacrum without the administration of contrast.     COMPARISON:  Radiographs 11/23/2021     FINDINGS:  Status post L5-S1 disc arthroplasty and fusion.     The alignment of the lumbar spine is normal.     The disc spaces are well maintained.     The conus medullaris terminates in good position.     T12-L1: No disc abnormality, no canal stenosis or foraminal narrowing.  The facet joints appear normal.     L1-L2: No disc abnormality, no canal stenosis or  foraminal narrowing.  The facet joints appear normal.     L2-L3: No disc abnormality, no canal stenosis or foraminal narrowing.  The facet joints appear normal.     L3-L4: No disc abnormality, no canal stenosis or foraminal narrowing.  The facet joints appear normal.     L4-5: No canal stenosis or foraminal narrowing.  No disc abnormality.     L5-S1: Postoperative change, no canal stenosis or foraminal narrowing.  The facet joints appear normal.     The upper sacrum and upper sacroiliac joints appear normal.     The paravertebral and paraspinal soft tissues appear normal.     Impression:     Postoperative changes L5-S1 disc arthroplasty and fusion.     No canal stenosis or foraminal narrowing at any level.        Electronically signed by: Zunilda Montiel MD  Date:                                            12/16/2021  Time:                                           09:06        Narrative & Impression  EXAMINATION:  XR LUMBAR SPINE AP AND LAT WITH FLEX/EXT     CLINICAL HISTORY:  Other intervertebral disc degeneration, lumbar region     FINDINGS:  Lumbar spine four views: There is a levoconvex curvature.  There is postoperative change with hardware and a disc implant at L5-S1.  There is mild DJD.  No instability seen.  No trauma seen.  No fracture dislocation bone destruction seen.     Impression:     Chronic change as above.        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   Prior Level of Function: prior to Aug - visiting gym, lifting weights, biking, walking  Current Level of Function: difficulty /c household chores, pain /c dressing, walking for  "distance    Pain:      Current 7/10, worst 10/10 /c kidney stones, best 5/10 /c rest/no work "relaxing day"    Location: bilateral back    Description: constant - dull ache, exacerbated - "fire"  Aggravating Factors: extended sitting, standing, walking, lifting   Easing Factors: side lying     Patient's FRP goals: "I would like to make peace /c [my] chronic illness"  Find exercises that I can do that will help me stay healthy     Medical History:   Past Medical History:   Diagnosis Date    Asthma     Chronic back pain     Migraine headache     Pyelonephritis        Surgical History:   Carlos Snell  has a past surgical history that includes Back surgery (2013).    Medications:   Carlos has a current medication list which includes the following prescription(s): bupropion hcl, cetirizine, cyanocobalamin, dextroamphetamine-amphetamine, gabapentin, pantoprazole, rizatriptan, and topiramate.    Allergies:   Review of patient's allergies indicates:   Allergen Reactions    Lactose Diarrhea    Gluten protein Itching     Inflammation, bloating, diarrhea and pain all over body          Objective     Postural examination:  Seated: slouched, fwd head, rounded shoulders  Standing: fwd head, rounded shoulders, slight ant pelvic tilt, decreased spinal dissociation w/ mobility    Range of Motion - Cervical   AROM AROM AROM    Evaluation Reassessment  Reassessment   Flexion WFL ° °   Extension Mod loss ° °   Side bending Right Mod loss ° °   Side bending Left Mod loss ° °   Rotation Right Min loss  ° °   Rotation Left Min loss ° °     Range of Motion - Lumbar         ROM Loss ROM Loss ROM Loss   Flexion moderate loss     Extension moderate loss     Side bending Right moderate loss     Side bending Left moderate loss     Rotation Right moderate loss     Rotation Left moderate loss P!       Strength Testing   Evaluation Reassessment Reassessment   Motion Tested         Lower Extremity R L R L R L   Hip Flexion 3+/5 P! 3+/5 P! "       Hip Extension 3/5 3/5       Hip Abduction 3+/5 3+/5       Hip IR 3+/5 3+/5       Hip ER 3+/5 P! 3+/5 P!       Knee Extension 4+/5 5/5       Knee Flexion 4+/5 5/5         Functional Testing     Evaluation Reassessment  Reassessment   Timed Up and Go* 12.76 sec sec sec   5 Time Sit to Stand w/out UE* 22.41 sec sec sec   Single Limb Stance R LE 15.47 sec sec sec   Single Limb Stance L LE 12.;86 sec sec sec   2 Minute Walk Test 261 ft = 79.43 m feet feet   6 Minute Walk Test 744 ft = 226.87 m feet feet   Self Selected Walking Speed 0.63 m/sec   (0.66 m/sec at 2 min) m/sec m/sec   *inc pain in B knee, hip         GAIT:  Assistive Device used: none  Level of Assistance: independent  Patient displays the following gait deviations:  unsteady gait, decreased step length and decreased weight shift. R hip drop, dec RLE stance time    Minimum standards/norms:    TUG:  < 13.5 seconds/ Males 7.3 sec, Females 8.1 sec  SLS:  26-29 sec  Ages 20-69  Self Selected Walking Speed:  .4-.8m/sec  Limited community ambulator                                                   .8- 1.2  Community ambulator                                                    1.2 m/sec and above  Able to safely cross streets      Limitation/Restriction for FOTO Lumbar Survey    Therapist reviewed FOTO scores for Carlos Snell on 1/28/2022.   FOTO documents entered into [employer] - see Media section.    Limitation Score: 71%    Goal: < 55%          TREATMENT     Total Treatment time (time-based codes) separate from Evaluation:  10 minutes     Carlos received the treatments listed below:      therapeutic activities to improve functional performance for 10 minutes, including:   PT education:  o Physical & psychological viscous pain cycles (see patient instructions 01/28/22  o Role of consistent activity (graded exposure) to break the physical cycle  o Role of the  & education to encourage intrinsic patient motivation to help break the psychological  cycle  o Impact on pt's functional goals when breaking each one of these cycles.    o Impact central sensitization has on the sensory system in the chronic pain population      PATIENT EDUCATION AND HOME EXERCISES     Education provided:   - pain cycle.  Pt issued handout     Written Home Exercises Provided: Patient instructed to cont prior HEP. Exercises were reviewed and Carlos was able to demonstrate them prior to the end of the session.  Carlos demonstrated fair  understanding of the education provided. See EMR under Patient Instructions for information provided during therapy sessions.    ASSESSMENT   Carlos is a 31 y.o. female referred to outpatient Physical Therapy with a medical diagnosis of G89.4 (ICD-10-CM) - Chronic pain disorder, Z78.9 (ICD-10-CM) - Decreased activities of daily living (ADL). Pt presents with pain, BLE weakness, impaired mobility, decreased balance, decreased endurance, gait deviations, fall risk, decreased AROM, increased psychosocial concerns, & inability to perform her ADL's as before due to her conditions. Pt increased psychosocial concerns including sig fear avoidance, co-morbidities & central sensitization present an unstable clinical presentation.  Pt is a good candidate for FRP.  Therefore, pt issued pain cycles handout and advised our AM spot is available.  However in POC, pt may need a great deal of encouragement to differentiate hurt vs harm and challenge her present fearfulness to activity.       Based on the above history and physical examination an active physical therapy program within a multi-disciplinary setting is recommended.  Pt will benefit from skilled outpatient physical therapy to address the deficits listed below in the chart, provide pt/family education and to maximize pt's level of independence in the home and community environment.     Plan of care discussed with patient: Yes  Pt's spiritual, cultural and educational needs considered and patient is  agreeable to the plan of care and goals as stated below:     Pt prognosis is Fair.   Anticipated Barriers for therapy: fearfulness to activity, work schedule     Medical Necessity is demonstrated by the following  History  Co-morbidities and personal factors that may impact the plan of care Co-morbidities:   COPD/asthma, coping style/mechanism, depression, difficulty sleeping and prior lumbar surgery    Personal Factors:   coping style     moderate   Examination  Body Structures and Functions, activity limitations and participation restrictions that may impact the plan of care Body Regions:   back  lower extremities  trunk    Body Systems:    gross symmetry  ROM  strength  gross coordinated movement  balance  gait  transfers  motor control    Participation Restrictions:   none    Activity limitations:   Learning and applying knowledge  no deficits    General Tasks and Commands  no deficits    Communication  no deficits    Mobility  lifting and carrying objects  walking    Self care  dressing    Domestic Life  shopping  cooking  doing house work (cleaning house, washing dishes, laundry)    Interactions/Relationships  no deficits    Life Areas  no deficits    Community and Social Life  community life  recreation and leisure         high   Clinical Presentation evolving clinical presentation with changing clinical characteristics moderate   Decision Making/ Complexity Score: high       GOALS: Pt is in agreement with the following goals:      Goal Progress Towards Goal   Short Term: In 3 weeks, pt will:    Verbalize & demonstrate good understanding of resisted training with 3-1-3 second rep for [id number] contraction to encourage full muscle recruitment for strength & endurance Progress towards goal: initiated 1/28/2022   Verbalize & demonstrate good understanding of home stretch routine to improve AROM, help decrease pain & improve mobility Progress towards goal: initiated 1/28/2022   Demonstrate  proper supine or seated diaphragmatic breathing with decreased chest excursion & emphasis on full abdominal excursion & increased expiration time to promote muscle relaxation & increased parasympathetic activity to improve patient tolerance to activities Progress towards goal: initiated 1/28/2022   Demonstrate proper static standing posture in frontal & sagittal plane per PT visual assessment to decrease postural strain in ADLs Progress towards goal: initiated 1/28/2022   Demonstrate good recall of names of resisted exercises w/ minimal to moderate verbal cues to promote independence w/ exercise at the end of the program Progress towards goal: initiated 1/28/2022   Verbalize plan for continuing resisted exercises w/ specific location (i.e. commercial gym, home, community fitness center) indicating preference for machine-based or free-weight exercises to incorporate all major muscle groups to maintain & progress therapeutic functional improvements Progress towards goal: initiated 1/28/2022           Long Term: In 8 weeks, pt will:    Verbalize good understanding of activities planning/scheduling (stretching, mindfulness, resisted training, & cardio) prescribed by PT/OT following the end of the program to sustain & progress functional improvements Progress towards goal: initiated 1/28/2022   Be independent w/ selected resisted training w/ minimal to no cuing while performing to continue w/ resisted strengthening independently at the end of the program Progress towards goal: initiated 1/28/2022   Improve 2 Minute Walk Test (MWT) to 375 feet and 6 MWT to 1125 feet or greater to improve gait speed and mobility and reach community ambulator status Progress towards goal: initiated 1/28/2022   Perform single leg stance 20 seconds or greater bilaterally to improve safety and balance in ADLs Progress towards goal: initiated 1/28/2022   Demonstrate Timed Up & Go (with appropriate assistive device, if necessary) of 10 seconds  or less to decrease fall risk and improve functional mobility Progress towards goal: initiated 1/28/2022   Demonstrate 5 time sit to stand test without upper extremity assist to 15 sec or less to improve general mobility and decrease fall risk Progress towards goal: initiated 1/28/2022   Score 55 % or less on Lumbar FOTO to indicate significant functional improvements in impaired functions secondary to low back pain Progress towards goal: initiated 1/28/2022                 PLAN   Plan of care Certification: 02/07/22 to 04/22/22.    Outpatient Physical Therapy 3 times weekly for 10 weeks to include the following interventions: Gait Training, Manual Therapy, Moist Heat/ Ice, Neuromuscular Re-ed, Patient Education, Self Care, Therapeutic Activities and Therapeutic Exercise.     Shady Bain, PT

## 2022-01-31 ENCOUNTER — PATIENT MESSAGE (OUTPATIENT)
Dept: RHEUMATOLOGY | Facility: CLINIC | Age: 32
End: 2022-01-31
Payer: COMMERCIAL

## 2022-02-02 ENCOUNTER — PATIENT MESSAGE (OUTPATIENT)
Dept: REHABILITATION | Facility: OTHER | Age: 32
End: 2022-02-02
Payer: COMMERCIAL

## 2022-02-07 ENCOUNTER — CLINICAL SUPPORT (OUTPATIENT)
Dept: REHABILITATION | Facility: OTHER | Age: 32
End: 2022-02-07
Payer: COMMERCIAL

## 2022-02-07 ENCOUNTER — OFFICE VISIT (OUTPATIENT)
Dept: BEHAVIORAL HEALTH | Facility: CLINIC | Age: 32
End: 2022-02-07
Payer: COMMERCIAL

## 2022-02-07 DIAGNOSIS — Z78.9 DECREASED ACTIVITIES OF DAILY LIVING (ADL): ICD-10-CM

## 2022-02-07 DIAGNOSIS — R29.898 DECREASED STRENGTH OF UPPER EXTREMITY: ICD-10-CM

## 2022-02-07 DIAGNOSIS — Z74.09 IMPAIRED FUNCTIONAL MOBILITY, BALANCE, GAIT, AND ENDURANCE: ICD-10-CM

## 2022-02-07 DIAGNOSIS — F90.9 ATTENTION DEFICIT HYPERACTIVITY DISORDER (ADHD), UNSPECIFIED ADHD TYPE: ICD-10-CM

## 2022-02-07 DIAGNOSIS — F40.298 FEAR OF PAIN: ICD-10-CM

## 2022-02-07 DIAGNOSIS — M62.81 WEAKNESS OF TRUNK MUSCULATURE: ICD-10-CM

## 2022-02-07 DIAGNOSIS — G89.4 CHRONIC PAIN SYNDROME: ICD-10-CM

## 2022-02-07 DIAGNOSIS — F33.0 MILD EPISODE OF RECURRENT MAJOR DEPRESSIVE DISORDER: Primary | ICD-10-CM

## 2022-02-07 DIAGNOSIS — R29.898 WEAKNESS OF BOTH HIPS: ICD-10-CM

## 2022-02-07 PROCEDURE — 90791 PSYCH DIAGNOSTIC EVALUATION: CPT | Mod: S$GLB,,, | Performed by: SOCIAL WORKER

## 2022-02-07 PROCEDURE — 97150 GROUP THERAPEUTIC PROCEDURES: CPT

## 2022-02-07 PROCEDURE — 90791 PR PSYCHIATRIC DIAGNOSTIC EVALUATION: ICD-10-PCS | Mod: S$GLB,,, | Performed by: SOCIAL WORKER

## 2022-02-07 PROCEDURE — 97530 THERAPEUTIC ACTIVITIES: CPT

## 2022-02-07 PROCEDURE — 97110 THERAPEUTIC EXERCISES: CPT

## 2022-02-07 NOTE — PROGRESS NOTES
[facility]    Functional Restoration Program  Physical Therapy Treatment Note  FRP Day 1    Name: Carlos Snell  MRN:[id number]      Therapy Diagnosis:   Encounter Diagnoses   Name Primary?    Impaired functional mobility, balance, gait, and endurance     Chronic pain syndrome     Weakness of both hips     Weakness of trunk musculature      Physician: Gwendolyn Zaidi NP    Date: 2/7/2022    Evaluation Date: 1/28/2022  Authorization Period Expiration: 12/31/2022  Plan of Care Expiration: 04/22/22  Visit # / Visits authorized: 2/20  FOTO: completed 1 / 3       Time In: 9:05a  Time Out: 10:00a  Total Billable Time: 55 minutes    SUBJECTIVE       Carlos reports she is ready to start the program. Her pain is a little higher today, but she will do her best.       Current 6/10  Location(s): low back, B hips, B knees; L LE numbness     OBJECTIVE     Objective Measures updated at progress report unless specified.     Treatment       Carlos received the Individualized Treatments listed below:     therapeutic exercises to develop strength, endurance, ROM, flexibility, posture and core stabilization for 60 minutes including:    Peripheral muscle strengthening which included 1-2 sets of 10-20 repetitions at a slow, controlled 5-7 second per rep pace focused on strengthening supporting musculature for improved body mechanics & functional mobility.  Patient & therapist focused on proper form during treatment to ensure optimal strengthening of each targeted muscle group. Patients are instructed to keep sets/reps with proper load to stay at 3-5 out of 10 on the provided modified Jono exertion scale.       BATCA Machine Exercises Weight (lbs) Sets Repetitions Jono Exertion Scale Rating   Leg Extension  5 1 10 6   Hamstring Curls 12.5 1 15 7   Chest Press 10 1 15 6   Pec Fly 10 1 15 3   Lat Pull Down 10 1 15 4   Mid Row 10 1 15 4   Bicep Bar Curls 5 1 10 6   Standing Tricep Extension        Single Leg Press 15 1 15 6   · Supine LTR 2x10  · Supine bridge w/ belt 2x15  · Supine diaphragmatic breaths w/ verbal cue to increase abdominal excursion 2x10    Patient Education and Home Exercises     Home Exercises Provided and Patient Education Provided     Education provided:   - resisted exercise basics    Written Home Exercises Provided: yes. Exercises were reviewed and Carlos was able to demonstrate them prior to the end of the session.  Carlos demonstrated good  understanding of the education provided. See EMR under Patient Instructions for information provided during therapy sessions    ASSESSMENT     Carlos w/ good participation in PT today & demonstrated good understanding of the 3-1-3 count for her exercises. She reported higher Jono exertion ratings than expected on many exercises. Fear of pain & central sensitization likely causing normal sensory input to be seen as a threat. She will need increased time to differentiate hurt vs harm in the program.    Carlos Is progressing well towards her goals.   Pt prognosis is Fair.     Pt will continue to benefit from skilled outpatient physical therapy to address the deficits listed in the problem list box on initial evaluation, provide pt/family education and to maximize pt's level of independence in the home and community environment.     Pt's spiritual, cultural and educational needs considered and pt agreeable to plan of care and goals.     Anticipated barriers to physical therapy: fear of pain    GOALS: Pt is in agreement with the following goals:        Goal Progress Towards Goal   Short Term: In 3 weeks, pt will:     Verbalize & demonstrate good understanding of resisted training with 3-1-3 second rep for tistfjvxrd-gbdvvyhny-racsqhntc contraction to encourage full muscle recruitment for strength & endurance Progress towards goal: initiated 1/28/2022   Verbalize & demonstrate good understanding of home stretch routine to improve AROM, help  decrease pain & improve mobility Progress towards goal: initiated 1/28/2022   Demonstrate proper supine or seated diaphragmatic breathing with decreased chest excursion & emphasis on full abdominal excursion & increased expiration time to promote muscle relaxation & increased parasympathetic activity to improve patient tolerance to activities Progress towards goal: initiated 1/28/2022   Demonstrate proper static standing posture in frontal & sagittal plane per PT visual assessment to decrease postural strain in ADLs Progress towards goal: initiated 1/28/2022   Demonstrate good recall of names of resisted exercises w/ minimal to moderate verbal cues to promote independence w/ exercise at the end of the program Progress towards goal: initiated 1/28/2022   Verbalize plan for continuing resisted exercises w/ specific location (i.e. commercial gym, home, community fitness center) indicating preference for machine-based or free-weight exercises to incorporate all major muscle groups to maintain & progress therapeutic functional improvements Progress towards goal: initiated 1/28/2022               Long Term: In 8 weeks, pt will:     Verbalize good understanding of activities planning/scheduling (stretching, mindfulness, resisted training, & cardio) prescribed by PT/OT following the end of the program to sustain & progress functional improvements Progress towards goal: initiated 1/28/2022   Be independent w/ selected resisted training w/ minimal to no cuing while performing to continue w/ resisted strengthening independently at the end of the program Progress towards goal: initiated 1/28/2022   Improve 2 Minute Walk Test (MWT) to 375 feet and 6 MWT to 1125 feet or greater to improve gait speed and mobility and reach community ambulator status Progress towards goal: initiated 1/28/2022   Perform single leg stance 20 seconds or greater bilaterally to improve safety and balance in ADLs Progress towards goal: initiated  1/28/2022   Demonstrate Timed Up & Go (with appropriate assistive device, if necessary) of 10 seconds or less to decrease fall risk and improve functional mobility Progress towards goal: initiated 1/28/2022   Demonstrate 5 time sit to stand test without upper extremity assist to 15 sec or less to improve general mobility and decrease fall risk Progress towards goal: initiated 1/28/2022   Score 55 % or less on Lumbar FOTO to indicate significant functional improvements in impaired functions secondary to low back pain Progress towards goal: initiated 1/28/2022                      PLAN     Continue PT per POC     Duy Camp, PT, DPT

## 2022-02-07 NOTE — PROGRESS NOTES
"[facility]   Functional Restoration Program  Occupational Therapy Daily Note  FRP Day 1    Name: Carlos Snell  Medical Record Number: [id number]    Therapy Diagnosis:   Encounter Diagnoses   Name Primary?    Decreased activities of daily living (ADL)     Decreased strength of upper extremity     Fear of pain      Physician: Gwendolyn Zaidi NP    Visit Date: 2/7/2022    Physician Orders: OT Eval and Treat  Medical Diagnosis: Chronic pain disorder  Evaluation Date: 1/28/2022  Insurance Authorization Period Expiration: 12/31/2022  Plan of Care Certification Period: 3/18/2022  Visit # / Visits authorized: 1/ 20 (plus eval)  FOTO: evaluation: 52% limitation    Time In: 8:16  Time Out: 09:00  Total Billable Time: 44 minutes    Subjective     Carlos reports "I am feeling pretty bad. 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        Pain: 6/10  Location:low back and knees and bursa both hips; no c/o shoulder pain.  Description: pins and needles, "fire", sharp, dull aching   Functional Exacerbations: ascending stairs, prolonged activity (walking, sitting)  Easing Factors: sidelying, ice/heat     Personal Functional Three Month Goals: Performance and satisfaction noted below.    OBJECTIVE     Objective Measures updated at progress report unless specified.     Dominant hand: left  Strength Testing             Evaluation  1/28/2022 [place] FRP  2 Month Follow Up   Motion Tested                 R L R L R L   Upper Extremity               Shoulder Flexion 4/5 4+/5           Shoulder Extension 5/5 5/5           Shoulder Abduction 4/5 3+/5           Shoulder IR 5/5 5/5           Shoulder ER 4/5 4/5           Elbow Flexion 4/5 4/5           Elbow Extension 4/5 4/5                   Evaluation   5 times sit-stand  (adults 18-65 y/o) 22.41 seconds  >12 sec= fall risk for general elderly  >16 sec= fall risk for Parkinson's disease  >10 sec= " balance/vestibular dysfunction (<59 y/o)  >14.2 sec= balance/vestibular dysfunction (>59 y/o)  >12 sec= fall risk for CVA      Endurance Testing: Modified Ramp Treadmill Test    GAP Re-assessment Follow-up   Minutes Completed  2 mins 151 secs       % Grades  5 %       Speed (mph)  1.3 mph       METS 3        bpm       Jono Rating Perceived Exertion Scale   7         Functional Strength Test:  Pile Testing: Lifting  (Pounds/Heart rate)   GAP/Eval (#/HR/JONO)  Reassessment  Day 9   Follow-up   Appointment    Repetitive Floor to Waist    8#/97bpm/5          Repetitive Waist to Shoulder    10#/81bpm/4              1- Time Maximum     12#/105bpm/5           Carry-2 Handed (40ft)     12#/95bpm/3           Work demand Level     Sedentary-Light               Treatment     Carlos received the treatments listed below    Therapeutic activities to improve functional performance for 44 minutes, including:    Full body stretch w/ 3-10 sec hold technique &/or 3-5 repetition technique on all of the following:   Cervical flx/ext   Cervical sidebending   Cervical rotation   Cervical protraction/retraction   Shld rolls   Shld depression/elevation   Scapular retraction/protraction/scaption   Posterior shld stretch (cross arm)   Shld ER stretch (chicken wing)   Elbow flx/ext   Forearm supination/pronation   Wrist flx/ext   Open/close hands/fingers   Seated trunk rotations   Seated trunk/thoracic ext/flx   Seated marches & knee to chest   Seated knee flx/ext   Seated hip ER/piriformis stretch   Seated hamstring stretch   Seated toe raise to heel raise    Seated ankle circles each way   Standing lumbar extensions   Standing lateral trunk stretch   Standing quad stretch    Discussed established goals. Reviewed and revised goals based on patient feedback. Pt oriented/educated on functional lifting/endurance exercise program with plan for progression toward goals. Pt educated on frequent functional lifts; she  verbalized and demonstrated understanding. Pt demonstrated understanding of functional tasks associated on program. Pt educated on the importance of paying attention to body with functional activities, considerations for use of log and use of heriberto exertion scale. Pt educated on proper body mechanics and safety with lifting tasks.    Pt completed functional lifting, floor to waist, 3 reps x 5# lbs with exertion rated hard (6 /10), focused education on lifting sequence, keeping weight into heels to activate glutes. 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 Discussed base of support, center point of gravity and support using arm with extended reach into car to pull bins closer to edge.    Transferred to king placed on ground; reviewed diaphragmatic breathing and pursed lip breathing, with education regarding benefit of PNS activation. Educated on technique sit to stand.     Patient Education and Home Exercises     Education provided:   - Ms. Snell received education regarding proper posture and body mechanics. She received education regarding anticipated muscular soreness following lift testing and strategies for management were reviewed with patient including stretching, using ice, scheduled rest.  - Additional Education provided: heriberto scale, pain cycle, z-lie, functional lifting mechanics, pursed lip and diaphragmatic breathing techniques  - education on the Functional Restoration Program. Details of the program were discussed.     Written Home Exercises Provided: Patient instructed to cont prior HEP.  Carlos demonstrated good understanding of the education provided. See EMR under Patient Instructions for information provided during therapy sessions.    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 Participated in review of goals, in addition to review and performance of daily stretch routine, and open to education and feedback provided.     Carlos is unable to fully participate in work, recreational, and home-based activities because of decreased functional strength, decreased endurance, limited positional tolerance, fear of re-injury, and fear of increased pain. She will continue to benefit from skilled outpatient occupational therapy to address the deficits listed on initial evaluation, provide pt education and to maximize pt's level of independence in the home and community environment.      Carlos's prognosis is Good due to good personal goals, willingness to make behavioral changes and good response to pain education and identification of current behaviors.     Pt's spiritual, cultural and educational needs considered and pt agreeable to plan of care and goals.  Anticipated barriers to occupational therapy: None identified    FRP Goals: While working towards the long-term (3 month) functional goals listed above, Carlos will accomplish the following short term goals during the 5-week intensive rehabilitation program. Carlos will:      1. Develop a viable return to work plan.   2. Demonstrate physical capacities consistent with a light-medium work demand level.   3. Demonstrate increased functional strength by lifting 20 lbs floor to waist and 20 lbs waist to shoulder in order to meet demand of work/home routine.   4. Increase her positional tolerance to the level needed for work and home activities.   5. Implement self-care strategies during the program and at home to control pain.   6.   Patient will demonstrate use of mindfulness, stress management, and coping  strategies for improved participation in daily routine.      Specific patient expressed goals and demand levels:  Current Capacity Limit/ Physical  Demand Level (PDL)    Demand Levels of Functional Recovery Goals      Performance/  Satisfaction  Day 1 Performance/  Satisfaction  Day 10 Performance/  Satisfaction  Follow-up      Sedentary-Light Physical Demand  (Based above tested results)     Vocational:  Standing tolerance          Walking tolerance     Ascending stairs     Hauling supply bins      Recreational:  Cooking      Exercise      Daily Living:  Hair care     Lower body dressing      Housechores (vacuuming, dishes, etc)     Light-medium Physical Demand Level     4/4 (now after 15 minutes it becomes painful)    5/3    4/3    5/4      6/5    3/1      6/5    6/5      3/1       The PDL listed above is based on today's physical performance screening test. More comprehensive physical  testing integrated with clinical findings would be required to derived an accurate work capacity.      PLAN   Continue per POC.    JEF Rivero, OTR/L, CEAS-I  2/7/2022
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
## 2022-02-09 ENCOUNTER — CLINICAL SUPPORT (OUTPATIENT)
Dept: REHABILITATION | Facility: OTHER | Age: 32
End: 2022-02-09
Payer: COMMERCIAL

## 2022-02-09 ENCOUNTER — OFFICE VISIT (OUTPATIENT)
Dept: BEHAVIORAL HEALTH | Facility: CLINIC | Age: 32
End: 2022-02-09
Payer: COMMERCIAL

## 2022-02-09 DIAGNOSIS — Z74.09 IMPAIRED FUNCTIONAL MOBILITY, BALANCE, GAIT, AND ENDURANCE: Primary | ICD-10-CM

## 2022-02-09 DIAGNOSIS — Z78.9 DECREASED ACTIVITIES OF DAILY LIVING (ADL): ICD-10-CM

## 2022-02-09 DIAGNOSIS — R29.898 WEAKNESS OF BOTH HIPS: ICD-10-CM

## 2022-02-09 DIAGNOSIS — F90.9 ATTENTION DEFICIT HYPERACTIVITY DISORDER (ADHD), UNSPECIFIED ADHD TYPE: ICD-10-CM

## 2022-02-09 DIAGNOSIS — M62.81 WEAKNESS OF TRUNK MUSCULATURE: ICD-10-CM

## 2022-02-09 DIAGNOSIS — R29.898 DECREASED STRENGTH OF UPPER EXTREMITY: ICD-10-CM

## 2022-02-09 DIAGNOSIS — F40.298 FEAR OF PAIN: ICD-10-CM

## 2022-02-09 DIAGNOSIS — F33.0 MILD EPISODE OF RECURRENT MAJOR DEPRESSIVE DISORDER: Primary | ICD-10-CM

## 2022-02-09 PROCEDURE — 90853 PR GROUP PSYCHOTHERAPY: ICD-10-PCS | Mod: S$GLB,,, | Performed by: SOCIAL WORKER

## 2022-02-09 PROCEDURE — 90853 GROUP PSYCHOTHERAPY: CPT | Mod: S$GLB,,, | Performed by: SOCIAL WORKER

## 2022-02-09 PROCEDURE — 97110 THERAPEUTIC EXERCISES: CPT

## 2022-02-09 PROCEDURE — 97530 THERAPEUTIC ACTIVITIES: CPT

## 2022-02-09 NOTE — PROGRESS NOTES
"[facility]   Functional Restoration Program  Occupational Therapy Daily Note  FRP Day 2    Name: Carlos Snell  Medical Record Number: [id number]    Therapy Diagnosis:   Encounter Diagnoses   Name Primary?    Decreased activities of daily living (ADL)     Decreased strength of upper extremity     Fear of pain      Physician: Gwendolyn Zaidi NP    Visit Date: 2/9/2022    Physician Orders: OT Eval and Treat  Medical Diagnosis: Chronic pain disorder  Evaluation Date: 1/28/2022  Insurance Authorization Period Expiration: 12/31/2022  Plan of Care Certification Period: 3/18/2022  Visit # / Visits authorized: 2 / 20 (plus eval)  FOTO: evaluation: 52% limitation    Time In: 9:00   Time Out: 10:15  Total Billable Time: 75 minutes  Activities performed with the assistance of certified rehab technician.     Subjective     Carlos reports "I have just been in a pain flare since Sunday but I know why".        Pain: 6/10  Location: generalized, back    Response to previous treatment: Carlos noted "it was just harder because I was already in a pain flare from Sunday".     Personal Functional Three Month Goals: Performance and satisfaction noted below.    OBJECTIVE     Carlos received the treatments listed below    Pt participated in functional lifting/endurance/therapeutic activities in order to increase pt's participation with vocational, selfcare ADLs, and leisure activities in order to improve quality of life, for 75 min, which included:    Full body stretch w/ 3-10 sec hold technique &/or 3-5 repetition technique on all of the following:   Cervical flx/ext   Cervical sidebending   Cervical rotation   Cervical protraction/retraction   Shld rolls   Shld depression/elevation   Scapular retraction/protraction/scaption   Posterior shld stretch (cross arm)   Shld ER stretch (chicken wing)   Elbow flx/ext   Forearm supination/pronation   Wrist flx/ext   Open/close " hands/fingers   Seated trunk rotations   Seated trunk/thoracic ext/flx   Seated marches & knee to chest   Seated knee flx/ext   Seated hip ER/piriformis stretch   Seated hamstring stretch   Seated toe raise to heel raise    Seated ankle circles each way   Standing lumbar extensions   Standing lateral trunk stretch   Standing quad stretch    Pt completed functional lifting, floor to waist, 15 reps x 5 lbs with exertion rated sort of hard (4 /10), focused education on lifting sequence, keeping weight into heels to activate glutes. 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 Discussed base of support, center point of gravity and support using arm with extended reach into car to pull bins closer to edge.    Pt completed dynamic reaching in various functional ranges, with continued focused education on standing posture, core awareness, hip rotation/weight shifting, pivoting on back foot, 10 reps x bodyweight, rated as sort of hard (4/10) exertion.     Pt completed maximal lift 10 reps with 10 #, rated as sort of hard (4/10), continued education focused on neutral spine positioning, wide base of support, and pushing through heels for safety and activation of correct muscles. Educated on and completed standing assisted forward flexion stretch at [facility]. Noted relief from stretch.     Pt participated in functional lift waist to shoulder, 15 reps x  5 # with a rating of sort of hard (4/10). Pt educated on standing posture, core awareness, keeping shoulders depressed and retracted, stepping to place > reaching to place, keeping weight close to center of gravity and pacing as needed. Required verbal cues with first 5 reps and then with improved independence with body mechanics.      Patient Education and Home Exercises     Education provided:   - Ms. Snell received education regarding proper posture and body mechanics. She received education regarding anticipated muscular  soreness following lift testing and strategies for management were reviewed with patient including stretching, using ice, scheduled rest.  - Additional Education provided: heriberto scale, pain cycle, z-lie, functional lifting mechanics, pursed lip and diaphragmatic breathing techniques  - education on the Functional Restoration Program. Details of the program were discussed.     Written Home Exercises Provided: Patient instructed to cont prior HEP.  Carlos demonstrated good understanding of the education provided. See EMR under Patient Instructions for information provided during therapy sessions.    ASSESSMENT   Carlos presents with mild affect upon arrival and continued report of pain post weekend activities. Despite this, she continues with willingness to participate. Good tolerance to session this date and with good response to all feedback and education. Good adjustments of body mechanics with verbal cues and improved mind-body connection post adjustments. Continued fear of increased pain but also with continued progress towards personal goals.     Carlos is unable to fully participate in work, recreational, and home-based activities because of decreased functional strength, decreased endurance, limited positional tolerance, fear of re-injury, and fear of increased pain. She will continue to benefit from skilled outpatient occupational therapy to address the deficits listed on initial evaluation, provide pt education and to maximize pt's level of independence in the home and community environment.      Carlos's prognosis is Good due to good personal goals, willingness to make behavioral changes and good response to pain education and identification of current behaviors.     Pt's spiritual, cultural and educational needs considered and pt agreeable to plan of care and goals.  Anticipated barriers to occupational therapy: None identified    FRP Goals: While working towards the long-term (3 month) functional  goals listed above, Carlos will accomplish the following short term goals during the 5-week intensive rehabilitation program. Carlos will:      1. Develop a viable return to work plan.   2. Demonstrate physical capacities consistent with a light-medium work demand level.   3. Demonstrate increased functional strength by lifting 20 lbs floor to waist and 20 lbs waist to shoulder in order to meet demand of work/home routine.   4. Increase her positional tolerance to the level needed for work and home activities.   5. Implement self-care strategies during the program and at home to control pain.   6.   Patient will demonstrate use of mindfulness, stress management, and coping  strategies for improved participation in daily routine.      Specific patient expressed goals and demand levels:  Current Capacity Limit/ Physical  Demand Level (PDL)    Demand Levels of Functional Recovery Goals     Performance/  Satisfaction  Day 1 Performance/  Satisfaction  Day 10 Performance/  Satisfaction  Follow-up      Sedentary-Light Physical Demand  (Based above tested results)     Vocational:  Standing tolerance          Walking tolerance     Ascending stairs     Hauling supply bins      Recreational:  Cooking      Exercise      Daily Living:  Hair care     Lower body dressing      Housechores (vacuuming, dishes, etc)     Light-medium Physical Demand Level     4/4 (now after 15 minutes it becomes painful)    5/3    4/3    5/4      6/5    3/1      6/5    6/5      3/1       The PDL listed above is based on today's physical performance screening test. More comprehensive physical  testing integrated with clinical findings would be required to derived an accurate work capacity.      PLAN   Continue per POC.    Juana Terry OTR/L  2/9/2022

## 2022-02-09 NOTE — PROGRESS NOTES
"[facility]    Functional Restoration Program  Physical Therapy Treatment Note  FRP Day 2    Name: Carlos Snell  MRN:[id number]      Therapy Diagnosis:   Encounter Diagnoses   Name Primary?    Impaired functional mobility, balance, gait, and endurance Yes    Weakness of both hips     Weakness of trunk musculature      Physician: Gwendolyn Zaidi NP    Date: 2/9/2022    Evaluation Date: 1/28/2022  Authorization Period Expiration: 12/31/2022  Plan of Care Expiration: 04/22/22  Visit # / Visits authorized: 3 / 20  FOTO: completed 1 / 3       Time In:  10:15  am  Time Out: 11:30 am  Total Billable Time: 75 minutes    Note: This patient was co-treated /c the assistance of certified rehab technician /c each exercise performed with the supervision of a licensed practitioner.       SUBJECTIVE       Carlos reports no sig change in fatigue or pain presentation on Tuesday.  However, pt note limiting her activity levels Tuesday.  Pt agrees to tx today.         Patient's FRP goals: "I would like to make peace /c [my] chronic illness"  Find exercises that I can do that will help me stay healthy         Current 5/10  Location(s): low back, B hips, B knees; R LE numbness     OBJECTIVE     Objective Measures updated at progress report unless specified.     Treatment       Carlos received the Individualized Treatments listed below:     Therapeutic exercises to develop strength, endurance, ROM, flexibility, posture and core stabilization for 75 minutes including:    Peripheral muscle strengthening which included 1-2 sets of 10-20 repetitions at a slow, controlled 5-7 second per rep pace focused on strengthening supporting musculature for improved body mechanics & functional mobility.  Patient & therapist focused on proper form during treatment to ensure optimal strengthening of each targeted muscle group. Patients are instructed to keep sets/reps with proper load to stay at 3-5 out of 10 on the " provided modified Jono exertion scale.       BATCA Machine Exercises Weight (lbs) Sets Repetitions Jono Exertion Scale Rating   Leg Extension  5 1 15 5   Hamstring Curls 12.5 1 15 4   Chest Press 10 1 17 3   Pec Fly 10 1 10 3   Lat Pull Down       Mid Row       Bicep Bar Curls * 5 1 20+ 5   Standing Tricep Extension Dumbbell 3 1 12 5   Single Leg Press 15 1 15 3     * /c lateral stepping 3 x 20 ft laps     Fitter board - mid setting, 20 tilt bouts    Fwd/Bwd - 2 hand      Lat - 1 hand  /c pt attempt no hands x 2     Lateral shuffle 20 ft laps   /c basketball bounce pass x 2   /c quick bounce pass x 2     Assisted flexion stretch at countertop x 2   Doorway Pec stretch 3 x 20 sec hold     Supine/Hooklying   LTR x 20    DKTC x 10 5 sec hold   Diaphragmatic breaths x 10    PPT x 10 /c mod pelvic tilt via BLE, x 10 /c due for dec BLE use, self tactile cue over TA   TA brace /c Tball /c SLR x 10 each , PT assist for elevation, heel tap rest between each rep    Bridges /c belt 2 x 10    Diaphragmatic breaths x 10                Patient Education and Home Exercises     Home Exercises Provided and Patient Education Provided     Education provided:       Written Home Exercises Provided: yes. Exercises were reviewed and Carlos was able to demonstrate them prior to the end of the session.  Carlos demonstrated good  understanding of the education provided. See EMR under Patient Instructions for information provided during therapy sessions    ASSESSMENT     Chabrina /c good participation today completing inc number of exercises.  However, pt cont to demo some self limiting behaviors discontinuing resistance training before max reps yet noting low RPE.  But, during bicep curl, /c distraction (dual task /c ambulation) pt perform greater than max reps /c less supportive dumbbells at lower RPE than was reported yesterday.  Perhaps this technique of dual tasking can be applied to inc exercise intensity to challenge pt's  self efficacy and exercise tolerance.  Supine exercises identify core motor control deficit.  Recommend cont core activation training for improved lumbar support.        Carlos Is progressing well towards her goals.   Pt prognosis is Fair.     Pt will continue to benefit from skilled outpatient physical therapy to address the deficits listed in the problem list box on initial evaluation, provide pt/family education and to maximize pt's level of independence in the home and community environment.     Pt's spiritual, cultural and educational needs considered and pt agreeable to plan of care and goals.     Anticipated barriers to physical therapy: fear of pain    GOALS: Pt is in agreement with the following goals:        Goal Progress Towards Goal   Short Term: In 3 weeks, pt will:     Verbalize & demonstrate good understanding of resisted training with 3-1-3 second rep for bqsmvjitpt-wzwiquloe-qtncneovu contraction to encourage full muscle recruitment for strength & endurance Progress towards goal: initiated 1/28/2022   Verbalize & demonstrate good understanding of home stretch routine to improve AROM, help decrease pain & improve mobility Progress towards goal: initiated 1/28/2022   Demonstrate proper supine or seated diaphragmatic breathing with decreased chest excursion & emphasis on full abdominal excursion & increased expiration time to promote muscle relaxation & increased parasympathetic activity to improve patient tolerance to activities Progress towards goal: initiated 1/28/2022   Demonstrate proper static standing posture in frontal & sagittal plane per PT visual assessment to decrease postural strain in ADLs Progress towards goal: initiated 1/28/2022   Demonstrate good recall of names of resisted exercises w/ minimal to moderate verbal cues to promote independence w/ exercise at the end of the program Progress towards goal: initiated 1/28/2022   Verbalize plan for continuing resisted exercises w/  specific location (i.e. commercial gym, home, community fitness center) indicating preference for machine-based or free-weight exercises to incorporate all major muscle groups to maintain & progress therapeutic functional improvements Progress towards goal: initiated 1/28/2022               Long Term: In 8 weeks, pt will:     Verbalize good understanding of activities planning/scheduling (stretching, mindfulness, resisted training, & cardio) prescribed by PT/OT following the end of the program to sustain & progress functional improvements Progress towards goal: initiated 1/28/2022   Be independent w/ selected resisted training w/ minimal to no cuing while performing to continue w/ resisted strengthening independently at the end of the program Progress towards goal: initiated 1/28/2022   Improve 2 Minute Walk Test (MWT) to 375 feet and 6 MWT to 1125 feet or greater to improve gait speed and mobility and reach community ambulator status Progress towards goal: initiated 1/28/2022   Perform single leg stance 20 seconds or greater bilaterally to improve safety and balance in ADLs Progress towards goal: initiated 1/28/2022   Demonstrate Timed Up & Go (with appropriate assistive device, if necessary) of 10 seconds or less to decrease fall risk and improve functional mobility Progress towards goal: initiated 1/28/2022   Demonstrate 5 time sit to stand test without upper extremity assist to 15 sec or less to improve general mobility and decrease fall risk Progress towards goal: initiated 1/28/2022   Score 55 % or less on Lumbar FOTO to indicate significant functional improvements in impaired functions secondary to low back pain Progress towards goal: initiated 1/28/2022                      PLAN     Continue PT per POC     Shady Bain PT, DPT

## 2022-02-10 ENCOUNTER — CLINICAL SUPPORT (OUTPATIENT)
Dept: REHABILITATION | Facility: OTHER | Age: 32
End: 2022-02-10
Payer: COMMERCIAL

## 2022-02-10 ENCOUNTER — OFFICE VISIT (OUTPATIENT)
Dept: BEHAVIORAL HEALTH | Facility: CLINIC | Age: 32
End: 2022-02-10
Payer: COMMERCIAL

## 2022-02-10 DIAGNOSIS — R29.898 DECREASED STRENGTH OF UPPER EXTREMITY: ICD-10-CM

## 2022-02-10 DIAGNOSIS — F90.9 ATTENTION DEFICIT HYPERACTIVITY DISORDER (ADHD), UNSPECIFIED ADHD TYPE: ICD-10-CM

## 2022-02-10 DIAGNOSIS — R29.898 WEAKNESS OF BOTH HIPS: ICD-10-CM

## 2022-02-10 DIAGNOSIS — Z74.09 IMPAIRED FUNCTIONAL MOBILITY, BALANCE, GAIT, AND ENDURANCE: Primary | ICD-10-CM

## 2022-02-10 DIAGNOSIS — M62.81 WEAKNESS OF TRUNK MUSCULATURE: ICD-10-CM

## 2022-02-10 DIAGNOSIS — F40.298 FEAR OF PAIN: ICD-10-CM

## 2022-02-10 DIAGNOSIS — F33.0 MILD EPISODE OF RECURRENT MAJOR DEPRESSIVE DISORDER: Primary | ICD-10-CM

## 2022-02-10 DIAGNOSIS — Z78.9 DECREASED ACTIVITIES OF DAILY LIVING (ADL): ICD-10-CM

## 2022-02-10 PROCEDURE — 90853 GROUP PSYCHOTHERAPY: CPT | Mod: S$GLB,,, | Performed by: SOCIAL WORKER

## 2022-02-10 PROCEDURE — 90853 PR GROUP PSYCHOTHERAPY: ICD-10-PCS | Mod: S$GLB,,, | Performed by: SOCIAL WORKER

## 2022-02-10 PROCEDURE — 97110 THERAPEUTIC EXERCISES: CPT

## 2022-02-10 PROCEDURE — 97530 THERAPEUTIC ACTIVITIES: CPT

## 2022-02-10 NOTE — PROGRESS NOTES
"[facility]   Functional Restoration Program  Occupational Therapy Daily Note  FRP Day 3    Name: Carlos Snell  Medical Record Number: [id number]    Therapy Diagnosis:   Encounter Diagnoses   Name Primary?    Decreased activities of daily living (ADL)     Decreased strength of upper extremity     Fear of pain      Physician: Gwendolyn Zaidi NP    Visit Date: 2/10/2022    Physician Orders: OT Eval and Treat  Medical Diagnosis: Chronic pain disorder  Evaluation Date: 1/28/2022  Insurance Authorization Period Expiration: 12/31/2022  Plan of Care Certification Period: 3/18/2022  Visit # / Visits authorized: 3 / 20 (plus eval)  FOTO: evaluation: 52% limitation    Time In: 10:20  Time Out: 11:30  Total Billable Time: 70 minutes  Activities performed with the assistance of certified rehab technician.     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        Pain: 5/10  Location: generalized, back and knees.    Response to previous treatment: Carlos noted: "I've been hurting".    Personal Functional Three Month Goals: Performance and satisfaction noted below.    OBJECTIVE     Carlos received the treatments listed below.    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    Pt completed functional lifting, floor to waist, 15 reps x 5# lbs with exertion rated sort of " hard (4 /10), focused education on weight through heels, sequence of lifting technique. Tendency to reach basket down using hip flexion, initiating squatting late. Pt plans to use this lift related to managing boxes and supplies at pop-up. Open to feedback about decreasing weight in the big boxes, and up in the small boxes, as weight carried further away from body creates bigger pull on spine.    Pt participated in functional lift waist to shoulder, 15 reps x  8 #lbs with a rating of moderate (3/10). Pt educated on standing posture, core awareness, keeping shoulders depressed and retracted, stepping to place > reaching to place, keeping weight close to center of gravity and pacing as needed.     Pt completed push and pull x 10 minutes, with review of how she is currently pulling her cart, using one hand and twisting when pulling, vs using both hands to pull, then review of pushing, and turning grocery basket in order to reach personal goal of improving grocery shopping ability;  educated focused on upright standing posture and activating gluts with functional mobility, leading with hips, decreasing  on handles, using push/pull method with hands for improved control of cart, keeping cart close to center of gravity and weight shifting/stretching as needed. Rated activity as moderate (3/10) exertion.    Pt completed dynamic reaching in various functional ranges, with continued focus on hip rotation/weight shifting, 10 reps x BW, rated as moderate (3/10) exertion. Tendency to over extend spine instead of flexing shoulders overhead. Educated on shoulder stretch using wall walks.    Carlos participated in endurance activity using Vyopta for 10 minutes, at level 1.1 to improve functional endurance and progress towards increased MET level for improved community mobility and participation, rated as hard (5/10), Carlos educated on how to add mindfulness component to activity and how to pace appropriately by completed  self check ins and grading activity as needed, with goal to reach moderate to sort of hard by end of activity. Completed 689 steps.    Pt completed maximal lift 8 reps with 12# #, rated as sort of hard (4/10), continued education focused on neutral spine positioning and pushing through heels for safety and activation of correct muscles. Cues to decrease extension of trunk, which remains difficult. Discussion regarding minimizing lateral trunk rotation with twist when reaching into boxes when setting up display, but possible placing it onto step stool, or using golfer's bend. Also discussed use of toe onto object with ankle dorsally flexed to help with standing tolerance, or tandem step with weight through toes.        Patient Education and Home Exercises     Education provided:   - Ms. Snell received education regarding proper posture and body mechanics. She received education regarding anticipated muscular soreness following lift testing and strategies for management were reviewed with patient including stretching, using ice, scheduled rest.  - Additional Education provided: heriberto scale, pain cycle, z-lie, functional lifting mechanics, pursed lip and diaphragmatic breathing techniques  - education on the Functional Restoration Program. Details of the program were discussed.   - avoiding trunk extensions  - golfer's bend    Written Home Exercises Provided: Patient instructed to cont prior HEP.  Brynnchristayaz demonstrated good understanding of the education provided. See EMR under Patient Instructions for information provided during therapy sessions.    ASSESSMENT   Carlos presents with flat affect, minimal verbalizations unless when engaged in discussion, feedback, education, due to being in pain. 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 Continued fear of increased pain but also with continued progress towards  personal goals.     Carlos is unable to fully participate in work, recreational, and home-based activities because of decreased functional strength, decreased endurance, limited positional tolerance, fear of re-injury, and fear of increased pain. She will continue to benefit from skilled outpatient occupational therapy to address the deficits listed on initial evaluation, provide pt education and to maximize pt's level of independence in the home and community environment.      Carlos's prognosis is Good due to good personal goals, willingness to make behavioral changes and good response to pain education and identification of current behaviors.     Pt's spiritual, cultural and educational needs considered and pt agreeable to plan of care and goals.  Anticipated barriers to occupational therapy: None identified    FRP Goals: While working towards the long-term (3 month) functional goals listed above, Carlos will accomplish the following short term goals during the 5-week intensive rehabilitation program. Carlos will:      1. Develop a viable return to work plan.   2. Demonstrate physical capacities consistent with a light-medium work demand level.   3. Demonstrate increased functional strength by lifting 20 lbs floor to waist and 20 lbs waist to shoulder in order to meet demand of work/home routine.   4. Increase her positional tolerance to the level needed for work and home activities.   5. Implement self-care strategies during the program and at home to control pain.   6.   Patient will demonstrate use of mindfulness, stress management, and coping  strategies for improved participation in daily routine.      Specific patient expressed goals and demand levels:  Current Capacity Limit/ Physical  Demand Level (PDL)    Demand Levels of Functional Recovery Goals     Performance/  Satisfaction  Day 1 Performance/  Satisfaction  Day 10 Performance/  Satisfaction  Follow-up      Sedentary-Light Physical  Demand  (Based above tested results)     Vocational:  Standing tolerance          Walking tolerance     Ascending stairs     Hauling supply bins      Recreational:  Cooking      Exercise      Daily Living:  Hair care     Lower body dressing      Housechores (vacuuming, dishes, etc)     Light-medium Physical Demand Level     4/4 (now after 15 minutes it becomes painful)    5/3    4/3    5/4      6/5    3/1      6/5    6/5      3/1       The PDL listed above is based on today's physical performance screening test. More comprehensive physical  testing integrated with clinical findings would be required to derived an accurate work capacity.      PLAN   Continue per POC.    JEF Rivero, OTR/L, CEAS-I  2/10/2022

## 2022-02-10 NOTE — PROGRESS NOTES
"[facility]    Functional Restoration Program  Physical Therapy Treatment Note  FRP Day 3    Name: Carlos Snell  MRN:[id number]      Therapy Diagnosis:   Encounter Diagnoses   Name Primary?    Impaired functional mobility, balance, gait, and endurance Yes    Weakness of both hips     Weakness of trunk musculature      Physician: Gwendolyn Zaidi NP    Date: 2/10/2022    Evaluation Date: 1/28/2022  Authorization Period Expiration: 12/31/2022  Plan of Care Expiration: 04/22/22  Visit # / Visits authorized: 4 / 20  FOTO: completed 1 / 3       Time In:  9:00  am  Time Out: 10:15 am  Total Billable Time: 75 minutes    Note: This patient was co-treated /c the assistance of certified rehab technician /c each exercise performed with the supervision of a licensed practitioner.       SUBJECTIVE       Carlos reports tolerating last tx session fair noting inc LB pain this AM.    Pt verbalize concern for 2 pop ups this weekend and the physicality needed to load/unload tables, clothing rack, chair.          Patient's FRP goals: "I would like to make peace /c [my] chronic illness"  Find exercises that I can do that will help me stay healthy         Current 5/10  Location(s): low back, B knees    OBJECTIVE     Objective Measures updated at progress report unless specified.     Treatment       Carlos received the Individualized Treatments listed below:     Therapeutic exercises to develop strength, endurance, ROM, flexibility, posture and core stabilization for 75 minutes including:      Full body stretch w/ 3-10 sec hold technique &/or 3-5 repetition technique on all of the following:   Cervical flx/ext   Cervical sidebending   Cervical rotation   Cervical protraction/retraction   Shld rolls   Shld depression/elevation   Scapular retraction/protraction/scaption   Posterior shld stretch (cross arm)   Shld ER stretch (chicken wing)   Elbow flx/ext   Forearm " supination/pronation   Wrist flx/ext   Open/close hands/fingers   Seated trunk rotations   Seated trunk/thoracic ext/flx   Seated marches & knee to chest   Seated knee flx/ext   Seated hip ER/piriformis stretch   Seated hamstring stretch   Seated toe raise to heel raise    Seated ankle circles each way   Standing lumbar extensions   Standing lateral leaning stretch   Standing quad stretch (UE support for balance)      Peripheral muscle strengthening which included 1-2 sets of 10-20 repetitions at a slow, controlled 5-7 second per rep pace focused on strengthening supporting musculature for improved body mechanics & functional mobility.  Patient & therapist focused on proper form during treatment to ensure optimal strengthening of each targeted muscle group. Patients are instructed to keep sets/reps with proper load to stay at 3-5 out of 10 on the provided modified Jono exertion scale.       BATCA Machine Exercises Weight (lbs) Sets Repetitions Jono Exertion Scale Rating   Leg Extension  5 1 20 5   Hamstring Curls 12.5 1 15 4   Chest Press 10 1 20 3   Pec Fly 10 1 20 3   Lat Pull Down 10 1 20 5   Mid Row 10 1 20 4   Bicep Bar Curls *       Standing Tricep Extension Dumbbell       Single Leg Press           Seated TBall    Marches x 10 each difficulty raising L knee   Pelvic tilts    Fwd/Bwd x 10 cue for dec torso motion    Lat x 10 pt note L hip discomfort     Supine/Hooklying   LTR x 20    DKTC x 10 5 sec hold inc LBP    Prone laying 2 min -> Prone on elbow 2 min -> prone press ups x 10           Patient Education and Home Exercises     Home Exercises Provided and Patient Education Provided     Education provided:   - core activation during LE resistance exercises  - pt issued stretch routine (see pt instructions)     Written Home Exercises Provided: yes. Exercises were reviewed and Carlos was able to demonstrate them prior to the end of the session.  Carlos demonstrated good  understanding of the  education provided. See EMR under Patient Instructions for information provided during therapy sessions    ASSESSMENT     Carlos demo core instability /c LB arching and subsequent inc discomfort during BATCA LE exercises.  Therefore, core activation exercises trialled on TBall since pt /c difficulty in supine last tx session.  On TBall, pt need cue for pelvic rotation vs torso movement confirming dec core motor coordination.  To challenge  lumbopelvic mobility, initiated prone positioning.  Pt tolerate press ups /s inc discomfort and PT highlight this observation to invoke inc self efficacy.  Pt /c flat affect making determination of intrinsic motivation inconclusive.  Pt also somewhat resistant to choosing which resistance exercise to perform offering statement that she does not like to make choices indicating external locus of control.    Regarding her weekend pop ups, pt advised to perform stretch routine to prepare body for loading activities.  Pt issued stretching handout for inc participation.           Carlos Is progressing well towards her goals.   Pt prognosis is Fair.     Pt will continue to benefit from skilled outpatient physical therapy to address the deficits listed in the problem list box on initial evaluation, provide pt/family education and to maximize pt's level of independence in the home and community environment.     Pt's spiritual, cultural and educational needs considered and pt agreeable to plan of care and goals.     Anticipated barriers to physical therapy: fear of pain    GOALS: Pt is in agreement with the following goals:        Goal Progress Towards Goal   Short Term: In 3 weeks, pt will:     Verbalize & demonstrate good understanding of resisted training with 3-1-3 second rep for oapuklxmrg-kdcpitfrs-cdmgcdwbt contraction to encourage full muscle recruitment for strength & endurance Progress towards goal: initiated 1/28/2022   Verbalize & demonstrate good understanding of home  stretch routine to improve AROM, help decrease pain & improve mobility Progress towards goal: initiated 1/28/2022   Demonstrate proper supine or seated diaphragmatic breathing with decreased chest excursion & emphasis on full abdominal excursion & increased expiration time to promote muscle relaxation & increased parasympathetic activity to improve patient tolerance to activities Progress towards goal: initiated 1/28/2022   Demonstrate proper static standing posture in frontal & sagittal plane per PT visual assessment to decrease postural strain in ADLs Progress towards goal: initiated 1/28/2022   Demonstrate good recall of names of resisted exercises w/ minimal to moderate verbal cues to promote independence w/ exercise at the end of the program Progress towards goal: initiated 1/28/2022   Verbalize plan for continuing resisted exercises w/ specific location (i.e. commercial gym, home, community fitness center) indicating preference for machine-based or free-weight exercises to incorporate all major muscle groups to maintain & progress therapeutic functional improvements Progress towards goal: initiated 1/28/2022               Long Term: In 8 weeks, pt will:     Verbalize good understanding of activities planning/scheduling (stretching, mindfulness, resisted training, & cardio) prescribed by PT/OT following the end of the program to sustain & progress functional improvements Progress towards goal: initiated 1/28/2022   Be independent w/ selected resisted training w/ minimal to no cuing while performing to continue w/ resisted strengthening independently at the end of the program Progress towards goal: initiated 1/28/2022   Improve 2 Minute Walk Test (MWT) to 375 feet and 6 MWT to 1125 feet or greater to improve gait speed and mobility and reach community ambulator status Progress towards goal: initiated 1/28/2022   Perform single leg stance 20 seconds or greater bilaterally to improve safety and balance in ADLs  Progress towards goal: initiated 1/28/2022   Demonstrate Timed Up & Go (with appropriate assistive device, if necessary) of 10 seconds or less to decrease fall risk and improve functional mobility Progress towards goal: initiated 1/28/2022   Demonstrate 5 time sit to stand test without upper extremity assist to 15 sec or less to improve general mobility and decrease fall risk Progress towards goal: initiated 1/28/2022   Score 55 % or less on Lumbar FOTO to indicate significant functional improvements in impaired functions secondary to low back pain Progress towards goal: initiated 1/28/2022                      PLAN     Continue PT per POC     Shady Bain, PT, DPT

## 2022-02-11 ENCOUNTER — PATIENT MESSAGE (OUTPATIENT)
Dept: PAIN MEDICINE | Facility: OTHER | Age: 32
End: 2022-02-11
Payer: COMMERCIAL

## 2022-02-14 ENCOUNTER — CLINICAL SUPPORT (OUTPATIENT)
Dept: REHABILITATION | Facility: OTHER | Age: 32
End: 2022-02-14
Payer: COMMERCIAL

## 2022-02-14 ENCOUNTER — OFFICE VISIT (OUTPATIENT)
Dept: BEHAVIORAL HEALTH | Facility: CLINIC | Age: 32
End: 2022-02-14
Payer: COMMERCIAL

## 2022-02-14 DIAGNOSIS — Z78.9 DECREASED ACTIVITIES OF DAILY LIVING (ADL): ICD-10-CM

## 2022-02-14 DIAGNOSIS — M62.81 WEAKNESS OF TRUNK MUSCULATURE: ICD-10-CM

## 2022-02-14 DIAGNOSIS — G89.4 CHRONIC PAIN SYNDROME: ICD-10-CM

## 2022-02-14 DIAGNOSIS — R29.898 DECREASED STRENGTH OF UPPER EXTREMITY: ICD-10-CM

## 2022-02-14 DIAGNOSIS — F40.298 FEAR OF PAIN: ICD-10-CM

## 2022-02-14 DIAGNOSIS — R29.898 WEAKNESS OF BOTH HIPS: ICD-10-CM

## 2022-02-14 DIAGNOSIS — F90.9 ATTENTION DEFICIT HYPERACTIVITY DISORDER (ADHD), UNSPECIFIED ADHD TYPE: ICD-10-CM

## 2022-02-14 DIAGNOSIS — Z74.09 IMPAIRED FUNCTIONAL MOBILITY, BALANCE, GAIT, AND ENDURANCE: ICD-10-CM

## 2022-02-14 DIAGNOSIS — F33.0 MILD EPISODE OF RECURRENT MAJOR DEPRESSIVE DISORDER: Primary | ICD-10-CM

## 2022-02-14 PROCEDURE — 90853 GROUP PSYCHOTHERAPY: CPT | Mod: S$GLB,,, | Performed by: SOCIAL WORKER

## 2022-02-14 PROCEDURE — 90853 PR GROUP PSYCHOTHERAPY: ICD-10-PCS | Mod: S$GLB,,, | Performed by: SOCIAL WORKER

## 2022-02-14 PROCEDURE — 97530 THERAPEUTIC ACTIVITIES: CPT

## 2022-02-14 PROCEDURE — 97110 THERAPEUTIC EXERCISES: CPT

## 2022-02-14 NOTE — PROGRESS NOTES
Group Psychotherapy    Site: [facility]    Clinical status of patient: Outpatient    2/10/2022    Length of service:[id number]    Referred by: Functional Restoration Program     Number of patients in attendance: 6    Target symptoms: Chronic Pain Management     Patient's response to intervention:  The patient's response to intervention is active listening.    Progress toward goals and other mental status changes:  The patient's progress toward goals is fair .    Plan: group psychotherapy    Topics Covered: Pt attended CBT for Chronic Pain as part of her participation in Functional Restoration. Topics discussed today included a discussion on sleep and the importance of sleep in the setting of anxiety, depression, other mood disorders and chronic pain. We discussed sleep hygiene tips, homework for the weekend is to track sleep. Will f/u Monday.

## 2022-02-14 NOTE — PROGRESS NOTES
"[facility]   Functional Restoration Program  Occupational Therapy Daily Note  FRP Day 4    Name: Carlos Snell  Medical Record Number: [id number]    Therapy Diagnosis:   Encounter Diagnoses   Name Primary?    Decreased activities of daily living (ADL)     Decreased strength of upper extremity     Fear of pain      Physician: Gwendolyn Zaidi NP    Visit Date: 2/14/2022    Physician Orders: OT Eval and Treat  Medical Diagnosis: Chronic pain disorder  Evaluation Date: 1/28/2022  Insurance Authorization Period Expiration: 12/31/2022  Plan of Care Certification Period: 3/18/2022  Visit # / Visits authorized: 3 / 20 (plus eval)  FOTO: evaluation: 52% limitation    Time In: 10:17  Time Out: 11:30  Total Billable Time: 73 minutes    Subjective     Carlos reports "it wasn't as bad as I expected. I thought I would be in more pain today. It really helped to place things on the table instead of bending, and I used the golfer's bend".        Pain: 3/10  Location: generalized, back and knees, and hip region.    Response to previous treatment: Carlos noted: it's so much better than when I started".    Personal Functional Three Month Goals: Performance and satisfaction noted below.    OBJECTIVE     Carlos received the treatments listed below.    Pt participated in functional lifting/endurance/therapeutic activities in order to increase pt's participation with vocational, selfcare ADLs, and leisure activities in order to improve quality of life, for 73 minutes, which included:    Review of pop ups. Reported having been able to apply concepts of body mechanics with set up and break down, including use of golfer's bend.     Pt completed functional lifting, floor to waist, 15 reps x 8# lbs with exertion rated sort of hard (4 /10), focused education on keeping weight into heels to activate glutes.     Pt completed dynamic reaching in various functional ranges, with continued focus on hip " "rotation/weight shifting, 10 reps x 1# lbs, rated as sort of hard (4/10) exertion. Post activity stretching of shoulders performed at door.    Carlos participated in endurance activity using nustep for 10 minutes, starting at level 2, decreased to 1.5 at 2 minute to improve functional endurance and progress towards increased MET level for improved community mobility and participation, rated as sort of hard (4/10), Carlos re-educated on how to add mindfulness component to activity and how to pace appropriately by completed self check ins and grading activity as needed, with goal to reach moderate to sort of hard by end of activity. Completed 675 steps. Reported increased pain/needles sensation, with antalgic gait noted when walking into bathroom after completion of activity.    Pt participated in functional lift waist to shoulder, 11 reps x 10 #lbs with a rating of hard (5/10). Pt educated on standing posture, core awareness, keeping shoulders depressed and retracted, stepping to place > reaching to place, keeping weight close to center of gravity and pacing as needed. "It's hard because of my leg; I could push my self through it to get it done". Since rated 5 at 11, re-educated regarding ROSA ISELA scale to be all inclusive and it being OK to doing an odd, or less expected rep set, even if the weight given for these reps is appropriate to complete 15.     Performed diaphragmatic breathing in supine on mat with use of ball for z-lie position, with pillow placed under lumbar spine. "it helped a little bit".    In standing, reviewed position at kitchen counter, with review of opening cabinets to place foot inside vs keeping foot dorsally flexed onto cabinet, to help with pressure relief in back. Discussed raising working surface by placing cutting board onto pan, if needed, as well as energy pacing and potentially prepping while seated.    Pt completed maximal lift 5 reps with 12 #, rated as sort of hard (4/10), " "continued education focused on neutral spine positioning and pushing through heels for safety and activation of correct muscles.    In seated, initiated education regarding energy banking, in relation to using it as a tool to communicate attempt to pacing to spouse. "He doesn't like clutter so when he sees I am in pain he will help out. He is currently doing 60/40% in the household, while it is supposed to be 50/50".     Patient Education and Home Exercises     Education provided:   - Ms. Snell received education regarding proper posture and body mechanics. She received education regarding anticipated muscular soreness following lift testing and strategies for management were reviewed with patient including stretching, using ice, scheduled rest.  - Additional Education provided: heriberto scale, pain cycle, z-lie, functional lifting mechanics, pursed lip and diaphragmatic breathing techniques  - education on the Functional Restoration Program. Details of the program were discussed.   - avoiding trunk extensions  - golfer's bend  - energy banking; pacing self with performance.   - Progress towards goals    Written Home Exercises Provided: Patient instructed to cont prior HEP.  Carlos demonstrated good understanding of the education provided. See EMR under Patient Instructions for information provided during therapy sessions.    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    Carlos is unable to fully participate in work, " recreational, and home-based activities because of decreased functional strength, decreased endurance, limited positional tolerance, fear of re-injury, and fear of increased pain. She will continue to benefit from skilled outpatient occupational therapy to address the deficits listed on initial evaluation, provide pt education and to maximize pt's level of independence in the home and community environment.      Carlos's prognosis is Good due to good personal goals, willingness to make behavioral changes and good response to pain education and identification of current behaviors.     Pt's spiritual, cultural and educational needs considered and pt agreeable to plan of care and goals.  Anticipated barriers to occupational therapy: None identified    FRP Goals: While working towards the long-term (3 month) functional goals listed above, Carlos will accomplish the following short term goals during the 5-week intensive rehabilitation program. Carlos will:      1. Develop a viable return to work plan.   2. Demonstrate physical capacities consistent with a light-medium work demand level.   3. Demonstrate increased functional strength by lifting 20 lbs floor to waist and 20 lbs waist to shoulder in order to meet demand of work/home routine.   4. Increase her positional tolerance to the level needed for work and home activities.   5. Implement self-care strategies during the program and at home to control pain.   6.   Patient will demonstrate use of mindfulness, stress management, and coping  strategies for improved participation in daily routine.      Specific patient expressed goals and demand levels:  Current Capacity Limit/ Physical  Demand Level (PDL)    Demand Levels of Functional Recovery Goals     Performance/  Satisfaction  Day 1 Performance/  Satisfaction  Day 10 Performance/  Satisfaction  Follow-up      Sedentary-Light Physical Demand  (Based above tested results)     Vocational:  Standing tolerance           Walking tolerance     Ascending stairs     Hauling supply bins      Recreational:  Cooking      Exercise      Daily Living:  Hair care     Lower body dressing      Housechores (vacuuming, dishes, etc)     Light-medium Physical Demand Level     4/4 (now after 15 minutes it becomes painful)    5/3    4/3    5/4      6/5    3/1      6/5    6/5      3/1       The PDL listed above is based on today's physical performance screening test. More comprehensive physical  testing integrated with clinical findings would be required to derived an accurate work capacity.      PLAN   Continue per POC.    JEF Rivero, OTR/L, CEAS-I  2/14/2022

## 2022-02-14 NOTE — PROGRESS NOTES
"[facility]    Functional Restoration Program  Physical Therapy Treatment Note  FRP Day 4    Name: Carlos Snell  MRN:[id number]      Therapy Diagnosis:   Encounter Diagnoses   Name Primary?    Impaired functional mobility, balance, gait, and endurance     Chronic pain syndrome     Weakness of both hips     Weakness of trunk musculature      Physician: Gwendolyn Zaidi NP    Date: 2/14/2022    Evaluation Date: 1/28/2022  Authorization Period Expiration: 12/31/2022  Plan of Care Expiration: 04/22/22  Visit # / Visits authorized: 4 / 20  FOTO: completed 1 / 3       Time In:  9:00  am  Time Out: 10:15 am  Total Billable Time: 75 minutes    SUBJECTIVE       Carlos reports she had 2 pop ups that she worked this weekend. Using the body mechanics & lifting training she learned in the program, this was an easier task for her. She also paced herself appropriately & the result was a less painful experience for her.          Patient's FRP goals: "I would like to make peace /c [my] chronic illness"  Find exercises that I can do that will help me stay healthy         Current 3/10  Location(s): low back, B knees    OBJECTIVE     Objective Measures updated at progress report unless specified.     Treatment       Carlos received the Individualized Treatments listed below:     Therapeutic exercises to develop strength, endurance, ROM, flexibility, posture and core stabilization for 65 minutes including:      Full body stretch w/ 3-10 sec hold technique &/or 3-5 repetition technique on all of the following:   Cervical flx/ext   Cervical sidebending   Cervical rotation   Cervical protraction/retraction   Shld rolls   Shld depression/elevation   Scapular retraction/protraction/scaption   Posterior shld stretch (cross arm)   Shld ER stretch (chicken wing)   Elbow flx/ext   Forearm supination/pronation   Wrist flx/ext   Open/close hands/fingers   Seated trunk rotations   Seated " "trunk/thoracic ext/flx   Seated marches & knee to chest   Seated knee flx/ext   Seated hip ER/piriformis stretch   Seated hamstring stretch   Seated toe raise to heel raise    Seated ankle circles each way   Standing lumbar extensions   Standing lateral leaning stretch   Standing quad stretch (UE support for balance)      Peripheral muscle strengthening which included 1-2 sets of 10-20 repetitions at a slow, controlled 5-7 second per rep pace focused on strengthening supporting musculature for improved body mechanics & functional mobility.  Patient & therapist focused on proper form during treatment to ensure optimal strengthening of each targeted muscle group. Patients are instructed to keep sets/reps with proper load to stay at 3-5 out of 10 on the provided modified Jono exertion scale.       [employer] Machine Exercises Weight (lbs) Sets Repetitions Jono Exertion Scale Rating   Leg Extension  5 1 15 4   Hamstring Curls 15 1 15 5   Chest Press       Pec Fly       Lat Pull Down 10 1 20 4   Mid Row 10 1 20 3   Bicep Bar Curls  5 1 20 4   Standing Tricep Extension  10 1 15 5   Single Leg Press 20 1 20 3       · Fitter board hard tilts 2x20 each way w/ palmar support  · Bridge w/ belt 2x20  · Supine LTR 2x15  · Self release lateral L knee w/ roller stick ~4 minutes      Carlos participated in dynamic functional therapeutic activities to improve functional performance for 10  minutes, including:    Diaphragmatic breathing:   · Timed for inhale 3-4", hold 1-2", & exhale 5-6"  · Awareness of pulling breath down away from mouth to hips  · Cues for for proper abdominal excursion & decreased use of accessory muscles of breathing (hand on stomach & on chest; increased stomach motion, decreased chest motion)  · Education on inhalation activating sympathetic nervous system   · Education on exhalation activating parasympathetic nervous system          Patient Education and Home Exercises     Home Exercises Provided and " Patient Education Provided     Education provided:   - Diaphragmatic breathing    Written Home Exercises Provided: Patient instructed to cont prior HEP. Exercises were reviewed and Carlos was able to demonstrate them prior to the end of the session.  Carlos demonstrated good  understanding of the education provided. See EMR under Patient Instructions for information provided during therapy sessions    ASSESSMENT     Carlos w/ good participation in therapy today & demonstrated carryover from last week needing only minor cuing to slow down her pace. She also demonstrated more confident use of the Jono scale. In breathing exercises, she was able to increase her expiration time well & reported she tried to employ that in her exercises as well. PT suggested to pt that she breathe naturally while exercising to focus on form & train breathing independently.          Carlos Is progressing well towards her goals.   Pt prognosis is Fair.     Pt will continue to benefit from skilled outpatient physical therapy to address the deficits listed in the problem list box on initial evaluation, provide pt/family education and to maximize pt's level of independence in the home and community environment.     Pt's spiritual, cultural and educational needs considered and pt agreeable to plan of care and goals.     Anticipated barriers to physical therapy: fear of pain    GOALS: Pt is in agreement with the following goals:        Goal Progress Towards Goal   Short Term: In 3 weeks, pt will:     Verbalize & demonstrate good understanding of resisted training with 3-1-3 second rep for khkbengpna-zywerounw-nzbrexkkw contraction to encourage full muscle recruitment for strength & endurance Progress towards goal: initiated 1/28/2022   Verbalize & demonstrate good understanding of home stretch routine to improve AROM, help decrease pain & improve mobility Progress towards goal: initiated 1/28/2022   Demonstrate proper supine or seated  diaphragmatic breathing with decreased chest excursion & emphasis on full abdominal excursion & increased expiration time to promote muscle relaxation & increased parasympathetic activity to improve patient tolerance to activities Progress towards goal: initiated 1/28/2022   Demonstrate proper static standing posture in frontal & sagittal plane per PT visual assessment to decrease postural strain in ADLs Progress towards goal: initiated 1/28/2022   Demonstrate good recall of names of resisted exercises w/ minimal to moderate verbal cues to promote independence w/ exercise at the end of the program Progress towards goal: initiated 1/28/2022   Verbalize plan for continuing resisted exercises w/ specific location (i.e. commercial gym, home, community fitness center) indicating preference for machine-based or free-weight exercises to incorporate all major muscle groups to maintain & progress therapeutic functional improvements Progress towards goal: initiated 1/28/2022               Long Term: In 8 weeks, pt will:     Verbalize good understanding of activities planning/scheduling (stretching, mindfulness, resisted training, & cardio) prescribed by PT/OT following the end of the program to sustain & progress functional improvements Progress towards goal: initiated 1/28/2022   Be independent w/ selected resisted training w/ minimal to no cuing while performing to continue w/ resisted strengthening independently at the end of the program Progress towards goal: initiated 1/28/2022   Improve 2 Minute Walk Test (MWT) to 375 feet and 6 MWT to 1125 feet or greater to improve gait speed and mobility and reach community ambulator status Progress towards goal: initiated 1/28/2022   Perform single leg stance 20 seconds or greater bilaterally to improve safety and balance in ADLs Progress towards goal: initiated 1/28/2022   Demonstrate Timed Up & Go (with appropriate assistive device, if necessary) of 10 seconds or less to decrease  fall risk and improve functional mobility Progress towards goal: initiated 1/28/2022   Demonstrate 5 time sit to stand test without upper extremity assist to 15 sec or less to improve general mobility and decrease fall risk Progress towards goal: initiated 1/28/2022   Score 55 % or less on Lumbar FOTO to indicate significant functional improvements in impaired functions secondary to low back pain Progress towards goal: initiated 1/28/2022                      PLAN     Continue PT per POC     Duy Camp, PT, DPT

## 2022-02-14 NOTE — PATIENT INSTRUCTIONS
[employer]            Please track at least 3 days. Please note per day which day of the week it was.

## 2022-02-15 NOTE — PROGRESS NOTES
Group Psychotherapy    Site: [facility]    Clinical status of patient: Outpatient    2/9/2022    Length of service:[id number]    Referred by: Functional [facility]oration Program     Number of patients in attendance: 6    Target symptoms: Chronic Pain Management     Patient's response to intervention:  The patient's response to intervention is active listening.    Progress toward goals and other mental status changes:  The patient's progress toward goals is fair .    Plan: group psychotherapy    Topics Covered: Pt attended CBT for Chronic Pain as part of her participation in Functional Restoration. Topics discussed today included a review of the pain cycle and the stages of change, and how both of these are impacted by anxiety, depression, other mood disorders and chronic pain. Will f/u in 1 day.

## 2022-02-15 NOTE — PROGRESS NOTES
Group Psychotherapy    Site: [facility]    Clinical status of patient: Outpatient    2/14/2022    Length of service:[id number]    Referred by: Functional Restoration Program     Number of patients in attendance: 5    Target symptoms: Chronic Pain Management     Patient's response to intervention:  The patient's response to intervention is active listening.    Progress toward goals and other mental status changes:  The patient's progress toward goals is fair.    Plan: group psychotherapy    Topics Covered: Pt attended CBT for Chronic Pain as part of her participation in Functional Restoration. Topics discussed today included a discussion on their weekend and what they did differently based upon what they have learned in the program so far. Pts also learned about self care in the context of anxiety, depression, other mood disorders and chronic pain. Pts filled out a self care wheel and discussed what they will do in times they need to access self care. Will f/u in 2 days.

## 2022-02-16 ENCOUNTER — CLINICAL SUPPORT (OUTPATIENT)
Dept: REHABILITATION | Facility: OTHER | Age: 32
End: 2022-02-16
Payer: COMMERCIAL

## 2022-02-16 ENCOUNTER — OFFICE VISIT (OUTPATIENT)
Dept: BEHAVIORAL HEALTH | Facility: CLINIC | Age: 32
End: 2022-02-16
Payer: COMMERCIAL

## 2022-02-16 DIAGNOSIS — F40.298 FEAR OF PAIN: ICD-10-CM

## 2022-02-16 DIAGNOSIS — R29.898 DECREASED STRENGTH OF UPPER EXTREMITY: ICD-10-CM

## 2022-02-16 DIAGNOSIS — F33.0 MILD EPISODE OF RECURRENT MAJOR DEPRESSIVE DISORDER: Primary | ICD-10-CM

## 2022-02-16 DIAGNOSIS — F90.9 ATTENTION DEFICIT HYPERACTIVITY DISORDER (ADHD), UNSPECIFIED ADHD TYPE: ICD-10-CM

## 2022-02-16 DIAGNOSIS — M62.81 WEAKNESS OF TRUNK MUSCULATURE: ICD-10-CM

## 2022-02-16 DIAGNOSIS — Z78.9 DECREASED ACTIVITIES OF DAILY LIVING (ADL): ICD-10-CM

## 2022-02-16 DIAGNOSIS — Z74.09 IMPAIRED FUNCTIONAL MOBILITY, BALANCE, GAIT, AND ENDURANCE: Primary | ICD-10-CM

## 2022-02-16 DIAGNOSIS — R29.898 WEAKNESS OF BOTH HIPS: ICD-10-CM

## 2022-02-16 PROCEDURE — 97530 THERAPEUTIC ACTIVITIES: CPT

## 2022-02-16 PROCEDURE — 97110 THERAPEUTIC EXERCISES: CPT

## 2022-02-16 PROCEDURE — 90853 PR GROUP PSYCHOTHERAPY: ICD-10-PCS | Mod: S$GLB,,, | Performed by: SOCIAL WORKER

## 2022-02-16 PROCEDURE — 90853 GROUP PSYCHOTHERAPY: CPT | Mod: S$GLB,,, | Performed by: SOCIAL WORKER

## 2022-02-16 NOTE — PROGRESS NOTES
"[facility]    Functional Restoration Program  Physical Therapy Treatment Note  FRP Day 5    Name: Carlos Snell  MRN:[id number]      Therapy Diagnosis:   Encounter Diagnoses   Name Primary?    Impaired functional mobility, balance, gait, and endurance Yes    Weakness of both hips     Weakness of trunk musculature      Physician: Gwendolyn Zaidi NP    Date: 2/16/2022    Evaluation Date: 1/28/2022  Authorization Period Expiration: 12/31/2022  Plan of Care Expiration: 04/22/22  Visit # / Visits authorized: 5 / 20  FOTO: completed 1 / 3       Time In:  9:00  am  Time Out: 10:15 am  Total Billable Time: 75 minutes    Note: This patient was co-treated /c the assistance of certified rehab technician /c each exercise performed with the supervision of a licensed practitioner.     SUBJECTIVE       Carlos reports sig issue /p NuStep when completing tx on Monday including inc LE numbness and LBP.  Pt reports this limited her Tuesday activities and found herself /c extended sitting.  Pt arrives today noting cont inc LBP. Pt report her sleep was interrupted /c the pain over the last few days.    Pt note she performs stretches in AM and they are getting smoother to perform.          Patient's FRP goals: "I would like to make peace /c [my] chronic illness"  Find exercises that I can do that will help me stay healthy         Current 4/10  Location(s): low back, B knees    OBJECTIVE     Objective Measures updated at progress report unless specified.     Treatment       Carlos received the Individualized Treatments listed below:     Therapeutic exercises to develop strength, endurance, ROM, flexibility, posture and core stabilization for 60 minutes including:      Full body stretch w/ 3-10 sec hold technique &/or 3-5 repetition technique on all of the following:   Cervical flx/ext   Cervical sidebending   Cervical rotation   Cervical protraction/retraction   Shld rolls   Shld " depression/elevation   Scapular retraction/protraction/scaption   Posterior shld stretch (cross arm)   Shld ER stretch (chicken wing)   Elbow flx/ext   Forearm supination/pronation   Wrist flx/ext   Open/close hands/fingers   Seated trunk rotations   Seated trunk/thoracic ext/flx   Seated marches & knee to chest   Seated knee flx/ext   Seated hip ER/piriformis stretch   Seated hamstring stretch   Seated toe raise to heel raise    Seated ankle circles each way   Standing lumbar extensions   Standing lateral leaning stretch   Standing quad stretch (UE support for balance)      Peripheral muscle strengthening which included 1-2 sets of 10-20 repetitions at a slow, controlled 5-7 second per rep pace focused on strengthening supporting musculature for improved body mechanics & functional mobility.  Patient & therapist focused on proper form during treatment to ensure optimal strengthening of each targeted muscle group. Patients are instructed to keep sets/reps with proper load to stay at 3-5 out of 10 on the provided modified Jono exertion scale.       [employer] Machine Exercises Weight (lbs) Sets Repetitions Jono Exertion Scale Rating   Leg Extension        Hamstring Curls       Chest Press 12.5 1 20 3   Pec Fly 12.5 1 20 3   Lat Pull Down       Mid Row 12.5 1 20 3   Bicep Bar Curls        Standing Tricep Extension        Single Leg Press            Supine/Hooklying              LTR x 20               DKTC /c ball x 10 5 sec hold    LTR x 10 on ball cue for core engagement      Prone laying 2 min -> Prone on elbow 2 min -> prone press ups x 10      Seated TBall               Marches x 10 each difficulty raising knees, PT support ball              Pelvic tilts                          Fwd/Bwd x 20 improved movement quality                          Lat x 20    Circumduction CCW, CW x 20    Self release lateral L knee, B HS w/ roller stick 5 minutes      Carlos Snell participated in dynamic functional  therapeutic activities to improve function for 15 minutes, including:     Pt instructed on pillows for positioning & support to offload areas of pain to improve sleeping quality by reducing pain including:  · Supine w/ pillows under knees  · Side-lying w/ pillows under knee & ankle, as well as upper arm/shld  · Semi-supine w/ pillows under trunk, lower knees, upper knees/ankles, & upper arm/shld  Pt w/ good return demonstration & reported increased comfort w/ use.        Patient Education and Home Exercises     Home Exercises Provided and Patient Education Provided     Education provided:   -Pillow placement for limb support and pelvic position while sleeping    Written Home Exercises Provided: Patient instructed to cont prior HEP. Exercises were reviewed and Carlos was able to demonstrate them prior to the end of the session.  Carlos demonstrated good  understanding of the education provided. See EMR under Patient Instructions for information provided during therapy sessions    ASSESSMENT     Tateina /c good participation today completing all requested activities despite initial inc subjective pain report.  'Sleeping positions demo'd for improved support and comfort.  Pt note pillow placement under torso offer change in present sleep plan, therefore, PT encourage pt to attempt for potential improved sleep quality.  Pt demo sig improved TBall movement quality indicating improve core coordination/call up.  BATCA exercises cont to progress weight at max reps indicating improved strength.  Plan to return to resistance training next visit to complete full body challenge.  However, pt cont /c flat affect upon PT noting improved exercise technique and inc tolerance to advance core challenges making difficult to assess general FRP buy in.       Carlos Is progressing well towards her goals.   Pt prognosis is Fair.     Pt will continue to benefit from skilled outpatient physical therapy to address the deficits listed  in the problem list box on initial evaluation, provide pt/family education and to maximize pt's level of independence in the home and community environment.     Pt's spiritual, cultural and educational needs considered and pt agreeable to plan of care and goals.     Anticipated barriers to physical therapy: fear of pain    GOALS: Pt is in agreement with the following goals:        Goal Progress Towards Goal   Short Term: In 3 weeks, pt will:     Verbalize & demonstrate good understanding of resisted training with 3-1-3 second rep for gezlngfclf-ffzkpvteh-zuqerkrtz contraction to encourage full muscle recruitment for strength & endurance Progress towards goal: initiated 1/28/2022   Verbalize & demonstrate good understanding of home stretch routine to improve AROM, help decrease pain & improve mobility Progress towards goal: initiated 1/28/2022   Demonstrate proper supine or seated diaphragmatic breathing with decreased chest excursion & emphasis on full abdominal excursion & increased expiration time to promote muscle relaxation & increased parasympathetic activity to improve patient tolerance to activities Progress towards goal: initiated 1/28/2022   Demonstrate proper static standing posture in frontal & sagittal plane per PT visual assessment to decrease postural strain in ADLs Progress towards goal: initiated 1/28/2022   Demonstrate good recall of names of resisted exercises w/ minimal to moderate verbal cues to promote independence w/ exercise at the end of the program Progress towards goal: initiated 1/28/2022   Verbalize plan for continuing resisted exercises w/ specific location (i.e. commercial gym, home, community fitness center) indicating preference for machine-based or free-weight exercises to incorporate all major muscle groups to maintain & progress therapeutic functional improvements Progress towards goal: initiated 1/28/2022               Long Term: In 8 weeks, pt will:     Verbalize good  understanding of activities planning/scheduling (stretching, mindfulness, resisted training, & cardio) prescribed by PT/OT following the end of the program to sustain & progress functional improvements Progress towards goal: initiated 1/28/2022   Be independent w/ selected resisted training w/ minimal to no cuing while performing to continue w/ resisted strengthening independently at the end of the program Progress towards goal: initiated 1/28/2022   Improve 2 Minute Walk Test (MWT) to 375 feet and 6 MWT to 1125 feet or greater to improve gait speed and mobility and reach community ambulator status Progress towards goal: initiated 1/28/2022   Perform single leg stance 20 seconds or greater bilaterally to improve safety and balance in ADLs Progress towards goal: initiated 1/28/2022   Demonstrate Timed Up & Go (with appropriate assistive device, if necessary) of 10 seconds or less to decrease fall risk and improve functional mobility Progress towards goal: initiated 1/28/2022   Demonstrate 5 time sit to stand test without upper extremity assist to 15 sec or less to improve general mobility and decrease fall risk Progress towards goal: initiated 1/28/2022   Score 55 % or less on Lumbar FOTO to indicate significant functional improvements in impaired functions secondary to low back pain Progress towards goal: initiated 1/28/2022                      PLAN     Continue PT per POC     Shady Bain, PT, DPT

## 2022-02-16 NOTE — PROGRESS NOTES
"[facility]   Functional Restoration Program  Occupational Therapy Daily Note  FRP Day 5    Name: Carlos Snell  Medical Record Number: [id number]    Therapy Diagnosis:   Encounter Diagnoses   Name Primary?    Decreased activities of daily living (ADL)     Decreased strength of upper extremity     Fear of pain      Physician: Gwendolyn Zaidi NP    Visit Date: 2/16/2022    Physician Orders: OT Eval and Treat  Medical Diagnosis: Chronic pain disorder  Evaluation Date: 1/28/2022  Insurance Authorization Period Expiration: 12/31/2022  Plan of Care Certification Period: 3/18/2022  Visit # / Visits authorized: 4 / 20 (plus eval)  FOTO: evaluation: 52% limitation    Time In: 10:17   Time Out: 11:30  Total Billable Time: 73 minutes    Subjective     Carlos reports "Im not limping, im feeling a little better but my back and R hip are still bothering me".        Pain: 3/10  Location: generalized, back and knees, and hip region.    Response to previous treatment: Carlos noted: "Monday was not so great. It started off fine, but during OT I did the nustep and it really messed me up, it gave me a ton of back and leg pain and I couldn't go to sleep and Tuesday I was still recovering from it and it took a while, until the evening, to go down".    Personal Functional Three Month Goals: Performance and satisfaction noted below.    OBJECTIVE     Carlos received the treatments listed below.    Pt participated in functional lifting/endurance/therapeutic activities in order to increase pt's participation with vocational, selfcare ADLs, and leisure activities in order to improve quality of life, for 73 minutes, which included:    Carlos participated in endurance activity on treadmill for 10 minutes, at 2.0 mph (attempted to increased speed but noted increased pain and decreased back down to 2.0), to improve functional endurance and progress towards increased MET level for improved community " "mobility and participation, rated as sort of hard (4/10), Beny-educated on how to add mindfulness component to activity and how to pace appropriately by completed self check ins and grading activity as needed, with goal to reach sort of hard by end of activity.  While on treadmill, able to carry intermittent conversation and reported, "I did go for a walk yesterday, I didn't want to but I did it anyway. I havent gotten it through my head yet that exercise helps the pain but I did it anyway. I walked for 15 min, like our homework said, and set a timer on my phone because I have a tendency to overdo things.     Pt completed dynamic reaching in various functional ranges, with continued focus on standing posture, core awareness, hip rotation/weight shifting, pacing as needed, slow and controlled movement and coordinating movement with breath. Completed 10 reps x 1# lbs, rated as sort of hard (4/10) exertion. Post activity stretching of shoulders performed at door.    Pt completed functional lifting, floor to waist, 15 reps x 8 lbs with exertion rated moderate (3 /10), continued education focused on knees tracking with toes, keeping weight close to center of gravity to minimize reaching, gaze shifting to ground to maintain neutral spine position and coordinating movement with breath.     Pt with personal goal of improved functional activity tolerance to complete hair care. She completed sustained standing task x 10 minutes at upright peg board on wall with focused education on posture, core awareness, overhead reaching, BUE muscular endurance, weight shifting and knees bent, pacing as needed and adding mindfulness component to connect mind-body with intermittent stretches and reconnecting to breath. Able to  pegs that fell on floor with proper mechanics. She rated activity as sort of hard (4/10).      Pt completed maximal lift 2x5 reps with 13lbs, rated as sort of hard (4/10), continued education focused on " neutral spine positioning and pushing through heels for safety and activation of correct muscles. Added focus on slow and controlled movement, holding end ranges for an extra breath to encourage activation of muscles.      Pt participated in functional lift waist to shoulder, 15 reps x 9 lbs with a rating of moderate (3/10). Continued education focused on standing posture, core awareness, keeping shoulders depressed and retracted, stepping to place > reaching to place, keeping weight close to center of gravity and pacing as needed. Added focus and education on adding mindfulness component to activity in order to pace appropriately to complete entirety of activity.     Performed diaphragmatic breathing in supine on mat with use of chair for z-lie position. Discussed benefits of z-lie re: PNS activation, decreased swelling, back decompression, etc. Voiced understanding.     Pt completed yoga activity on mat, supine, quadriped, seated and standing, with focused education on getting on/off floor properly and safely, back mobility, hip flexibility, stress reduction, dynamic standing balance, posture, alignment and coordinating movement with breath, rated moderate (3/10).     Patient Education and Home Exercises     Education provided:   - Ms. Snell received education regarding proper posture and body mechanics. She received education regarding anticipated muscular soreness following lift testing and strategies for management were reviewed with patient including stretching, using ice, scheduled rest.  - Additional Education provided: heriberto scale, pain cycle, z-lie, functional lifting mechanics, pursed lip and diaphragmatic breathing techniques  - education on the Functional Restoration Program. Details of the program were discussed.   - avoiding trunk extensions  - golfer's bend  - energy banking; pacing self with performance.   - Progress towards goals    Written Home Exercises Provided: Patient instructed to cont prior  HEP.  Carlos demonstrated good understanding of the education provided. See EMR under Patient Instructions for information provided during therapy sessions.    ASSESSMENT   Carlos presents with mild affect at start of session. Report of having difficult session on Monday and with increased pain and fatigue well into Tuesday. Noted feeling better today. Good tolerance to session this date with improved indepedence with body mechanics, pacing and fluidity of movement. Good tolerance to weight and repetition increases in functional activities and good discussion re: carry over to functional activities outside of program. Overall, continues with good willingness to participate and making progress towards personal goals    Carlos is unable to fully participate in work, recreational, and home-based activities because of decreased functional strength, decreased endurance, limited positional tolerance, fear of re-injury, and fear of increased pain. She will continue to benefit from skilled outpatient occupational therapy to address the deficits listed on initial evaluation, provide pt education and to maximize pt's level of independence in the home and community environment.      Carlos's prognosis is Good due to good personal goals, willingness to make behavioral changes and good response to pain education and identification of current behaviors.     Pt's spiritual, cultural and educational needs considered and pt agreeable to plan of care and goals.  Anticipated barriers to occupational therapy: None identified    FRP Goals: While working towards the long-term (3 month) functional goals listed above, Carlos will accomplish the following short term goals during the 5-week intensive rehabilitation program. Carlos will:      1. Develop a viable return to work plan.   2. Demonstrate physical capacities consistent with a light-medium work demand level.   3. Demonstrate increased functional strength by lifting 20  lbs floor to waist and 20 lbs waist to shoulder in order to meet demand of work/home routine.   4. Increase her positional tolerance to the level needed for work and home activities.   5. Implement self-care strategies during the program and at home to control pain.   6.   Patient will demonstrate use of mindfulness, stress management, and coping  strategies for improved participation in daily routine.      Specific patient expressed goals and demand levels:  Current Capacity Limit/ Physical  Demand Level (PDL)    Demand Levels of Functional Recovery Goals     Performance/  Satisfaction  Day 1 Performance/  Satisfaction  Day 10 Performance/  Satisfaction  Follow-up      Sedentary-Light Physical Demand  (Based above tested results)     Vocational:  Standing tolerance          Walking tolerance     Ascending stairs     Hauling supply bins      Recreational:  Cooking      Exercise      Daily Living:  Hair care     Lower body dressing      Housechores (vacuuming, dishes, etc)     Light-medium Physical Demand Level     4/4 (now after 15 minutes it becomes painful)    5/3    4/3    5/4      6/5    3/1      6/5    6/5      3/1       The PDL listed above is based on today's physical performance screening test. More comprehensive physical  testing integrated with clinical findings would be required to derived an accurate work capacity.      PLAN   Continue per POC.    Juana Terry, OTR/L  2/16/2022

## 2022-02-17 ENCOUNTER — OFFICE VISIT (OUTPATIENT)
Dept: BEHAVIORAL HEALTH | Facility: CLINIC | Age: 32
End: 2022-02-17
Payer: COMMERCIAL

## 2022-02-17 ENCOUNTER — CLINICAL SUPPORT (OUTPATIENT)
Dept: REHABILITATION | Facility: OTHER | Age: 32
End: 2022-02-17
Payer: COMMERCIAL

## 2022-02-17 DIAGNOSIS — F40.298 FEAR OF PAIN: ICD-10-CM

## 2022-02-17 DIAGNOSIS — R29.898 WEAKNESS OF BOTH HIPS: ICD-10-CM

## 2022-02-17 DIAGNOSIS — F90.9 ATTENTION DEFICIT HYPERACTIVITY DISORDER (ADHD), UNSPECIFIED ADHD TYPE: ICD-10-CM

## 2022-02-17 DIAGNOSIS — Z74.09 IMPAIRED FUNCTIONAL MOBILITY, BALANCE, GAIT, AND ENDURANCE: Primary | ICD-10-CM

## 2022-02-17 DIAGNOSIS — M62.81 WEAKNESS OF TRUNK MUSCULATURE: ICD-10-CM

## 2022-02-17 DIAGNOSIS — R29.898 DECREASED STRENGTH OF UPPER EXTREMITY: ICD-10-CM

## 2022-02-17 DIAGNOSIS — F33.0 MILD EPISODE OF RECURRENT MAJOR DEPRESSIVE DISORDER: Primary | ICD-10-CM

## 2022-02-17 DIAGNOSIS — Z78.9 DECREASED ACTIVITIES OF DAILY LIVING (ADL): ICD-10-CM

## 2022-02-17 PROCEDURE — 90853 PR GROUP PSYCHOTHERAPY: ICD-10-PCS | Mod: S$GLB,,, | Performed by: SOCIAL WORKER

## 2022-02-17 PROCEDURE — 90853 GROUP PSYCHOTHERAPY: CPT | Mod: S$GLB,,, | Performed by: SOCIAL WORKER

## 2022-02-17 PROCEDURE — 97530 THERAPEUTIC ACTIVITIES: CPT

## 2022-02-17 PROCEDURE — 97110 THERAPEUTIC EXERCISES: CPT

## 2022-02-17 NOTE — PROGRESS NOTES
"[facility]   Functional Restoration Program  Occupational Therapy Daily Note  FRP Day 6    Name: Carlos Snell  Medical Record Number: [id number]    Therapy Diagnosis:   Encounter Diagnoses   Name Primary?    Decreased activities of daily living (ADL)     Decreased strength of upper extremity     Fear of pain      Physician: Gwendolyn Zaidi NP    Visit Date: 2/17/2022    Physician Orders: OT Eval and Treat  Medical Diagnosis: Chronic pain disorder  Evaluation Date: 1/28/2022  Insurance Authorization Period Expiration: 12/31/2022  Plan of Care Certification Period: 3/18/2022  Visit # / Visits authorized: 5 / 20 (plus eval)  FOTO: evaluation: 52% limitation    Time In: 10:20  Time Out: 11:30  Total Billable Time: 70 minutes    Subjective     Carlos reports "I am just really hurting today. I think after I hurt my leg on Monday I think im just still recovering and Its really hard not to think about it".        Pain: 3/10  Location: generalized, back and knees, and hip region.    Response to previous treatment: Carlos noted: "I am just really hurting still".    Personal Functional Three Month Goals: Performance and satisfaction noted below.    OBJECTIVE     Carlos received the treatments listed below.    Pt participated in functional lifting/endurance/therapeutic activities in order to increase pt's participation with vocational, selfcare ADLs, and leisure activities in order to improve quality of life, for 70 minutes, which included:    Pt completed dynamic reaching in various functional ranges, from seated position 2/2 increased RLE pain, with continued focus on seated posture, weight shifting as needed, core awareness, hip rotation/weight shifting, pacing as needed, slow and controlled movement and coordinating movement with breath. Completed 10 reps x 2 lbs, rated as moderate (3/10) exertion. Post activity stretching of shoulders performed at door.    Pt completed functional " lifting, floor to waist, 2x5 reps x 9 lbs with exertion rated sort of hard (4 /10), continued education focused on knees tracking with toes, keeping weight close to center of gravity to minimize reaching, gaze shifting to ground to maintain neutral spine position and coordinating movement with breath. Pt educated on wider base of support can lead to faster activation of glutes and lends itself to decreased compensatory movement patterns. Educated on weight shifting during rest breaks to provide relief on either side. Noted pain relief with weight shifting on crate.    Pt with personal goal of improved functional activity tolerance to complete hair care. She completed sustained standing task x 10 minutes (finished last 5 min seated on stool 2/2 increased pain in RLE) at upright peg board on wall with focused education on posture, core awareness, overhead reaching, BUE muscular endurance, weight shifting and knees bent, pacing as needed and adding mindfulness component to connect mind-body with intermittent stretches and reconnecting to breath. Placed double crated in front of her to have option to weight shift when needed. Noted pain relief with weight shift. Able to  pegs that fell on floor with proper mechanics. 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 She rated activity as easy (2/10).    Pt completed maximal lift 2x5 reps with 14lbs, rated as hard (5/10), continued education focused on neutral spine positioning and pushing through heels for safety and activation of correct muscles. Added focus on slow and controlled movement, holding end ranges for an extra breath to encourage activation of muscles.     Pt educated on and completed seated mindfulness alternate nostril breathing activity x20 cycles with focus on posture, mechanics of breathing, and benefits re: PNS activation and grounding. Pt voiced and demonstrated understanding. Given handout.     Pt  completed yoga activity on mat, supine, quadriped, seated and standing, with focused education on getting on/off floor properly and safely, back mobility, hip flexibility, stress reduction, dynamic standing balance, posture, alignment and coordinating movement with breath, rated moderate (3/10).     Completed standing activity with discussion re: symptoms of panic, fear of pain, and how to reassure nervous system. Educated pt on how deep pressure through joints can calm nervous system. Completed standing activity at wall with staggered stance to push into wall for application of deep pressure. Completed intentional breathing cycles and with good discussion re: symptoms of panic and how to talk to herself more postively and being mindful of body sensations in order to reframe (ie: what do I need right now?)      Patient Education and Home Exercises     Education provided:   - Ms. Snell received education regarding proper posture and body mechanics. She received education regarding anticipated muscular soreness following lift testing and strategies for management were reviewed with patient including stretching, using ice, scheduled rest.  - Additional Education provided: heriberto scale, pain cycle, z-lie, functional lifting mechanics, pursed lip and diaphragmatic breathing techniques  - education on the Functional Restoration Program. Details of the program were discussed.   - avoiding trunk extensions  - golfer's bend  - energy banking; pacing self with performance.   - Progress towards goals    Written Home Exercises Provided: Patient instructed to cont prior HEP.  Carlos demonstrated good understanding of the education provided. See EMR under Patient Instructions for information provided during therapy sessions.    ASSESSMENT   Carlos presents with continued sunday affect and low mood at start of session. Report of continued increased pain in RLE. Despite this, continued willingness to participate and good overall  tolerance to session this date with modifications and increased time to complete activities. Good response to all feedback and education and noted improved pain relief and elevated mood at end of session post mind-body discussions, nervous system education and yoga activity. Overall, continues with good willingness to participate and making progress towards personal goals    Carlos is unable to fully participate in work, recreational, and home-based activities because of decreased functional strength, decreased endurance, limited positional tolerance, fear of re-injury, and fear of increased pain. She will continue to benefit from skilled outpatient occupational therapy to address the deficits listed on initial evaluation, provide pt education and to maximize pt's level of independence in the home and community environment.      Carlos's prognosis is Good due to good personal goals, willingness to make behavioral changes and good response to pain education and identification of current behaviors.     Pt's spiritual, cultural and educational needs considered and pt agreeable to plan of care and goals.  Anticipated barriers to occupational therapy: None identified    FRP Goals: While working towards the long-term (3 month) functional goals listed above, Carlos will accomplish the following short term goals during the 5-week intensive rehabilitation program. Carlos will:      1. Develop a viable return to work plan.   2. Demonstrate physical capacities consistent with a light-medium work demand level.   3. Demonstrate increased functional strength by lifting 20 lbs floor to waist and 20 lbs waist to shoulder in order to meet demand of work/home routine.   4. Increase her positional tolerance to the level needed for work and home activities.   5. Implement self-care strategies during the program and at home to control pain.   6.   Patient will demonstrate use of mindfulness, stress management, and coping   strategies for improved participation in daily routine.      Specific patient expressed goals and demand levels:  Current Capacity Limit/ Physical  Demand Level (PDL)    Demand Levels of Functional Recovery Goals     Performance/  Satisfaction  Day 1 Performance/  Satisfaction  Day 10 Performance/  Satisfaction  Follow-up      Sedentary-Light Physical Demand  (Based above tested results)     Vocational:  Standing tolerance          Walking tolerance     Ascending stairs     Hauling supply bins      Recreational:  Cooking      Exercise      Daily Living:  Hair care     Lower body dressing      Housechores (vacuuming, dishes, etc)     Light-medium Physical Demand Level     4/4 (now after 15 minutes it becomes painful)    5/3    4/3    5/4      6/5    3/1      6/5    6/5      3/1       The PDL listed above is based on today's physical performance screening test. More comprehensive physical  testing integrated with clinical findings would be required to derived an accurate work capacity.      PLAN   Continue per POC.    Juana Terry OTR/L  2/17/2022

## 2022-02-17 NOTE — PROGRESS NOTES
"[facility]    Functional Restoration Program  Physical Therapy Treatment Note  FRP Day 6    Name: Carlos Snell  MRN:[id number]      Therapy Diagnosis:   Encounter Diagnoses   Name Primary?    Impaired functional mobility, balance, gait, and endurance Yes    Weakness of both hips     Weakness of trunk musculature      Physician: Gwendolyn Zaidi NP    Date: 2/17/2022    Evaluation Date: 1/28/2022  Authorization Period Expiration: 12/31/2022  Plan of Care Expiration: 04/22/22  Visit # / Visits authorized: 6 / 20  FOTO: completed 1 / 3       Time In:  9:00  am  Time Out: 10:15 am  Total Billable Time: 75 minutes        SUBJECTIVE       Carlos reports RLE sig inc in pain "shooting knives" and numb R foot since 4pm.  Pt thinks possible exacerbated by extended sitting during drive home. Pt report loss of dinner appetite and poor sleep last night.  Pt attempted LE pillow placement between ankle and knee /c no improvement in downside hip pain.    Pt report 1 pop up this weekend.        Patient's FRP goals: "I would like to make peace /c [my] chronic illness"  Find exercises that I can do that will help me stay healthy         Current 7/10  Location(s): low back, B knees    OBJECTIVE     Objective Measures updated at progress report unless specified.     Treatment       Carlos received the Individualized Treatments listed below:     Therapeutic exercises to develop strength, endurance, ROM, flexibility, posture and core stabilization for 75 minutes including:      Full body stretch w/ 3-10 sec hold technique &/or 3-5 repetition technique on all of the following:   Cervical flx/ext   Cervical sidebending   Cervical rotation   Cervical protraction/retraction   Shld rolls   Shld depression/elevation   Scapular retraction/protraction/scaption   Posterior shld stretch (cross arm)   Shld ER stretch (chicken wing)   Elbow flx/ext   Forearm supination/pronation   Wrist " flx/ext   Open/close hands/fingers   Seated trunk rotations   Seated trunk/thoracic ext/flx   Seated marches & knee to chest   Seated knee flx/ext   Seated hip ER/piriformis stretch   Seated hamstring stretch   Seated toe raise to heel raise    Seated ankle circles each way   Standing lumbar extensions   Standing lateral leaning stretch   Standing quad stretch (UE support for balance)      Peripheral muscle strengthening which included 1-2 sets of 10-20 repetitions at a slow, controlled 5-7 second per rep pace focused on strengthening supporting musculature for improved body mechanics & functional mobility.  Patient & therapist focused on proper form during treatment to ensure optimal strengthening of each targeted muscle group. Patients are instructed to keep sets/reps with proper load to stay at 3-5 out of 10 on the provided modified Jono exertion scale.       BATCA Machine Exercises Weight (lbs) Sets Repetitions Jono Exertion Scale Rating   Leg Extension        Hamstring Curls       Chest Press 12.5 1 20 3   Pec Fly 12.5 1 20 3   Lat Pull Down       Mid Row 12.5 1 20 3   Bicep Bar Curls        Standing Tricep Extension        Single Leg Press            Supine/Hooklying              LTR x 20               DKTC /c ball x 20  End range pain, worse      Prone laying 2 min    PT cutaneous light pressure inc LBP   Prone on pillow chest 2 min   glute set x 5 5 sec hold, knee bend x 10 B pt note inc sx   GLENN 2 min worse   Press ups x 20  End range pain, no worse           Patient Education and Home Exercises     Home Exercises Provided and Patient Education Provided     Education provided:   -Pillow placement for limb support and pelvic position while sleeping    Written Home Exercises Provided: Patient instructed to cont prior HEP. Exercises were reviewed and Carlos was able to demonstrate them prior to the end of the session.  Carlos demonstrated good  understanding of the education provided. See EMR  under Patient Instructions for information provided during therapy sessions    ASSESSMENT     Chabrina /c sig limited activity tolerance and inc time between activity.  Pt subjective report of inc RLE and R LBP likely mechanical origin /c neural involvement as pt note sx ebb and flow thru the week..  Therefore, tx begin /c investigation for directional preference for sx self management.   Although ext not result in dec sx presentation no inc either, therefore, pt advised to perform extension biased mobility for improved posture and preparation for future bouts of extended sitting. However, upon motions testing no sx relief noted and instead inc sx /c non provocative actions indicating cont sig of central sensitization driving pain presentation.   Of note, PT focus on physical attributes of mobility challenges today not result in improved participation or affect.  For example, pt believe her decision making ability limited by her pain and refuse to make choice on what resistance exercises to perform.  If pain cont to Monday, recommend return to breath training /c pt ed for parasympathetic stimulation and return to resistance training /c modifications as needed.        Carlos Is progressing well towards her goals.   Pt prognosis is Fair.     Pt will continue to benefit from skilled outpatient physical therapy to address the deficits listed in the problem list box on initial evaluation, provide pt/family education and to maximize pt's level of independence in the home and community environment.     Pt's spiritual, cultural and educational needs considered and pt agreeable to plan of care and goals.     Anticipated barriers to physical therapy: fear of pain    GOALS: Pt is in agreement with the following goals:        Goal Progress Towards Goal   Short Term: In 3 weeks, pt will:     Verbalize & demonstrate good understanding of resisted training with 3-1-3 second rep for ciddstsijj-nrrpnjzth-lcbqqsyln contraction to  encourage full muscle recruitment for strength & endurance Progress towards goal: initiated 1/28/2022   Verbalize & demonstrate good understanding of home stretch routine to improve AROM, help decrease pain & improve mobility Progress towards goal: initiated 1/28/2022   Demonstrate proper supine or seated diaphragmatic breathing with decreased chest excursion & emphasis on full abdominal excursion & increased expiration time to promote muscle relaxation & increased parasympathetic activity to improve patient tolerance to activities Progress towards goal: initiated 1/28/2022   Demonstrate proper static standing posture in frontal & sagittal plane per PT visual assessment to decrease postural strain in ADLs Progress towards goal: initiated 1/28/2022   Demonstrate good recall of names of resisted exercises w/ minimal to moderate verbal cues to promote independence w/ exercise at the end of the program Progress towards goal: initiated 1/28/2022   Verbalize plan for continuing resisted exercises w/ specific location (i.e. commercial gym, home, community fitness center) indicating preference for machine-based or free-weight exercises to incorporate all major muscle groups to maintain & progress therapeutic functional improvements Progress towards goal: initiated 1/28/2022               Long Term: In 8 weeks, pt will:     Verbalize good understanding of activities planning/scheduling (stretching, mindfulness, resisted training, & cardio) prescribed by PT/OT following the end of the program to sustain & progress functional improvements Progress towards goal: initiated 1/28/2022   Be independent w/ selected resisted training w/ minimal to no cuing while performing to continue w/ resisted strengthening independently at the end of the program Progress towards goal: initiated 1/28/2022   Improve 2 Minute Walk Test (MWT) to 375 feet and 6 MWT to 1125 feet or greater to improve gait speed and mobility and reach [facility]  ambulator status Progress towards goal: initiated 1/28/2022   Perform single leg stance 20 seconds or greater bilaterally to improve safety and balance in ADLs Progress towards goal: initiated 1/28/2022   Demonstrate Timed Up & Go (with appropriate assistive device, if necessary) of 10 seconds or less to decrease fall risk and improve functional mobility Progress towards goal: initiated 1/28/2022   Demonstrate 5 time sit to stand test without upper extremity assist to 15 sec or less to improve general mobility and decrease fall risk Progress towards goal: initiated 1/28/2022   Score 55 % or less on Lumbar FOTO to indicate significant functional improvements in impaired functions secondary to low back pain Progress towards goal: initiated 1/28/2022                      PLAN     Continue PT per POC     Shady Bain, PT, DPT

## 2022-02-17 NOTE — PROGRESS NOTES
Group Psychotherapy    Site: [facility]    Clinical status of patient: Outpatient    2/17/2022    Length of service:[id number]    Referred by: Functional [facility]oration Program     Number of patients in attendance: 5    Target symptoms: Chronic Pain Management     Patient's response to intervention:  The patient's response to intervention is active listening.    Progress toward goals and other mental status changes:  The patient's progress toward goals is fair .    Plan: group psychotherapy    Topics Covered: Pt attended CBT for Chronic Pain as part of her participation in Functional Restoration. Topics discussed today included a discussion on vulnerability in the setting of chronic pain and mood disorders. Pts watched Dr. Darryl Meek's RONEY talk on the subject. We also discussed homework for the weekend, pts understand what they're to do. Will f/u on Monday.

## 2022-02-17 NOTE — PROGRESS NOTES
Group Psychotherapy    Site: [facility]    Clinical status of patient: Outpatient    2/16/2022    Length of service:[id number]    Referred by: [facility] Program     Number of patients in attendance: 6    Target symptoms: Chronic Pain Management     Patient's response to intervention:  The patient's response to intervention is active listening.    Progress toward goals and other mental status changes:  The patient's progress toward goals is fair .    Plan: group psychotherapy    Topics Covered: Pt attended CBT for Chronic Pain as part of her participation in Functional [facility]oration. Topics discussed today included a discussion on the autonomic nervous system and how it impacts chronic pain, anxiety, depression and other mood disorders. We discussed coping skills for stress and how to find a balance between the sympathetic and parasympathetic nervous systems. Will f/u in 1 day.

## 2022-02-21 ENCOUNTER — CLINICAL SUPPORT (OUTPATIENT)
Dept: REHABILITATION | Facility: OTHER | Age: 32
End: 2022-02-21
Payer: COMMERCIAL

## 2022-02-21 ENCOUNTER — OFFICE VISIT (OUTPATIENT)
Dept: BEHAVIORAL HEALTH | Facility: CLINIC | Age: 32
End: 2022-02-21
Payer: COMMERCIAL

## 2022-02-21 DIAGNOSIS — R29.898 WEAKNESS OF BOTH HIPS: ICD-10-CM

## 2022-02-21 DIAGNOSIS — Z74.09 IMPAIRED FUNCTIONAL MOBILITY, BALANCE, GAIT, AND ENDURANCE: Primary | ICD-10-CM

## 2022-02-21 DIAGNOSIS — G89.4 CHRONIC PAIN SYNDROME: ICD-10-CM

## 2022-02-21 DIAGNOSIS — F90.9 ATTENTION DEFICIT HYPERACTIVITY DISORDER (ADHD), UNSPECIFIED ADHD TYPE: ICD-10-CM

## 2022-02-21 DIAGNOSIS — F40.298 FEAR OF PAIN: ICD-10-CM

## 2022-02-21 DIAGNOSIS — M62.81 WEAKNESS OF TRUNK MUSCULATURE: ICD-10-CM

## 2022-02-21 DIAGNOSIS — Z78.9 DECREASED ACTIVITIES OF DAILY LIVING (ADL): Primary | ICD-10-CM

## 2022-02-21 DIAGNOSIS — R29.898 DECREASED STRENGTH OF UPPER EXTREMITY: ICD-10-CM

## 2022-02-21 DIAGNOSIS — F33.0 MILD EPISODE OF RECURRENT MAJOR DEPRESSIVE DISORDER: Primary | ICD-10-CM

## 2022-02-21 PROCEDURE — 97530 THERAPEUTIC ACTIVITIES: CPT

## 2022-02-21 PROCEDURE — 90853 PR GROUP PSYCHOTHERAPY: ICD-10-PCS | Mod: S$GLB,,, | Performed by: SOCIAL WORKER

## 2022-02-21 PROCEDURE — 90853 GROUP PSYCHOTHERAPY: CPT | Mod: S$GLB,,, | Performed by: SOCIAL WORKER

## 2022-02-21 PROCEDURE — 97110 THERAPEUTIC EXERCISES: CPT

## 2022-02-21 NOTE — PROGRESS NOTES
"[facility]    Functional Restoration Program  Physical Therapy Treatment Note  FRP Day 7    Name: Carlos Snell  MRN:[id number]      Therapy Diagnosis:   Encounter Diagnoses   Name Primary?    Impaired functional mobility, balance, gait, and endurance Yes    Chronic pain syndrome     Weakness of both hips     Weakness of trunk musculature      Physician: Gwendolyn Zaidi NP    Date: 2/21/2022    Evaluation Date: 1/28/2022  Authorization Period Expiration: 12/31/2022  Plan of Care Expiration: 04/22/22  Visit # / Visits authorized: 7 / 20  FOTO: completed 1 / 3       Time In:  10:15  am  Time Out: 11:30 am  Total Billable Time: 75 minutes        SUBJECTIVE       Carlos reports she did a lot of activities over the weekend. She wishes that she would have had time to stretch before her pop up as she thinks it would not have taken as much out of her. In doing housework on Sunday, she was very sore & it was quite hard. She took an introspective look to see if it can help her pace more appropriately in the future.      Patient's FRP goals: "I would like to make peace /c [my] chronic illness"  Find exercises that I can do that will help me stay healthy         Current 4/10  Location(s): low back, B knees    OBJECTIVE     Objective Measures updated at progress report unless specified.     Treatment       Carlos received the Individualized Treatments listed below:     Therapeutic exercises to develop strength, endurance, ROM, flexibility, posture and core stabilization for 75 minutes including:          Peripheral muscle strengthening which included 1-2 sets of 10-20 repetitions at a slow, controlled 5-7 second per rep pace focused on strengthening supporting musculature for improved body mechanics & functional mobility.  Patient & therapist focused on proper form during treatment to ensure optimal strengthening of each targeted muscle group. Patients are instructed to keep sets/reps with " proper load to stay at 3-5 out of 10 on the provided modified Jono exertion scale.       BATCA Machine Exercises Weight (lbs) Sets Repetitions Jono Exertion Scale Rating   Leg Extension  5 1 15 5   Hamstring Curls 15 1 15 6   Chest Press       Pec Fly       Lat Pull Down 12.5 1 20 5   Mid Row 12.5 1 20 4   Bicep Bar Curls  5 1 20 4   Standing Tricep Extension  12.5 1 15 5   Single Leg Press 20 1 20 5 R, 4 L       · Supine LTR w/ Tball 10 B  · Supine bridge partial w/ verbal cue to activate gluts 10  · Supine LTR 10 B  · Supine bridge w/ belt 10  · Supine straight leg raise w/ opposite LE Tbal compression for core activation 2x10 B  · Fitter board hard tilts w/ light palmar support 2x20 each way  · Standing lumbar & posterior chain stretch 2x30 sec B  · Supine AROM R LE neural flossing 10 (DF, knee ext)  · Seated AROM R LE neural flossing 10 (DF, knee ext)  · Self release massage to hamstrings ~3 minute w/ wand        Patient Education and Home Exercises     Home Exercises Provided and Patient Education Provided     Education provided:   -none    Written Home Exercises Provided: Patient instructed to cont prior HEP. Exercises were reviewed and Carlos was able to demonstrate them prior to the end of the session.  Carlos demonstrated good  understanding of the education provided. See EMR under Patient Instructions for information provided during therapy sessions    ASSESSMENT     Carlos w/ fairly good tolerance to inteventions today w/ reports of some increased pins/needles & nerve pain w/ activity. Lumbar mobility & stretching did not change her pain much indicating that it may be peripheral nerve immobility.  She also demonstrates a great deal of fear of pain & central sensitization needing reminder that the low velocity & low load exercises are not going to do harm to the body. In supine exercises, she expressed a great deal of concern about the effect of gravity & pressure on her back needing encouragement to  continue. PT will need reinforcement of this.      Carlos Is progressing well towards her goals.   Pt prognosis is Fair.     Pt will continue to benefit from skilled outpatient physical therapy to address the deficits listed in the problem list box on initial evaluation, provide pt/family education and to maximize pt's level of independence in the home and community environment.     Pt's spiritual, cultural and educational needs considered and pt agreeable to plan of care and goals.     Anticipated barriers to physical therapy: fear of pain    GOALS: Pt is in agreement with the following goals:        Goal Progress Towards Goal   Short Term: In 3 weeks, pt will:     Verbalize & demonstrate good understanding of resisted training with 3-1-3 second rep for ubivdalyiy-xzysrtvvd-pkoexuzbg contraction to encourage full muscle recruitment for strength & endurance Progress towards goal: initiated 1/28/2022   Verbalize & demonstrate good understanding of home stretch routine to improve AROM, help decrease pain & improve mobility Progress towards goal: initiated 1/28/2022   Demonstrate proper supine or seated diaphragmatic breathing with decreased chest excursion & emphasis on full abdominal excursion & increased expiration time to promote muscle relaxation & increased parasympathetic activity to improve patient tolerance to activities Progress towards goal: initiated 1/28/2022   Demonstrate proper static standing posture in frontal & sagittal plane per PT visual assessment to decrease postural strain in ADLs Progress towards goal: initiated 1/28/2022   Demonstrate good recall of names of resisted exercises w/ minimal to moderate verbal cues to promote independence w/ exercise at the end of the program Progress towards goal: initiated 1/28/2022   Verbalize plan for continuing resisted exercises w/ specific location (i.e. commercial gym, home, community fitness center) indicating preference for machine-based or free-weight  exercises to incorporate all major muscle groups to maintain & progress therapeutic functional improvements Progress towards goal: initiated 1/28/2022               Long Term: In 8 weeks, pt will:     Verbalize good understanding of activities planning/scheduling (stretching, mindfulness, resisted training, & cardio) prescribed by PT/OT following the end of the program to sustain & progress functional improvements Progress towards goal: initiated 1/28/2022   Be independent w/ selected resisted training w/ minimal to no cuing while performing to continue w/ resisted strengthening independently at the end of the program Progress towards goal: initiated 1/28/2022   Improve 2 Minute Walk Test (MWT) to 375 feet and 6 MWT to 1125 feet or greater to improve gait speed and mobility and reach community ambulator status Progress towards goal: initiated 1/28/2022   Perform single leg stance 20 seconds or greater bilaterally to improve safety and balance in ADLs Progress towards goal: initiated 1/28/2022   Demonstrate Timed Up & Go (with appropriate assistive device, if necessary) of 10 seconds or less to decrease fall risk and improve functional mobility Progress towards goal: initiated 1/28/2022   Demonstrate 5 time sit to stand test without upper extremity assist to 15 sec or less to improve general mobility and decrease fall risk Progress towards goal: initiated 1/28/2022   Score 55 % or less on Lumbar FOTO to indicate significant functional improvements in impaired functions secondary to low back pain Progress towards goal: initiated 1/28/2022                      PLAN     Continue PT per POC     Duy Camp, PT, DPT

## 2022-02-21 NOTE — PROGRESS NOTES
"    [facility]   Functional Restoration Program  Occupational Therapy Daily Note  FRP Day 7    Name: Carlos Snell  Medical Record Number: [id number]    Therapy Diagnosis:   Encounter Diagnoses   Name Primary?    Decreased activities of daily living (ADL) Yes    Decreased strength of upper extremity     Fear of pain      Physician: Gwendolyn Zaidi NP    Visit Date: 2/21/2022    Physician Orders: OT Eval and Treat  Medical Diagnosis: Chronic pain disorder  Evaluation Date: 1/28/2022  Insurance Authorization Period Expiration: 12/31/2022  Plan of Care Certification Period: 3/18/2022  Visit # / Visits authorized: 8 / 20 (incuding eval)  FOTO: evaluation: 52% limitation    Time In: 09:01  Time Out: 10:15  Total Billable Time: 74 minutes    Subjective     Carlos reports "my foot was numb and my leg was hurting; it was hard to do the cleaning". "my 's messy is my clean". It was hard because he felt I had an attitude cleaning, which doesn't help pacing. If I take a break he might think I can do it and will take over, so I might not pace myself.        Pain: 4/10  Location: generalized, back and knees, and hip region.    Response to previous treatment: Carlos noted: "I am not crying like last Thursday".    Personal Functional Three Month Goals: Performance and satisfaction noted below.    OBJECTIVE     Carlos received the treatments listed below.    Pt participated in functional lifting/endurance/therapeutic activities in order to increase pt's participation with vocational, selfcare ADLs, and leisure activities in order to improve quality of life, for 74 minutes, which included:    Full body stretch w/ 3-10 sec hold technique &/or 3-5 repetition technique on all of the following:   Cervical flx/ext   Cervical sidebending   Cervical rotation   Cervical protraction/retraction   Shld rolls   Shld depression/elevation   Scapular retraction/protraction/scaption   Posterior " shld stretch (cross arm)   Shld ER stretch (chicken wing)   Elbow flx/ext   Forearm supination/pronation   Wrist flx/ext   Open/close hands/fingers   Seated trunk rotations   Seated trunk/thoracic ext/flx   Seated marches & knee to chest   Seated knee flx/ext   Seated hip ER/piriformis stretch   Seated hamstring stretch   Seated toe raise to heel raise    Seated ankle circles each way   Standing lumbar extensions   Standing lateral trunk stretch   Standing quad stretch    Pt completed functional lifting, floor to waist, 15 reps x 9# lbs with exertion rated hard (5 /10). Carlos used wide base of support with technique. Discussed how she was able to apply concepts of functional lifting during last weekend's pop-up, placing boxes on table to unpack, and using pulling with 2 hands of cart after the sale, which she stated worked well, to avoid twisting with trunk to pull the cart single handely.     Pt participated in functional lift waist to shoulder, 10 reps x  10 #;\lbs with a rating of sort of hard (4/10). Pt educated on standing posture, core awareness, keeping shoulders depressed and retracted, stepping to place > reaching to place, keeping weight close to center of gravity and pacing as needed. Discussed consider use of multi-step step stool to place pop up sale supplies in several bins over head, instead of single step. She states that they have a ladder in spouse' closet, for him to reach his shoes at top shelve. Discussed chores of the weekend, with included sorting closet and swapping summer and winter clothing, in addition to having had the pop-up sale.    Carlos participated in endurance activity using treadmill for 10 minutes, starting at level 1 to level 2 at 4 minutes to improve functional endurance and progress towards increased MET level for improved community mobility and participation, rated as moderate (3/10), Carlos re-educated on how to add mindfulness component to activity  and how to pace appropriately by completed self check ins and grading activity as needed, with goal to reach moderate to sort of hard by end of activity.    Transferred to supine with use of physio ball for z-lie position. Focus on diaphragmatic breathing, then review of energy banking tracking, and using this tracking as a way to communicate with spouse regarding pacing, planning, prioritizing, and that FRP is not just about posture and using body mechanics with lifting.    Pt completed dynamic reaching in various functional ranges with continued focus on seated posture, weight shifting as needed, core awareness, hip rotation/weight shifting, pacing as needed, slow and controlled movement and coordinating movement with breath. Instructed to scoop before raising up, to keep shoulders depressed. Completed 10 reps x 2# lbs, rated as sort of hard (4/10) exertion. Post activity stretching of shoulders performed at wall.    Pt completed maximal lift 5 reps with 14 #lbs, rated as moderate (3/10), continued education focused on neutral spine positioning and pushing through heels for safety and activation of correct muscles.     Patient Education and Home Exercises     Education provided:   - Ms. Snell received education regarding proper posture and body mechanics. She received education regarding anticipated muscular soreness following lift testing and strategies for management were reviewed with patient including stretching, using ice, scheduled rest.  - Additional Education provided: heriberto scale, pain cycle, z-lie, functional lifting mechanics, pursed lip and diaphragmatic breathing techniques  - education on the Functional Restoration Program. Details of the program were discussed.   - avoiding trunk extensions  - golfer's bend  - energy banking; pacing self with performance.   - Progress towards goals    Written Home Exercises Provided: Patient instructed to cont prior OVIDIO Gonzalez demonstrated good understanding of  the education provided. See EMR under Patient Instructions for information provided during therapy sessions.    ASSESSMENT   Carlos presents with report of being able to apply concepts of body mechanics with the pop-up sale she had on Saturday. Despite this activity, spent time on Sunday organizing closet space, in addition to laundry, sweeping etc, and open to explaining different values of her and spouse regarding what is clean, with spouse taking over task when she is taking a break, resulting in feelings of guilt.    Although with improving pain in leg as compared to last week, with decreased affect during session, and speech low in volume and rate. Despite this, continued willingness to participate and good overall tolerance to session this date with modifications and increased time to complete activities. Overall, continues with good willingness to participate and making progress towards personal goals    Carlos is unable to fully participate in work, recreational, and home-based activities because of decreased functional strength, decreased endurance, limited positional tolerance, fear of re-injury, and fear of increased pain. She will continue to benefit from skilled outpatient occupational therapy to address the deficits listed on initial evaluation, provide pt education and to maximize pt's level of independence in the home and community environment.      Carlos's prognosis is Good due to good personal goals, willingness to make behavioral changes and good response to pain education and identification of current behaviors.     Pt's spiritual, cultural and educational needs considered and pt agreeable to plan of care and goals.  Anticipated barriers to occupational therapy: None identified    FRP Goals: While working towards the long-term (3 month) functional goals listed above, Carlos will accomplish the following short term goals during the 5-week intensive rehabilitation program. Carlos will:       1. Develop a viable return to work plan.   2. Demonstrate physical capacities consistent with a light-medium work demand level.   3. Demonstrate increased functional strength by lifting 20 lbs floor to waist and 20 lbs waist to shoulder in order to meet demand of work/home routine.   4. Increase her positional tolerance to the level needed for work and home activities.   5. Implement self-care strategies during the program and at home to control pain.   6.   Patient will demonstrate use of mindfulness, stress management, and coping  strategies for improved participation in daily routine.      Specific patient expressed goals and demand levels:  Current Capacity Limit/ Physical  Demand Level (PDL)    Demand Levels of Functional Recovery Goals     Performance/  Satisfaction  Day 1 Performance/  Satisfaction  Day 10 Performance/  Satisfaction  Follow-up      Sedentary-Light Physical Demand  (Based above tested results)     Vocational:  Standing tolerance          Walking tolerance     Ascending stairs     Hauling supply bins      Recreational:  Cooking      Exercise      Daily Living:  Hair care     Lower body dressing      Housechores (vacuuming, dishes, etc)     Light-medium Physical Demand Level     4/4 (now after 15 minutes it becomes painful)    5/3    4/3    5/4      6/5    3/1      6/5    6/5      3/1       The PDL listed above is based on today's physical performance screening test. More comprehensive physical  testing integrated with clinical findings would be required to derived an accurate work capacity.      PLAN   Continue per POC.    JEF Rivero, LILIR/L, CEAS-I  2/21/2022

## 2022-02-24 ENCOUNTER — CLINICAL SUPPORT (OUTPATIENT)
Dept: REHABILITATION | Facility: OTHER | Age: 32
End: 2022-02-24
Payer: COMMERCIAL

## 2022-02-24 ENCOUNTER — OFFICE VISIT (OUTPATIENT)
Dept: BEHAVIORAL HEALTH | Facility: CLINIC | Age: 32
End: 2022-02-24
Payer: COMMERCIAL

## 2022-02-24 DIAGNOSIS — Z78.9 DECREASED ACTIVITIES OF DAILY LIVING (ADL): Primary | ICD-10-CM

## 2022-02-24 DIAGNOSIS — R29.898 DECREASED STRENGTH OF UPPER EXTREMITY: ICD-10-CM

## 2022-02-24 DIAGNOSIS — F33.0 MILD EPISODE OF RECURRENT MAJOR DEPRESSIVE DISORDER: Primary | ICD-10-CM

## 2022-02-24 DIAGNOSIS — F40.298 FEAR OF PAIN: ICD-10-CM

## 2022-02-24 DIAGNOSIS — M62.81 WEAKNESS OF TRUNK MUSCULATURE: ICD-10-CM

## 2022-02-24 DIAGNOSIS — Z74.09 IMPAIRED FUNCTIONAL MOBILITY, BALANCE, GAIT, AND ENDURANCE: Primary | ICD-10-CM

## 2022-02-24 DIAGNOSIS — R29.898 WEAKNESS OF BOTH HIPS: ICD-10-CM

## 2022-02-24 DIAGNOSIS — F90.9 ATTENTION DEFICIT HYPERACTIVITY DISORDER (ADHD), UNSPECIFIED ADHD TYPE: ICD-10-CM

## 2022-02-24 PROCEDURE — 90853 GROUP PSYCHOTHERAPY: CPT | Mod: S$GLB,,, | Performed by: SOCIAL WORKER

## 2022-02-24 PROCEDURE — 97530 THERAPEUTIC ACTIVITIES: CPT

## 2022-02-24 PROCEDURE — 97110 THERAPEUTIC EXERCISES: CPT

## 2022-02-24 PROCEDURE — 90853 PR GROUP PSYCHOTHERAPY: ICD-10-PCS | Mod: S$GLB,,, | Performed by: SOCIAL WORKER

## 2022-02-24 NOTE — PROGRESS NOTES
"[facility]   Functional Restoration Program  Occupational Therapy Progress Report  FRP Day 9   NOTE: This is a duplicate copy utilized for reassessment purposes & continuity of care.  See pt's plan of care for co-signed copy.    Name: Carlos Snell  Medical Record Number: [id number]    Therapy Diagnosis:   Encounter Diagnoses   Name Primary?    Decreased activities of daily living (ADL) Yes    Decreased strength of upper extremity     Fear of pain      Physician: Gwendolyn Zaidi NP    Visit Date: 2/24/2022    Physician Orders: OT Eval and Treat  Medical Diagnosis: Chronic pain disorder  Evaluation Date: 1/28/2022  Insurance Authorization Period Expiration: 12/31/2022  Plan of Care Certification Period: 5/6/2022 (UPDATED 2/24/22)  Visit # / Visits authorized: 8 / 20 (incuding eval)  FOTO: evaluation: 52% limitation  FOTO: reassessment: 36% limitation    Time In: 09:00   Time Out: 10:15  Total Billable Time: 75 minutes    Subjective     Carlos reports "I think its been good. By the time I get home I probably look the same as before I started the program. But I know how to lift better which I think has been the most helpful thing is how to functionally lift. 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 Its way less painful so I have more energy to be nice to customers. The z-lie position has been really helpful, deep breathing. I am surprised by how much I like the deep breathing exercises and how helpful they are. I am still frustrated with the pain level. I wanted it to go down more than it has. I think just internally trying to find the motivation to go to the gym. I feel like I'm doing this 3x/ week right now so that's good enough. I wanted cleaning to become easier and it hasn't really except lifting things. Bending over is still challenging. How many movements you have to do is hard when cleaning, especially the bathroom. I hope this leg pain can go down, and same with my back " "and I hope that bending can get easier".         Pain:  Current: 10  Worst: 710  Best: 10  Location: generalized, back and knees, and hip region  Descriptions: pins and needles down R leg and back pain "but I was hoping the pins and needles feeling would go away or reduce more than it has"     Social and ADL History:  Activities of Daily Living Checklist:   Key to Score:   0: Unable to do the activity  1: Can do the activity with great difficulty  2: Can do the activity with little difficulty   3: No problem with activity  N/A: This is not an activity I do  H/T: Have not tried yet     Personal Care:  eval 22; reassess 22 Homemaking:   eval 22; reassess 22   Dressing Upper Body:   Meal preparation:      Dressing Lower Body:   Kitchen Cleanup:    Bathin / 2 Childcare:  na   Hair Care:  3 / 2 Grocery shoppin   Sleep:   Vacuuming/moppin / 1       Emptying trash/ garbage bag: 3 / 2   General Mobility:    Bed making changin / 2   Sittin / 2 Laundry  2    Bendin / 1       Getting in/out of bed:   2 Home Maintenance:     Standin / 2 Mowing, rakin / na   Walkin / 2 Gardenin / na   Twistin / 2 Home Repairs:  na   Liftin / 2 Painting:  na             Getting around:          Getting in and out of car:   / 2       Buckling up you seat belt:  3 / 3       Opening heavy doors:   / 2       Climbing/descending stairs:   / 2                    Response to previous treatment: Carlos noted: "Monday was hard. It was painful. Its just really hard with this leg pain".    Personal Functional Three Month Goals: Performance and satisfaction noted below.    OBJECTIVE     Carlos received the treatments listed below.    Pt participated in functional lifting/endurance/therapeutic activities in order to increase pt's participation with vocational, selfcare ADLs, and leisure activities in order to improve quality of life, " "for 75 minutes, which included:    Strength Testing             Evaluation  1/28/2022 [place] FRP  2/24/22  2 Month Follow Up   Motion Tested                 R L R L R L   Upper Extremity               Shoulder Flexion 4/5 4+/5 5/5   5/5        Shoulder Extension 5/5 5/5 5/5    5/5        Shoulder Abduction 4/5 3+/5  5/5   5/5        Shoulder IR 5/5 5/5  5/5   5/5        Shoulder ER 4/5 4/5  5/5   5/5        Elbow Flexion 4/5 4/5  4/5   4/5        Elbow Extension 4/5 4/5  4/5   4/5                Evaluation 1/28/22; Reassess 2/24/22   5 times sit-stand  (adults 18-65 y/o) 22.41 seconds; Reassess 20.8 sec  >12 sec= fall risk for general elderly  >16 sec= fall risk for Parkinson's disease  >10 sec= balance/vestibular dysfunction (<59 y/o)  >14.2 sec= balance/vestibular dysfunction (>59 y/o)  >12 sec= fall risk for CVA      Endurance Testing: Modified Ramp Treadmill Test    GAP 1/28/22 Re-assessment 2/24/22 Follow-up   Minutes Completed  2 mins 15 secs  2 min 00 sec     % Grades  5 %  5%     Speed (mph)  1.3 mph  1.3 mph     METS 3  3      bpm  129 bpm     Jono Rating Perceived Exertion Scale   7  6     Reason for stop point: Pt reported posture changes, fatigue, noted decline in maintaining pace safely. Attempted 1.7 at 10% but asked to stop after 10 seconds 2/2 B hip pain   Reason for stop: Reports pins and needles in RLE and hot fire in low"      Functional Strength Test:  Pile Testing: Lifting  (Pounds/Heart rate)   GAP/Eval (#/HR/JONO)  Reassessment  Day 9  2/24/22   Follow-up   Appointment    Repetitive Floor to Waist    8#/97bpm/5 15#/135bpm/4         Repetitive Waist to Shoulder    10#/81bpm/4 15#/111bpm/4              1- Time Maximum     12#/105bpm/5 20#/113bpm/4           Carry-2 Handed (40ft)     12#/95bpm/3 18#/112bpm/5           Work demand Level     Sedentary-Light light          Reason for Stop point: Increased time required for completing repetitions. During physical testing, participation level " "consistent.    Full body stretch w/ 3-10 sec hold technique &/or 3-5 repetition technique on all of the following:   Cervical flx/ext   Cervical sidebending   Cervical rotation   Cervical protraction/retraction   Shld rolls   Shld depression/elevation   Scapular retraction/protraction/scaption   Posterior shld stretch (cross arm)   Shld ER stretch (chicken wing)   Elbow flx/ext   Forearm supination/pronation   Wrist flx/ext   Open/close hands/fingers   Seated trunk rotations   Seated trunk/thoracic ext/flx   Seated marches & knee to chest   Seated knee flx/ext   Seated hip ER/piriformis stretch   Seated hamstring stretch   Seated toe raise to heel raise    Seated ankle circles each way   Standing lumbar extensions   Standing lateral trunk stretch   Standing quad stretch    Pt completed yoga activity on mat, supine, quadriped, seated and standing, with focused education on getting on/off floor properly and safely, back mobility, hip flexibility, stress reduction, dynamic standing balance, posture, alignment and coordinating movement with breath, rated moderate (3/10). Additional focus on low back and hip mobility in all planes of movement. Time spent in supine in butterfly with B knees support x10 diaphragmatic breaths. Good discussion re: "supported vulnerability", positive self-talk and mind-body connection. Voiced understanding.       Patient Education and Home Exercises     Education provided:   - Ms. Snell received education regarding proper posture and body mechanics. She received education regarding anticipated muscular soreness following lift testing and strategies for management were reviewed with patient including stretching, using ice, scheduled rest.  - Additional Education provided: heriberto scale, pain cycle, z-lie, functional lifting mechanics, pursed lip and diaphragmatic breathing techniques  - avoiding trunk extensions  - energy banking; pacing self with performance.   - Progress " towards goals    Written Home Exercises Provided: Patient instructed to cont prior HEP.  Carlos demonstrated good understanding of the education provided. See EMR under Patient Instructions for information provided during therapy sessions.    ASSESSMENT   Carlos presents with report of increased pain post Monday FRP sessions. Despite this and missing yesterdays session she had good tolerance to session this date. She continues to progress to personal and functional goals. Notably, she was able to improve work demand level from sedentary-light to light. She was also able to improve weights in all aspects of PILE test by 5#. Perception of progress is minimal, noting continued frustration of consistent pins and needles in R leg and pain in back. But with good self-report of increased tolerance to functional lifting. She continues to be limited by fear of pain, symptoms of depression and goal of pain reduction versus improved function. But with good continued willingness to participate and good overall tolerance to session this date with modifications and increased time to complete activities. Overall, continues with good willingness to participate and making progress towards personal goals.     Carlos is unable to fully participate in work, recreational, and home-based activities because of decreased functional strength, decreased endurance, limited positional tolerance, fear of re-injury, and fear of increased pain. She will continue to benefit from skilled outpatient occupational therapy to address the deficits listed on initial evaluation, provide pt education and to maximize pt's level of independence in the home and community environment.      Carlos's prognosis is Good due to good personal goals, willingness to make behavioral changes and good response to pain education and identification of current behaviors.     Pt's spiritual, cultural and educational needs considered and pt agreeable to plan of care  and goals.  Anticipated barriers to occupational therapy: None identified    FRP Goals: While working towards the long-term (3 month) functional goals listed above, Carlos will accomplish the following short term goals during the 5-week intensive rehabilitation program. Carlos will:      1. Develop a viable return to work plan. ------------ progressing 2/24/22  2. Demonstrate physical capacities consistent with a light-medium work demand level. ------------ progressing 2/24/22  3. Demonstrate increased functional strength by lifting 20 lbs floor to waist and 20 lbs waist to shoulder in order to meet demand of work/home routine. ------------ progressing 2/24/22  4. Increase her positional tolerance to the level needed for work and home activities. ------------ progressing 2/24/22  5. Implement self-care strategies during the program and at home to control pain. ------------ progressing 2/24/22  6.   Patient will demonstrate use of mindfulness, stress management, and coping  strategies for improved participation in daily routine. ------------ progressing 2/24/22    ADDED GOALS 2/24/22:   7. Pt will improve 5x sit to stand time by 5 seconds to progress towards decreased fall risk and improve confidence in community mobility      Specific patient expressed goals and demand levels:  Current Capacity Limit/ Physical  Demand Level (PDL)    Demand Levels of Functional Recovery Goals     Performance/  Satisfaction  Day 1 Performance/  Satisfaction  Day 9  2/24/22 Performance/  Satisfaction  Follow-up      Sedentary-Light Physical Demand  (Based above tested results)     Vocational:  Standing tolerance          Walking tolerance     Ascending stairs     Hauling supply bins      Recreational:  Cooking      Exercise      Daily Living:  Hair care     Lower body dressing      Housechores (vacuuming, dishes, etc)     Light-medium Physical Demand Level     4/4 (now after 15 minutes it becomes  painful)    5/3    4/3    5/4      6/5    3/1      6/5    6/5      3/1     5 / 5         4 / 3     6 / 6      7 / 6        6 / 5      5 / 5        6 / 6      5 / 5        2 / 1      The PDL listed above is based on today's physical performance screening test. More comprehensive physical  testing integrated with clinical findings would be required to derived an accurate work capacity.      PLAN     UPDATED plan of care certification 2/24/2022 to 5/6/2022    Outpatient occupational therapy, 3 x/wks for 2 additional weeks. Following this, pt to go on hold for a minimum of 7 weeks to attempt independence w/ Home Activity Plan (HAP) & PT activities, and will then return for reassessment.    Juana Terry, OTR/L  2/24/2022

## 2022-02-24 NOTE — PLAN OF CARE
"[facility]   Functional Restoration Program  Occupational Therapy Progress Report  FRP Day 9     Name: Carlos Snell  Medical Record Number: [id number]    Therapy Diagnosis:   Encounter Diagnoses   Name Primary?    Decreased activities of daily living (ADL) Yes    Decreased strength of upper extremity     Fear of pain      Physician: Gwendolyn Zaidi NP    Visit Date: 2/24/2022    Physician Orders: OT Eval and Treat  Medical Diagnosis: Chronic pain disorder  Evaluation Date: 1/28/2022  Insurance Authorization Period Expiration: 12/31/2022  Plan of Care Certification Period: 5/6/2022 (UPDATED 2/24/22)  Visit # / Visits authorized: 8 / 20 (incuding eval)  FOTO: evaluation: 52% limitation  FOTO: reassessment: 36% limitation    Time In: 09:00   Time Out: 10:15  Total Billable Time: 75 minutes    Subjective     Carlos reports "I think its been good. By the time I get home I probably look the same as before I started the program. But I know how to lift better which I think has been the most helpful thing is how to functionally lift. 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 Its way less painful so I have more energy to be nice to customers. The z-lie position has been really helpful, deep breathing. I am surprised by how much I like the deep breathing exercises and how helpful they are. I am still frustrated with the pain level. I wanted it to go down more than it has. I think just internally trying to find the motivation to go to the gym. I feel like I'm doing this 3x/ week right now so that's good enough. I wanted cleaning to become easier and it hasn't really except lifting things. Bending over is still challenging. How many movements you have to do is hard when cleaning, especially the bathroom. I hope this leg pain can go down, and same with my back and I hope that bending can get easier".         Pain:  Current: 5/10  Worst: 7/10  Best: 4/10  Location: generalized, back and knees, " "and hip region  Descriptions: pins and needles down R leg and back pain "but I was hoping the pins and needles feeling would go away or reduce more than it has"     Social and ADL History:  Activities of Daily Living Checklist:   Key to Score:   0: Unable to do the activity  1: Can do the activity with great difficulty  2: Can do the activity with little difficulty   3: No problem with activity  N/A: This is not an activity I do  H/T: Have not tried yet     Personal Care:  eval 22; reassess 22 Homemaking:   eval 22; reassess 22   Dressing Upper Body:   Meal preparation:      Dressing Lower Body:   Kitchen Cleanup:    Bathin / 2 Childcare:  na   Hair Care:  3 / 2 Grocery shoppin   Sleep:   Vacuuming/moppin / 1       Emptying trash/ garbage bag: 3 / 2   General Mobility:    Bed making changin / 2   Sittin / 2 Laundry     Bendin / 1       Getting in/out of bed:   2 Home Maintenance:     Standin / 2 Mowing, rakin / na   Walkin / 2 Gardenin / na   Twistin / 2 Home Repairs:  na   Liftin / 2 Painting:  na             Getting around:          Getting in and out of car:         Buckling up you seat belt:  3 / 3       Opening heavy doors:         Climbing/descending stairs:                      Response to previous treatment: Carlos noted: "Monday was hard. It was painful. Its just really hard with this leg pain".    Personal Functional Three Month Goals: Performance and satisfaction noted below.    OBJECTIVE     Carlos received the treatments listed below.    Pt participated in functional lifting/endurance/therapeutic activities in order to increase pt's participation with vocational, selfcare ADLs, and leisure activities in order to improve quality of life, for 75 minutes, which included:    Strength Testing             Evaluation  2022 [place] FRP  22  2 Month Follow Up   Motion " "Tested                 R L R L R L   Upper Extremity               Shoulder Flexion 4/5 4+/5 5/5   5/5        Shoulder Extension 5/5 5/5 5/5    5/5        Shoulder Abduction 4/5 3+/5  5/5   5/5        Shoulder IR 5/5 5/5  5/5   5/5        Shoulder ER 4/5 4/5  5/5   5/5        Elbow Flexion 4/5 4/5  4/5   4/5        Elbow Extension 4/5 4/5  4/5   4/5                Evaluation 1/28/22; Reassess 2/24/22   5 times sit-stand  (adults 18-63 y/o) 22.41 seconds; Reassess 20.8 sec  >12 sec= fall risk for general elderly  >16 sec= fall risk for Parkinson's disease  >10 sec= balance/vestibular dysfunction (<59 y/o)  >14.2 sec= balance/vestibular dysfunction (>59 y/o)  >12 sec= fall risk for CVA      Endurance Testing: Modified Ramp Treadmill Test    GAP 1/28/22 Re-assessment 2/24/22 Follow-up   Minutes Completed  2 mins 15 secs  2 min 00 sec     % Grades  5 %  5%     Speed (mph)  1.3 mph  1.3 mph     METS 3  3      bpm  129 bpm     Jono Rating Perceived Exertion Scale   7  6     Reason for stop point: Pt reported posture changes, fatigue, noted decline in maintaining pace safely. Attempted 1.7 at 10% but asked to stop after 10 seconds 2/2 B hip pain   Reason for stop: Reports pins and needles in RLE and hot fire in low"      Functional Strength Test:  Pile Testing: Lifting  (Pounds/Heart rate)   GAP/Eval (#/HR/JONO)  Reassessment  Day 9  2/24/22   Follow-up   Appointment    Repetitive Floor to Waist    8#/97bpm/5 15#/135bpm/4         Repetitive Waist to Shoulder    10#/81bpm/4 15#/111bpm/4              1- Time Maximum     12#/105bpm/5 20#/113bpm/4           Carry-2 Handed (40ft)     12#/95bpm/3 18#/112bpm/5           Work demand Level     Sedentary-Light light          Reason for Stop point: Increased time required for completing repetitions. During physical testing, participation level consistent.    Full body stretch w/ 3-10 sec hold technique &/or 3-5 repetition technique on all of the following:   Cervical " "flx/ext   Cervical sidebending   Cervical rotation   Cervical protraction/retraction   Shld rolls   Shld depression/elevation   Scapular retraction/protraction/scaption   Posterior shld stretch (cross arm)   Shld ER stretch (chicken wing)   Elbow flx/ext   Forearm supination/pronation   Wrist flx/ext   Open/close hands/fingers   Seated trunk rotations   Seated trunk/thoracic ext/flx   Seated marches & knee to chest   Seated knee flx/ext   Seated hip ER/piriformis stretch   Seated hamstring stretch   Seated toe raise to heel raise    Seated ankle circles each way   Standing lumbar extensions   Standing lateral trunk stretch   Standing quad stretch    Pt completed yoga activity on mat, supine, quadriped, seated and standing, with focused education on getting on/off floor properly and safely, back mobility, hip flexibility, stress reduction, dynamic standing balance, posture, alignment and coordinating movement with breath, rated moderate (3/10). Additional focus on low back and hip mobility in all planes of movement. Time spent in supine in butterfly with B knees support x10 diaphragmatic breaths. Good discussion re: "supported vulnerability", positive self-talk and mind-body connection. Voiced understanding.       Patient Education and Home Exercises     Education provided:   - Ms. Snell received education regarding proper posture and body mechanics. She received education regarding anticipated muscular soreness following lift testing and strategies for management were reviewed with patient including stretching, using ice, scheduled rest.  - Additional Education provided: heriberto scale, pain cycle, z-lie, functional lifting mechanics, pursed lip and diaphragmatic breathing techniques  - avoiding trunk extensions  - energy banking; pacing self with performance.   - Progress towards goals    Written Home Exercises Provided: Patient instructed to cont prior OVIDIO Gonzalez demonstrated good " understanding of the education provided. See EMR under Patient Instructions for information provided during therapy sessions.    ASSESSMENT   Carlos presents with report of increased pain post Monday FRP sessions. Despite this and missing yesterdays session she had good tolerance to session this date. She continues to progress to personal and functional goals. Notably, she was able to improve work demand level from sedentary-light to light. She was also able to improve weights in all aspects of PILE test by 5#. Perception of progress is minimal, noting continued frustration of consistent pins and needles in R leg and pain in back. But with good self-report of increased tolerance to functional lifting. She continues to be limited by fear of pain, symptoms of depression and goal of pain reduction versus improved function. But with good continued willingness to participate and good overall tolerance to session this date with modifications and increased time to complete activities. Overall, continues with good willingness to participate and making progress towards personal goals.     Carlos is unable to fully participate in work, recreational, and home-based activities because of decreased functional strength, decreased endurance, limited positional tolerance, fear of re-injury, and fear of increased pain. She will continue to benefit from skilled outpatient occupational therapy to address the deficits listed on initial evaluation, provide pt education and to maximize pt's level of independence in the home and community environment.      Carlos's prognosis is Good due to good personal goals, willingness to make behavioral changes and good response to pain education and identification of current behaviors.     Pt's spiritual, cultural and educational needs considered and pt agreeable to plan of care and goals.  Anticipated barriers to occupational therapy: None identified    FRP Goals: While working towards the  long-term (3 month) functional goals listed above, Carlos will accomplish the following short term goals during the 5-week intensive rehabilitation program. Carlos will:      1. Develop a viable return to work plan. ------------ progressing 2/24/22  2. Demonstrate physical capacities consistent with a light-medium work demand level. ------------ progressing 2/24/22  3. Demonstrate increased functional strength by lifting 20 lbs floor to waist and 20 lbs waist to shoulder in order to meet demand of work/home routine. ------------ progressing 2/24/22  4. Increase her positional tolerance to the level needed for work and home activities. ------------ progressing 2/24/22  5. Implement self-care strategies during the program and at home to control pain. ------------ progressing 2/24/22  6.   Patient will demonstrate use of mindfulness, stress management, and coping  strategies for improved participation in daily routine. ------------ progressing 2/24/22    ADDED GOALS 2/24/22:   7. Pt will improve 5x sit to stand time by 5 seconds to progress towards decreased fall risk and improve confidence in community mobility      Specific patient expressed goals and demand levels:  Current Capacity Limit/ Physical  Demand Level (PDL)    Demand Levels of Functional Recovery Goals     Performance/  Satisfaction  Day 1 Performance/  Satisfaction  Day 9  2/24/22 Performance/  Satisfaction  Follow-up      Sedentary-Light Physical Demand  (Based above tested results)     Vocational:  Standing tolerance          Walking tolerance     Ascending stairs     Hauling supply bins      Recreational:  Cooking      Exercise      Daily Living:  Hair care     Lower body dressing      Housechores (vacuuming, dishes, etc)     Light-medium Physical Demand Level     4/4 (now after 15 minutes it becomes painful)    5/3    4/3    5/4      6/5    3/1      6/5    6/5      3/1     5 / 5         4 / 3     6 / 6      7 / 6        6 / 5 5 / 5        6  / 6 5 / 5 2 / 1      The PDL listed above is based on today's physical performance screening test. More comprehensive physical  testing integrated with clinical findings would be required to derived an accurate work capacity.      PLAN     UPDATED plan of care certification 2/24/2022 to 5/6/2022    Outpatient occupational therapy, 3 x/wks for 2 additional weeks. Following this, pt to go on hold for a minimum of 7 weeks to attempt independence w/ Home Activity Plan (HAP) & PT activities, and will then return for reassessment.    Juana Terry, OTR/L  2/24/2022

## 2022-02-24 NOTE — PLAN OF CARE
"    [facility]  Functional Restoration Program  Physical Therapy Progress Report  FRP Day 9    Date: 2/24/2022   Name: Carlos Snell  Clinic Number: [id number]    Therapy Diagnosis:   Encounter Diagnoses   Name Primary?    Impaired functional mobility, balance, gait, and endurance Yes    Weakness of both hips     Weakness of trunk musculature      Physician: Gwendolyn Zaidi, NP    Physician Orders: PT Eval and Treat   Medical Diagnosis from Referral:   G89.4 (ICD-10-CM) - Chronic pain disorder   Z78.9 (ICD-10-CM) - Decreased activities of daily living (ADL)   Z74.09 (ICD-10-CM) - Impaired mobility and endurance       Evaluation Date: 2/24/2022  Authorization Period Expiration: 01/27/23  Plan of Care Expiration: 04/22/22  Visit # / Visits authorized: 08 / 20   FOTO:   Completed 2/3     Precautions: Standard and Falln    Time In:  10:15 am   Time Out:  11:00  am   Total Appointment Time (timed & untimed codes): 75 minutes      Subjective       Patient reported history of current condition - Carlos reports no sig change in sx presentation vs eval including feeling LLE cont weakness and cont difficulty /c bending/lifting.  Pt perform stretches in AM and modifying them thru the day to specific areas of body.  Pt report no change in sleep quality despite attempting pillow placement per instruction.   Pt report no specific CBT or lecture session has influenced her stating "I have read much of that before".     Pt state intent to return to gym having maintained her membership.  Pt arrives today noting cont mid level pain.  Pt note volunteer why she missed appt yesterday.          Patient's FRP goals: "I would like to make peace /c [my] chronic illness"  Find exercises that I can do that will help me stay healthy       Pain:      Current 5/10, worst 6/10 Monday, best 5/10    Location: bilateral back, RLE   Description: constant - dull ache, exacerbated - "fire"      Objective     Postural " examination:  Seated: slouched, fwd head, rounded shoulders  Standing: fwd head, rounded shoulders, slight ant pelvic tilt, decreased spinal dissociation w/ mobility    Range of Motion - Cervical   AROM AROM AROM    Evaluation Reassessment  Reassessment   Flexion WFL WFL °   Extension Mod loss Mod los °   Side bending Right Mod loss Mod loss °   Side bending Left Mod loss Mod loss °   Rotation Right Min loss  Min loss °   Rotation Left Min loss Min loss °     Range of Motion - Lumbar   SLOW MOVEMENTS         ROM Loss ROM Loss ROM Loss   Flexion moderate loss Mod loss    Extension moderate loss Mod loss    Side bending Right moderate loss Mod loss    Side bending Left moderate loss Mod loss    Rotation Right moderate loss Mod loss    Rotation Left moderate loss P! Mod loss P!      Strength Testing   Evaluation Reassessment Reassessment   Motion Tested         Lower Extremity R L R L R L   Hip Flexion 3+/5 P! 3+/5 P! 4-/5 4-/5     Hip Extension 3/5 3/5 3/5 3/5     Hip Abduction 3+/5 3+/5 3+/5 3+/5     Hip IR 3+/5 3+/5 4/5 4/5     Hip ER 3+/5 P! 3+/5 P! 4+/5 4+/5     Knee Extension 4+/5 5/5 5/5 5/5     Knee Flexion 4+/5 5/5 5/5  5/5       Functional Testing     Evaluation Reassessment  Reassessment   Timed Up and Go 12.76 sec 13.85 sec sec   5 Time Sit to Stand w/out UE 22.41 sec 20.8 sec sec   Single Limb Stance R LE 15.47 sec 13.33 sec sec   Single Limb Stance L LE 12.86 sec 21.55 sec sec   2 Minute Walk Test 261 ft = 79.43 m 354 ft = 107.9 m feet   6 Minute Walk Test 744 ft = 226.87 m 1118 ft = 340.76 m feet   Self Selected Walking Speed 0.63 m/sec   (0.66 m/sec at 2 min) 0.95 m/sec  (0.89 m/sec at 2 min) m/sec           GAIT:  Assistive Device used: none  Level of Assistance: independent  Patient displays the following gait deviations:  unsteady gait, decreased step length and decreased weight shift. R hip drop, dec RLE stance time    Minimum standards/norms:    TUG:  < 13.5 seconds/ Males 7.3 sec, Females 8.1  sec  SLS:  26-29 sec  Ages 20-69  Self Selected Walking Speed:  .4-.8m/sec  Limited community ambulator                                                   .8- 1.2  Community ambulator                                                    1.2 m/sec and above  Able to safely cross streets      Limitation/Restriction for FOTO Lumbar Survey    Therapist reviewed FOTO scores for Carlos Snell on 2/24/2022.   FOTO documents entered into EP[employer] - see Media section.    Limitation Score: 71%    Visit 9: 53%     Goal: < 55%          TREATMENT   Carlos received the Individualized Treatments listed below:      Therapeutic exercises to develop strength, endurance, ROM, flexibility, posture and core stabilization for 75 minutes including:      PT reassessment (see above)     Pt /c diaphragmatic breathing supine in readiness for MMT      Peripheral muscle strengthening which included 1-2 sets of 10-20 repetitions at a slow, controlled 5-7 second per rep pace focused on strengthening supporting musculature for improved body mechanics & functional mobility.  Patient & therapist focused on proper form during treatment to ensure optimal strengthening of each targeted muscle group. Patients are instructed to keep sets/reps with proper load to stay at 3-5 out of 10 on the provided modified Jono exertion scale.        BATCA Machine Exercises Weight (lbs) Sets Repetitions Jono Exertion Scale Rating   Leg Extension        Hamstring Curls       Chest Press  15 1 20  3   Pec Fly  15 1 20   3   Lat Pull Down 15 1 20 4   Mid Row 15 1 20 4   Bicep Bar Curls        Standing Tricep Extension        Single Leg Press 22.5 1 20 5 R, 4 L      Supine   LTR x 5, x 10 on ball   Bridge /c R TB 2 x 10    Supine straight leg raise w/ opposite LE Tbal compression for core activation 2x10 B        PATIENT EDUCATION AND HOME EXERCISES     Education provided:     Written Home Exercises Provided: Patient instructed to cont prior HEP. Exercises were reviewed and  "Carlos was able to demonstrate them prior to the end of the session.  Carlos demonstrated fair  understanding of the education provided. See EMR under Patient Instructions for information provided during therapy sessions.    ASSESSMENT   Carlos return to PT after missing yesterday session.  Pt not volunteer why miss appt.  PT reminded pt that exercise rx can be altered at each session if pain/discomfort levels are elevated that day.  Pt verbalize understanding but maintain flat affect.  Pt's flat affect making difficult to ascertain "buy in" on FRP learning points especially /c pt noting no lecture or CBT sessions illuminating her perspective on pain.  However, pt  able to demo correct technique /c diaphragmatic breathing, verbalize safe resistance training principles and report daily stretching indicating some assimilation of inc physical activity into day.  This may be contributing to modest improvements in functional testing including nearly eclipsing LTG for 6MWT. Also, despite pt subjective report noting min change in pain, mobility and strength, pt's FOTO score surpass LTG indicating inc functionality and likely associated /c dec pain.  Additionally, MMT indicate mod improvement in LE strength and /c no report of inc pain in LB.  However, cont core weakness demonstrated /c excess lumbar ext /c hip flexion MMT.  Consequently, pt advised to perform core contraction during resistance training for inc stability and participation and recommend cont core stability challenges in POC.  Pt, also, cont to demo signs of central sensitization and exhibits cont fear avoidance /c slowness of movement.  For example, during mobility assessment, pt note pain in no physically challenged structures. After pt educated on anatomical structure vs movement challenge pt demo improved tolerance to BATCA meeting full reps at lower RPE indicating the importance of cont reinforcement of "hurt vs harm" concept to elicit full " participation in txTruman Gonzalez Is progressing well towards her goals.   Pt prognosis is Fair.      Pt will continue to benefit from skilled outpatient physical therapy to address the deficits listed in the problem list box on initial evaluation, provide pt/family education and to maximize pt's level of independence in the home and community environment.      Pt's spiritual, cultural and educational needs considered and pt agreeable to plan of care and goals.     Anticipated barriers to physical therapy: fear of pain    GOALS: Pt is in agreement with the following goals:      Goal Progress Towards Goal   Short Term: In 3 weeks, pt will:    Verbalize & demonstrate good understanding of resisted training with 3-1-3 second rep for yjfrzohqkq-mrksyytan-rqoyoogqh contraction to encourage full muscle recruitment for strength & endurance Progress towards goal: MET 2/24/2022   Verbalize & demonstrate good understanding of home stretch routine to improve AROM, help decrease pain & improve mobility Progress towards goal: MET 2/24/2022   Demonstrate proper supine or seated diaphragmatic breathing with decreased chest excursion & emphasis on full abdominal excursion & increased expiration time to promote muscle relaxation & increased parasympathetic activity to improve patient tolerance to activities Progress towards goal: MET 2/24/2022   Demonstrate proper static standing posture in frontal & sagittal plane per PT visual assessment to decrease postural strain in ADLs Progress towards goal: MET 2/24/2022   Demonstrate good recall of names of resisted exercises w/ minimal to moderate verbal cues to promote independence w/ exercise at the end of the program Progress towards goal: MET 2/24/2022   Verbalize plan for continuing resisted exercises w/ specific location (i.e. commercial gym, home, community fitness center) indicating preference for machine-based or free-weight exercises to incorporate all major muscle groups  to maintain & progress therapeutic functional improvements Progress towards goal: MET 2/24/2022           Long Term: In 8 weeks, pt will:    Verbalize good understanding of activities planning/scheduling (stretching, mindfulness, resisted training, & cardio) prescribed by PT/OT following the end of the program to sustain & progress functional improvements Progress towards goal: progressing 2/24/2022   Be independent w/ selected resisted training w/ minimal to no cuing while performing to continue w/ resisted strengthening independently at the end of the program Progress towards goal: progressing 2/24/2022   Improve 2 Minute Walk Test (MWT) to 375 feet and 6 MWT to 1125 feet or greater to improve gait speed and mobility and reach community ambulator status Progress towards goal: progressing 2/24/2022   Perform single leg stance 20 seconds or greater bilaterally to improve safety and balance in ADLs Progress towards goal: progressing 2/24/2022   Demonstrate Timed Up & Go (with appropriate assistive device, if necessary) of 10 seconds or less to decrease fall risk and improve functional mobility Progress towards goal: progressing 2/24/2022   Demonstrate 5 time sit to stand test without upper extremity assist to 15 sec or less to improve general mobility and decrease fall risk Progress towards goal: progressing 2/24/2022   Score 55 % or less on Lumbar FOTO to indicate significant functional improvements in impaired functions secondary to low back pain Progress towards goal: MET 2/24/2022, to be reassessed nearer to d/c                  PLAN   Plan of care Certification: 02/07/22 to 04/22/22.    Outpatient Physical Therapy 3 times weekly for 2 weeks to include the following interventions: Gait Training, Manual Therapy, Moist Heat/ Ice, Neuromuscular Re-ed, Patient Education, Self Care, Therapeutic Activities and Therapeutic Exercise.     Following this, pt to go on hold for minimum of 3 weeks to attempt independence w/  HEP & PT activities, then return for reassessment.    Shady Bain, PT

## 2022-02-24 NOTE — PROGRESS NOTES
"NOTE: This is a duplicate copy utilized for reassessment purposes & continuity of care.  See pt's plan of care for co-signed copy.    [facility]  Functional Restoration Program  Physical Therapy Progress Report  FRP Day 9    Date: 2/24/2022   Name: Carlos Snell  Clinic Number: [id number]    Therapy Diagnosis:   Encounter Diagnoses   Name Primary?    Impaired functional mobility, balance, gait, and endurance Yes    Weakness of both hips     Weakness of trunk musculature      Physician: Gwendolyn Zaidi NP    Physician Orders: PT Eval and Treat   Medical Diagnosis from Referral:   G89.4 (ICD-10-CM) - Chronic pain disorder   Z78.9 (ICD-10-CM) - Decreased activities of daily living (ADL)   Z74.09 (ICD-10-CM) - Impaired mobility and endurance       Evaluation Date: 2/24/2022  Authorization Period Expiration: 01/27/23  Plan of Care Expiration: 04/22/22  Visit # / Visits authorized: 08 / 20   FOTO:   Completed 2/3     Precautions: Standard and Falln    Time In:  10:15 am   Time Out:  11:00  am   Total Appointment Time (timed & untimed codes): 75 minutes      Subjective       Patient reported history of current condition - Carlos reports no sig change in sx presentation vs eval including feeling LLE cont weakness and cont difficulty /c bending/lifting.  Pt perform stretches in AM and modifying them thru the day to specific areas of body.  Pt report no change in sleep quality despite attempting pillow placement per instruction.   Pt report no specific CBT or lecture session has influenced her stating "I have read much of that before".     Pt state intent to return to gym having maintained her membership.  Pt arrives today noting cont mid level pain.  Pt note volunteer why she missed appt yesterday.          Patient's FRP goals: "I would like to make peace /c [my] chronic illness"  Find exercises that I can do that will help me stay healthy       Pain:      Current 5/10, worst 6/10 Monday, " "best 5/10    Location: bilateral back, RLE   Description: constant - dull ache, exacerbated - "fire"      Objective     Postural examination:  Seated: slouched, fwd head, rounded shoulders  Standing: fwd head, rounded shoulders, slight ant pelvic tilt, decreased spinal dissociation w/ mobility    Range of Motion - Cervical   AROM AROM AROM    Evaluation Reassessment  Reassessment   Flexion WFL WFL °   Extension Mod loss Mod los °   Side bending Right Mod loss Mod loss °   Side bending Left Mod loss Mod loss °   Rotation Right Min loss  Min loss °   Rotation Left Min loss Min loss °     Range of Motion - Lumbar   SLOW MOVEMENTS         ROM Loss ROM Loss ROM Loss   Flexion moderate loss Mod loss    Extension moderate loss Mod loss    Side bending Right moderate loss Mod loss    Side bending Left moderate loss Mod loss    Rotation Right moderate loss Mod loss    Rotation Left moderate loss P! Mod loss P!      Strength Testing   Evaluation Reassessment Reassessment   Motion Tested         Lower Extremity R L R L R L   Hip Flexion 3+/5 P! 3+/5 P! 4-/5 4-/5     Hip Extension 3/5 3/5 3/5 3/5     Hip Abduction 3+/5 3+/5 3+/5 3+/5     Hip IR 3+/5 3+/5 4/5 4/5     Hip ER 3+/5 P! 3+/5 P! 4+/5 4+/5     Knee Extension 4+/5 5/5 5/5 5/5     Knee Flexion 4+/5 5/5 5/5  5/5       Functional Testing     Evaluation Reassessment  Reassessment   Timed Up and Go 12.76 sec 13.85 sec sec   5 Time Sit to Stand w/out UE 22.41 sec 20.8 sec sec   Single Limb Stance R LE 15.47 sec 13.33 sec sec   Single Limb Stance L LE 12.86 sec 21.55 sec sec   2 Minute Walk Test 261 ft = 79.43 m 354 ft = 107.9 m feet   6 Minute Walk Test 744 ft = 226.87 m 1118 ft = 340.76 m feet   Self Selected Walking Speed 0.63 m/sec   (0.66 m/sec at 2 min) 0.95 m/sec  (0.89 m/sec at 2 min) m/sec           GAIT:  Assistive Device used: none  Level of Assistance: independent  Patient displays the following gait deviations:  unsteady gait, decreased step length and decreased " weight shift. R hip drop, dec RLE stance time    Minimum standards/norms:    TUG:  < 13.5 seconds/ Males 7.3 sec, Females 8.1 sec  SLS:  26-29 sec  Ages 20-69  Self Selected Walking Speed:  .4-.8m/sec  Limited community ambulator                                                   .8- 1.2  Community ambulator                                                    1.2 m/sec and above  Able to safely cross streets      Limitation/Restriction for FOTO Lumbar Survey    Therapist reviewed FOTO scores for Carlos Snell on 2/24/2022.   FOTO documents entered into EP[employer] - see Media section.    Limitation Score: 71%    Visit 9: 53%     Goal: < 55%          TREATMENT   Carlos received the Individualized Treatments listed below:      Therapeutic exercises to develop strength, endurance, ROM, flexibility, posture and core stabilization for 75 minutes including:      PT reassessment (see above)     Pt /c diaphragmatic breathing supine in readiness for MMT      Peripheral muscle strengthening which included 1-2 sets of 10-20 repetitions at a slow, controlled 5-7 second per rep pace focused on strengthening supporting musculature for improved body mechanics & functional mobility.  Patient & therapist focused on proper form during treatment to ensure optimal strengthening of each targeted muscle group. Patients are instructed to keep sets/reps with proper load to stay at 3-5 out of 10 on the provided modified Jono exertion scale.        [employer] Machine Exercises Weight (lbs) Sets Repetitions Jono Exertion Scale Rating   Leg Extension        Hamstring Curls       Chest Press  15 1 20  3   Pec Fly  15 1 20   3   Lat Pull Down 15 1 20 4   Mid Row 15 1 20 4   Bicep Bar Curls        Standing Tricep Extension        Single Leg Press 22.5 1 20 5 R, 4 L      Supine   LTR x 5, x 10 on ball   Bridge /c R TB 2 x 10    Supine straight leg raise w/ opposite LE Tbal compression for core activation 2x10 B        PATIENT EDUCATION AND HOME EXERCISES  "    Education provided:     Written Home Exercises Provided: Patient instructed to cont prior HEP. Exercises were reviewed and Carlos was able to demonstrate them prior to the end of the session.  Carlos demonstrated fair  understanding of the education provided. See EMR under Patient Instructions for information provided during therapy sessions.    ASSESSMENT   Carlos return to PT after missing yesterday session.  Pt not volunteer why miss appt.  PT reminded pt that exercise rx can be altered at each session if pain/discomfort levels are elevated that day.  Pt verbalize understanding but maintain flat affect.  Pt's flat affect making difficult to ascertain "buy in" on FRP learning points especially /c pt noting no lecture or CBT sessions illuminating her perspective on pain.  However, pt  able to demo correct technique /c diaphragmatic breathing, verbalize safe resistance training principles and report daily stretching indicating some assimilation of inc physical activity into day.  This may be contributing to modest improvements in functional testing including nearly eclipsing LTG for 6MWT. Also, despite pt subjective report noting min change in pain, mobility and strength, pt's FOTO score surpass LTG indicating inc functionality and likely associated /c dec pain.  Additionally, MMT indicate mod improvement in LE strength and /c no report of inc pain in LB.  However, cont core weakness demonstrated /c excess lumbar ext /c hip flexion MMT.  Consequently, pt advised to perform core contraction during resistance training for inc stability and participation and recommend cont core stability challenges in POC.  Pt, also, cont to demo signs of central sensitization and exhibits cont fear avoidance /c slowness of movement.  For example, during mobility assessment, pt note pain in no physically challenged structures. After pt educated on anatomical structure vs movement challenge pt demo improved tolerance to BATCA " "meeting full reps at lower RPE indicating the importance of cont reinforcement of "hurt vs harm" concept to elicit full participation in tx.            Carlos Is progressing well towards her goals.   Pt prognosis is Fair.      Pt will continue to benefit from skilled outpatient physical therapy to address the deficits listed in the problem list box on initial evaluation, provide pt/family education and to maximize pt's level of independence in the home and community environment.      Pt's spiritual, cultural and educational needs considered and pt agreeable to plan of care and goals.     Anticipated barriers to physical therapy: fear of pain    GOALS: Pt is in agreement with the following goals:      Goal Progress Towards Goal   Short Term: In 3 weeks, pt will:    Verbalize & demonstrate good understanding of resisted training with 3-1-3 second rep for agjkxlhxie-uxtuosdqn-hsabgilhn contraction to encourage full muscle recruitment for strength & endurance Progress towards goal: MET 2/24/2022   Verbalize & demonstrate good understanding of home stretch routine to improve AROM, help decrease pain & improve mobility Progress towards goal: MET 2/24/2022   Demonstrate proper supine or seated diaphragmatic breathing with decreased chest excursion & emphasis on full abdominal excursion & increased expiration time to promote muscle relaxation & increased parasympathetic activity to improve patient tolerance to activities Progress towards goal: MET 2/24/2022   Demonstrate proper static standing posture in frontal & sagittal plane per PT visual assessment to decrease postural strain in ADLs Progress towards goal: MET 2/24/2022   Demonstrate good recall of names of resisted exercises w/ minimal to moderate verbal cues to promote independence w/ exercise at the end of the program Progress towards goal: MET 2/24/2022   Verbalize plan for continuing resisted exercises w/ specific location (i.e. commercial gym, home, community " fitness center) indicating preference for machine-based or free-weight exercises to incorporate all major muscle groups to maintain & progress therapeutic functional improvements Progress towards goal: MET 2/24/2022           Long Term: In 8 weeks, pt will:    Verbalize good understanding of activities planning/scheduling (stretching, mindfulness, resisted training, & cardio) prescribed by PT/OT following the end of the program to sustain & progress functional improvements Progress towards goal: progressing 2/24/2022   Be independent w/ selected resisted training w/ minimal to no cuing while performing to continue w/ resisted strengthening independently at the end of the program Progress towards goal: progressing 2/24/2022   Improve 2 Minute Walk Test (MWT) to 375 feet and 6 MWT to 1125 feet or greater to improve gait speed and mobility and reach community ambulator status Progress towards goal: progressing 2/24/2022   Perform single leg stance 20 seconds or greater bilaterally to improve safety and balance in ADLs Progress towards goal: progressing 2/24/2022   Demonstrate Timed Up & Go (with appropriate assistive device, if necessary) of 10 seconds or less to decrease fall risk and improve functional mobility Progress towards goal: progressing 2/24/2022   Demonstrate 5 time sit to stand test without upper extremity assist to 15 sec or less to improve general mobility and decrease fall risk Progress towards goal: progressing 2/24/2022   Score 55 % or less on Lumbar FOTO to indicate significant functional improvements in impaired functions secondary to low back pain Progress towards goal: MET 2/24/2022, to be reassessed nearer to d/c                  PLAN   Plan of care Certification: 02/07/22 to 04/22/22.    Outpatient Physical Therapy 3 times weekly for 2 weeks to include the following interventions: Gait Training, Manual Therapy, Moist Heat/ Ice, Neuromuscular Re-ed, Patient Education, Self Care, Therapeutic  Activities and Therapeutic Exercise.     Following this, pt to go on hold for minimum of 3 weeks to attempt independence w/ HEP & PT activities, then return for reassessment.    Shady Bain, PT

## 2022-03-01 NOTE — PROGRESS NOTES
"[facility]    Functional Restoration Program  Physical Therapy Treatment Note  FRP Day 10    Name: Carlos Snell  MRN:[id number]      Therapy Diagnosis:   Encounter Diagnoses   Name Primary?    Impaired functional mobility, balance, gait, and endurance Yes    Weakness of both hips     Weakness of trunk musculature      Physician: Gwendolyn Zaidi NP    Date: 3/2/2022    Evaluation Date: 1/28/2022  Authorization Period Expiration: 12/31/2022  Plan of Care Expiration: 04/22/22  Visit # / Visits authorized: 09 / 20  FOTO: completed 2 / 3       Time In:  10:20  am  Time Out: 11:30 am  Total Billable Time: 70 minutes      SUBJECTIVE       Carlos reports no attempts at inc [employer] related activity. However, pt note visiting /c friends including going out to eat.  Pt report not feeling limited in doing this.  Also, pt report going on walks per FRP goals.  Pt report reaching 20 min duration but noting inc RLE discomfort (pins needles).   Pt plans to visit store to obtain roller for B knee discomfort.    Pt tolerate last session fair noting inc RLE sx presentation upon returning home         Patient's FRP goals: "I would like to make peace /c [my] chronic illness"  Find exercises that I can do that will help me stay healthy         Current 4/10  Location(s): low back, B knees, R LE     OBJECTIVE     Objective Measures updated at progress report unless specified.     Treatment       Carlos received the Individualized Treatments listed below:     Therapeutic exercises to develop strength, endurance, ROM, flexibility, posture and core stabilization for 75 minutes including:      Peripheral muscle strengthening which included 1-2 sets of 10-20 repetitions at a slow, controlled 5-7 second per rep pace focused on strengthening supporting musculature for improved body mechanics & functional mobility.  Patient & therapist focused on proper form during treatment to ensure optimal strengthening " of each targeted muscle group. Patients are instructed to keep sets/reps with proper load to stay at 3-5 out of 10 on the provided modified Jono exertion scale.       BATCA Machine Exercises Weight (lbs) Sets Repetitions Jono Exertion Scale Rating   Leg Extension        Hamstring Curls       Chest Press       Pec Fly       Lat Pull Down 17.5 1 20 4   Mid Row 17.5 1 20 3   Bicep Bar Curls        Standing Tricep Extension        Single Leg Press 22.5 1 20 5         Supine   LTR x 5   DKTC x 10 5 sec hold /s ball     TA brace x 10 sig tactile and verbal cue    TA brace x 5 5 sec hold cue for dec valsalva    Bridge x 5, x 5 /c arms across chest for core brace check   Bridge /c hip AB Y TB x 10 min hip AB    BUE press TBall for TA brace x 10 5 sec hold    BUE press TBall for TA brace /c SLR x 10 B    BUE overhead pull down /c Y TB /c SLR 2 x 10 B  SL    Clamshells x 15 B        Seated TBall               Pelvic tilts                          Fwd/Bwd x 20 improved movement quality                          Lat x 20    Fwd/Bwd x 10, Lat x 10 /c inc velocity                           Circumduction CCW, CW x 15 cue for inc PPT    Marches x 10 each difficulty raising knees, PT support ball   Pallof Press /c Y TB from seated TBall     x10 each side          Patient Education and Home Exercises     Home Exercises Provided and Patient Education Provided     Education provided:   -none    Written Home Exercises Provided: Patient instructed to cont prior HEP. Exercises were reviewed and Carlos was able to demonstrate them prior to the end of the session.  Carlos demonstrated good  understanding of the education provided. See EMR under Patient Instructions for information provided during therapy sessions    ASSESSMENT     Carlos cont to present noting sig RLE discomfort, however, able to perform all rx exercises indicating inc activiity tolerance.  This is mirrored in subjective report of inc community activities /c friends.   Although, limited participation in [employer] associated activities indicates potential cont fear avoidance.  Tx primary focus /c core activation challenges today.  In supine, pt demo poor TA brace needing sig cueing.  However, pt demo inc core activation /c dynamic exercises and tolerate inc exercise intensity /c Tball and TB.  Perhaps pt initial fear of pain limit her concentration.  Considering pt /c no inc subjective pain report upon completing tx today, plan to begin /c multi-faceted exercises (UE and LE) to build self efficacy and limit fear avoidance before return to singular focus (TA brace).           Carlos Is progressing well towards her goals.   Pt prognosis is Fair.     Pt will continue to benefit from skilled outpatient physical therapy to address the deficits listed in the problem list box on initial evaluation, provide pt/family education and to maximize pt's level of independence in the home and community environment.     Pt's spiritual, cultural and educational needs considered and pt agreeable to plan of care and goals.     Anticipated barriers to physical therapy: fear of pain    GOALS: Pt is in agreement with the following goals:        Goal Progress Towards Goal   Short Term: In 3 weeks, pt will:     Verbalize & demonstrate good understanding of resisted training with 3-1-3 second rep for xopsfjkzpm-hayirrwro-frgphebru contraction to encourage full muscle recruitment for strength & endurance Progress towards goal: MET 2/24/2022   Verbalize & demonstrate good understanding of home stretch routine to improve AROM, help decrease pain & improve mobility Progress towards goal: MET 2/24/2022   Demonstrate proper supine or seated diaphragmatic breathing with decreased chest excursion & emphasis on full abdominal excursion & increased expiration time to promote muscle relaxation & increased parasympathetic activity to improve patient tolerance to activities Progress towards goal: MET 2/24/2022    Demonstrate proper static standing posture in frontal & sagittal plane per PT visual assessment to decrease postural strain in ADLs Progress towards goal: MET 2/24/2022   Demonstrate good recall of names of resisted exercises w/ minimal to moderate verbal cues to promote independence w/ exercise at the end of the program Progress towards goal: MET 2/24/2022   Verbalize plan for continuing resisted exercises w/ specific location (i.e. commercial gym, home, community fitness center) indicating preference for machine-based or free-weight exercises to incorporate all major muscle groups to maintain & progress therapeutic functional improvements Progress towards goal: MET 2/24/2022               Long Term: In 8 weeks, pt will:     Verbalize good understanding of activities planning/scheduling (stretching, mindfulness, resisted training, & cardio) prescribed by PT/OT following the end of the program to sustain & progress functional improvements Progress towards goal: progressing 2/24/2022   Be independent w/ selected resisted training w/ minimal to no cuing while performing to continue w/ resisted strengthening independently at the end of the program Progress towards goal: progressing 2/24/2022   Improve 2 Minute Walk Test (MWT) to 375 feet and 6 MWT to 1125 feet or greater to improve gait speed and mobility and reach community ambulator status Progress towards goal: progressing 2/24/2022   Perform single leg stance 20 seconds or greater bilaterally to improve safety and balance in ADLs Progress towards goal: progressing 2/24/2022   Demonstrate Timed Up & Go (with appropriate assistive device, if necessary) of 10 seconds or less to decrease fall risk and improve functional mobility Progress towards goal: progressing 2/24/2022   Demonstrate 5 time sit to stand test without upper extremity assist to 15 sec or less to improve general mobility and decrease fall risk Progress towards goal: progressing 2/24/2022   Score 55 %  or less on Lumbar FOTO to indicate significant functional improvements in impaired functions secondary to low back pain Progress towards goal: MET 2/24/2022, to be reassessed nearer to d/c                       PLAN     Continue PT per POC     Shady Bain, ANGY, DPT

## 2022-03-02 ENCOUNTER — OFFICE VISIT (OUTPATIENT)
Dept: BEHAVIORAL HEALTH | Facility: CLINIC | Age: 32
End: 2022-03-02
Payer: COMMERCIAL

## 2022-03-02 ENCOUNTER — CLINICAL SUPPORT (OUTPATIENT)
Dept: REHABILITATION | Facility: OTHER | Age: 32
End: 2022-03-02
Payer: COMMERCIAL

## 2022-03-02 DIAGNOSIS — R29.898 DECREASED STRENGTH OF UPPER EXTREMITY: ICD-10-CM

## 2022-03-02 DIAGNOSIS — M62.81 WEAKNESS OF TRUNK MUSCULATURE: ICD-10-CM

## 2022-03-02 DIAGNOSIS — Z74.09 IMPAIRED FUNCTIONAL MOBILITY, BALANCE, GAIT, AND ENDURANCE: Primary | ICD-10-CM

## 2022-03-02 DIAGNOSIS — F33.0 MILD EPISODE OF RECURRENT MAJOR DEPRESSIVE DISORDER: Primary | ICD-10-CM

## 2022-03-02 DIAGNOSIS — R29.898 WEAKNESS OF BOTH HIPS: ICD-10-CM

## 2022-03-02 DIAGNOSIS — F90.9 ATTENTION DEFICIT HYPERACTIVITY DISORDER (ADHD), UNSPECIFIED ADHD TYPE: ICD-10-CM

## 2022-03-02 DIAGNOSIS — F40.298 FEAR OF PAIN: ICD-10-CM

## 2022-03-02 DIAGNOSIS — Z78.9 DECREASED ACTIVITIES OF DAILY LIVING (ADL): Primary | ICD-10-CM

## 2022-03-02 PROCEDURE — 97110 THERAPEUTIC EXERCISES: CPT

## 2022-03-02 PROCEDURE — 90853 GROUP PSYCHOTHERAPY: CPT | Mod: S$GLB,,, | Performed by: SOCIAL WORKER

## 2022-03-02 PROCEDURE — 97530 THERAPEUTIC ACTIVITIES: CPT

## 2022-03-02 PROCEDURE — 90853 PR GROUP PSYCHOTHERAPY: ICD-10-PCS | Mod: S$GLB,,, | Performed by: SOCIAL WORKER

## 2022-03-02 NOTE — PROGRESS NOTES
"[facility]    Functional Restoration Program  Physical Therapy Treatment Note  FRP Day 11    Name: Carlos Snell  MRN:[id number]      Therapy Diagnosis:   Encounter Diagnoses   Name Primary?    Impaired functional mobility, balance, gait, and endurance Yes    Weakness of both hips     Weakness of trunk musculature      Physician: Gwendolyn Zaidi NP    Date: 3/3/2022    Evaluation Date: 1/28/2022  Authorization Period Expiration: 12/31/2022  Plan of Care Expiration: 04/22/22  Visit # / Visits authorized: 10 / 20  FOTO: completed 2 / 3       Time In:  10:20 am  Time Out:  11:30 am  Total Billable Time: 70 minutes      SUBJECTIVE     Carlos reports tolerating last session fair /c inc RLE sx presentation which she notes is consistent.  Pt report sig interruption in her sleep and using Calm lacho to assist falling asleep.     Pt agree to tx today.    Near end of session, pt voice concern for use of term "central sensitization" as a "brush off" tool /c min regard for inc pain /c activity.        Patient's FRP goals: "I would like to make peace /c [my] chronic illness"  Find exercises that I can do that will help me stay healthy         Current 4/10  Location(s): low back, B knees, R LE     OBJECTIVE     Objective Measures updated at progress report unless specified.     Treatment       Carlos received the Individualized Treatments listed below:     Therapeutic exercises to develop strength, endurance, ROM, flexibility, posture and core stabilization for 75 minutes including:      Peripheral muscle strengthening which included 1-2 sets of 10-20 repetitions at a slow, controlled 5-7 second per rep pace focused on strengthening supporting musculature for improved body mechanics & functional mobility.  Patient & therapist focused on proper form during treatment to ensure optimal strengthening of each targeted muscle group. Patients are instructed to keep sets/reps with proper load to stay " "at 3-5 out of 10 on the provided modified Jono exertion scale.       BATCA Machine Exercises Weight (lbs) Sets Repetitions Jono Exertion Scale Rating   Leg Extension  5 1 20 5   Hamstring Curls 15 1 20 5   Chest Press 20 1 15 5   Pec Fly 20 1 20 4   Lat Pull Down       Mid Row       Bicep Bar Curls  7.5 1 20 5   Standing Tricep Extension  10 1 15 5   Single Leg Press           Seated    Tball rollouts x 10 fwd, L, R    Tball rollout /c arc L<>R x 3 pt d/c for discomfort    Seated TBall               Pelvic tilts                          Fwd/Bwd x 20 improved movement quality                          Lat x 20    Fwd/Bwd x 10, Lat x 10 /c inc velocity                           Circumduction CCW, CW x 20 cue for inc PPT    BUE overhead wave x 10 B cue to feel WS in feet   (Marches x 10 B, x 5 /c 3 sec hold - seated in chair)    Marches x 10 each difficulty raising knees    Pallof Press /c Y TB x10 each side    Scap retracts x 10 B, x 5 each side   Reverse fly x 10    Basketball bounce R/L 30 sec each   Supine roll outs x 9 cue for glute contraction, x 1 /c 10 sec hold   Supine roll out /c Pallof press L side x 10, R side x 5   Prone on Tball    Positional release     Supine   Piriformis stretch R side x 1 20 sec hold   SKTC R side 10 sec hold   LTR x 10    PPT x 10 improved pelvic tilt /c dec BLE use           Patient Education and Home Exercises     Home Exercises Provided and Patient Education Provided     Education provided:   - central sensitization as a confounding factor for "hurt vs harm"     Written Home Exercises Provided: Patient instructed to cont prior HEP. Exercises were reviewed and Carlos was able to demonstrate them prior to the end of the session.  Carlos demonstrated good  understanding of the education provided. See EMR under Patient Instructions for information provided during therapy sessions    ASSESSMENT     Chafe cont to demo improved core activation during BUE and BLE exercises.  Pt note " "she employs "activate your core" when lifting at home and /c resistance exercises indicating inc body awareness and improved core motor control.  Today, to challenge core endurance, sig time spent on TBall.  Although pt demo improved movement quality /c marches having dec WS and able to tolerate rollouts and ball bouncing /c no LOB, pt verbalize that she doesn't think she has improved her control on TBall indicating dec self efficacy.  Recommend cont exercise progression to illuminate pt to improved exercise tolerance and offer examples at exercise progressions to apply /p FRP.    Pt also offer insight to possible dec affect during tx when she note concern that when her pain does increase /c exercise she feels "brushed off" and told it is "just central sensitization". PT take moment to ed pt on the intent to empower pt /c knowledge that the pain response from central sensitization is excess protection but that no harm is physiologically occurring.  PT note FRP lectures may offer additional information.  Pt /c mild signs of acceptance and recommend f/u to cont to build therapeutic alliance to elicit inc participation in tx and FRP take home principles.        Carlos Is progressing fair towards her goals.   Pt prognosis is Fair.     Pt will continue to benefit from skilled outpatient physical therapy to address the deficits listed in the problem list box on initial evaluation, provide pt/family education and to maximize pt's level of independence in the home and community environment.     Pt's spiritual, cultural and educational needs considered and pt agreeable to plan of care and goals.     Anticipated barriers to physical therapy: fear of pain    GOALS: Pt is in agreement with the following goals:        Goal Progress Towards Goal   Short Term: In 3 weeks, pt will:     Verbalize & demonstrate good understanding of resisted training with 3-1-3 second rep for [id number]-lrilhybbd contraction to encourage " full muscle recruitment for strength & endurance Progress towards goal: MET 2/24/2022   Verbalize & demonstrate good understanding of home stretch routine to improve AROM, help decrease pain & improve mobility Progress towards goal: MET 2/24/2022   Demonstrate proper supine or seated diaphragmatic breathing with decreased chest excursion & emphasis on full abdominal excursion & increased expiration time to promote muscle relaxation & increased parasympathetic activity to improve patient tolerance to activities Progress towards goal: MET 2/24/2022   Demonstrate proper static standing posture in frontal & sagittal plane per PT visual assessment to decrease postural strain in ADLs Progress towards goal: MET 2/24/2022   Demonstrate good recall of names of resisted exercises w/ minimal to moderate verbal cues to promote independence w/ exercise at the end of the program Progress towards goal: MET 2/24/2022   Verbalize plan for continuing resisted exercises w/ specific location (i.e. commercial gym, home, community fitness center) indicating preference for machine-based or free-weight exercises to incorporate all major muscle groups to maintain & progress therapeutic functional improvements Progress towards goal: MET 2/24/2022               Long Term: In 8 weeks, pt will:     Verbalize good understanding of activities planning/scheduling (stretching, mindfulness, resisted training, & cardio) prescribed by PT/OT following the end of the program to sustain & progress functional improvements Progress towards goal: progressing 2/24/2022   Be independent w/ selected resisted training w/ minimal to no cuing while performing to continue w/ resisted strengthening independently at the end of the program Progress towards goal: progressing 2/24/2022   Improve 2 Minute Walk Test (MWT) to 375 feet and 6 MWT to 1125 feet or greater to improve gait speed and mobility and reach community ambulator status Progress towards goal:  progressing 2/24/2022   Perform single leg stance 20 seconds or greater bilaterally to improve safety and balance in ADLs Progress towards goal: progressing 2/24/2022   Demonstrate Timed Up & Go (with appropriate assistive device, if necessary) of 10 seconds or less to decrease fall risk and improve functional mobility Progress towards goal: progressing 2/24/2022   Demonstrate 5 time sit to stand test without upper extremity assist to 15 sec or less to improve general mobility and decrease fall risk Progress towards goal: progressing 2/24/2022   Score 55 % or less on Lumbar FOTO to indicate significant functional improvements in impaired functions secondary to low back pain Progress towards goal: MET 2/24/2022, to be reassessed nearer to d/c                       PLAN     Continue PT per POC     Shady Bain, PT, DPT

## 2022-03-02 NOTE — PROGRESS NOTES
"[facility]   Functional Restoration Program  Occupational Therapy Daily Note  FRP Day 10    Name: Carlos Snell  Medical Record Number: [id number]    Therapy Diagnosis:   Encounter Diagnoses   Name Primary?    Decreased activities of daily living (ADL) Yes    Decreased strength of upper extremity     Fear of pain      Physician: Gwendolyn Zaidi NP    Visit Date: 3/2/2022    Physician Orders: OT Eval and Treat  Medical Diagnosis: Chronic pain disorder  Evaluation Date: 1/28/2022  Insurance Authorization Period Expiration: 12/31/2022  Plan of Care Certification Period: 5/6/2022 (UPDATED 2/24/22)  Visit # / Visits authorized: 9 / 20 (incuding eval)  FOTO: evaluation: 52% limitation  FOTO: reassessment: 36% limitation    Time In: 9:00 AM  Time Out: 10:15 AM  Total Billable Time: 75 minutes    Subjective     Carlos reports "I hung out with my  and friends over the weekend, I did the 20 min walk like the homework said. My leg still feels like pins and needles but I managed".        Pain: 4 /10  Location: generalized, low back, R leg    Personal Functional Three Month Goals: Performance and satisfaction noted below.    OBJECTIVE     Carlos received the treatments listed below.    Pt participated in functional lifting/endurance/therapeutic activities in order to increase pt's participation with vocational, selfcare ADLs, and leisure activities in order to improve quality of life, for 75 minutes, which included:    Full body stretch w/ 3-10 sec hold technique &/or 3-5 repetition technique on all of the following:   Cervical flx/ext   Cervical sidebending   Cervical rotation   Cervical protraction/retraction   Shld rolls   Shld depression/elevation   Scapular retraction/protraction/scaption   Posterior shld stretch (cross arm)   Shld ER stretch (chicken wing)   Elbow flx/ext   Forearm supination/pronation   Wrist flx/ext   Open/close hands/fingers   Seated trunk " "rotations   Seated trunk/thoracic ext/flx   Seated marches & knee to chest   Seated knee flx/ext   Seated hip ER/piriformis stretch   Seated hamstring stretch   Seated toe raise to heel raise    Seated ankle circles each way   Standing lumbar extensions   Standing lateral trunk stretch   Standing quad stretch    Pt completed functional lifting, floor to waist, 15 reps x 9# lbs with exertion rated moderate (3 /10). Chabrina with good independence with body mechanics, requiring intermittent verbal cues to coordinate movement with breath. At end of activity she noted, "I don't know if its getting easier, but it is definitely more natural now and when I bend the wrong way I know it automatically".     Pt completed dynamic reaching in various functional ranges with continued focus on seated posture, weight shifting as needed, core awareness, hip rotation/weight shifting, pacing as needed, slow and controlled movement and coordinating movement with breath. Instructed to scoop before raising up, to keep shoulders depressed. Completed 15 reps x 2# lbs, rated as sort of hard (4/10) exertion. Post activity stretching of shoulders performed at wall.     Pt participated in functional lift waist to shoulder, 10 reps x  10 #;\lbs with a rating of sort of hard (4/10). Pt educated on standing posture, core awareness, keeping shoulders depressed and retracted, stepping to place > reaching to place, keeping weight close to center of gravity and pacing as needed. Discussed consider use of multi-step step stool to place pop up sale supplies in several bins over head, instead of single step. She states that they have a ladder in spouse' closet, for him to reach his shoes at top shelve. Discussed chores of the weekend, with included sorting closet and swapping summer and winter clothing, in addition to having had the pop-up sale.    Pt completed maximal lift x10 reps with 16 lbs, rated as sort of hard (4/10), continued " "education focused on neutral spine positioning and pushing through heels for safety and activation of correct muscles as well as knees and toes tracking each other. Post activity pt educated on wide leg forward fold stretch and she noted "when I focus on my breathing, I can go farther with every exhale".     Pt with personal goal of improved functional activity tolerance to complete hair care. She completed sustained standing task x 10 minutes at upright peg board on wall with focused education on posture, core awareness, overhead reaching, BUE muscular endurance, weight shifting and knees bent, pacing as needed and adding mindfulness component to connect mind-body with intermittent stretches and reconnecting to breath. Placed single crated in front of her to have option to weight shift when needed. Able to  pegs that fell on floor with proper mechanics. 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 She rated activity as sort of hard (4/10).    Pt completed yoga activity on mat, supine, quadriped, seated and standing, with focused education on getting on/off floor properly and safely, back mobility, hip flexibility, stress reduction, dynamic standing balance, posture, alignment and coordinating movement with breath, rated sort of hard (4/10). Throughout activity pt inquired about yoga studio recommendations around [place]. She noted that she has begun to enjoy yoga and would like to continue post program. Increased difficulty with prone back extension and with increased tightness observed in hamstrings.      Patient Education and Home Exercises     Education provided:   - Ms. Snell received education regarding proper posture and body mechanics. She received education regarding anticipated muscular soreness following lift testing and strategies for management were reviewed with patient including stretching, using ice, scheduled rest.  - Additional Education " provided: heriberto scale, pain cycle, z-lie, functional lifting mechanics, pursed lip and diaphragmatic breathing techniques  - education on the Functional Restoration Program. Details of the program were discussed.   - avoiding trunk extensions  - golfer's bend  - energy banking; pacing self with performance.   - Progress towards goals    Written Home Exercises Provided: Patient instructed to cont prior HEP.  Carlos demonstrated good understanding of the education provided. See EMR under Patient Instructions for information provided during therapy sessions.    ASSESSMENT   Carlos presents with improved mood, energy levels, pain and increased socialization. She had good tolerance to session this date and with good tolerance to weight increases and repetition increases in functional activities. Good report of carryover and with good compliance with FRP homework in re: walking program and stretching. Overall, continues with good willingness to participate and making progress towards personal goals    Carlos is unable to fully participate in work, recreational, and home-based activities because of decreased functional strength, decreased endurance, limited positional tolerance, fear of re-injury, and fear of increased pain. She will continue to benefit from skilled outpatient occupational therapy to address the deficits listed on initial evaluation, provide pt education and to maximize pt's level of independence in the home and community environment.      Carlos's prognosis is Good due to good personal goals, willingness to make behavioral changes and good response to pain education and identification of current behaviors.     Pt's spiritual, cultural and educational needs considered and pt agreeable to plan of care and goals.  Anticipated barriers to occupational therapy: None identified    FRP Goals: While working towards the long-term (3 month) functional goals listed above, Carlos will accomplish the  following short term goals during the 5-week intensive rehabilitation program. Carlos will:      1. Develop a viable return to work plan. ------------ progressing 2/24/22  2. Demonstrate physical capacities consistent with a light-medium work demand level. ------------ progressing 2/24/22  3. Demonstrate increased functional strength by lifting 20 lbs floor to waist and 20 lbs waist to shoulder in order to meet demand of work/home routine. ------------ progressing 2/24/22  4. Increase her positional tolerance to the level needed for work and home activities. ------------ progressing 2/24/22  5. Implement self-care strategies during the program and at home to control pain. ------------ progressing 2/24/22  6.   Patient will demonstrate use of mindfulness, stress management, and coping  strategies for improved participation in daily routine. ------------ progressing 2/24/22     ADDED GOALS 2/24/22:   7. Pt will improve 5x sit to stand time by 5 seconds to progress towards decreased fall risk and improve confidence in community mobility      Specific patient expressed goals and demand levels:  Current Capacity Limit/ Physical  Demand Level (PDL)    Demand Levels of Functional Recovery Goals     Performance/  Satisfaction  Day 1 Performance/  Satisfaction  Day 9  2/24/22 Performance/  Satisfaction  Follow-up      Sedentary-Light Physical Demand  (Based above tested results)     Vocational:  Standing tolerance            Walking tolerance     Ascending stairs      Hauling supply bins      Recreational:  Cooking      Exercise      Daily Living:  Hair care     Lower body dressing      Housechores (vacuuming, dishes, etc)     Light-medium Physical Demand Level     4/4 (now after 15 minutes it becomes painful)     5/3     4/3     5/4        6/5     3/1        6/5     6/5        3/1     5 / 5            4 / 3      6 / 6       7 / 6          6 / 5       5 / 5          6 / 6       5 / 5 2 / 1      The PDL listed  above is based on today's physical performance screening test. More comprehensive physical  testing integrated with clinical findings would be required to derived an accurate work capacity.        PLAN   Continue per POC.    Juana Terry, OTR/L  3/2/2022

## 2022-03-03 ENCOUNTER — CLINICAL SUPPORT (OUTPATIENT)
Dept: REHABILITATION | Facility: OTHER | Age: 32
End: 2022-03-03
Payer: COMMERCIAL

## 2022-03-03 ENCOUNTER — OFFICE VISIT (OUTPATIENT)
Dept: BEHAVIORAL HEALTH | Facility: CLINIC | Age: 32
End: 2022-03-03
Payer: COMMERCIAL

## 2022-03-03 DIAGNOSIS — R29.898 DECREASED STRENGTH OF UPPER EXTREMITY: ICD-10-CM

## 2022-03-03 DIAGNOSIS — Z78.9 DECREASED ACTIVITIES OF DAILY LIVING (ADL): Primary | ICD-10-CM

## 2022-03-03 DIAGNOSIS — Z74.09 IMPAIRED FUNCTIONAL MOBILITY, BALANCE, GAIT, AND ENDURANCE: Primary | ICD-10-CM

## 2022-03-03 DIAGNOSIS — F90.9 ATTENTION DEFICIT HYPERACTIVITY DISORDER (ADHD), UNSPECIFIED ADHD TYPE: ICD-10-CM

## 2022-03-03 DIAGNOSIS — R29.898 WEAKNESS OF BOTH HIPS: ICD-10-CM

## 2022-03-03 DIAGNOSIS — F40.298 FEAR OF PAIN: ICD-10-CM

## 2022-03-03 DIAGNOSIS — M62.81 WEAKNESS OF TRUNK MUSCULATURE: ICD-10-CM

## 2022-03-03 DIAGNOSIS — F33.0 MILD EPISODE OF RECURRENT MAJOR DEPRESSIVE DISORDER: Primary | ICD-10-CM

## 2022-03-03 PROCEDURE — 90853 GROUP PSYCHOTHERAPY: CPT | Mod: S$GLB,,, | Performed by: SOCIAL WORKER

## 2022-03-03 PROCEDURE — 97110 THERAPEUTIC EXERCISES: CPT

## 2022-03-03 PROCEDURE — 97530 THERAPEUTIC ACTIVITIES: CPT

## 2022-03-03 PROCEDURE — 90853 PR GROUP PSYCHOTHERAPY: ICD-10-PCS | Mod: S$GLB,,, | Performed by: SOCIAL WORKER

## 2022-03-03 NOTE — PROGRESS NOTES
"[facility]   Functional Restoration Program  Occupational Therapy Daily Note  FRP Day 11    Name: Carlos Snell  Medical Record Number: [id number]    Therapy Diagnosis:   Encounter Diagnoses   Name Primary?    Decreased activities of daily living (ADL) Yes    Decreased strength of upper extremity     Fear of pain      Physician: Gwendolyn Zaidi NP    Visit Date: 3/3/2022    Physician Orders: OT Eval and Treat  Medical Diagnosis: Chronic pain disorder  Evaluation Date: 1/28/2022  Insurance Authorization Period Expiration: 12/31/2022  Plan of Care Certification Period: 5/6/2022 (UPDATED 2/24/22)  Visit # / Visits authorized: 10 / 20 (incuding eval)  FOTO: evaluation: 52% limitation  FOTO: reassessment: 36% limitation    Time In: 9:00 AM  Time Out: 10:15 AM  Total Billable Time: 75 minutes    Subjective     Carlos reports "when I left here my leg was numb and I was in so much pain last night I couldn't fall asleep, but I put on the calm lacho and that helped".        Pain: 5 /10  Location: generalized, low back, R leg    Personal Functional Three Month Goals: Performance and satisfaction noted below.    OBJECTIVE     Carlos received the treatments listed below.    Pt participated in functional lifting/endurance/therapeutic activities in order to increase pt's participation with vocational, selfcare ADLs, and leisure activities in order to improve quality of life, for 75 minutes, which included:    Full body stretch w/ 3-10 sec hold technique &/or 3-5 repetition technique on all of the following:   Cervical flx/ext   Cervical sidebending   Cervical rotation   Cervical protraction/retraction   Shld rolls   Shld depression/elevation   Scapular retraction/protraction/scaption   Posterior shld stretch (cross arm)   Shld ER stretch (chicken wing)   Elbow flx/ext   Forearm supination/pronation   Wrist flx/ext   Open/close hands/fingers   Seated trunk rotations   Seated " "trunk/thoracic ext/flx   Seated marches & knee to chest   Seated knee flx/ext   Seated hip ER/piriformis stretch   Seated hamstring stretch   Seated toe raise to heel raise    Seated ankle circles each way   Standing lumbar extensions   Standing lateral trunk stretch   Standing quad stretch    Pt completed functional lifting, floor to waist, 15 reps x 10 lbs with exertion rated sort of hard (4 /10). Chabrina with good independence with body mechanics, requiring intermittent verbal cues to coordinate movement with breath. Education and discussions focused on being mindful of maladaptive compensatory patterns when experiencing increased pain in RLE. Discussed wanting to avoid reinforcing fear of pain and danger signals to brain. Voiced understanding. During activity she noted, "yesterday I had to carry a bin to [employer] like half a block but I did it all properly with good body mechanics".     Pt participated in functional lift waist to shoulder, 15 reps x 10 lbs with a rating of moderate (3/10). Pt educated on standing posture, core awareness, keeping shoulders depressed and retracted, stepping to place > reaching to place, keeping weight close to center of gravity and pacing as needed.     Pt completed maximal lift x10 reps with 16 lbs, rated as hard (5/10), continued education focused on neutral spine positioning and pushing through heels for safety and activation of correct muscles as well as knees and toes tracking each other.        Pt completed dynamic reaching in various functional ranges, alternating standing and seated 2/2 increased R leg pain, with continued focus on seated posture, weight shifting as needed, core awareness, hip rotation/weight shifting, pacing as needed, slow and controlled movement and coordinating movement with breath. Instructed to scoop before raising up, to keep shoulders depressed. Completed 15 reps x 2# lbs, rated as sort of hard (4/10) exertion. Post activity " "stretching of shoulders performed at wall.     Pt completed yoga activity on mat, supine, quadriped, seated and standing, with focused education on getting on/off floor properly and safely, back mobility, hip flexibility, stress reduction, dynamic standing balance, posture, alignment and coordinating movement with breath, rated sort of hard (4/10). Throughout activity pt inquired about yoga studio recommendations around [place]. At end of activity she noted "there is definitely less pins and needles feeling after that".      Patient Education and Home Exercises     Education provided:   - Ms. Snell received education regarding proper posture and body mechanics. She received education regarding anticipated muscular soreness following lift testing and strategies for management were reviewed with patient including stretching, using ice, scheduled rest.  - Additional Education provided: heriberto scale, pain cycle, z-lie, functional lifting mechanics, pursed lip and diaphragmatic breathing techniques  - education on the Functional Restoration Program. Details of the program were discussed.   - avoiding trunk extensions  - golfer's bend  - energy banking; pacing self with performance.   - Progress towards goals    Written Home Exercises Provided: Patient instructed to cont prior HEP.  Carlos demonstrated good understanding of the education provided. See EMR under Patient Instructions for information provided during therapy sessions.    ASSESSMENT   Carlos presents with report of increased pain and decreased sleep quality last night. Despite this, she had good tolerance to session this date. She required increased time to complete functional activities 2/2 fatigue and increased pain but with good use of pacing. Good continued response to yoga activity and with good modifications throughout session as needed. Overall, continues with good willingness to participate and making progress towards personal goals.    Carlos " is unable to fully participate in work, recreational, and home-based activities because of decreased functional strength, decreased endurance, limited positional tolerance, fear of re-injury, and fear of increased pain. She will continue to benefit from skilled outpatient occupational therapy to address the deficits listed on initial evaluation, provide pt education and to maximize pt's level of independence in the home and community environment.      Carlos's prognosis is Good due to good personal goals, willingness to make behavioral changes and good response to pain education and identification of current behaviors.     Pt's spiritual, cultural and educational needs considered and pt agreeable to plan of care and goals.  Anticipated barriers to occupational therapy: None identified    FRP Goals: While working towards the long-term (3 month) functional goals listed above, Carlos will accomplish the following short term goals during the 5-week intensive rehabilitation program. Carlos will:      1. Develop a viable return to work plan. ------------ progressing 2/24/22  2. Demonstrate physical capacities consistent with a light-medium work demand level. ------------ progressing 2/24/22  3. Demonstrate increased functional strength by lifting 20 lbs floor to waist and 20 lbs waist to shoulder in order to meet demand of work/home routine. ------------ progressing 2/24/22  4. Increase her positional tolerance to the level needed for work and home activities. ------------ progressing 2/24/22  5. Implement self-care strategies during the program and at home to control pain. ------------ progressing 2/24/22  6.   Patient will demonstrate use of mindfulness, stress management, and coping  strategies for improved participation in daily routine. ------------ progressing 2/24/22     ADDED GOALS 2/24/22:   7. Pt will improve 5x sit to stand time by 5 seconds to progress towards decreased fall risk and improve confidence  in community mobility      Specific patient expressed goals and demand levels:  Current Capacity Limit/ Physical  Demand Level (PDL)    Demand Levels of Functional Recovery Goals     Performance/  Satisfaction  Day 1 Performance/  Satisfaction  Day 9  2/24/22 Performance/  Satisfaction  Follow-up      Sedentary-Light Physical Demand  (Based above tested results)     Vocational:  Standing tolerance            Walking tolerance     Ascending stairs      Hauling supply bins      Recreational:  Cooking      Exercise      Daily Living:  Hair care     Lower body dressing      Housechores (vacuuming, dishes, etc)     Light-medium Physical Demand Level     4/4 (now after 15 minutes it becomes painful)     5/3     4/3     5/4        6/5     3/1        6/5     6/5        3/1     5 / 5            4 / 3      6 / 6       7 / 6          6 / 5       5 / 5          6 / 6       5 / 5 2 / 1      The PDL listed above is based on today's physical performance screening test. More comprehensive physical  testing integrated with clinical findings would be required to derived an accurate work capacity.        PLAN   Continue per POC.    Juana Terry, OTR/L  3/3/2022

## 2022-03-04 ENCOUNTER — OFFICE VISIT (OUTPATIENT)
Dept: BEHAVIORAL HEALTH | Facility: CLINIC | Age: 32
End: 2022-03-04
Payer: COMMERCIAL

## 2022-03-04 ENCOUNTER — CLINICAL SUPPORT (OUTPATIENT)
Dept: REHABILITATION | Facility: OTHER | Age: 32
End: 2022-03-04
Payer: COMMERCIAL

## 2022-03-04 DIAGNOSIS — Z78.9 DECREASED ACTIVITIES OF DAILY LIVING (ADL): Primary | ICD-10-CM

## 2022-03-04 DIAGNOSIS — R29.898 WEAKNESS OF BOTH HIPS: ICD-10-CM

## 2022-03-04 DIAGNOSIS — F33.0 MILD EPISODE OF RECURRENT MAJOR DEPRESSIVE DISORDER: Primary | ICD-10-CM

## 2022-03-04 DIAGNOSIS — Z74.09 IMPAIRED FUNCTIONAL MOBILITY, BALANCE, GAIT, AND ENDURANCE: Primary | ICD-10-CM

## 2022-03-04 DIAGNOSIS — R29.898 DECREASED STRENGTH OF UPPER EXTREMITY: ICD-10-CM

## 2022-03-04 DIAGNOSIS — F90.9 ATTENTION DEFICIT HYPERACTIVITY DISORDER (ADHD), UNSPECIFIED ADHD TYPE: ICD-10-CM

## 2022-03-04 DIAGNOSIS — M62.81 WEAKNESS OF TRUNK MUSCULATURE: ICD-10-CM

## 2022-03-04 DIAGNOSIS — F40.298 FEAR OF PAIN: ICD-10-CM

## 2022-03-04 PROCEDURE — 97110 THERAPEUTIC EXERCISES: CPT

## 2022-03-04 PROCEDURE — 97530 THERAPEUTIC ACTIVITIES: CPT

## 2022-03-04 PROCEDURE — 90853 GROUP PSYCHOTHERAPY: CPT | Mod: S$GLB,,, | Performed by: SOCIAL WORKER

## 2022-03-04 PROCEDURE — 90853 PR GROUP PSYCHOTHERAPY: ICD-10-PCS | Mod: S$GLB,,, | Performed by: SOCIAL WORKER

## 2022-03-04 NOTE — PROGRESS NOTES
"[facility]   Functional Restoration Program  Occupational Therapy Daily Note  FRP Day 12    Name: Carlos Snell  Medical Record Number: [id number]    Therapy Diagnosis:   Encounter Diagnoses   Name Primary?    Decreased activities of daily living (ADL) Yes    Decreased strength of upper extremity     Fear of pain      Physician: Gwendolyn Zaidi NP    Visit Date: 3/4/2022    Physician Orders: OT Eval and Treat  Medical Diagnosis: Chronic pain disorder  Evaluation Date: 1/28/2022  Insurance Authorization Period Expiration: 12/31/2022  Plan of Care Certification Period: 5/6/2022 (UPDATED 2/24/22)  Visit # / Visits authorized: 11 / 20 (incuding eval)  FOTO: evaluation: 52% limitation  FOTO: reassessment: 36% limitation    Time In: 10:20 AM  Time Out: 11:30 AM  Total Billable Time: 70 minutes    Subjective     Carlos reports "I had a fall Wednesday at home and I was really hurting yesterday after I left here".        Pain: 5 /10  Location: generalized, low back, R leg    Personal Functional Three Month Goals: Performance and satisfaction noted below.    OBJECTIVE     Carlos received the treatments listed below.    Pt participated in functional lifting/endurance/therapeutic activities in order to increase pt's participation with vocational, selfcare ADLs, and leisure activities in order to improve quality of life, for 70 minutes, which included:    Carlos participated in endurance activity using treadmill for 10 minutes, on flat road, starting at 1.5mph and progressing to 2.2 mph, to improve functional endurance and progress towards increased MET level for improved community mobility and participation, rated as hard (5/10) exertion, Carlos re-educated on how to add mindfulness component to activity and how to pace appropriately by completed self check ins and grading activity as needed, with goal to reach moderate to sort of hard by end of activity. Added focus on posture, core " "awareness, heel to toe walking, and pursed lip breathing. 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 Discussed preparation, set-up, running store and clean up steps and problem solved re: breaking up parts to account for proper rest and avoid boom/bust. Discussed packing car the night before, etc.  Also noted mom was in town to help. After 5/10 exertion rating, discussed how she could have made the activity more tolerable (ie: speed, rest breaks, breathing).     Pt participated in functional lift waist to shoulder, 15 reps x 10 lbs with a rating of sort of hard (4/10). Pt educated on standing posture, core awareness, keeping shoulders depressed and retracted, stepping to place > reaching to place, keeping weight close to center of gravity and pacing as needed. Post activity she noted "the movement themselves arent hard but with the leg and back pain it becomes a 4". Encouraged to take a seated stretch and rest break post activity.     Pt completed functional lifting, floor to waist, x15 x 10 lbs with exertion rated sort of hard (4 /10). Chabrina with good independence with body mechanics, requiring intermittent verbal cues to coordinate movement with breath. Continued education and discussions focused on setting herself up for success with pop up store this weekend but allotting extra time for set up so account for proper rest breaks as needed. Discussed evenly distributing weight in boxes to set expectations, etc. Discussed that she has 2 big events coming up that she is excited for - a pop up this weekend and an event next weekend that is the first she has hosted herself. Good discussion re: anticipatory stress, managing expectations and planning for mind-body kindness pre-during-post activity. Voiced understanding.     Pt completed seated mindfulness diaphragmatic breathing activity [id number] with focus on posture, mechanics of breathing, and benefits re: PNS activation. " "Pt voiced and demonstrated understanding.     Pt completed dynamic reaching in various functional ranges, alternating standing and seated 2/2 increased R leg and back pain, with continued focus on seated posture, weight shifting as needed, core awareness, hip rotation/weight shifting, pacing as needed, slow and controlled movement and coordinating movement with breath. Instructed to scoop before raising up, to keep shoulders depressed. Completed 15 reps x 2# lbs, rated as sort of hard (4/10) exertion. Post activity stretching of shoulders performed at wall.      Pt completed maximal lift x10 reps with 16 lbs, rated as hard (5/10), continued education focused on neutral spine positioning and pushing through heels for safety and activation of correct muscles as well as knees and toes tracking each other.        Pt completed supine mindfulness diaphragmatic breathing activity x15-20 reps in z-lie position with feet on chair, with focus on neutral spine, mechanics of breathing, and benefits re: improved circulation, increased blood flow, PNS activation, and mindfulness. Discussed how she fell on Wednesday getting up from couch and also reported "extreme pain" post FRP session yesterday. She discussed she decided not to run errands and instead went home and complete z-lie, stretches and mindfulness and was then able to clean her house.     Pt completed yoga activity on mat, supine, quadriped, seated and standing, with focused education on getting on/off floor properly and safely, back mobility, hip flexibility, stress reduction, dynamic standing balance, posture, alignment and coordinating movement with breath, rated moderate (3/10). Throughout activity pt inquired about yoga studio recommendations around [place]. At end of activity she noted "the tingles aren't so bad". Rated moderate and noted "it was a good moderate".     Pt with personal goal of improved functional activity tolerance to complete hair care. She " "completed sustained standing task x 10 minutes at upright peg board on wall with focused education on posture, core awareness, overhead reaching, BUE muscular endurance, weight shifting and knees bent, pacing as needed and adding mindfulness component to connect mind-body with intermittent stretches and reconnecting to breath. Placed single crated in front of her to have option to weight shift when needed. Able to  pegs that fell on floor with proper mechanics. She rated activity as moderate (3/10). Noted "this is good practice, I have to take my hair out tonight".      Patient Education and Home Exercises     Education provided:   - Ms. Snell received education regarding proper posture and body mechanics. She received education regarding anticipated muscular soreness following lift testing and strategies for management were reviewed with patient including stretching, using ice, scheduled rest.  - Additional Education provided: heriberto scale, pain cycle, z-lie, functional lifting mechanics, pursed lip and diaphragmatic breathing techniques  - education on the Functional Restoration Program. Details of the program were discussed.   - avoiding trunk extensions  - golfer's bend  - energy banking; pacing self with performance.   - Progress towards goals    Written Home Exercises Provided: Patient instructed to cont prior HEP.  Carlos demonstrated good understanding of the education provided. See EMR under Patient Instructions for information provided during therapy sessions.    ASSESSMENT   Carols presents with increased pain upon arrival and with request to "take it easier today" in preparation for work demands over the weekend. Despite this, she had good tolerance to session this date. Required increased time to complete some functional activities 2/2 pain and fatigue but with good independence with pacing as needed. Good continued response to yoga and zlie activity and noted she has been using this at home. " Overall, continues with good willingness to participate and making progress towards personal goals.    Carlos is unable to fully participate in work, recreational, and home-based activities because of decreased functional strength, decreased endurance, limited positional tolerance, fear of re-injury, and fear of increased pain. She will continue to benefit from skilled outpatient occupational therapy to address the deficits listed on initial evaluation, provide pt education and to maximize pt's level of independence in the home and community environment.      Carlos's prognosis is Good due to good personal goals, willingness to make behavioral changes and good response to pain education and identification of current behaviors.     Pt's spiritual, cultural and educational needs considered and pt agreeable to plan of care and goals.  Anticipated barriers to occupational therapy: None identified    FRP Goals: While working towards the long-term (3 month) functional goals listed above, Carlos will accomplish the following short term goals during the 5-week intensive rehabilitation program. Carlos will:      1. Develop a viable return to work plan. ------------ progressing 2/24/22  2. Demonstrate physical capacities consistent with a light-medium work demand level. ------------ progressing 2/24/22  3. Demonstrate increased functional strength by lifting 20 lbs floor to waist and 20 lbs waist to shoulder in order to meet demand of work/home routine. ------------ progressing 2/24/22  4. Increase her positional tolerance to the level needed for work and home activities. ------------ progressing 2/24/22  5. Implement self-care strategies during the program and at home to control pain. ------------ progressing 2/24/22  6.   Patient will demonstrate use of mindfulness, stress management, and coping  strategies for improved participation in daily routine. ------------ progressing 2/24/22     ADDED GOALS 2/24/22:   7. Pt  will improve 5x sit to stand time by 5 seconds to progress towards decreased fall risk and improve confidence in community mobility      Specific patient expressed goals and demand levels:  Current Capacity Limit/ Physical  Demand Level (PDL)    Demand Levels of Functional Recovery Goals     Performance/  Satisfaction  Day 1 Performance/  Satisfaction  Day 9  2/24/22 Performance/  Satisfaction  Follow-up      Sedentary-Light Physical Demand  (Based above tested results)     Vocational:  Standing tolerance            Walking tolerance     Ascending stairs      Hauling supply bins      Recreational:  Cooking      Exercise      Daily Living:  Hair care     Lower body dressing      Housechores (vacuuming, dishes, etc)     Light-medium Physical Demand Level     4/4 (now after 15 minutes it becomes painful)     5/3     4/3     5/4        6/5     3/1        6/5     6/5        3/1     5 / 5            4 / 3      6 / 6       7 / 6          6 / 5       5 / 5          6 / 6       5 / 5          2 / 1      The PDL listed above is based on today's physical performance screening test. More comprehensive physical  testing integrated with clinical findings would be required to derived an accurate work capacity.        PLAN   Continue per POC.    Juana Terry OTR/L  3/4/2022

## 2022-03-04 NOTE — PROGRESS NOTES
Group Psychotherapy    Site: [facility]    Clinical status of patient: Outpatient    3/4/2022    Length of service:[id number]    Referred by: Functional Restoration Program     Number of patients in attendance: 3    Target symptoms: Chronic Pain Management     Patient's response to intervention:  The patient's response to intervention is active listening.    Progress toward goals and other mental status changes:  The patient's progress toward goals is good.    Plan: group psychotherapy    Topics Covered: Pt attended CBT for Chronic Pain as part of her participation in Functional Restoration. Topics discussed today included a discussion on boundary setting that pts practiced this week. Pts discussed anxiety about setting boundaries and how that impacts other mood disorders, stress and chronic pain. We also discussed how to set a schedule for after the program ends and what that might look like, how setting a schedule can help with time management. Will f/u in 3 days.

## 2022-03-04 NOTE — PROGRESS NOTES
"[facility]    Functional Restoration Program  Physical Therapy Treatment Note  FRP Day 12    Name: Carlos Snell  MRN:[id number]      Therapy Diagnosis:   Encounter Diagnoses   Name Primary?    Impaired functional mobility, balance, gait, and endurance Yes    Weakness of both hips     Weakness of trunk musculature      Physician: Gwendolyn Zaidi NP    Date: 3/4/2022    Evaluation Date: 1/28/2022  Authorization Period Expiration: 12/31/2022  Plan of Care Expiration: 04/22/22  Visit # / Visits authorized: 11 / 20  FOTO: completed 2 / 3       Time In:   9:10 am (pt late)  Time Out: 10:20 am  Total Billable Time: 70 minutes      SUBJECTIVE     Carlos reports leaving last session /c inc RLE pain so limiting that she did not perform planned errands. Pt arrives today noting cont pain presentation.  Pt requests dec intensity exercise today due to the pain and stating she wants to make sure she has energy for her weekend pop up.    At completion of tx, PT asked if pt would like to cont free weight resistance exercises, pt respond "maybe"    Patient's FRP goals: "I would like to make peace /c [my] chronic illness"  Find exercises that I can do that will help me stay healthy         Current 5/10  Location(s): low back, B knees, R LE     OBJECTIVE     Objective Measures updated at progress report unless specified.     Treatment       Carlos received the Individualized Treatments listed below:     Therapeutic exercises to develop strength, endurance, ROM, flexibility, posture and core stabilization for 70 minutes including:    Full body stretch w/ 3-10 sec hold technique &/or 3-5 repetition technique on all of the following:   Cervical flx/ext   Cervical sidebending   Cervical rotation   Cervical protraction/retraction   Shld rolls   Shld depression/elevation   Scapular retraction/protraction/scaption   Posterior shld stretch (cross arm)   Shld ER stretch (chicken wing)   Elbow " flx/ext   Forearm supination/pronation   Wrist flx/ext   Open/close hands/fingers   Seated trunk rotations   Seated trunk/thoracic ext/flx   Seated marches & knee to chest   Seated knee flx/ext   Seated hip ER/piriformis stretch   Seated hamstring stretch   Seated toe raise to heel raise    Seated ankle circles each way   Standing lumbar extensions   Standing lateral leaning stretch   Standing quad stretch (UE support for balance)    Peripheral muscle strengthening which included 1-2 sets of 10-20 repetitions at a slow, controlled 5-7 second per rep pace focused on strengthening supporting musculature for improved body mechanics & functional mobility.  Patient & therapist focused on proper form during treatment to ensure optimal strengthening of each targeted muscle group. Patients are instructed to keep sets/reps with proper load to stay at 3-5 out of 10 on the provided modified Jono exertion scale.       NOT PERFORMED  BATCA Machine Exercises Weight (lbs) Sets Repetitions Jono Exertion Scale Rating   Leg Extension        Hamstring Curls       Chest Press       Pec Fly       Lat Pull Down       Mid Row       Bicep Bar Curls        Standing Tricep Extension        Single Leg Press       NOT PERFORMED    Peripheral muscle strengthening which included 1-2 sets of 10-20 repetitions at a slow, controlled 5-7 second per rep pace focused on strengthening supporting musculature for improved body mechanics & functional mobility.  Patient & therapist focused on proper form during treatment to ensure optimal strengthening of each targeted muscle group. Patients are instructed to keep sets/reps with proper load to stay at 3-5 out of 10 on the provided modified Jono exertion scale.       Free Weight Exercises Weight (lbs) Sets Repetitions Jono Exertion Scale Rating   Bicep Dumbbell Curls 3 1 20 3   Bent Over Tricep Kickback 3 1 10, 5 4   Prone Row 4 1 15 4   Supine Pec Fly 3 1 15 4   Supine Chest Press 3 1 15 4  "  Overhead Shoulder Press 2 1 15 4   Reverse Back Fly 2 1 15 5   Sit to Stand Unsupported       Wall Squats n/a 2 10 5       Seated   Lumbar flexion 2 x 10 sec hold   Piriformis stretch R side 2 x 10 sec hold    HS stretch R side 2 x 10 sec     Fitter board - hard setting, 30 tilt bouts   Fwd/Bwd - R hand palmar support    Trial 1 - R hand palmar support     Trial 2 - EC, R hand palmar support fade to piano fingers    Lat     Trial 1- EO, R hand palmar support    Trial 2 - EC, R hand palmar support fade to piano fingers      Core activation:  Supine   LTR x 10    PPT x 4 pt note inc LB, RLE pain  Seated   Pelvic tilts x 10  Quad    Cat/Cow x 10 no change sx   UE ext x 5 each pt note inc LB "pinch"    Supine    (return to) LTR x 10           Patient Education and Home Exercises     Home Exercises Provided and Patient Education Provided     Education provided:   - central sensitization as a confounding factor for "hurt vs harm"     Written Home Exercises Provided: Patient instructed to cont prior HEP. Exercises were reviewed and Carlos was able to demonstrate them prior to the end of the session.  Carlos demonstrated good  understanding of the education provided. See EMR under Patient Instructions for information provided during therapy sessions    ASSESSMENT     Carlos arrive expressing concern for her present pain level.  This lead to dec activity tolerance even limiting UE exercise RPE which typically has not inc her discomfort indicating strong application of central sensitization to PT activity today.  Considering pt has demo safety /c resistance machines and to offer tx variation to elicit inc participation, PT introduce free weight exercises /c low weights (subsequently lowered more by pt report of discomfort).  Pt able to complete reps within RPE at each position, however, /c modification to dec eliminate bend over positions.  Today, pt demo perseveration /c her pain throughout tx demo-ing anticipatory " "pain, fear avoidance often requesting lower weights than PT initially request.  Pt will need cont reassurance on "hurt vs harm" to promote FRP principle carry over.  Recommend next visit assessing weekend activity tolerance and use as possible set point to highlight pt's improvements and, thereby, elicit inc willingness to in session challenges (inc RPE or cont free weights).        Carlos Is progressing fair towards her goals.   Pt prognosis is Fair.     Pt will continue to benefit from skilled outpatient physical therapy to address the deficits listed in the problem list box on initial evaluation, provide pt/family education and to maximize pt's level of independence in the home and community environment.     Pt's spiritual, cultural and educational needs considered and pt agreeable to plan of care and goals.     Anticipated barriers to physical therapy: fear of pain    GOALS: Pt is in agreement with the following goals:        Goal Progress Towards Goal   Short Term: In 3 weeks, pt will:     Verbalize & demonstrate good understanding of resisted training with 3-1-3 second rep for thlcypaicp-[id number]-ngobiuazx contraction to encourage full muscle recruitment for strength & endurance Progress towards goal: MET 2/24/2022   Verbalize & demonstrate good understanding of home stretch routine to improve AROM, help decrease pain & improve mobility Progress towards goal: MET 2/24/2022   Demonstrate proper supine or seated diaphragmatic breathing with decreased chest excursion & emphasis on full abdominal excursion & increased expiration time to promote muscle relaxation & increased parasympathetic activity to improve patient tolerance to activities Progress towards goal: MET 2/24/2022   Demonstrate proper static standing posture in frontal & sagittal plane per PT visual assessment to decrease postural strain in ADLs Progress towards goal: MET 2/24/2022   Demonstrate good recall of names of resisted exercises w/ " minimal to moderate verbal cues to promote independence w/ exercise at the end of the program Progress towards goal: MET 2/24/2022   Verbalize plan for continuing resisted exercises w/ specific location (i.e. commercial gym, home, community fitness center) indicating preference for machine-based or free-weight exercises to incorporate all major muscle groups to maintain & progress therapeutic functional improvements Progress towards goal: MET 2/24/2022               Long Term: In 8 weeks, pt will:     Verbalize good understanding of activities planning/scheduling (stretching, mindfulness, resisted training, & cardio) prescribed by PT/OT following the end of the program to sustain & progress functional improvements Progress towards goal: progressing 2/24/2022   Be independent w/ selected resisted training w/ minimal to no cuing while performing to continue w/ resisted strengthening independently at the end of the program Progress towards goal: progressing 2/24/2022   Improve 2 Minute Walk Test (MWT) to 375 feet and 6 MWT to 1125 feet or greater to improve gait speed and mobility and reach community ambulator status Progress towards goal: progressing 2/24/2022   Perform single leg stance 20 seconds or greater bilaterally to improve safety and balance in ADLs Progress towards goal: progressing 2/24/2022   Demonstrate Timed Up & Go (with appropriate assistive device, if necessary) of 10 seconds or less to decrease fall risk and improve functional mobility Progress towards goal: progressing 2/24/2022   Demonstrate 5 time sit to stand test without upper extremity assist to 15 sec or less to improve general mobility and decrease fall risk Progress towards goal: progressing 2/24/2022   Score 55 % or less on Lumbar FOTO to indicate significant functional improvements in impaired functions secondary to low back pain Progress towards goal: MET 2/24/2022, to be reassessed nearer to d/c                       PLAN     Continue PT  per JUNITO Bain, PT, DPT

## 2022-03-04 NOTE — PROGRESS NOTES
Group Psychotherapy    Site: [facility]    Clinical status of patient: Outpatient    3/2/2022    Length of service:[id number]    Referred by: [facility]     Number of patients in attendance: 5    Target symptoms: Chronic Pain Management     Patient's response to intervention:  The patient's response to intervention is active listening.    Progress toward goals and other mental status changes:  The patient's progress toward goals is good.    Plan: group psychotherapy    Topics Covered: Pt attended CBT for Chronic Pain as part of her participation in [facility]. Topics discussed today included a discussion on shame and vulnerability and how that impacts anxiety, depression, other mood disorders and chronic pain. We discussed how pts made it through [facility] and how they changed their activities or behaviors as a result of what they have learned in the program. Will f/u in 1 day.

## 2022-03-04 NOTE — PROGRESS NOTES
Group Psychotherapy    Site: [facility], [place], operated by [facility]    Clinical status of patient: Outpatient    3/3/2022    Length of service:[id number]    Referred by: [facility] Program     Number of patients in attendance: 5    Target symptoms: Chronic Pain Management     Patient's response to intervention:  The patient's response to intervention is active listening.    Progress toward goals and other mental status changes:  The patient's progress toward goals is good.    Plan: group psychotherapy    Topics Covered: Pt attended CBT for Chronic Pain as part of her participation in [facility]. Topics discussed today included a discussion on boundary setting and how this impacts anxiety, depression and other mood disorders in addition to chronic pain. Pts discussed difficulties with setting boundaries, specifically within the program. Will f/u within 1 day.

## 2022-03-07 ENCOUNTER — OFFICE VISIT (OUTPATIENT)
Dept: BEHAVIORAL HEALTH | Facility: CLINIC | Age: 32
End: 2022-03-07
Payer: COMMERCIAL

## 2022-03-07 ENCOUNTER — CLINICAL SUPPORT (OUTPATIENT)
Dept: REHABILITATION | Facility: OTHER | Age: 32
End: 2022-03-07
Payer: COMMERCIAL

## 2022-03-07 DIAGNOSIS — Z74.09 IMPAIRED FUNCTIONAL MOBILITY, BALANCE, GAIT, AND ENDURANCE: Primary | ICD-10-CM

## 2022-03-07 DIAGNOSIS — F90.9 ATTENTION DEFICIT HYPERACTIVITY DISORDER (ADHD), UNSPECIFIED ADHD TYPE: ICD-10-CM

## 2022-03-07 DIAGNOSIS — G89.4 CHRONIC PAIN SYNDROME: ICD-10-CM

## 2022-03-07 DIAGNOSIS — F40.298 FEAR OF PAIN: ICD-10-CM

## 2022-03-07 DIAGNOSIS — F33.0 MILD EPISODE OF RECURRENT MAJOR DEPRESSIVE DISORDER: Primary | ICD-10-CM

## 2022-03-07 DIAGNOSIS — R29.898 DECREASED STRENGTH OF UPPER EXTREMITY: ICD-10-CM

## 2022-03-07 DIAGNOSIS — Z78.9 DECREASED ACTIVITIES OF DAILY LIVING (ADL): Primary | ICD-10-CM

## 2022-03-07 DIAGNOSIS — M62.81 WEAKNESS OF TRUNK MUSCULATURE: ICD-10-CM

## 2022-03-07 DIAGNOSIS — R29.898 WEAKNESS OF BOTH HIPS: ICD-10-CM

## 2022-03-07 PROCEDURE — 90853 PR GROUP PSYCHOTHERAPY: ICD-10-PCS | Mod: S$GLB,,, | Performed by: SOCIAL WORKER

## 2022-03-07 PROCEDURE — 97110 THERAPEUTIC EXERCISES: CPT

## 2022-03-07 PROCEDURE — 97530 THERAPEUTIC ACTIVITIES: CPT

## 2022-03-07 PROCEDURE — 90853 GROUP PSYCHOTHERAPY: CPT | Mod: S$GLB,,, | Performed by: SOCIAL WORKER

## 2022-03-07 NOTE — PROGRESS NOTES
"[facility]    Functional Restoration Program  Physical Therapy Treatment Note  FRP Day 13    Name: Carlos Snell  MRN:[id number]      Therapy Diagnosis:   Encounter Diagnoses   Name Primary?    Impaired functional mobility, balance, gait, and endurance Yes    Chronic pain syndrome     Weakness of both hips     Weakness of trunk musculature      Physician: Gwendolyn Zaidi NP    Date: 3/7/2022    Evaluation Date: 1/28/2022  Authorization Period Expiration: 12/31/2022  Plan of Care Expiration: 04/22/22  Visit # / Visits authorized: 13 / 20  FOTO: completed 2 / 3       Time In:   10:15a  Time Out: 11:30a  Total Billable Time: 75 minutes      SUBJECTIVE     Carlos reports that she did well in her pop up this weekend w/ her mother & she is happy she had help. She also reports that she is finally seeing some improvement in her symptoms, & although only minor, she is optimistic it might continue to improve. She is considering extending her FMLA to make sure that she can develop good habits.    Patient's FRP goals: "I would like to make peace /c [my] chronic illness"  Find exercises that I can do that will help me stay healthy         Current 3/10  Location(s): low back, B knees, R LE     OBJECTIVE     Objective Measures updated at progress report unless specified.     Treatment       Carlos received the Individualized Treatments listed below:     Therapeutic exercises to develop strength, endurance, ROM, flexibility, posture and core stabilization for 75 minutes including:      Peripheral muscle strengthening which included 1-2 sets of 10-20 repetitions at a slow, controlled 5-7 second per rep pace focused on strengthening supporting musculature for improved body mechanics & functional mobility.  Patient & therapist focused on proper form during treatment to ensure optimal strengthening of each targeted muscle group. Patients are instructed to keep sets/reps with proper load to stay " at 3-5 out of 10 on the provided modified Jono exertion scale.         BATCA Machine Exercises Weight (lbs) Sets Repetitions Jono Exertion Scale Rating   Leg Extension  5 1 20 5   Hamstring Curls 15 1 20 5   Chest Press 20 1 15 5   Pec Fly 20 1 20 5   Lat Pull Down 20 1 20 4   Mid Row 20 1 20 4   Bicep Bar Curls  7.5 1 20 4   Standing Tricep Extension  10 1 20 4   Single Leg Press 22.5 1 20 3         · Bridge w/ belt 2x15  · LTR 2x10  · Supine AROM hamstring stretch & neural flossing (DF, knee extension, & hip ER) 2x10 B  · Squats on fitter board each way 2x10  · Tilts on fitter board 20 each way w/out UE support  · Sit on Tball lateral lumbar shifts 2x15  · Sit on Tball dying bug 2x10 B     Patient Education and Home Exercises     Home Exercises Provided and Patient Education Provided     Education provided:   - none    Written Home Exercises Provided: Patient instructed to cont prior HEP. Exercises were reviewed and Carlos was able to demonstrate them prior to the end of the session.  Carlos demonstrated good  understanding of the education provided. See EMR under Patient Instructions for information provided during therapy sessions    ASSESSMENT     Carlos w/ excellent participation today demonstrating improved tolerance to activities fitting more exercises in her allotted time. Her subjective report indicates that she feels confident in her ability to continue w/ daily exercise at the end of the program. She demonstrates good independence w/ her resisted training needing little to no cuing to perform correctly. In stability work on TBall & fitter board, she did very well & reported better tolerance & increased confidence.      Carlos Is progressing fair towards her goals.   Pt prognosis is Fair.     Pt will continue to benefit from skilled outpatient physical therapy to address the deficits listed in the problem list box on initial evaluation, provide pt/family education and to maximize pt's level of  independence in the home and community environment.     Pt's spiritual, cultural and educational needs considered and pt agreeable to plan of care and goals.     Anticipated barriers to physical therapy: fear of pain    GOALS: Pt is in agreement with the following goals:        Goal Progress Towards Goal   Short Term: In 3 weeks, pt will:     Verbalize & demonstrate good understanding of resisted training with 3-1-3 second rep for tmiltcscmq-qfqvyceps-pgwidildd contraction to encourage full muscle recruitment for strength & endurance Progress towards goal: MET 2/24/2022   Verbalize & demonstrate good understanding of home stretch routine to improve AROM, help decrease pain & improve mobility Progress towards goal: MET 2/24/2022   Demonstrate proper supine or seated diaphragmatic breathing with decreased chest excursion & emphasis on full abdominal excursion & increased expiration time to promote muscle relaxation & increased parasympathetic activity to improve patient tolerance to activities Progress towards goal: MET 2/24/2022   Demonstrate proper static standing posture in frontal & sagittal plane per PT visual assessment to decrease postural strain in ADLs Progress towards goal: MET 2/24/2022   Demonstrate good recall of names of resisted exercises w/ minimal to moderate verbal cues to promote independence w/ exercise at the end of the program Progress towards goal: MET 2/24/2022   Verbalize plan for continuing resisted exercises w/ specific location (i.e. commercial gym, home, community fitness center) indicating preference for machine-based or free-weight exercises to incorporate all major muscle groups to maintain & progress therapeutic functional improvements Progress towards goal: MET 2/24/2022               Long Term: In 8 weeks, pt will:     Verbalize good understanding of activities planning/scheduling (stretching, mindfulness, resisted training, & cardio) prescribed by PT/OT following the end of the  program to sustain & progress functional improvements Progress towards goal: progressing 2/24/2022   Be independent w/ selected resisted training w/ minimal to no cuing while performing to continue w/ resisted strengthening independently at the end of the program Progress towards goal: progressing 2/24/2022   Improve 2 Minute Walk Test (MWT) to 375 feet and 6 MWT to 1125 feet or greater to improve gait speed and mobility and reach community ambulator status Progress towards goal: progressing 2/24/2022   Perform single leg stance 20 seconds or greater bilaterally to improve safety and balance in ADLs Progress towards goal: progressing 2/24/2022   Demonstrate Timed Up & Go (with appropriate assistive device, if necessary) of 10 seconds or less to decrease fall risk and improve functional mobility Progress towards goal: progressing 2/24/2022   Demonstrate 5 time sit to stand test without upper extremity assist to 15 sec or less to improve general mobility and decrease fall risk Progress towards goal: progressing 2/24/2022   Score 55 % or less on Lumbar FOTO to indicate significant functional improvements in impaired functions secondary to low back pain Progress towards goal: MET 2/24/2022, to be reassessed nearer to d/c                       PLAN     Continue PT per POC     Duy Camp, PT, DPT

## 2022-03-07 NOTE — PROGRESS NOTES
"[facility]   Functional Restoration Program  Occupational Therapy Daily Note  FRP Day 13    Name: Carlos Snell  Medical Record Number: [id number]    Therapy Diagnosis:   Encounter Diagnoses   Name Primary?    Decreased activities of daily living (ADL) Yes    Decreased strength of upper extremity     Fear of pain      Physician: Gwendolyn Zaidi NP    Visit Date: 3/7/2022    Physician Orders: OT Eval and Treat  Medical Diagnosis: Chronic pain disorder  Evaluation Date: 1/28/2022  Insurance Authorization Period Expiration: 12/31/2022  Plan of Care Certification Period: 5/6/2022 (UPDATED 2/24/22)  Visit # / Visits authorized: 12 / 20 (incuding eval)   FOTO: evaluation: 52% limitation  FOTO: reassessment: 36% limitation    Time In: 09:00  Time Out: 10:15  Total Billable Time: 75 minutes    Subjective     Carlos reports "it's been good to have more help. My mother was here this weekend and she helped, and Matty helped to get heavy things down". "I am surprised on well I have been liking the breathing exercises". "I took my anna out last week and I was surprised how easy it was".        Pain: 3 /10  Location: generalized, low back, R leg    Personal Functional Three Month Goals: Performance and satisfaction noted below.    OBJECTIVE     Carlos received the treatments listed below.    Pt participated in functional lifting/endurance/therapeutic activities in order to increase pt's participation with vocational, selfcare ADLs, and leisure activities in order to improve quality of life, for 75 minutes, which included:    Full body stretch w/ 3-10 sec hold technique &/or 3-5 repetition technique on all of the following:   Cervical flx/ext   Cervical sidebending   Cervical rotation   Cervical protraction/retraction   Shld rolls   Shld depression/elevation   Scapular retraction/protraction/scaption   Posterior shld stretch (cross arm)   Shld ER stretch (chicken wing)   Elbow " flx/ext   Forearm supination/pronation   Wrist flx/ext   Open/close hands/fingers   Seated trunk rotations   Seated trunk/thoracic ext/flx   Seated marches & knee to chest   Seated knee flx/ext   Seated hip ER/piriformis stretch   Seated hamstring stretch   Seated toe raise to heel raise    Seated ankle circles each way   Standing lumbar extensions   Standing lateral trunk stretch   Standing quad stretch    Pt completed functional lifting, floor to waist, 15 reps x 12# lbs with exertion rated moderate (3 /10). Spoke about having help from spouse to pack up car the previous night and mom when at the sale. She mentioned about liking to do things for herself, but understood that choosing and asking for help could still be counted as being independent.     Pt participated in functional lift waist to shoulder, 15 reps x  12 # with a rating of moderate (3/10). Pt educated on standing posture, core awareness, keeping shoulders depressed and retracted, stepping to place > reaching to place, keeping weight close to center of gravity and pacing as needed.    Took bathroom break, then continued with participation in endurance activity using treadmill for 10 minutes, starting at level 1.6 to 2.2 to improve functional endurance and progress towards increased MET level for improved community mobility and participation, rated as sort of hard (4/10), Carlos re-educated on how to add mindfulness component to activity and how to pace appropriately by completed self check ins and grading activity as needed, with goal to reach moderate to sort of hard by end of activity. While on treadmill, spoke with OT student regarding exercise device/method she has been using in the past (the mirror) and which she is planning to continuing with. Was able to walk without using UE as support.    Pt completed dynamic reaching in various functional ranges, with continued focus on hip rotation/weight shifting, 10 reps x 3# lbs, rated as  sort of hard (4/10) exertion. Cues to stay aware of overextending and shoulder elevation. Post activity stretching of shoulders performed at wall.     Pt completed maximal lift 5 reps with 20 #lbs, rated as sort of hard  (4/10), continued education focused on neutral spine positioning and pushing through heels for safety and activation of correct muscles.    Pt completed supine mindfulness diaphragmatic breathing activity x15-20 reps in z-lie position with feet on chair, with focus on neutral spine, mechanics of breathing, and benefits re: improved circulation, increased blood flow, PNS activation, and mindfulness. Reiterated importance of being able to make changes to activities throughout the day to be able to manage other activities. Got on the floor using chair. Took initiate to complete stretches while on floor, prior to starting with PT session.      Patient Education and Home Exercises     Education provided:   - Ms. Snell received education regarding proper posture and body mechanics. She received education regarding anticipated muscular soreness following lift testing and strategies for management were reviewed with patient including stretching, using ice, scheduled rest.  - Additional Education provided: heriberto scale, pain cycle, z-lie, functional lifting mechanics, pursed lip and diaphragmatic breathing techniques  - education on the Functional Restoration Program. Details of the program were discussed.   - avoiding trunk extensions  - golfer's bend  - energy banking; pacing self with performance.   - Progress towards goals    Written Home Exercises Provided: Patient instructed to cont prior HEP.  Carlos demonstrated good understanding of the education provided. See EMR under Patient Instructions for information provided during therapy sessions.    ASSESSMENT   Carlos presents with open affect and report of having had an easier time with pop up sale on weekend, using spouse to take boxes down, packing  car night before and having mother present who helped with the actual sale. Good pacing this session, and taking initiative to complete stretches. Overall, continues with good willingness to participate and making progress towards personal goals.    Carlos is unable to fully participate in work, recreational, and home-based activities because of decreased functional strength, decreased endurance, limited positional tolerance, fear of re-injury, and fear of increased pain. She will continue to benefit from skilled outpatient occupational therapy to address the deficits listed on initial evaluation, provide pt education and to maximize pt's level of independence in the home and community environment.      Carlos's prognosis is Good due to good personal goals, willingness to make behavioral changes and good response to pain education and identification of current behaviors.     Pt's spiritual, cultural and educational needs considered and pt agreeable to plan of care and goals.  Anticipated barriers to occupational therapy: None identified    FRP Goals: While working towards the long-term (3 month) functional goals listed above, Carlos will accomplish the following short term goals during the 5-week intensive rehabilitation program. Carlos will:      1. Develop a viable return to work plan. ------------ progressing 2/24/22  2. Demonstrate physical capacities consistent with a light-medium work demand level. ------------ progressing 2/24/22  3. Demonstrate increased functional strength by lifting 20 lbs floor to waist and 20 lbs waist to shoulder in order to meet demand of work/home routine. ------------ progressing 2/24/22  4. Increase her positional tolerance to the level needed for work and home activities. ------------ progressing 2/24/22  5. Implement self-care strategies during the program and at home to control pain. ------------ progressing 2/24/22  6.   Patient will demonstrate use of mindfulness,  stress management, and coping  strategies for improved participation in daily routine. ------------ progressing 2/24/22     ADDED GOALS 2/24/22:   7. Pt will improve 5x sit to stand time by 5 seconds to progress towards decreased fall risk and improve confidence in community mobility      Specific patient expressed goals and demand levels:  Current Capacity Limit/ Physical  Demand Level (PDL)    Demand Levels of Functional Recovery Goals     Performance/  Satisfaction  Day 1 Performance/  Satisfaction  Day 9  2/24/22 Performance/  Satisfaction  Follow-up      Sedentary-Light Physical Demand  (Based above tested results)     Vocational:  Standing tolerance            Walking tolerance     Ascending stairs      Hauling supply bins      Recreational:  Cooking      Exercise      Daily Living:  Hair care     Lower body dressing      Housechores (vacuuming, dishes, etc)     Light-medium Physical Demand Level     4/4 (now after 15 minutes it becomes painful)     5/3     4/3     5/4        6/5     3/1        6/5     6/5        3/1     5 / 5            4 / 3      6 / 6       7 / 6          6 / 5       5 / 5          6 / 6       5 / 5          2 / 1      The PDL listed above is based on today's physical performance screening test. More comprehensive physical  testing integrated with clinical findings would be required to derived an accurate work capacity.        PLAN   Continue per POC.    JEF Rivero, OTR/L, CEAS-I  3/7/2022

## 2022-03-08 NOTE — PROGRESS NOTES
Group Psychotherapy     Site: [facility], [place]     Clinical status of patient: Outpatient     3/7/2022     Length of service:[id number]     Referred by: Functional Restoration Program      Number of patients in attendance: 5     Target symptoms: Chronic Pain Management      Patient's response to intervention:  The patient's response to intervention is active listening.     Progress toward goals and other mental status changes:  The patient's progress toward goals is good.     Plan: group psychotherapy     Topics Covered: Pt attended CBT for Chronic Pain as part of her participation in Functional Restoration. Topics discussed today included a discussion on resiliency and resiliency plans, pts filled out a resiliency plan and discussed how resiliency plays into anxiety, depression, other mood disorders and chronic pain. We also did an exercise on cognitive skills vs behavioral skills and what they have learned in the program. Will f/u on Wednesday for final testing.

## 2022-03-09 ENCOUNTER — CLINICAL SUPPORT (OUTPATIENT)
Dept: REHABILITATION | Facility: OTHER | Age: 32
End: 2022-03-09
Payer: COMMERCIAL

## 2022-03-09 ENCOUNTER — OFFICE VISIT (OUTPATIENT)
Dept: BEHAVIORAL HEALTH | Facility: CLINIC | Age: 32
End: 2022-03-09
Payer: COMMERCIAL

## 2022-03-09 DIAGNOSIS — F90.9 ATTENTION DEFICIT HYPERACTIVITY DISORDER (ADHD), UNSPECIFIED ADHD TYPE: ICD-10-CM

## 2022-03-09 DIAGNOSIS — M54.16 LUMBAR RADICULOPATHY: Primary | ICD-10-CM

## 2022-03-09 DIAGNOSIS — R29.898 WEAKNESS OF BOTH HIPS: ICD-10-CM

## 2022-03-09 DIAGNOSIS — F33.0 MILD EPISODE OF RECURRENT MAJOR DEPRESSIVE DISORDER: Primary | ICD-10-CM

## 2022-03-09 DIAGNOSIS — M62.81 WEAKNESS OF TRUNK MUSCULATURE: ICD-10-CM

## 2022-03-09 DIAGNOSIS — F40.298 FEAR OF PAIN: ICD-10-CM

## 2022-03-09 DIAGNOSIS — Z78.9 DECREASED ACTIVITIES OF DAILY LIVING (ADL): Primary | ICD-10-CM

## 2022-03-09 DIAGNOSIS — Z74.09 IMPAIRED FUNCTIONAL MOBILITY, BALANCE, GAIT, AND ENDURANCE: Primary | ICD-10-CM

## 2022-03-09 DIAGNOSIS — Z98.1 HISTORY OF LUMBAR SPINAL FUSION: ICD-10-CM

## 2022-03-09 DIAGNOSIS — R29.898 DECREASED STRENGTH OF UPPER EXTREMITY: ICD-10-CM

## 2022-03-09 PROCEDURE — 97110 THERAPEUTIC EXERCISES: CPT

## 2022-03-09 PROCEDURE — 97530 THERAPEUTIC ACTIVITIES: CPT

## 2022-03-09 PROCEDURE — 90791 PSYCH DIAGNOSTIC EVALUATION: CPT | Mod: S$GLB,,, | Performed by: SOCIAL WORKER

## 2022-03-09 PROCEDURE — 90791 PR PSYCHIATRIC DIAGNOSTIC EVALUATION: ICD-10-PCS | Mod: S$GLB,,, | Performed by: SOCIAL WORKER

## 2022-03-09 NOTE — PROGRESS NOTES
"[facility]    Functional Restoration Program  Physical Therapy Treatment Note  FRP Day 14    Name: Carlos Snell  MRN:[id number]      Therapy Diagnosis:   Encounter Diagnoses   Name Primary?    Impaired functional mobility, balance, gait, and endurance Yes    Weakness of both hips     Weakness of trunk musculature      Physician: Gwendolyn Zaidi NP    Date: 3/9/2022    Evaluation Date: 1/28/2022  Authorization Period Expiration: 12/31/2022  Plan of Care Expiration: 04/22/22  Visit # / Visits authorized: 13 / 20  FOTO: completed 2 / 3       Time In:  9:10 am  Time Out:  10:20 am  Total Billable Time: 70 minutes      SUBJECTIVE     Carlos reports she feels "pretty much the same" regarding her strength.     Regarding free weight exercises/use from Thursday, pt report more comfort /c resistance machines.  Pt tolerate last session well /c no c/o of excess discomfort.  Pt report yesterday walking 2 miles, stopping from leg discomfort but noting she would not have been able to do accomplish this before FRP.  Pt agrees to tx today.        Patient's FRP goals: "I would like to make peace /c [my] chronic illness"  Find exercises that I can do that will help me stay healthy         Current 4/10  Location(s):  R knee, R LE     OBJECTIVE     Objective Measures updated at progress report unless specified.     Treatment       Carlos received the Individualized Treatments listed below:     Therapeutic exercises to develop strength, endurance, ROM, flexibility, posture and core stabilization for 70 minutes including:    Full body stretch w/ 3-10 sec hold technique &/or 3-5 repetition technique on all of the following:   Cervical flx/ext   Cervical sidebending   Cervical rotation   Cervical protraction/retraction   Shld rolls   Shld depression/elevation   Scapular retraction/protraction/scaption   Posterior shld stretch (cross arm)   Shld ER stretch (chicken wing)   Elbow " flx/ext   Forearm supination/pronation   Wrist flx/ext   Open/close hands/fingers   Seated trunk rotations   Seated trunk/thoracic ext/flx   Seated marches & knee to chest   Seated knee flx/ext   Seated hip ER/piriformis stretch   Seated hamstring stretch   Seated toe raise to heel raise    Seated ankle circles each way   Standing lumbar extensions   Standing lateral leaning stretch   Standing quad stretch (UE support for balance)      Peripheral muscle strengthening which included 1-2 sets of 10-20 repetitions at a slow, controlled 5-7 second per rep pace focused on strengthening supporting musculature for improved body mechanics & functional mobility.  Patient & therapist focused on proper form during treatment to ensure optimal strengthening of each targeted muscle group. Patients are instructed to keep sets/reps with proper load to stay at 3-5 out of 10 on the provided modified Jono exertion scale.         BATCA Machine Exercises Weight (lbs) Sets Repetitions Jono Exertion Scale Rating   Leg Extension        Hamstring Curls       Chest Press 20 1 15 5   Pec Fly 20 1 20 5   Lat Pull Down 22.5 1 20 5   Mid Row 22.5  1 20 5   Bicep Bar Curls        Standing Tricep Extension        Single Leg Press 25 1 20 4       Supine    LTR x 10    Supine AROM hamstring stretch & neural flossing (DF, knee extension, & hip ER) 2x10 B   Piriformis stretch 3 x 20 sec hold    Bridge w/ belt 2x15    Seated TBall   Pelvic tilts    Lat, Fwd/Bwd, CW, CCW x 20 each    Dead bugs x 20   Eritrean twists x 20 /c 7# ball      Fitter board- hard setting   Fwd/Bwd    Trial 1 - EO, piano fingers 1 hand     Trial 2 - SL stance /c 2 hand finger tip  support x 20 each leg   Lat    Trial 1 - EO, piano fingers 1 hand    Trial 2 - SL stance /c 1 hand  support x 20 each leg, dec tilt control, cue for fwd gaze         Patient Education and Home Exercises     Home Exercises Provided and Patient Education Provided     Education  provided:   - none    Written Home Exercises Provided: Patient instructed to cont prior HEP. Exercises were reviewed and Carlos was able to demonstrate them prior to the end of the session.  Carlos demonstrated good  understanding of the education provided. See EMR under Patient Instructions for information provided during therapy sessions    ASSESSMENT     Carlos subjective report indicate improved amb tolerance.  Pt encouraged to cont walking program as weather improves.   Today, despite pt noting inc intensity of RLE sx, pt complete all rx tx including SL stance challenge on fitter board and Tball core stability challenges. Pt demo inc BLE strength /c fitter board tilting as well as inc balance confidence limit hand support to fingers (vs full hand ).  Pt demo improved quality of movement /c pelvic tilts on Tball indicating improved core activation and motor control.  Plan complete BATCA exercises next visit and issuing paperwork /c ending measures to promote cont resistance training outside of FRP.       Carlos Is progressing fair towards her goals.   Pt prognosis is Fair.     Pt will continue to benefit from skilled outpatient physical therapy to address the deficits listed in the problem list box on initial evaluation, provide pt/family education and to maximize pt's level of independence in the home and community environment.     Pt's spiritual, cultural and educational needs considered and pt agreeable to plan of care and goals.     Anticipated barriers to physical therapy: fear of pain    GOALS: Pt is in agreement with the following goals:        Goal Progress Towards Goal   Short Term: In 3 weeks, pt will:     Verbalize & demonstrate good understanding of resisted training with 3-1-3 second rep for wslzckwpqf-delkqlwqk-dydbapvzb contraction to encourage full muscle recruitment for strength & endurance Progress towards goal: MET 2/24/2022   Verbalize & demonstrate good understanding of home  stretch routine to improve AROM, help decrease pain & improve mobility Progress towards goal: MET 2/24/2022   Demonstrate proper supine or seated diaphragmatic breathing with decreased chest excursion & emphasis on full abdominal excursion & increased expiration time to promote muscle relaxation & increased parasympathetic activity to improve patient tolerance to activities Progress towards goal: MET 2/24/2022   Demonstrate proper static standing posture in frontal & sagittal plane per PT visual assessment to decrease postural strain in ADLs Progress towards goal: MET 2/24/2022   Demonstrate good recall of names of resisted exercises w/ minimal to moderate verbal cues to promote independence w/ exercise at the end of the program Progress towards goal: MET 2/24/2022   Verbalize plan for continuing resisted exercises w/ specific location (i.e. commercial gym, home, community fitness center) indicating preference for machine-based or free-weight exercises to incorporate all major muscle groups to maintain & progress therapeutic functional improvements Progress towards goal: MET 2/24/2022               Long Term: In 8 weeks, pt will:     Verbalize good understanding of activities planning/scheduling (stretching, mindfulness, resisted training, & cardio) prescribed by PT/OT following the end of the program to sustain & progress functional improvements Progress towards goal: progressing 2/24/2022   Be independent w/ selected resisted training w/ minimal to no cuing while performing to continue w/ resisted strengthening independently at the end of the program Progress towards goal: progressing 2/24/2022   Improve 2 Minute Walk Test (MWT) to 375 feet and 6 MWT to 1125 feet or greater to improve gait speed and mobility and reach community ambulator status Progress towards goal: progressing 2/24/2022   Perform single leg stance 20 seconds or greater bilaterally to improve safety and balance in ADLs Progress towards goal:  progressing 2/24/2022   Demonstrate Timed Up & Go (with appropriate assistive device, if necessary) of 10 seconds or less to decrease fall risk and improve functional mobility Progress towards goal: progressing 2/24/2022   Demonstrate 5 time sit to stand test without upper extremity assist to 15 sec or less to improve general mobility and decrease fall risk Progress towards goal: progressing 2/24/2022   Score 55 % or less on Lumbar FOTO to indicate significant functional improvements in impaired functions secondary to low back pain Progress towards goal: MET 2/24/2022, to be reassessed nearer to d/c                       PLAN     Continue PT per POC     Shady Bain, PT, DPT

## 2022-03-09 NOTE — PROGRESS NOTES
Group Psychotherapy    Site: [facility]    Clinical status of patient: Outpatient    2/24/2022    Length of service:[id number]    Referred by: Functional Restoration Program     Number of patients in attendance: 3    Target symptoms: Chronic Pain Management     Patient's response to intervention:  The patient's response to intervention is active listening.    Progress toward goals and other mental status changes:  The patient's progress toward goals is good.    Plan: group psychotherapy    Topics Covered: Pt attended CBT for Chronic Pain as part of her participation in Functional Restoration. Topics discussed today included a discussion on the neuromatrix of pain and how anxiety, depression and other mood disorders impact chronic pain. Pts discussed what they've learned in the program regarding the interaction between stress/mental health and chronic pain. Will f/u in 1 week.

## 2022-03-09 NOTE — PROGRESS NOTES
"[facility]   Functional Restoration Program  Occupational Therapy Daily Note  FRP Day 14    Name: Carlos Snell  Medical Record Number: [id number]    Therapy Diagnosis:   Encounter Diagnoses   Name Primary?    Decreased activities of daily living (ADL) Yes    Decreased strength of upper extremity     Fear of pain      Physician: Gwendolyn Zaidi NP    Visit Date: 3/9/2022    Physician Orders: OT Eval and Treat  Medical Diagnosis: Chronic pain disorder  Evaluation Date: 1/28/2022  Insurance Authorization Period Expiration: 12/31/2022  Plan of Care Certification Period: 5/6/2022 (UPDATED 2/24/22)  Visit # / Visits authorized: 13 / 20 (incuding eval)   FOTO: evaluation: 52% limitation  FOTO: reassessment: 36% limitation    Time In: 10:20 AM  Time Out: 11:30 AM  Total Billable Time: 70 minutes    Subjective     Carlos reports "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 Yesterday I drove a lot, we went to [facility], and my leg was really aching and it just made it harder to fall asleep".         Pain: 4 /10  Location: generalized, low back, R leg    Personal Functional Three Month Goals: Performance and satisfaction noted below.    OBJECTIVE     Carlos received the treatments listed below.    Pt participated in functional lifting/endurance/therapeutic activities in order to increase pt's participation with vocational, selfcare ADLs, and leisure activities in order to improve quality of life, for 70 minutes, which included:    Pt completed functional lifting, floor to waist, 15 reps x 14 lbs with exertion rated hard (5 /10). Added focus on pacing as needed 2/2 increased R leg pain. During activity, she discussed difficulty with R leg pain cause disruptions in sleep. Noted increased pain with long periods of sitting and lingering pain causing difficulty with function. Discussed that she was unable to use tools to help decrease her pain aside " from deep breathing. Discussed importance of showing herself kindness and understanding today over frustration and negative self talk.     Pt participated in functional lift waist to shoulder, 15 reps x  14 lbs with a rating of hard (5/10). Pt educated on standing posture, core awareness, keeping depressed and retracted, stepping to place > reaching to place, keeping weight close to center of gravity and pacing as needed. Discussed her equal strength in BLE and BUE noted by lifting the same weight in both activities consistently. Responded with slight increased self efficacy. Encouraged to complete standing shoulder stretch at wall post activity. Observed altered gait pattern walking in between activities. Encouraged to take seated rest break.     Pt completed yoga activity on mat, supine, quadriped, seated and standing, with focused education on getting on/off floor properly and safely, back mobility, hip flexibility, stress reduction, dynamic standing balance, posture, alignment and coordinating movement with breath, rated easy (2/10). Continues to perseverate on R leg pain throughout activities but with continued willingness to participate in yoga activities and continues to note enjoyment of yoga and good carryover with yoga practice at home.     Pt completed supine mindfulness diaphragmatic breathing activity x15-20 reps in z-lie position with feet on chair, with focus on neutral spine, mechanics of breathing, and benefits re: improved circulation, increased blood flow, PNS activation, and mindfulness.      Pt completed maximal lift x10 reps with 20 lbs, rated as hard (5/10), continued education focused on neutral spine positioning and pushing through heels for safety and activation of correct muscles.     Pt completed dynamic reaching in various functional ranges, alternating seated and standing position as needed to pace appropriately 2/2 increased R leg pain, with continued focus on posture, core awareness,  hip rotation/weight shifting, slow and controlled movement, coordinating movement with breath. Completed x10 reps x 3# lbs, rated as sort of hard (4/10) exertion. Post activity shoulder stretches performed at wall.     Pt with personal goal of improved functional activity tolerance to complete hair care. She completed sustained standing task x 10 minutes at upright peg board on wall with focused education on posture, core awareness, overhead reaching, BUE muscular endurance, weight shifting and knees bent, pacing as needed and adding mindfulness component to connect mind-body with intermittent stretches and reconnecting to breath. Placed single crated in front of her to have option to weight shift when needed. Able to  pegs that fell on floor with proper mechanics. Discussed that she was able to take out her anna with decreased pain. Noted it took her less time than anticipated but with report of improved BUE functional endurance to complete task. She rated activity as moderate (3/10).    Participation in endurance activity using treadmill for 5 minutes, starting at 2.0mph and progressing to 2.2 mph to improve functional endurance and progress towards increased MET level for improved community mobility and participation, rated as hard (4/10), Carlos re-educated on heel to toe walking and added focus on how to incorporate mindfulness component to activity, as well as pacing appropriately by completed self check ins and grading activity as needed. Was able to walk without using UE as support.      Patient Education and Home Exercises     Education provided:   - Ms. Snell received education regarding proper posture and body mechanics. She received education regarding anticipated muscular soreness following lift testing and strategies for management were reviewed with patient including stretching, using ice, scheduled rest.  - Additional Education provided: heriberto scale, pain cycle, z-lie, functional lifting  mechanics, pursed lip and diaphragmatic breathing techniques  - education on the Functional Restoration Program. Details of the program were discussed.   - avoiding trunk extensions  - golfer's bend  - energy banking; pacing self with performance.   - Progress towards goals    Written Home Exercises Provided: Patient instructed to cont prior HEP.  Carlos demonstrated good understanding of the education provided. See EMR under Patient Instructions for information provided during therapy sessions.    ASSESSMENT   Carlos presents with report of increased R leg pain upon arrival and decreased sleep. She continues with flat but open affect and continued willingness to participate in session. Good pacing this session, and taking initiative to complete stretches as needed and with improved independence in guiding session. Good tolerance to weight increases in functional lifting tasks and good continued response to yoga and mindfulness activities. Overall, continues to make progress towards personal goals.    Carlos is unable to fully participate in work, recreational, and home-based activities because of decreased functional strength, decreased endurance, limited positional tolerance, fear of re-injury, and fear of increased pain. She will continue to benefit from skilled outpatient occupational therapy to address the deficits listed on initial evaluation, provide pt education and to maximize pt's level of independence in the home and community environment.      Carlos's prognosis is Good due to good personal goals, willingness to make behavioral changes and good response to pain education and identification of current behaviors.     Pt's spiritual, cultural and educational needs considered and pt agreeable to plan of care and goals.  Anticipated barriers to occupational therapy: None identified    FRP Goals: While working towards the long-term (3 month) functional goals listed above, Carlos will accomplish the  following short term goals during the 5-week intensive rehabilitation program. Carlos will:      1. Develop a viable return to work plan. ------------ progressing 2/24/22  2. Demonstrate physical capacities consistent with a light-medium work demand level. ------------ progressing 2/24/22  3. Demonstrate increased functional strength by lifting 20 lbs floor to waist and 20 lbs waist to shoulder in order to meet demand of work/home routine. ------------ progressing 2/24/22  4. Increase her positional tolerance to the level needed for work and home activities. ------------ progressing 2/24/22  5. Implement self-care strategies during the program and at home to control pain. ------------ progressing 2/24/22  6.   Patient will demonstrate use of mindfulness, stress management, and coping  strategies for improved participation in daily routine. ------------ progressing 2/24/22     ADDED GOALS 2/24/22:   7. Pt will improve 5x sit to stand time by 5 seconds to progress towards decreased fall risk and improve confidence in community mobility      Specific patient expressed goals and demand levels:  Current Capacity Limit/ Physical  Demand Level (PDL)    Demand Levels of Functional Recovery Goals     Performance/  Satisfaction  Day 1 Performance/  Satisfaction  Day 9  2/24/22 Performance/  Satisfaction  Follow-up      Sedentary-Light Physical Demand  (Based above tested results)     Vocational:  Standing tolerance            Walking tolerance     Ascending stairs      Hauling supply bins      Recreational:  Cooking      Exercise      Daily Living:  Hair care     Lower body dressing      Housechores (vacuuming, dishes, etc)     Light-medium Physical Demand Level     4/4 (now after 15 minutes it becomes painful)     5/3     4/3     5/4        6/5     3/1        6/5     6/5        3/1     5 / 5            4 / 3      6 / 6       7 / 6          6 / 5       5 / 5          6 / 6       5 / 5 2 / 1      The PDL listed  above is based on today's physical performance screening test. More comprehensive physical  testing integrated with clinical findings would be required to derived an accurate work capacity.        PLAN   Continue per POC.    Juana Terry, OTR/L  3/9/2022

## 2022-03-10 ENCOUNTER — CLINICAL SUPPORT (OUTPATIENT)
Dept: REHABILITATION | Facility: OTHER | Age: 32
End: 2022-03-10
Payer: COMMERCIAL

## 2022-03-10 ENCOUNTER — OFFICE VISIT (OUTPATIENT)
Dept: PAIN MEDICINE | Facility: OTHER | Age: 32
End: 2022-03-10
Payer: COMMERCIAL

## 2022-03-10 DIAGNOSIS — Z74.09 IMPAIRED FUNCTIONAL MOBILITY, BALANCE, GAIT, AND ENDURANCE: Primary | ICD-10-CM

## 2022-03-10 DIAGNOSIS — G89.4 CHRONIC PAIN DISORDER: Primary | ICD-10-CM

## 2022-03-10 DIAGNOSIS — M62.81 WEAKNESS OF TRUNK MUSCULATURE: ICD-10-CM

## 2022-03-10 DIAGNOSIS — R29.898 DECREASED STRENGTH OF UPPER EXTREMITY: ICD-10-CM

## 2022-03-10 DIAGNOSIS — F40.298 FEAR OF PAIN: ICD-10-CM

## 2022-03-10 DIAGNOSIS — R29.898 WEAKNESS OF BOTH HIPS: ICD-10-CM

## 2022-03-10 DIAGNOSIS — Z78.9 DECREASED ACTIVITIES OF DAILY LIVING (ADL): Primary | ICD-10-CM

## 2022-03-10 PROCEDURE — 99366 PR TEAM CONFERENCE FACE-TO-FACE NONPHYSICIAN: ICD-10-PCS | Mod: ,,, | Performed by: NURSE PRACTITIONER

## 2022-03-10 PROCEDURE — 99366 TEAM CONF W/PAT BY HC PROF: CPT | Mod: ,,, | Performed by: NURSE PRACTITIONER

## 2022-03-10 PROCEDURE — 97110 THERAPEUTIC EXERCISES: CPT

## 2022-03-10 PROCEDURE — 1159F MED LIST DOCD IN RCRD: CPT | Mod: CPTII,,, | Performed by: NURSE PRACTITIONER

## 2022-03-10 PROCEDURE — 1160F PR REVIEW ALL MEDS BY PRESCRIBER/CLIN PHARMACIST DOCUMENTED: ICD-10-PCS | Mod: CPTII,,, | Performed by: NURSE PRACTITIONER

## 2022-03-10 PROCEDURE — 1160F RVW MEDS BY RX/DR IN RCRD: CPT | Mod: CPTII,,, | Performed by: NURSE PRACTITIONER

## 2022-03-10 PROCEDURE — 97530 THERAPEUTIC ACTIVITIES: CPT

## 2022-03-10 PROCEDURE — 1159F PR MEDICATION LIST DOCUMENTED IN MEDICAL RECORD: ICD-10-PCS | Mod: CPTII,,, | Performed by: NURSE PRACTITIONER

## 2022-03-10 NOTE — PROGRESS NOTES
"[facility]    Functional Restoration Program  Physical Therapy Treatment Note  FRP Day 15    Name: Carlos Snell  MRN:[id number]      Therapy Diagnosis:   Encounter Diagnoses   Name Primary?    Impaired functional mobility, balance, gait, and endurance Yes    Weakness of both hips     Weakness of trunk musculature      Physician: Gwendolyn Zaidi NP    Date: 3/10/2022    Evaluation Date: 1/28/2022  Authorization Period Expiration: 12/31/2022  Plan of Care Expiration: 04/22/22  Visit # / Visits authorized: 14 / 20  FOTO: completed 2 / 3       Time In:   8:30 am  Time Out:  9:45 am  Total Billable Time: 75 Minutes    Note: This patient was co-treated /c the assistance of certified rehab technician /c each exercise performed with the direct supervision of the attending physical therapist      SUBJECTIVE     Carlos reports no change in RLE and finds it cont disrupt her sleep. Pt plans to f/u /c pain MD.   However, pt report specific use of alternate nostril breathing has served to dec her stress throughout the day.  Pt report she will use her stretches moving forward to "help stay healthy".   Pt tolerate last session well /c no c/o of excess discomfort.   Pt agrees to tx today.        Patient's FRP goals: "I would like to make peace /c [my] chronic illness"  Find exercises that I can do that will help me stay healthy         Current 4/10  Location(s):  R knee, R LE     OBJECTIVE     Objective Measures updated at progress report unless specified.     Treatment       Carlos received the Individualized Treatments listed below:     Therapeutic exercises to develop strength, endurance, ROM, flexibility, posture and core stabilization for 75 minutes including:    Full body stretch w/ 3-10 sec hold technique &/or 3-5 repetition technique on all of the following:   Cervical flx/ext   Cervical sidebending   Cervical rotation   Cervical protraction/retraction   Shld rolls   Shld " depression/elevation   Scapular retraction/protraction/scaption   Posterior shld stretch (cross arm)   Shld ER stretch (chicken wing)   Elbow flx/ext   Forearm supination/pronation   Wrist flx/ext   Open/close hands/fingers   Seated trunk rotations   Seated trunk/thoracic ext/flx   Seated marches & knee to chest   Seated knee flx/ext   Seated hip ER/piriformis stretch   Seated hamstring stretch   Seated toe raise to heel raise    Seated ankle circles each way   Standing lumbar extensions   Standing lateral leaning stretch   Standing quad stretch (UE support for balance)      Peripheral muscle strengthening which included 1-2 sets of 10-20 repetitions at a slow, controlled 5-7 second per rep pace focused on strengthening supporting musculature for improved body mechanics & functional mobility.  Patient & therapist focused on proper form during treatment to ensure optimal strengthening of each targeted muscle group. Patients are instructed to keep sets/reps with proper load to stay at 3-5 out of 10 on the provided modified Jono exertion scale.         Lyrically [employer] Machine Exercises Weight (lbs) Sets Repetitions Jono Exertion Scale Rating   Leg Extension  5 1 20 5   Hamstring Curls 15 1 20 5   Chest Press       Pec Fly       Lat Pull Down 22.5 1 20 4   Mid Row 22.5 1 20 4   Bicep Bar Curls  7.5 1 20 5   Standing Tricep Extension  10 1 20 5   Single Leg Press R/l 25 1 20 6/4       Machine   Paloff press 5# x 20 B   Lumberjack down chops x 10       Supine    Diaphragmatic breathing x 10   LTR x 10       Patient Education and Home Exercises     Home Exercises Provided and Patient Education Provided     Education provided:       Written Home Exercises Provided: Patient instructed to cont prior HEP. Exercises were reviewed and Carlos was able to demonstrate them prior to the end of the session.  Carlos demonstrated good  understanding of the education provided. See EMR under Patient Instructions for  information provided during therapy sessions    ASSESSMENT     Carlos subjective report indicate plan to cont stress reduction techniques in effort to prevent RLE pain from limiting her functionality, meanwhile, applying a multimodal approach to dealing /c her cont RLE pain /c MD visit.  Today, pt complete full complement of resistance training exercises to match FRP principle of full body workouts. Pt demo safe technique and pacing indicating appropriateness to Ind gym resistance training.  Pt also issued handout /c ending measures to offer safe jump off point for Ind workouts.  Also, pt introduced to machine based core activation exercises to cont address her core motor control which has been improving thru txTruman Gonzalez Is progressing fair towards her goals.   Pt prognosis is Fair.     Pt will continue to benefit from skilled outpatient physical therapy to address the deficits listed in the problem list box on initial evaluation, provide pt/family education and to maximize pt's level of independence in the home and community environment.     Pt's spiritual, cultural and educational needs considered and pt agreeable to plan of care and goals.     Anticipated barriers to physical therapy: fear of pain    GOALS: Pt is in agreement with the following goals:        Goal Progress Towards Goal   Short Term: In 3 weeks, pt will:     Verbalize & demonstrate good understanding of resisted training with 3-1-3 second rep for [id number]-winiymymw-bwlxzqgid contraction to encourage full muscle recruitment for strength & endurance Progress towards goal: MET 2/24/2022   Verbalize & demonstrate good understanding of home stretch routine to improve AROM, help decrease pain & improve mobility Progress towards goal: MET 2/24/2022   Demonstrate proper supine or seated diaphragmatic breathing with decreased chest excursion & emphasis on full abdominal excursion & increased expiration time to promote muscle relaxation &  increased parasympathetic activity to improve patient tolerance to activities Progress towards goal: MET 2/24/2022   Demonstrate proper static standing posture in frontal & sagittal plane per PT visual assessment to decrease postural strain in ADLs Progress towards goal: MET 2/24/2022   Demonstrate good recall of names of resisted exercises w/ minimal to moderate verbal cues to promote independence w/ exercise at the end of the program Progress towards goal: MET 2/24/2022   Verbalize plan for continuing resisted exercises w/ specific location (i.e. commercial gym, home, community fitness center) indicating preference for machine-based or free-weight exercises to incorporate all major muscle groups to maintain & progress therapeutic functional improvements Progress towards goal: MET 2/24/2022               Long Term: In 8 weeks, pt will:     Verbalize good understanding of activities planning/scheduling (stretching, mindfulness, resisted training, & cardio) prescribed by PT/OT following the end of the program to sustain & progress functional improvements Progress towards goal: progressing 2/24/2022   Be independent w/ selected resisted training w/ minimal to no cuing while performing to continue w/ resisted strengthening independently at the end of the program Progress towards goal: progressing 2/24/2022   Improve 2 Minute Walk Test (MWT) to 375 feet and 6 MWT to 1125 feet or greater to improve gait speed and mobility and reach community ambulator status Progress towards goal: progressing 2/24/2022   Perform single leg stance 20 seconds or greater bilaterally to improve safety and balance in ADLs Progress towards goal: progressing 2/24/2022   Demonstrate Timed Up & Go (with appropriate assistive device, if necessary) of 10 seconds or less to decrease fall risk and improve functional mobility Progress towards goal: progressing 2/24/2022   Demonstrate 5 time sit to stand test without upper extremity assist to 15 sec  or less to improve general mobility and decrease fall risk Progress towards goal: progressing 2/24/2022   Score 55 % or less on Lumbar FOTO to indicate significant functional improvements in impaired functions secondary to low back pain Progress towards goal: MET 2/24/2022, to be reassessed nearer to d/c                       PLAN     Pt to go on hold for minimum of 3 weeks to attempt independence w/ HEP & PT activities, then return for reassessment.    Shady Bain, PT, DPT

## 2022-03-10 NOTE — PROGRESS NOTES
"[facility]   Functional Restoration Program  Occupational Therapy Daily Note  FRP Day 15    Name: Carlos Snell  Medical Record Number: [id number]    Therapy Diagnosis:   Encounter Diagnoses   Name Primary?    Decreased activities of daily living (ADL) Yes    Decreased strength of upper extremity     Fear of pain      Physician: Gwendolyn Zaidi NP    Visit Date: 3/10/2022    Physician Orders: OT Eval and Treat  Medical Diagnosis: Chronic pain disorder  Evaluation Date: 1/28/2022  Insurance Authorization Period Expiration: 12/31/2022  Plan of Care Certification Period: 5/6/2022 (UPDATED 2/24/22)  Visit # / Visits authorized: 14 / 20 (incuding eval)   FOTO: evaluation: 52% limitation  FOTO: reassessment: 36% limitation    Time In: 9:50 AM  Time Out: 11:00 AM  Total Billable Time: 70 minutes    Subjective     Carlos reports "my leg is in burning pain today".         Pain: 5 /10  Location: generalized, low back, R leg    Personal Functional Three Month Goals: Performance and satisfaction noted below.    OBJECTIVE     Carlos received the treatments listed below.    Pt participated in functional lifting/endurance/therapeutic activities in order to increase pt's participation with vocational, selfcare ADLs, and leisure activities in order to improve quality of life, for 70 minutes, which included:    Pt completed sustained standing activity to discuss current habits/routines re: daily schedule pre program, and set goals related to intentional movement, water intake, nutrition, stretching and mindfulness in order to create new daily schedule to include those goals. Pt used fine motor and dexterity skills to handwrite old and new schedules. Continued education on proper ergonomics (posture, neutral spine, 90 degrees for elbows on table, etc), weight shifting as needed and standing stretches as needed. Pt rated activity moderate (3/10) exertion.  During activity pt discussed anticipated " barriers, anxieties and stressors re: new schedule and self accountability. Pt with good mindset and plan moving forward and with decreased stress and increased self-efficacy post activity.    Pt completed yoga activity on mat, supine, quadriped, seated and standing, with focused education on getting on/off floor properly and safely, back mobility, hip flexibility, stress reduction, dynamic standing balance, posture, alignment and coordinating movement with breath, rated easy (2/10). Continues to perseverate on R leg pain throughout activities but with continued willingness to participate in yoga activities and continues to note enjoyment of yoga and good carryover with yoga practice at home.     Pt completed supine mindfulness diaphragmatic breathing activity x15-20 reps in z-lie position with feet on chair, with focus on neutral spine, mechanics of breathing, and benefits re: improved circulation, increased blood flow, PNS activation, and mindfulness.      Patient Education and Home Exercises     Education provided:   - Ms. Snell received education regarding proper posture and body mechanics. She received education regarding anticipated muscular soreness following lift testing and strategies for management were reviewed with patient including stretching, using ice, scheduled rest.  - Additional Education provided: heriberto scale, pain cycle, z-lie, functional lifting mechanics, pursed lip and diaphragmatic breathing techniques  - education on the Functional Restoration Program. Details of the program were discussed.   - avoiding trunk extensions  - golfer's bend  - energy banking; pacing self with performance.   - Progress towards goals    Written Home Exercises Provided: Patient instructed to cont prior HEP.  Brynnfe demonstrated good understanding of the education provided. See EMR under Patient Instructions for information provided during therapy sessions.    ASSESSMENT   Carlos presents with increased pain upon  arrival and increased frustration 2/2 increased pain. Despite increased pain report, she participated well in session and with good discussion re: self accountability plan. Good tolerance to session this date and with good continued response to yoga and mindfulness activity. Overall, continues to make progress towards personal goals.    Carlos is unable to fully participate in work, recreational, and home-based activities because of decreased functional strength, decreased endurance, limited positional tolerance, fear of re-injury, and fear of increased pain. She will continue to benefit from skilled outpatient occupational therapy to address the deficits listed on initial evaluation, provide pt education and to maximize pt's level of independence in the home and community environment.      Carlos's prognosis is Good due to good personal goals, willingness to make behavioral changes and good response to pain education and identification of current behaviors.     Pt's spiritual, cultural and educational needs considered and pt agreeable to plan of care and goals.  Anticipated barriers to occupational therapy: None identified    FRP Goals: While working towards the long-term (3 month) functional goals listed above, Carlos will accomplish the following short term goals during the 5-week intensive rehabilitation program. Carlos will:      1. Develop a viable return to work plan. ------------ progressing 2/24/22  2. Demonstrate physical capacities consistent with a light-medium work demand level. ------------ progressing 2/24/22  3. Demonstrate increased functional strength by lifting 20 lbs floor to waist and 20 lbs waist to shoulder in order to meet demand of work/home routine. ------------ progressing 2/24/22  4. Increase her positional tolerance to the level needed for work and home activities. ------------ progressing 2/24/22  5. Implement self-care strategies during the program and at home to control pain.  ------------ progressing 2/24/22  6.   Patient will demonstrate use of mindfulness, stress management, and coping  strategies for improved participation in daily routine. ------------ progressing 2/24/22     ADDED GOALS 2/24/22:   7. Pt will improve 5x sit to stand time by 5 seconds to progress towards decreased fall risk and improve confidence in community mobility      Specific patient expressed goals and demand levels:  Current Capacity Limit/ Physical  Demand Level (PDL)    Demand Levels of Functional Recovery Goals     Performance/  Satisfaction  Day 1 Performance/  Satisfaction  Day 9  2/24/22 Performance/  Satisfaction  Follow-up      Sedentary-Light Physical Demand  (Based above tested results)     Vocational:  Standing tolerance            Walking tolerance     Ascending stairs      Hauling supply bins      Recreational:  Cooking      Exercise      Daily Living:  Hair care     Lower body dressing      Housechores (vacuuming, dishes, etc)     Light-medium Physical Demand Level     4/4 (now after 15 minutes it becomes painful)     5/3     4/3     5/4        6/5     3/1        6/5     6/5        3/1     5 / 5            4 / 3      6 / 6       7 / 6          6 / 5       5 / 5          6 / 6       5 / 5          2 / 1      The PDL listed above is based on today's physical performance screening test. More comprehensive physical  testing integrated with clinical findings would be required to derived an accurate work capacity.        PLAN   Continue per POC.    FÉLIX Hodges/ELIZABETH  3/10/2022

## 2022-03-10 NOTE — PROGRESS NOTES
"[facility]    Functional Restoration Program  Physical Therapy Treatment Note  FRP Day 15    Name: Carlos Snell  MRN:[id number]      Therapy Diagnosis:   No diagnosis found.  Physician: Gwendolyn Zaidi NP    Date: 3/9/2022    Evaluation Date: 1/28/2022  Authorization Period Expiration: 12/31/2022  Plan of Care Expiration: 04/22/22  Visit # / Visits authorized: 14 / 20  FOTO: completed 2 / 3       Time In:   am  Time Out:   am  Total Billable Time: 75 Minutes    Note: This patient was co-treated /c the assistance of certified rehab technician /c each exercise performed with the direct supervision of the attending physical therapist      SUBJECTIVE     Carlos reports       Today  Since - weekend   HEP   Difficulties     Use of breathing  Do you have some exercises moving forward that will help you stay healthy?           she feels "pretty much the same" regarding her strength.     Regarding free weight exercises/use from Thursday, pt report more comfort /c resistance machines.  Pt tolerate last session well /c no c/o of excess discomfort.  Pt report yesterday walking 2 miles, stopping from leg discomfort but noting she would not have been able to do accomplish this before FRP.  Pt agrees to tx today.        Patient's FRP goals: "I would like to make peace /c [my] chronic illness"  Find exercises that I can do that will help me stay healthy         Current 4/10  Location(s):  R knee, R LE     OBJECTIVE     Objective Measures updated at progress report unless specified.     Treatment       Carlos received the Individualized Treatments listed below:     Therapeutic exercises to develop strength, endurance, ROM, flexibility, posture and core stabilization for *** minutes including:    Full body stretch w/ 3-10 sec hold technique &/or 3-5 repetition technique on all of the following:   Cervical flx/ext   Cervical sidebending   Cervical rotation   Cervical " protraction/retraction   Shld rolls   Shld depression/elevation   Scapular retraction/protraction/scaption   Posterior shld stretch (cross arm)   Shld ER stretch (chicken wing)   Elbow flx/ext   Forearm supination/pronation   Wrist flx/ext   Open/close hands/fingers   Seated trunk rotations   Seated trunk/thoracic ext/flx   Seated marches & knee to chest   Seated knee flx/ext   Seated hip ER/piriformis stretch   Seated hamstring stretch   Seated toe raise to heel raise    Seated ankle circles each way   Standing lumbar extensions   Standing lateral leaning stretch   Standing quad stretch (UE support for balance)      Peripheral muscle strengthening which included 1-2 sets of 10-20 repetitions at a slow, controlled 5-7 second per rep pace focused on strengthening supporting musculature for improved body mechanics & functional mobility.  Patient & therapist focused on proper form during treatment to ensure optimal strengthening of each targeted muscle group. Patients are instructed to keep sets/reps with proper load to stay at 3-5 out of 10 on the provided modified Jono exertion scale.         [employer] Machine Exercises Weight (lbs) Sets Repetitions Joon Exertion Scale Rating   Leg Extension        Hamstring Curls       Chest Press 20 1 15 5   Pec Fly 20 1 20 5   Lat Pull Down 22.5 1 20 5   Mid Row 22.5  1 20 5   Bicep Bar Curls        Standing Tricep Extension        Single Leg Press 25 1 20 4           Paloff  Lumber chops         Supine    LTR x 10    Supine AROM hamstring stretch & neural flossing (DF, knee extension, & hip ER) 2x10 B   Piriformis stretch 3 x 20 sec hold    Bridge w/ belt 2x15    Seated TBall   Pelvic tilts    Lat, Fwd/Bwd, CW, CCW x 20 each    Dead bugs x 20   Mongolian twists x 20 /c 7# ball      Fitter board- hard setting   Fwd/Bwd    Trial 1 - EO, piano fingers 1 hand     Trial 2 - SL stance /c 2 hand finger tip  support x 20 each leg   Lat    Trial 1 - EO, piano fingers 1  hand    Trial 2 - SL stance /c 1 hand  support x 20 each leg, dec tilt control, cue for fwd gaze         Patient Education and Home Exercises     Home Exercises Provided and Patient Education Provided     Education provided:   - none    Written Home Exercises Provided: Patient instructed to cont prior HEP. Exercises were reviewed and Carlos was able to demonstrate them prior to the end of the session.  Carlos demonstrated good  understanding of the education provided. See EMR under Patient Instructions for information provided during therapy sessions    ASSESSMENT     Tateyaz         Pt complete full complement of resistance training exercises to match FRP principle of full body workouts. Pt demo safe technique and pacing indicating appropriateness to Ind gym resistance training.  Pt also issued handout /c ending measures to offer safe jump off point for Ind workouts.            subjective report indicate improved amb tolerance.  Pt encouraged to cont walking program as weather improves.   Today, despite pt noting inc intensity of RLE sx, pt complete all rx tx including SL stance challenge on fitter board and Tball core stability challenges. Pt demo inc BLE strength /c fitter board tilting as well as inc balance confidence limit hand support to fingers (vs full hand ).  Pt demo improved quality of movement /c pelvic tilts on Tball indicating improved core activation and motor control.  Plan complete BATCA exercises next visit and issuing paperwork /c ending measures to promote cont resistance training outside of FRP.       Carlos Is progressing fair towards her goals.   Pt prognosis is Fair.     Pt will continue to benefit from skilled outpatient physical therapy to address the deficits listed in the problem list box on initial evaluation, provide pt/family education and to maximize pt's level of independence in the home and community environment.     Pt's spiritual, cultural and educational needs  considered and pt agreeable to plan of care and goals.     Anticipated barriers to physical therapy: fear of pain    GOALS: Pt is in agreement with the following goals:        Goal Progress Towards Goal   Short Term: In 3 weeks, pt will:     Verbalize & demonstrate good understanding of resisted training with 3-1-3 second rep for gyevageazi-cohjxwffp-mrppmkboq contraction to encourage full muscle recruitment for strength & endurance Progress towards goal: MET 2/24/2022   Verbalize & demonstrate good understanding of home stretch routine to improve AROM, help decrease pain & improve mobility Progress towards goal: MET 2/24/2022   Demonstrate proper supine or seated diaphragmatic breathing with decreased chest excursion & emphasis on full abdominal excursion & increased expiration time to promote muscle relaxation & increased parasympathetic activity to improve patient tolerance to activities Progress towards goal: MET 2/24/2022   Demonstrate proper static standing posture in frontal & sagittal plane per PT visual assessment to decrease postural strain in ADLs Progress towards goal: MET 2/24/2022   Demonstrate good recall of names of resisted exercises w/ minimal to moderate verbal cues to promote independence w/ exercise at the end of the program Progress towards goal: MET 2/24/2022   Verbalize plan for continuing resisted exercises w/ specific location (i.e. commercial gym, home, community fitness center) indicating preference for machine-based or free-weight exercises to incorporate all major muscle groups to maintain & progress therapeutic functional improvements Progress towards goal: MET 2/24/2022               Long Term: In 8 weeks, pt will:     Verbalize good understanding of activities planning/scheduling (stretching, mindfulness, resisted training, & cardio) prescribed by PT/OT following the end of the program to sustain & progress functional improvements Progress towards goal: progressing 2/24/2022   Be  independent w/ selected resisted training w/ minimal to no cuing while performing to continue w/ resisted strengthening independently at the end of the program Progress towards goal: progressing 2/24/2022   Improve 2 Minute Walk Test (MWT) to 375 feet and 6 MWT to 1125 feet or greater to improve gait speed and mobility and reach community ambulator status Progress towards goal: progressing 2/24/2022   Perform single leg stance 20 seconds or greater bilaterally to improve safety and balance in ADLs Progress towards goal: progressing 2/24/2022   Demonstrate Timed Up & Go (with appropriate assistive device, if necessary) of 10 seconds or less to decrease fall risk and improve functional mobility Progress towards goal: progressing 2/24/2022   Demonstrate 5 time sit to stand test without upper extremity assist to 15 sec or less to improve general mobility and decrease fall risk Progress towards goal: progressing 2/24/2022   Score 55 % or less on Lumbar FOTO to indicate significant functional improvements in impaired functions secondary to low back pain Progress towards goal: MET 2/24/2022, to be reassessed nearer to d/c                       PLAN     Pt to go on hold for minimum of 3 weeks to attempt independence w/ HEP & PT activities, then return for reassessment.    Shady Bain, PT, DPT

## 2022-03-10 NOTE — PROGRESS NOTES
The team met with patients individually prior to NP/LCSW meeting) met with Carlos Snell regarding her progress in the program and the plans moving forward.  Please refer to individual notes for plans of care.      Feels mentally there has been a lot of understanding and growth. CBT and mindfulness in particular were very helpful. She feels more satisfied with her functioning.     She completed has completed the Functional Restoration Program. The table below notes Measures Outcomes comparing Day 1 vs. Day 16 of the Program:    Carlos Snell Cohort 56 Day 1 Day 16 % change   YOVANNY Depression-4  Anxiety-5  Stress-9 Depression- 2  Anxiety- 2  Stress- 6 13% improvement  60% improvement  33% improvement   VAS  Pain today-7  Pain in the past week-8 Pain today- 7  Pain in the past week- 6 No Change  25% improvement   Pain Catastrophizing Scale 32 16 50% decrease    Sleep Quality Instrument  14 12 14% improvement    Pain Disability Index 49 38 22% decrease   Fear Avoidance Beliefs Fear of Physical pain -19  Fear of Pain caused by work/chores- 24  Total fear of pain- 59 Fear of Physical pain- 11  Fear of Pain caused by work/chores- 22  Total fear of pain- 50 42% decrease  8% decrease  15% decrease overall   Duarte Pain questionnaire 48 47 2% decrease   Low Back Disability  48% 40% 17% decrease   ACE score 4 N/A N/A         This is amazing progress in just 5 weeks!    We discussed that this progress is the result of taking ownership and accountability of your chronic pain and overall health and of the hard work and effort that was put forth during the program.     We discussed staying motivated and using the new strategies learned in the program.    Carlos Snell will return for FRP Graduation ceremony with the group and all FRP Team members following individual meetings.     We will continue to monitor her progress in our year-long follow-up program at the following intervals:  1 month with PT  2 months with OT  3 months  with NP  6 months with NP  9 months with LCSW  12 months with NP
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
## 2022-03-16 ENCOUNTER — OFFICE VISIT (OUTPATIENT)
Dept: INTERNAL MEDICINE | Facility: CLINIC | Age: 32
End: 2022-03-16
Payer: COMMERCIAL

## 2022-03-16 ENCOUNTER — PATIENT MESSAGE (OUTPATIENT)
Dept: PAIN MEDICINE | Facility: OTHER | Age: 32
End: 2022-03-16
Payer: COMMERCIAL

## 2022-03-16 VITALS
WEIGHT: 226.44 LBS | OXYGEN SATURATION: 96 % | DIASTOLIC BLOOD PRESSURE: 86 MMHG | BODY MASS INDEX: 37.73 KG/M2 | HEART RATE: 87 BPM | HEIGHT: 65 IN | SYSTOLIC BLOOD PRESSURE: 132 MMHG

## 2022-03-16 DIAGNOSIS — F98.8 ATTENTION DEFICIT DISORDER, UNSPECIFIED HYPERACTIVITY PRESENCE: ICD-10-CM

## 2022-03-16 DIAGNOSIS — G89.4 CHRONIC PAIN SYNDROME: Primary | ICD-10-CM

## 2022-03-16 DIAGNOSIS — F33.1 MODERATE EPISODE OF RECURRENT MAJOR DEPRESSIVE DISORDER: ICD-10-CM

## 2022-03-16 DIAGNOSIS — E66.9 CLASS 2 OBESITY WITHOUT SERIOUS COMORBIDITY WITH BODY MASS INDEX (BMI) OF 37.0 TO 37.9 IN ADULT, UNSPECIFIED OBESITY TYPE: ICD-10-CM

## 2022-03-16 PROBLEM — E66.812 CLASS 2 OBESITY WITHOUT SERIOUS COMORBIDITY IN ADULT: Status: ACTIVE | Noted: 2019-11-26

## 2022-03-16 PROBLEM — R89.9 ABNORMAL LABORATORY TEST: Status: RESOLVED | Noted: 2020-09-09 | Resolved: 2022-03-16

## 2022-03-16 PROCEDURE — 99215 PR OFFICE/OUTPT VISIT, EST, LEVL V, 40-54 MIN: ICD-10-PCS | Mod: S$GLB,,, | Performed by: INTERNAL MEDICINE

## 2022-03-16 PROCEDURE — 3008F BODY MASS INDEX DOCD: CPT | Mod: CPTII,S$GLB,, | Performed by: INTERNAL MEDICINE

## 2022-03-16 PROCEDURE — 3075F PR MOST RECENT SYSTOLIC BLOOD PRESS GE 130-139MM HG: ICD-10-PCS | Mod: CPTII,S$GLB,, | Performed by: INTERNAL MEDICINE

## 2022-03-16 PROCEDURE — 3079F PR MOST RECENT DIASTOLIC BLOOD PRESSURE 80-89 MM HG: ICD-10-PCS | Mod: CPTII,S$GLB,, | Performed by: INTERNAL MEDICINE

## 2022-03-16 PROCEDURE — 3075F SYST BP GE 130 - 139MM HG: CPT | Mod: CPTII,S$GLB,, | Performed by: INTERNAL MEDICINE

## 2022-03-16 PROCEDURE — 3008F PR BODY MASS INDEX (BMI) DOCUMENTED: ICD-10-PCS | Mod: CPTII,S$GLB,, | Performed by: INTERNAL MEDICINE

## 2022-03-16 PROCEDURE — 99215 OFFICE O/P EST HI 40 MIN: CPT | Mod: S$GLB,,, | Performed by: INTERNAL MEDICINE

## 2022-03-16 PROCEDURE — 1159F PR MEDICATION LIST DOCUMENTED IN MEDICAL RECORD: ICD-10-PCS | Mod: CPTII,S$GLB,, | Performed by: INTERNAL MEDICINE

## 2022-03-16 PROCEDURE — 99999 PR PBB SHADOW E&M-EST. PATIENT-LVL IV: ICD-10-PCS | Mod: PBBFAC,,, | Performed by: INTERNAL MEDICINE

## 2022-03-16 PROCEDURE — 3079F DIAST BP 80-89 MM HG: CPT | Mod: CPTII,S$GLB,, | Performed by: INTERNAL MEDICINE

## 2022-03-16 PROCEDURE — 99999 PR PBB SHADOW E&M-EST. PATIENT-LVL IV: CPT | Mod: PBBFAC,,, | Performed by: INTERNAL MEDICINE

## 2022-03-16 PROCEDURE — 1160F PR REVIEW ALL MEDS BY PRESCRIBER/CLIN PHARMACIST DOCUMENTED: ICD-10-PCS | Mod: CPTII,S$GLB,, | Performed by: INTERNAL MEDICINE

## 2022-03-16 PROCEDURE — 1159F MED LIST DOCD IN RCRD: CPT | Mod: CPTII,S$GLB,, | Performed by: INTERNAL MEDICINE

## 2022-03-16 PROCEDURE — 1160F RVW MEDS BY RX/DR IN RCRD: CPT | Mod: CPTII,S$GLB,, | Performed by: INTERNAL MEDICINE

## 2022-03-16 RX ORDER — BUPROPION HYDROCHLORIDE 450 MG/1
450 TABLET, FILM COATED, EXTENDED RELEASE ORAL DAILY
Qty: 90 TABLET | Refills: 0 | Status: CANCELLED | OUTPATIENT
Start: 2022-03-16 | End: 2023-03-16

## 2022-03-16 RX ORDER — DULOXETIN HYDROCHLORIDE 30 MG/1
30 CAPSULE, DELAYED RELEASE ORAL DAILY
Qty: 30 CAPSULE | Refills: 11 | Status: SHIPPED | OUTPATIENT
Start: 2022-03-16 | End: 2022-04-19

## 2022-03-16 NOTE — PROGRESS NOTES
Subjective:       Patient ID: Carlos Snell is a 31 y.o. female who  has a past medical history of ADD (attention deficit disorder), Asthma, Chronic back pain, Migraine headache, and Pyelonephritis.    Chief Complaint: Annual Exam, Back Pain, and Depression     History was obtained from the patient and supplemented through chart review  -follows with functional restoration program for chronic pain.    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 Currently on short term disability d/t visits for FRP.    HPI    Lumbar DDD, FMA:  Chronic R low back pain c RLE sciatica.  S/p fusion in 2013.     Had nephrolithiasis in 10/2021, which exacerbated FMA with prolonged extreme pain. Saw several specialists. Saw Rheumatology for polyarthralgia.  Low suspicion for autoimmune disorder. Labs neg. Completed functional restoration program, which helped with exercises. Pain improved 10->6-7/10, but still with intense R leg pain, paresthesias, sharp pain, foot feels numb.  Has realistic expectations regarding pain control. MRI s stenosis.  Has appt c Pain Clinic.    Depression, PMDD, anxiety:  Chronic.  Worries a lot, hard time relaxing.  Stress, worries, fear of failure. Guilt, self blame.  Has very concerned about weight gain.    FHx Mom with depression, anxiety on unknown med.      Is on Topamax for migraine prophylaxis. Wellbutrin helps c focus/concentration.      Was on Paxil for PMDD, but felt sick with severe nausea for multiple days, so quickly discontinued it. Also tried luteal phase Celexa for PMDD    Following with therapy at [facility], OSH Psych, Dr. Barbour at [facility].  Completed functional restoration program.      ADD:  Difficulty with day to day responsibilities, chores, bills, organization. Forgetful, easily irritated. Mental health as above. Started Adderral. Follows c Dr. Adenike Barbour with [facility], but she left the practice and needs a new Psych.    Obesity:    Was on phentermine by Endo. Now on Adderral for  ADD. Is on Wellbutrin for mood, Topamax for migraine PPX.  BMI Readings from Last 3 Encounters:   03/16/22 37.68 kg/m²   01/20/22 37.75 kg/m²   01/05/22 36.17 kg/m²     Lab Results   Component Value Date/Time    HGBA1C 5.3 11/30/2019 09:56 AM               Not addressed today.  H/o Dysphonia:  Was treated with antibiotics, steroids, but persistent hoarseness in the morning.  No SOB, dysphagia.  Following with ENT.  Performed laryngoscopy.  Thought to be functional.  Rec SLP.  Follow-up with ENT p.r.n.  GERD as below.  Started Protonix q.a.m. with great improvement.    Much improved on PPI. Cont PPI while she is having vocal rest from work. Consider weaning. Laryngoscopy normal.  Completed SLP.      GERD:   Drinks coffee daily.  No epigastric pain.  Not daily.  Sour taste in her throat, some vomit.  No particular food triggers.  Started Protonix q.a.m. with improvement.  Takes ibuprofen sparingly for back pain.    Improved with Protonix. Likely worsening asthma, dysphonia.      Asthma:    Cough, occurs with weather changes.  Improved with Advair BID, but admits to forgetting some doses.  Will set an alarm to take meds. Has not exercised in a while.  The patient does not smoke.    Cont Advair b.i.d., rescue inhaler p.r.n..     Cyanocobalamin deficiency:    Started low dose B12.  Lab Results   Component Value Date    XBDAZCXM06 334 10/28/2020     Normocytic anemia:   Hematocrit slightly decreased.  +Menorrhagia, endometriosis.  Reports regular cycles.  Was on OCPs, IUD, but stopped due to weight gain.  Uses super tampons every 1.5 hours per day; uses tampons + pads sometimes.  Folows with OBGYN.    Mild, resolved. Likely 2/2 endometriosis/menorrhagia.  Iron panel wnl.  f/u with OBGYN.  Lab Results   Component Value Date    IRON 78 11/30/2019    TIBC 432 11/30/2019    FERRITIN 43 11/30/2019     Lab Results   Component Value Date    IRPDPAWQ01 334 10/28/2020     No results found for: FOLATE    Endometriosis:    Off birth  "control due to concerns for weight. Regular cycles. TVUS normal. Following c OBGYN.     Saw Endo for low DHEA during OBGYN eval for PCOS, fatigue, weight gain. The rest of hormones were wnl.   Not c/w PCOS. ACTH was normal so not suggestive of an adrenal gland insufficiency. Low DHEA-S would just be from the inhaled steroids  Lab Results   Component Value Date    TSH 0.477 01/20/2022    FREET4 0.87 01/20/2022     Migraine:    Saw Neurology. C/w med overuse. Discussed avoiding NSAIDs. On Topamax for PPX, Maxalt for abortive. Was planning on occipital nerve block.    Review of Systems   Constitutional: Negative for activity change and appetite change.   Respiratory: Negative for wheezing.    Cardiovascular: Negative for leg swelling.   Musculoskeletal: Positive for back pain. Negative for gait problem.   Skin: Negative for rash and wound.   Hematological: Negative for adenopathy.   Psychiatric/Behavioral: Negative for confusion. The patient is not nervous/anxious.        I personally reviewed Past Medical History, Past Surgical History, Social History, and Family History.    Objective:      Vitals:    03/16/22 1450   BP: 132/86   Pulse: 87   SpO2: 96%   Weight: 102.7 kg (226 lb 6.6 oz)   Height: 5' 5" (1.651 m)      Physical Exam  Constitutional:       General: She is not in acute distress.     Appearance: She is well-developed. She is not diaphoretic.   HENT:      Head: Normocephalic and atraumatic.      Nose: Nose normal.      Mouth/Throat:      Pharynx: No oropharyngeal exudate.   Eyes:      General: No scleral icterus.        Right eye: No discharge.         Left eye: No discharge.   Neck:      Thyroid: No thyromegaly.      Trachea: No tracheal deviation.   Cardiovascular:      Rate and Rhythm: Normal rate and regular rhythm.      Heart sounds: Normal heart sounds. No murmur heard.  Pulmonary:      Effort: Pulmonary effort is normal. No respiratory distress.      Breath sounds: Normal breath sounds. No wheezing. "   Abdominal:      General: Bowel sounds are normal. There is no distension.      Palpations: Abdomen is soft.      Tenderness: There is no abdominal tenderness.   Musculoskeletal:         General: No deformity.      Cervical back: Neck supple.      Right lower leg: No edema.      Left lower leg: No edema.   Lymphadenopathy:      Cervical: No cervical adenopathy.   Skin:     General: Skin is warm and dry.      Findings: No erythema.   Neurological:      Mental Status: She is alert.      Gait: Gait normal.   Psychiatric:         Behavior: Behavior normal.           Lab Results   Component Value Date    WBC 7.39 10/24/2021    HGB 12.7 10/24/2021    HCT 38.0 10/24/2021     10/24/2021    CHOL 177 11/30/2019    TRIG 58 11/30/2019    HDL 50 11/30/2019    ALT 13 10/24/2021    AST 19 10/24/2021     10/24/2021    K 4.2 10/24/2021     10/24/2021    CREATININE 0.8 10/24/2021    BUN 11 10/24/2021    CO2 22 (L) 10/24/2021    TSH 0.477 01/20/2022    HGBA1C 5.3 11/30/2019       The ASCVD Risk score (Humbird DC Jr., et al., 2013) failed to calculate for the following reasons:    The 2013 ASCVD risk score is only valid for ages 40 to 79    (Imaging have been independently reviewed)  MRI lumbar spine s/p fusion. No stenosis.    Assessment:       1. Chronic pain syndrome    2. Moderate episode of recurrent major depressive disorder    3. Attention deficit disorder, unspecified hyperactivity presence    4. Class 2 obesity without serious comorbidity with body mass index (BMI) of 37.0 to 37.9 in adult, unspecified obesity type          Plan:       Carlos was seen today for annual exam, back pain and depression.    Diagnoses and all orders for this visit:    Chronic pain syndrome  Comments:  Improved, but persistent. Completed FRP. Start Cymbalta for mood, chronic pain. Discussed SE. May titrate if tolerated. Has appt c Pain Clinic.  Orders:  -     DULoxetine (CYMBALTA) 30 MG capsule; Take 1 capsule (30 mg total) by  mouth once daily. Start with 30 mg once daily for 1 week, then increase to 60 mg once daily as tolerated.    Moderate episode of recurrent major depressive disorder  Comments:  Persistent. Cont Wellbutrin 450. Add Cymbalta d/t chronic pain. Discussed SE. Cont therapy. Provided # for Psychiatry.  Orders:  -     Ambulatory referral/consult to Psychiatry; Future  -     DULoxetine (CYMBALTA) 30 MG capsule; Take 1 capsule (30 mg total) by mouth once daily. Start with 30 mg once daily for 1 week, then increase to 60 mg once daily as tolerated.    Attention deficit disorder, unspecified hyperactivity presence  Comments:  Will try to establish care with new Psych at [facility]. Provided # for [facility] just in case.  Orders:  -     Ambulatory referral/consult to Psychiatry; Future    Class 2 obesity without serious comorbidity with body mass index (BMI) of 37.0 to 37.9 in adult, unspecified obesity type  Comments:  Stable. Cont Wellbutrin for depression, Topamax. Discussed potential weight gain c SSRIs. Will discuss lifestyle changes during future visit.    Other orders  The following orders have not been finalized:  -     Cancel: buPROPion  mg Tb24         Side effects of medication(s) were discussed in detail and patient voiced understanding.  Patient will call back for any issues or complications.     I have spent a total of 55 minutes with the patient as well as reviewing the chart/medical record and placing orders on the day of the visit. Discussed mental health, chronic pain.     RTC in 1 month(s) or sooner PRN for mental health, chronic pain. 30 minutes long.

## 2022-03-17 ENCOUNTER — TELEPHONE (OUTPATIENT)
Dept: INTERNAL MEDICINE | Facility: CLINIC | Age: 32
End: 2022-03-17
Payer: COMMERCIAL

## 2022-03-17 ENCOUNTER — PATIENT OUTREACH (OUTPATIENT)
Dept: ADMINISTRATIVE | Facility: OTHER | Age: 32
End: 2022-03-17
Payer: COMMERCIAL

## 2022-03-17 NOTE — TELEPHONE ENCOUNTER
Initiated Prior Authorization for Duloxetine 30 mg capsules    Channing Gonzalez (Key: [id number])  DULoxetine HCl 30MG dr capsules       Status: Question Request    Created: March 17th, 2022

## 2022-03-22 ENCOUNTER — TELEPHONE (OUTPATIENT)
Dept: PAIN MEDICINE | Facility: CLINIC | Age: 32
End: 2022-03-22
Payer: COMMERCIAL

## 2022-03-22 NOTE — TELEPHONE ENCOUNTER
"This message is for patient in regards to his/her appointment 03/23/22 at 11:40a       [facility] Policy: For the safety of all patients and staff members.     Patient Visitor policy: Due to social distancing and limited seating staff are requesting patient to arrive to their schedule appointments on time.     Upon arriving to facility; patients are required to wear a face mask, if patient do not have a face mask one will be provided. Upon arriving patient we ask patients to contact clinic at this number [phone] to notify staff that they have arrived or they may do so by utilizing the Mobile checked in [facility](if patient have patient portal; clinical staff will send a message through there letting them know it's okay to proceed to their visit). Staff will give the patient the "okay" to come up or wait inside their vehicle until clinic is ready for patient to be seen by NICO Padilla. If you have any questions or concerns please contact [phone]    Patient verbalized and confirmed appt  "

## 2022-03-23 ENCOUNTER — OFFICE VISIT (OUTPATIENT)
Dept: PAIN MEDICINE | Facility: CLINIC | Age: 32
End: 2022-03-23
Payer: COMMERCIAL

## 2022-03-23 VITALS
HEIGHT: 65 IN | DIASTOLIC BLOOD PRESSURE: 97 MMHG | RESPIRATION RATE: 18 BRPM | HEART RATE: 84 BPM | SYSTOLIC BLOOD PRESSURE: 139 MMHG | OXYGEN SATURATION: 99 % | BODY MASS INDEX: 37.83 KG/M2 | TEMPERATURE: 98 F | WEIGHT: 227.06 LBS

## 2022-03-23 DIAGNOSIS — Z98.1 HISTORY OF LUMBAR SPINAL FUSION: ICD-10-CM

## 2022-03-23 DIAGNOSIS — M54.16 LUMBAR RADICULOPATHY: ICD-10-CM

## 2022-03-23 DIAGNOSIS — M79.7 FIBROMYALGIA: ICD-10-CM

## 2022-03-23 DIAGNOSIS — M46.1 SACROILIITIS: Primary | ICD-10-CM

## 2022-03-23 PROCEDURE — 1159F PR MEDICATION LIST DOCUMENTED IN MEDICAL RECORD: ICD-10-PCS | Mod: CPTII,S$GLB,, | Performed by: NURSE PRACTITIONER

## 2022-03-23 PROCEDURE — 3075F PR MOST RECENT SYSTOLIC BLOOD PRESS GE 130-139MM HG: ICD-10-PCS | Mod: CPTII,S$GLB,, | Performed by: NURSE PRACTITIONER

## 2022-03-23 PROCEDURE — 3080F PR MOST RECENT DIASTOLIC BLOOD PRESSURE >= 90 MM HG: ICD-10-PCS | Mod: CPTII,S$GLB,, | Performed by: NURSE PRACTITIONER

## 2022-03-23 PROCEDURE — 3075F SYST BP GE 130 - 139MM HG: CPT | Mod: CPTII,S$GLB,, | Performed by: NURSE PRACTITIONER

## 2022-03-23 PROCEDURE — 1159F MED LIST DOCD IN RCRD: CPT | Mod: CPTII,S$GLB,, | Performed by: NURSE PRACTITIONER

## 2022-03-23 PROCEDURE — 99214 PR OFFICE/OUTPT VISIT, EST, LEVL IV, 30-39 MIN: ICD-10-PCS | Mod: S$GLB,,, | Performed by: NURSE PRACTITIONER

## 2022-03-23 PROCEDURE — 99214 OFFICE O/P EST MOD 30 MIN: CPT | Mod: S$GLB,,, | Performed by: NURSE PRACTITIONER

## 2022-03-23 PROCEDURE — 3008F PR BODY MASS INDEX (BMI) DOCUMENTED: ICD-10-PCS | Mod: CPTII,S$GLB,, | Performed by: NURSE PRACTITIONER

## 2022-03-23 PROCEDURE — 1160F RVW MEDS BY RX/DR IN RCRD: CPT | Mod: CPTII,S$GLB,, | Performed by: NURSE PRACTITIONER

## 2022-03-23 PROCEDURE — 99999 PR PBB SHADOW E&M-EST. PATIENT-LVL IV: ICD-10-PCS | Mod: PBBFAC,,, | Performed by: NURSE PRACTITIONER

## 2022-03-23 PROCEDURE — 99999 PR PBB SHADOW E&M-EST. PATIENT-LVL IV: CPT | Mod: PBBFAC,,, | Performed by: NURSE PRACTITIONER

## 2022-03-23 PROCEDURE — 1160F PR REVIEW ALL MEDS BY PRESCRIBER/CLIN PHARMACIST DOCUMENTED: ICD-10-PCS | Mod: CPTII,S$GLB,, | Performed by: NURSE PRACTITIONER

## 2022-03-23 PROCEDURE — 3080F DIAST BP >= 90 MM HG: CPT | Mod: CPTII,S$GLB,, | Performed by: NURSE PRACTITIONER

## 2022-03-23 PROCEDURE — 3008F BODY MASS INDEX DOCD: CPT | Mod: CPTII,S$GLB,, | Performed by: NURSE PRACTITIONER

## 2022-03-28 ENCOUNTER — PATIENT MESSAGE (OUTPATIENT)
Dept: PAIN MEDICINE | Facility: OTHER | Age: 32
End: 2022-03-28
Payer: COMMERCIAL

## 2022-03-29 ENCOUNTER — TELEPHONE (OUTPATIENT)
Dept: PAIN MEDICINE | Facility: CLINIC | Age: 32
End: 2022-03-29
Payer: COMMERCIAL

## 2022-03-29 NOTE — TELEPHONE ENCOUNTER
----- Message from Carlene Lanza sent at 3/29/2022  1:18 PM CDT -----  Name of Who is Calling: SHARITA DUNEAS          What is the request in detail: The patient is calling to r/s her upcoming procedure. Please advise          Can the clinic reply by [facility]: Yes         [facility]: [phone]

## 2022-03-29 NOTE — H&P (VIEW-ONLY)
Chronic Pain - Established Pt     Referring Physician: No ref. provider found    Chief Complaint:   Chief Complaint   Patient presents with    Low-back Pain    Leg Pain     F/U        SUBJECTIVE:    Mrs Snell presents today for evaluation of right sided lower back pain down posterior leg. Pt has had prior L5/S1 fusion 2014. She had had formal PT and active in FRP program at this time. She has seen Dr Cee with Ortho/Spine and did not feel further surgical intervention indicated. Since surgical intervention she has not had procedures. She states pain worse with sitting and has to reposition frequently. She has no complaint of loss of b/b pr saddle paresthesias. She is currently Neurontin 300mg TID with some benefit and Cymbalta 30mg qd. She has no focal voicing of loss of b/b or saddle paresthesias.             Physical Therapy/Home Exercise: yes      Pain Disability Index Review:  Last 3 PDI Scores 3/23/2022   Pain Disability Index (PDI) 41       Pain Medications:  -Gabapentin 300mg  Cymbalta 30mg     report:  Not applicable    Pain Procedures: None since L5/S1 Fusion     Imaging:     MRI LUMBAR SPINE WITHOUT CONTRAST 12/15/2021     CLINICAL HISTORY:  Lumbar radiculopathy, symptoms persist with conservative treatment; Radiculopathy, lumbar region     TECHNIQUE:  Multiplanar, multisequence MR images were acquired from the thoracolumbar junction to the sacrum without the administration of contrast.     COMPARISON:  Radiographs 11/23/2021     FINDINGS:  Status post L5-S1 disc arthroplasty and fusion.     The alignment of the lumbar spine is normal.     The disc spaces are well maintained.     The conus medullaris terminates in good position.     T12-L1: No disc abnormality, no canal stenosis or foraminal narrowing.  The facet joints appear normal.     L1-L2: No disc abnormality, no canal stenosis or foraminal narrowing.  The facet joints appear normal.     L2-L3: No disc abnormality, no canal stenosis or foraminal  narrowing.  The facet joints appear normal.     L3-L4: No disc abnormality, no canal stenosis or foraminal narrowing.  The facet joints appear normal.     L4-5: No canal stenosis or foraminal narrowing.  No disc abnormality.     L5-S1: Postoperative change, no canal stenosis or foraminal narrowing.  The facet joints appear normal.     The upper sacrum and upper sacroiliac joints appear normal.     The paravertebral and paraspinal soft tissues appear normal.     Impression:     Postoperative changes L5-S1 disc arthroplasty and fusion.     No canal stenosis or foraminal narrowing at any level.    XR LUMBAR SPINE AP AND LAT WITH FLEX/EXT 11/19/2021     CLINICAL HISTORY:  Other intervertebral disc degeneration, lumbar region     FINDINGS:  Lumbar spine four views: There is a levoconvex curvature.  There is postoperative change with hardware and a disc implant at L5-S1.  There is mild DJD.  No instability seen.  No trauma seen.  No fracture dislocation bone destruction seen.     Impression:     Chronic change as above.      Past Medical History:   Diagnosis Date    ADD (attention deficit disorder)     Asthma     Chronic back pain     Migraine headache     Pyelonephritis      Past Surgical History:   Procedure Laterality Date    BACK SURGERY  2013     Social History     Socioeconomic History    Marital status:    Tobacco Use    Smoking status: Never Smoker    Smokeless tobacco: Never Used   Substance and Sexual Activity    Alcohol use: Yes     Comment: 1-3 drinks every 2 weeks    Drug use: No     Social Determinants of Health     Financial Resource Strain: Low Risk     Difficulty of Paying Living Expenses: Not hard at all   Food Insecurity: No Food Insecurity    Worried About Running Out of Food in the Last Year: Never true    Ran Out of Food in the Last Year: Never true   Transportation Needs: No Transportation Needs    Lack of Transportation (Medical): No    Lack of Transportation (Non-Medical):  No   Physical Activity: Inactive    Days of Exercise per Week: 0 days    Minutes of Exercise per Session: 0 min   Stress: Stress Concern Present    Feeling of Stress : To some extent   Social Connections: Unknown    Frequency of Communication with Friends and Family: Twice a week    Frequency of Social Gatherings with Friends and Family: Once a week    Active Member of Clubs or Organizations: Yes    Attends Club or Organization Meetings: More than 4 times per year    Marital Status:    Housing Stability: Low Risk     Unable to Pay for Housing in the Last Year: No    Number of Places Lived in the Last Year: 1    Unstable Housing in the Last Year: No     Family History   Problem Relation Age of Onset    Depression Mother     Anxiety disorder Mother     Ovarian cancer Mother     No Known Problems Father     Diabetes Sister     Hypertension Maternal Grandmother     Breast cancer Neg Hx     Colon cancer Neg Hx        Review of patient's allergies indicates:   Allergen Reactions    Lactose Diarrhea    Gluten protein Itching     Inflammation, bloating, diarrhea and pain all over body         Current Outpatient Medications   Medication Sig    buPROPion 450 mg Tb24 Take 450 mg by mouth once daily.    cetirizine (ZYRTEC) 10 MG tablet cetirizine 10 mg tablet    cyanocobalamin (VITAMIN B-12) 100 MCG tablet Take 1 tablet (100 mcg total) by mouth once daily.    dextroamphetamine-amphetamine (ADDERALL XR) 10 MG 24 hr capsule Take 10 mg by mouth every morning.    DULoxetine (CYMBALTA) 30 MG capsule Take 1 capsule (30 mg total) by mouth once daily. Start with 30 mg once daily for 1 week, then increase to 60 mg once daily as tolerated.    gabapentin (NEURONTIN) 300 MG capsule Take 1 capsule (300 mg total) by mouth 3 (three) times daily.    pantoprazole (PROTONIX) 40 MG tablet Take 1 tablet (40 mg total) by mouth every morning.    rizatriptan (MAXALT-MLT) 10 MG disintegrating tablet Take at onset of  "migraine, can repeat in 2 hrs if needed.  No more than 2 tabs per day or 3 days/wk.    topiramate (TOPAMAX) 50 MG tablet Take 1 tablet (50 mg total) by mouth every evening.     No current facility-administered medications for this visit.       REVIEW OF SYSTEMS:    GENERAL:  No weight loss, malaise or fevers.  HEENT:  Negative for frequent or significant headaches.  NECK:  Negative for lumps, goiter, pain and significant neck swelling.  RESPIRATORY:  Negative for cough, wheezing or shortness of breath.  CARDIOVASCULAR:  Negative for chest pain, leg swelling or palpitations.  GI:  Negative for abdominal discomfort, blood in stools or black stools or change in bowel habits.  MUSCULOSKELETAL:  See HPI.  SKIN:  Negative for lesions, rash, and itching.  PSYCH:  Negative for sleep disturbance, mood disorder and recent psychosocial stressors.  HEMATOLOGY/LYMPHOLOGY:  Negative for prolonged bleeding, bruising easily or swollen nodes.  NEURO:   No history of headaches, syncope, paralysis, seizures or tremors.  All other reviewed and negative other than HPI.    OBJECTIVE:    BP (!) 139/97 (BP Location: Right arm, Patient Position: Sitting, BP Method: X-Large (Automatic))   Pulse 84   Temp 97.9 °F (36.6 °C)   Resp 18   Ht 5' 5" (1.651 m)   Wt 103 kg (227 lb 1.2 oz)   SpO2 99%   BMI 37.79 kg/m²     PHYSICAL EXAMINATION:    General appearance: Well appearing, in no acute distress, alert and oriented x3.  Psych:  Mood and affect appropriate.  Skin: Skin color, texture, turgor normal, no rashes or lesions, in both upper and lower body.  Head/face:  Normocephalic, atraumatic. No palpable lymph nodes.  Neck: No pain to palpation over the cervical paraspinous muscles. Spurling Negative. No pain with neck flexion, extension, or lateral flexion.   Cor: RRR  Pulm: CTA  GI:  Non-distended   Back: No facet loading, no pain with forward flexion,+SLR to right.  Musculoskeletal: +PSIS TTP to right, +ANNA, +Gaenslen and Yeoman's, " +compression and thigh thrust     Extremities: +Neuro: Bilateral upper and lower extremity coordination and muscle stretch reflexes are physiologic and symmetric. No loss of sensation is noted.  Gait: normal.    ASSESSMENT: 32 y.o. year old female with LBP and Buttock pain    1. Sacroiliitis  Procedure Order to Pain Management   2. Lumbar radiculopathy     3. History of lumbar spinal fusion     4. Fibromyalgia           PLAN:     - Prior records including FRP and Ortho/Spine notes reviewed  - Prior imaging reviewed  - She had tried and failed formal PT and active in FRP program at this time  - Pain likely multifactorial with SIJ dysfunction seeming predominant at this time  - S/f Right SIJ injection  - Consider TFESI pending SIJ relief  - Can continue Neurontin 300mg TID  - I have stressed the importance of physical activity and a home exercise plan to help with pain and improve health.  - Patient can continue with medications for now since they are providing benefits, using them appropriately, and without side effects.  - Counseled patient regarding the importance of physical therapy.    - RTC 2 weeks after procedure for re-evaluation of symptoms.     The above plan and management options were discussed at length with patient. Patient is in agreement with the above and verbalized understanding. It will be communicated with the referring physician via electronic record, fax, or mail.    Nikita Santizo  03/29/2022     I spent a total of 30 minutes on the day of the visit.  This includes face to face time and non-face to face time preparing to see the patient by reviewing previous labs/imaging, obtaining and/or reviewing separately obtained history, documenting clinical information in the electronic or other health record, independently interpreting results and communicating results to the patient/family/caregiver.

## 2022-03-30 ENCOUNTER — TELEPHONE (OUTPATIENT)
Dept: PAIN MEDICINE | Facility: CLINIC | Age: 32
End: 2022-03-30
Payer: COMMERCIAL

## 2022-03-30 NOTE — TELEPHONE ENCOUNTER
----- Message from Sintia Fuentes sent at 3/30/2022  4:54 PM CDT -----  Contact: SHARITA DUENAS [[id number]]  Name of Who is Calling:SHARITA DUENAS [[id number]]          What is the request in detail:Patient calling in regards to getting original appoint of 4/8 at 1pm. Patient states if that date is no longer avablible she can do 4/11-4/13.Please advise           Can the clinic reply by [facility]:yes          What [facility] Back [facility]:[phone]

## 2022-03-30 NOTE — TELEPHONE ENCOUNTER
Patient wanted to speak with the schedulers to see if she can get a sooner procedure date. Staff informed patient message will be sent to the procedure schedulers to review.

## 2022-03-30 NOTE — TELEPHONE ENCOUNTER
----- Message from Dimple Lopez sent at 3/30/2022  4:32 PM CDT -----  Regarding: injection appointment  Name of Who is Calling:SHARITA DUENAS [[id number]]          What is the request in detail: Patient is calling in reference to earlier appointment           Can the clinic reply by [facility]: no           What Number to Call Back if not in [facility]: [phone]

## 2022-04-14 ENCOUNTER — HOSPITAL ENCOUNTER (OUTPATIENT)
Facility: OTHER | Age: 32
Discharge: HOME | End: 2022-04-14
Attending: ANESTHESIOLOGY | Admitting: ANESTHESIOLOGY
Payer: COMMERCIAL

## 2022-04-14 VITALS
HEIGHT: 66 IN | SYSTOLIC BLOOD PRESSURE: 139 MMHG | DIASTOLIC BLOOD PRESSURE: 87 MMHG | HEART RATE: 68 BPM | RESPIRATION RATE: 18 BRPM | WEIGHT: 220 LBS | TEMPERATURE: 98 F | BODY MASS INDEX: 35.36 KG/M2 | OXYGEN SATURATION: 97 %

## 2022-04-14 DIAGNOSIS — M46.1 SACROILIITIS: Primary | ICD-10-CM

## 2022-04-14 DIAGNOSIS — G89.29 CHRONIC PAIN: ICD-10-CM

## 2022-04-14 PROCEDURE — 27096 INJECT SACROILIAC JOINT: CPT | Mod: RT | Performed by: ANESTHESIOLOGY

## 2022-04-14 PROCEDURE — 63600175 PHARM REV CODE 636 W HCPCS: Performed by: ANESTHESIOLOGY

## 2022-04-14 PROCEDURE — 27096 PR INJECTION,SACROILIAC JOINT: ICD-10-PCS | Mod: RT,,, | Performed by: ANESTHESIOLOGY

## 2022-04-14 PROCEDURE — 25500020 PHARM REV CODE 255: Performed by: ANESTHESIOLOGY

## 2022-04-14 PROCEDURE — 25000003 PHARM REV CODE 250: Performed by: ANESTHESIOLOGY

## 2022-04-14 PROCEDURE — 27096 INJECT SACROILIAC JOINT: CPT | Mod: RT,,, | Performed by: ANESTHESIOLOGY

## 2022-04-14 RX ORDER — LIDOCAINE HYDROCHLORIDE 20 MG/ML
INJECTION, SOLUTION INFILTRATION; PERINEURAL
Status: DISCONTINUED | OUTPATIENT
Start: 2022-04-14 | End: 2022-04-14 | Stop reason: HOSPADM

## 2022-04-14 RX ORDER — ALPRAZOLAM 0.5 MG/1
0.5 TABLET ORAL ONCE
Status: COMPLETED | OUTPATIENT
Start: 2022-04-14 | End: 2022-04-14

## 2022-04-14 RX ORDER — BUPIVACAINE HYDROCHLORIDE 2.5 MG/ML
INJECTION, SOLUTION EPIDURAL; INFILTRATION; INTRACAUDAL
Status: DISCONTINUED | OUTPATIENT
Start: 2022-04-14 | End: 2022-04-14 | Stop reason: HOSPADM

## 2022-04-14 RX ORDER — TRIAMCINOLONE ACETONIDE 40 MG/ML
INJECTION, SUSPENSION INTRA-ARTICULAR; INTRAMUSCULAR
Status: DISCONTINUED | OUTPATIENT
Start: 2022-04-14 | End: 2022-04-14 | Stop reason: HOSPADM

## 2022-04-14 RX ORDER — SODIUM CHLORIDE 9 MG/ML
500 INJECTION, SOLUTION INTRAVENOUS CONTINUOUS
Status: DISCONTINUED | OUTPATIENT
Start: 2022-04-14 | End: 2022-04-14 | Stop reason: HOSPADM

## 2022-04-14 RX ADMIN — ALPRAZOLAM 0.5 MG: 0.5 TABLET ORAL at 02:04

## 2022-04-14 NOTE — OP NOTE
Sacroiliac Joint Injection under Fluoroscopic Guidance    The procedure, risks, benefits, and options were discussed with the patient. There are no contraindications to the procedure. The patent expressed understanding and agreed to the procedure. Informed written consent was obtained prior to the start of the procedure and can be found in the patient's chart.    PATIENT NAME: Carlos Snell   MRN: [id number]     DATE OF PROCEDURE: 04/14/2022    PROCEDURE: Right Sacroiliac Joint Injection under Fluoroscopic Guidance    PRE-OP DIAGNOSIS: Sacroiliitis [M46.1]    POST-OP DIAGNOSIS: Same    PHYSICIAN: Cristopher Simon MD    ASSISTANTS: Dr Quezada     MEDICATIONS INJECTED: Preservative-free Kenalog 20 mg with 3cc of Bupivacine 0.25%     LOCAL ANESTHETIC INJECTED: Xylocaine 2%     SEDATION: None    ESTIMATED BLOOD LOSS: None    COMPLICATIONS: None    TECHNIQUE: Time-out was performed to identify the patient and procedure to be performed. With the patient laying in a prone position, the surgical area was prepped and draped in the usual sterile fashion using ChloraPrep and a fenestrated drape. The sacroiliac joint was determined under fluoroscopy guidance. Skin anesthesia was achieved by injecting Lidocaine 2% over the injection site. The sacroiliac joint was  then approached with a 25 gauge, 3.5 inch spinal quinke needle that was introduced under fluoroscopic guidance in the AP and Lateral views. Once the needle tip was in the area of the joint, and there was no blood, contrast dye Omnipaque (300mg/ml) was injected to confirm placement and there was no vascular runoff. Fluoroscopic imaging in the AP and lateral views revealed a clear outline of the joint space. 4 mL of the medication mixture listed above was injected slowly intraarticular and janneth-articular. Displacement of the radio opaque contrast after injection of the medication confirmed that the medication went into the area of the joint. The needles were removed and  bleeding was nil. A sterile dressing was applied. No specimens collected. The patient tolerated the procedure well.       The patient was monitored after the procedure in the recovery area. They were given post-procedure and discharge instructions to follow at home. The patient was discharged in a stable condition.    I reviewed and edited the fellow's note. I conducted my own interview and physical examination. I agree with the findings. I was present and supervising all critical portions of the procedure.    Cristopher Simon MD

## 2022-04-14 NOTE — DISCHARGE SUMMARY
Discharge Note  Short Stay      SUMMARY     Admit Date: 4/14/2022    Attending Physician: Cristopher Simon      Discharge Physician: Cristopher Simon      Discharge Date: 4/14/2022 3:07 PM    Procedure(s) (LRB):  INJECTION, JOINT RIGHT SI NEEDS CONSENT (Right)    Final Diagnosis: Sacroiliitis [M46.1]    Disposition: Home or self care    Patient Instructions:   Current Discharge Medication List      CONTINUE these medications which have NOT CHANGED    Details   buPROPion 450 mg Tb24 Take 450 mg by mouth once daily.  Qty: 90 tablet, Refills: 0    Associated Diagnoses: Moderate episode of recurrent major depressive disorder      cetirizine (ZYRTEC) 10 MG tablet cetirizine 10 mg tablet      cyanocobalamin (VITAMIN B-12) 100 MCG tablet Take 1 tablet (100 mcg total) by mouth once daily.  Qty: 90 tablet, Refills: 3    Associated Diagnoses: Cyanocobalamin deficiency      dextroamphetamine-amphetamine (ADDERALL XR) 10 MG 24 hr capsule Take 10 mg by mouth every morning.      DULoxetine (CYMBALTA) 30 MG capsule Take 1 capsule (30 mg total) by mouth once daily. Start with 30 mg once daily for 1 week, then increase to 60 mg once daily as tolerated.  Qty: 30 capsule, Refills: 11    Associated Diagnoses: Moderate episode of recurrent major depressive disorder; Chronic pain syndrome      gabapentin (NEURONTIN) 300 MG capsule Take 1 capsule (300 mg total) by mouth 3 (three) times daily.  Qty: 90 capsule, Refills: 11    Associated Diagnoses: Acute midline low back pain with right-sided sciatica; Lumbar radiculopathy, chronic      pantoprazole (PROTONIX) 40 MG tablet Take 1 tablet (40 mg total) by mouth every morning.  Qty: 90 tablet, Refills: 3    Associated Diagnoses: Dysphonia; Gastroesophageal reflux disease, esophagitis presence not specified; Moderate persistent asthma without complication      rizatriptan (MAXALT-MLT) 10 MG disintegrating tablet Take at onset of migraine, can repeat in 2 hrs if needed.  No more than 2 tabs per  day or 3 days/wk.  Qty: 12 tablet, Refills: 3    Associated Diagnoses: Migraine with aura and without status migrainosus, not intractable      topiramate (TOPAMAX) 50 MG tablet Take 1 tablet (50 mg total) by mouth every evening.  Qty: 30 tablet, Refills: 3    Associated Diagnoses: Migraine with aura and without status migrainosus, not intractable                 Discharge Diagnosis: Sacroiliitis [M46.1]  Condition on Discharge: Stable with no complications to procedure   Diet on Discharge: Same as before.  Activity: as per instruction sheet.  Discharge to: Home with a responsible adult.  Follow up: 2-4 weeks

## 2022-04-19 ENCOUNTER — OFFICE VISIT (OUTPATIENT)
Dept: INTERNAL MEDICINE | Facility: CLINIC | Age: 32
End: 2022-04-19
Payer: COMMERCIAL

## 2022-04-19 VITALS
OXYGEN SATURATION: 97 % | DIASTOLIC BLOOD PRESSURE: 80 MMHG | HEIGHT: 66 IN | WEIGHT: 231.5 LBS | BODY MASS INDEX: 37.21 KG/M2 | SYSTOLIC BLOOD PRESSURE: 138 MMHG | HEART RATE: 110 BPM

## 2022-04-19 DIAGNOSIS — R11.0 NAUSEA: ICD-10-CM

## 2022-04-19 DIAGNOSIS — F33.1 MODERATE EPISODE OF RECURRENT MAJOR DEPRESSIVE DISORDER: ICD-10-CM

## 2022-04-19 DIAGNOSIS — G89.4 CHRONIC PAIN SYNDROME: ICD-10-CM

## 2022-04-19 DIAGNOSIS — G43.109 MIGRAINE WITH AURA AND WITHOUT STATUS MIGRAINOSUS, NOT INTRACTABLE: ICD-10-CM

## 2022-04-19 DIAGNOSIS — Z34.90 PREGNANCY, UNSPECIFIED GESTATIONAL AGE: Primary | ICD-10-CM

## 2022-04-19 DIAGNOSIS — F98.8 ATTENTION DEFICIT DISORDER, UNSPECIFIED HYPERACTIVITY PRESENCE: ICD-10-CM

## 2022-04-19 PROCEDURE — 99215 PR OFFICE/OUTPT VISIT, EST, LEVL V, 40-54 MIN: ICD-10-PCS | Mod: S$GLB,,, | Performed by: INTERNAL MEDICINE

## 2022-04-19 PROCEDURE — 3008F PR BODY MASS INDEX (BMI) DOCUMENTED: ICD-10-PCS | Mod: CPTII,S$GLB,, | Performed by: INTERNAL MEDICINE

## 2022-04-19 PROCEDURE — 99999 PR PBB SHADOW E&M-EST. PATIENT-LVL III: CPT | Mod: PBBFAC,,, | Performed by: INTERNAL MEDICINE

## 2022-04-19 PROCEDURE — 3075F PR MOST RECENT SYSTOLIC BLOOD PRESS GE 130-139MM HG: ICD-10-PCS | Mod: CPTII,S$GLB,, | Performed by: INTERNAL MEDICINE

## 2022-04-19 PROCEDURE — 1159F MED LIST DOCD IN RCRD: CPT | Mod: CPTII,S$GLB,, | Performed by: INTERNAL MEDICINE

## 2022-04-19 PROCEDURE — 1160F RVW MEDS BY RX/DR IN RCRD: CPT | Mod: CPTII,S$GLB,, | Performed by: INTERNAL MEDICINE

## 2022-04-19 PROCEDURE — 3008F BODY MASS INDEX DOCD: CPT | Mod: CPTII,S$GLB,, | Performed by: INTERNAL MEDICINE

## 2022-04-19 PROCEDURE — 3079F PR MOST RECENT DIASTOLIC BLOOD PRESSURE 80-89 MM HG: ICD-10-PCS | Mod: CPTII,S$GLB,, | Performed by: INTERNAL MEDICINE

## 2022-04-19 PROCEDURE — 3075F SYST BP GE 130 - 139MM HG: CPT | Mod: CPTII,S$GLB,, | Performed by: INTERNAL MEDICINE

## 2022-04-19 PROCEDURE — 99999 PR PBB SHADOW E&M-EST. PATIENT-LVL III: ICD-10-PCS | Mod: PBBFAC,,, | Performed by: INTERNAL MEDICINE

## 2022-04-19 PROCEDURE — 99215 OFFICE O/P EST HI 40 MIN: CPT | Mod: S$GLB,,, | Performed by: INTERNAL MEDICINE

## 2022-04-19 PROCEDURE — 1159F PR MEDICATION LIST DOCUMENTED IN MEDICAL RECORD: ICD-10-PCS | Mod: CPTII,S$GLB,, | Performed by: INTERNAL MEDICINE

## 2022-04-19 PROCEDURE — 1160F PR REVIEW ALL MEDS BY PRESCRIBER/CLIN PHARMACIST DOCUMENTED: ICD-10-PCS | Mod: CPTII,S$GLB,, | Performed by: INTERNAL MEDICINE

## 2022-04-19 PROCEDURE — 3079F DIAST BP 80-89 MM HG: CPT | Mod: CPTII,S$GLB,, | Performed by: INTERNAL MEDICINE

## 2022-04-19 RX ORDER — DULOXETIN HYDROCHLORIDE 30 MG/1
30 CAPSULE, DELAYED RELEASE ORAL DAILY
Qty: 30 CAPSULE | Refills: 11 | Status: CANCELLED | OUTPATIENT
Start: 2022-04-19 | End: 2023-04-19

## 2022-04-19 NOTE — PATIENT INSTRUCTIONS
"Thank you for your message. We have been getting a lot of questions like yours.     [facility] is offering COVID-19 vaccinations in accordance to the recommendations of the [facility].  [facility] recommends scheduling your COVID Vaccine via [employer] by following the directions outlined below.      To Schedule your vaccine on the [employer] website:  Choose "Visits" then "Schedule an Appointment" and select the visit tile titled "COVID-19 Vaccine."    To Schedule your vaccine on the [employer] lacho:  Choose "Appointments" then "Schedule an Appointment" then "Tell us why you're coming in" and select the visit tile titled "COVID Vaccine"      Patients may also call [phone] if they do not have a [facility] account.    More times and dates will become available as we receive more vaccine on a weekly basis. We encourage you to continue to check [facility] as more slots become available and appreciate your patience during this process.    Due to high activity, the [facility] website and COVID hotline may experience a slowdown or long wait times. We sincerely apologize for the inconvenience and appreciate your patience as you try and schedule your vaccination.    We are hopeful that the vaccine will be available to more members of the public soon. We encourage community members and patients to visit [email or web address] or [place].[place].gov/[email or web address] or [place] for the latest information and resources.     "

## 2022-04-19 NOTE — PROGRESS NOTES
"  Subjective:       Patient ID: Carlos Snell is a 32 y.o. female who  has a past medical history of ADD (attention deficit disorder), Asthma, Chronic back pain, Migraine headache, and Pyelonephritis.    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 On short term disability d/t visits for FRP, but currently not planning on returning to work.      HPI    She discovered that she is pregnant a few days ago!  3/3 home tests are positive. LMP 5 weeks ago. Was a surprise since this is their first time trying. Therefore, she stopped all of meds as below.  Is having nausea/"morning sickness" towards the evening. She will reduce caffeine.    PNV: none  ETOH: none  Tobacco: none    Lumbar DDD, FMA:  Chronic R low back pain c RLE sciatica.  S/p fusion in 2013.     Had nephrolithiasis in 10/2021, which exacerbated FMA with prolonged extreme pain. Saw several specialists. Saw Rheumatology for polyarthralgia.  Low suspicion for autoimmune disorder. Labs neg. Completed functional restoration program, which helped with exercises. Pain improved 10->6-7/10, but still with intense R leg pain, paresthesias, sharp pain, foot feels numb.  Has realistic expectations regarding pain control.     MRI s stenosis.  Following c Pain Clinic.  s/p SI joint injection. Rx Gabapentin.    Rx Cymbalta for mood, chronic pain. Was about to join a study for FMA, so never started this and then discovered that she was pregnant.  She is still going to therapy 2/week.  Mental health is much better since she's no longer working, so she would like to remain off meds.    Depression, PMDD, anxiety:  Chronic.  Worries a lot, hard time relaxing.  Stress, worries, fear of failure. Guilt, self blame.  Was very concerned about weight gain.    FHx Mom with depression, anxiety on unknown med.      Previous meds:  -Was on Paxil for PMDD, " but felt sick with severe nausea for multiple days, so quickly discontinued it.   -Also tried luteal phase Celexa for PMDD  -Was on Topamax for migraine prophylaxis.   -never tried Cymbalta d/t chronic pain, mood as above.    Wellbutrin 450 helped c focus/concentration.  Admits that she forgot to take Wellbutrin consistently x 2 weeks d/t travel. She then entirely stopped a few days ago d/t pregnancy. She feels well, especially since she is no longer working.  Following with therapy at [facility], OSH Psych, Dr. Barbour at [facility].  Going to therapy 2/month. Completed functional restoration program.      ADD:  Difficulty with day to day responsibilities, chores, bills, organization. Forgetful, easily irritated. Mental health as above. Started Adderral. Follows c Dr. Adenike Barbour with [facility], but she left the practice and establish care with a new Psychiatrist.  She stopped stimulants d/t pregnancy as above.    Migraine:    Saw Neurology. H/o med overuse. Discussed avoiding NSAIDs. Was on Topamax for PPX, Maxalt for abortive. Was planning on occipital nerve block.    Sx are controlled. Peppermint stick PRN helps.  Felt that Topamax made her sick, so has been off of this for some time.              Not addressed today.  Obesity:    Was on phentermine by Endo. Was on Wellbutrin for mood, Topamax for migraine PPX.  Stable. Will discuss lifestyle changes during future visit.  BMI Readings from Last 3 Encounters:   04/19/22 37.36 kg/m²   04/14/22 35.51 kg/m²   03/23/22 37.79 kg/m²     Lab Results   Component Value Date/Time    HGBA1C 5.3 11/30/2019 09:56 AM     H/o Dysphonia:  Was treated with antibiotics, steroids, but persistent hoarseness in the morning.  No SOB, dysphagia.  Following with ENT.  Performed laryngoscopy.  Thought to be functional.  Rec SLP.  Follow-up with ENT p.r.n.  GERD as below.  Started Protonix q.a.m. with great improvement.  No longer taking PPI    GERD:   Drinks coffee daily.  No epigastric  pain.  Not daily.  Sour taste in her throat, some vomit.  No particular food triggers.  Started Protonix q.a.m. with improvement.  Takes ibuprofen sparingly for back pain.    Improved with Protonix. Likely contributing to asthma, dysphonia.      Asthma:    Cough, occurs with weather changes.  Improved with Advair BID. The patient does not smoke.      Cyanocobalamin deficiency:    Hasn't been taking B12. Will start PNV for pregnancy.  Lab Results   Component Value Date    FENWKBLZ20 334 10/28/2020     Normocytic anemia:   Hematocrit slightly decreased.  +Menorrhagia, endometriosis.  Reports regular cycles.  Was on OCPs, IUD, but stopped due to weight gain.  Uses super tampons every 1.5 hours per day; uses tampons + pads sometimes.  Folows with OBGYN.    Mild, resolved. Likely 2/2 endometriosis/menorrhagia.  Iron panel wnl.  f/u with OBGYN.  Lab Results   Component Value Date    IRON 78 11/30/2019    TIBC 432 11/30/2019    FERRITIN 43 11/30/2019     Lab Results   Component Value Date    TXEVMOSL07 334 10/28/2020     No results found for: FOLATE    Endometriosis:    Off birth control due to concerns for weight. Regular cycles. TVUS normal. Following c OBGYN.     Saw Endo for low DHEA during OBGYN eval for PCOS, fatigue, weight gain. The rest of hormones were wnl.   Not c/w PCOS. ACTH was normal so not suggestive of an adrenal gland insufficiency. Low DHEA-S would just be from the inhaled steroids  Lab Results   Component Value Date    TSH 0.477 01/20/2022    FREET4 0.87 01/20/2022     Review of Systems   Constitutional: Negative for activity change and appetite change.   Respiratory: Negative for wheezing.    Cardiovascular: Negative for leg swelling.   Gastrointestinal: Positive for nausea.   Musculoskeletal: Positive for back pain. Negative for gait problem.   Skin: Negative for rash and wound.   Hematological: Negative for adenopathy.   Psychiatric/Behavioral: Negative for confusion and dysphoric mood. The patient  "is not nervous/anxious.        I personally reviewed Past Medical History, Past Surgical History, Social History, and Family History.    Objective:      Vitals:    04/19/22 1433   BP: 138/80   Pulse: 110   SpO2: 97%   Weight: 105 kg (231 lb 7.7 oz)   Height: 5' 6" (1.676 m)      Physical Exam  Constitutional:       General: She is not in acute distress.     Appearance: She is well-developed. She is not diaphoretic.   HENT:      Head: Normocephalic and atraumatic.      Nose: Nose normal.      Mouth/Throat:      Pharynx: No oropharyngeal exudate.   Eyes:      General: No scleral icterus.        Right eye: No discharge.         Left eye: No discharge.   Neck:      Thyroid: No thyromegaly.      Trachea: No tracheal deviation.   Cardiovascular:      Rate and Rhythm: Normal rate and regular rhythm.      Heart sounds: Normal heart sounds. No murmur heard.  Pulmonary:      Effort: Pulmonary effort is normal. No respiratory distress.      Breath sounds: Normal breath sounds. No wheezing.   Abdominal:      General: Bowel sounds are normal. There is no distension.      Palpations: Abdomen is soft.      Tenderness: There is no abdominal tenderness.   Musculoskeletal:         General: No deformity.      Cervical back: Neck supple.      Right lower leg: No edema.      Left lower leg: No edema.   Lymphadenopathy:      Cervical: No cervical adenopathy.   Skin:     General: Skin is warm and dry.      Findings: No erythema.   Neurological:      Mental Status: She is alert.      Gait: Gait normal.   Psychiatric:         Behavior: Behavior normal.           Lab Results   Component Value Date    WBC 7.39 10/24/2021    HGB 12.7 10/24/2021    HCT 38.0 10/24/2021     10/24/2021    CHOL 177 11/30/2019    TRIG 58 11/30/2019    HDL 50 11/30/2019    ALT 13 10/24/2021    AST 19 10/24/2021     10/24/2021    K 4.2 10/24/2021     10/24/2021    CREATININE 0.8 10/24/2021    BUN 11 10/24/2021    CO2 22 (L) 10/24/2021    TSH 0.477 " 01/20/2022    HGBA1C 5.3 11/30/2019       The ASCVD Risk score (Ponderay CHANG Jr., et al., 2013) failed to calculate for the following reasons:    The 2013 ASCVD risk score is only valid for ages 40 to 79    (Imaging have been independently reviewed)  MRI lumbar spine s/p fusion. No stenosis.    Assessment:       1. Pregnancy, unspecified gestational age    2. Nausea    3. Chronic pain syndrome    4. Moderate episode of recurrent major depressive disorder    5. Attention deficit disorder, unspecified hyperactivity presence    6. Migraine with aura and without status migrainosus, not intractable          Plan:       Carlos was seen today for pregnant.    Diagnoses and all orders for this visit:    Pregnancy, unspecified gestational age  Comments:  Discussed paucity of studies in pregnant women. Benefits outweighs risks RE antidepressants. Stop Topamax. Start PNV. Addtl notes below.    Nausea  Comments:  May try emily as it is a natural antiemetic. Schedule earlier visit with OBGYN. Will need Tdap later in pregnancy. Will reduce caffeine.    Chronic pain syndrome  Comments:  Doing well. Completed FRP. Stopped gabapentin. F/u c Pain Clinic PRN.    Moderate episode of recurrent major depressive disorder  Comments:  Doing well. Monitor for sx off antidepressants. Discussed R/B in pregnancy. Could consider Zoloft as it is preferred in breastfeeding women. Cont therapy.     Attention deficit disorder, unspecified hyperactivity presence  Comments:  Stopped Addreral d/t pregnancy. If she wishes to restart, could consider lower dose. F/u c OSH Psych.    Migraine with aura and without status migrainosus, not intractable  Comments:  Controlled. Doing well off Topamax. Watch for caffeine withdrawal HA.    Other orders  The following orders have not been finalized:  -     Cancel: DULoxetine (CYMBALTA) 30 MG capsule         Side effects of medication(s) were discussed in detail and patient voiced understanding.  Patient will call  back for any issues or complications.     I have spent a total of 69 minutes with the patient as well as reviewing the chart/medical record and placing orders on the day of the visit. Discussed pregnancy, mental health, meds, migraine, ADD.     RTC in 1 year(s) or sooner PRN..

## 2022-04-25 ENCOUNTER — PATIENT MESSAGE (OUTPATIENT)
Dept: INTERNAL MEDICINE | Facility: CLINIC | Age: 32
End: 2022-04-25
Payer: COMMERCIAL

## 2022-04-26 ENCOUNTER — CLINICAL SUPPORT (OUTPATIENT)
Dept: REHABILITATION | Facility: OTHER | Age: 32
End: 2022-04-26
Payer: COMMERCIAL

## 2022-04-26 DIAGNOSIS — Z74.09 IMPAIRED FUNCTIONAL MOBILITY, BALANCE, GAIT, AND ENDURANCE: Primary | ICD-10-CM

## 2022-04-26 DIAGNOSIS — R29.898 DECREASED STRENGTH OF UPPER EXTREMITY: ICD-10-CM

## 2022-04-26 DIAGNOSIS — Z78.9 DECREASED ACTIVITIES OF DAILY LIVING (ADL): Primary | ICD-10-CM

## 2022-04-26 DIAGNOSIS — G89.4 CHRONIC PAIN SYNDROME: ICD-10-CM

## 2022-04-26 DIAGNOSIS — R29.898 WEAKNESS OF BOTH HIPS: ICD-10-CM

## 2022-04-26 DIAGNOSIS — M62.81 WEAKNESS OF TRUNK MUSCULATURE: ICD-10-CM

## 2022-04-26 DIAGNOSIS — F40.298 FEAR OF PAIN: ICD-10-CM

## 2022-04-26 PROCEDURE — 97530 THERAPEUTIC ACTIVITIES: CPT

## 2022-04-26 PROCEDURE — 97110 THERAPEUTIC EXERCISES: CPT

## 2022-04-26 NOTE — PROGRESS NOTES
"NOTE: This is a duplicate copy utilized for reassessment purposes & continuity of care.  See pt's plan of care for co-signed copy.    [facility]  Functional Restoration Program  Physical Therapy Discharge Summary      Date: 4/26/2022   Name: Carlos Snell  Clinic Number: [id number]    Therapy Diagnosis:   Encounter Diagnoses   Name Primary?    Impaired functional mobility, balance, gait, and endurance Yes    Chronic pain syndrome     Weakness of both hips     Weakness of trunk musculature      Physician: Gwendolyn Zaidi NP    Physician Orders: PT Eval and Treat   Medical Diagnosis from Referral:   G89.4 (ICD-10-CM) - Chronic pain disorder   Z78.9 (ICD-10-CM) - Decreased activities of daily living (ADL)   Z74.09 (ICD-10-CM) - Impaired mobility and endurance       Evaluation Date: 4/26/2022  Authorization Period Expiration: 01/27/23  Plan of Care Expiration: 04/22/22  Visit # / Visits authorized: 16/20  FOTO:   Completed 2/3     Precautions: Standard and Falln    Time In:  11:20 am   Time Out:  12:15 pm   Total Appointment Time (timed & untimed codes): 55 minutes      Subjective       Patient reports she thinks she has been doing well w/ all of her changing in lifting & body mechanics in her home. She was doing well w/ her gym workouts, but then had an injection in her R SI joint & has not gotten right back in the gym as she is trying to give her body time to recover. She is going to join the [employer] as her current gym is too expensive.  Her pain has been a lot better, but she did wear a bad pair of shoes at a festival & had a flare up w/ her pain & somewhat her fatigue. She thinks she handled the flare up appropriately w/ stretching, deep breathing, yoga, & walking. "I did not freak out about it." She is doing mindfulness daily & thinks she has gotten pretty good at choosing what she needs on those days.         Patient's FRP goals: "I would like to make peace /c [my] chronic " "illness"  Find exercises that I can do that will help me stay healthy       Pain:      Current 3/10, worst 8/10 Monday, best 3/10    Location: B hips, R SI joint        Objective           Range of Motion - Lumbar   SLOW MOVEMENTS     4/26/2022    ROM Loss ROM Loss ROM Loss   Flexion moderate loss Mod loss Min loss   Extension moderate loss Mod loss Min loss   Side bending Right moderate loss Mod loss Mod loss   Side bending Left moderate loss Mod loss WFL   Rotation Right moderate loss Mod loss WFL   Rotation Left moderate loss P! Mod loss P! WFL     Strength Testing   Evaluation Reassessment Reassessment  4/26/2022   Motion Tested         Lower Extremity R L R L R L   Hip Flexion 3+/5 P! 3+/5 P! 4-/5 4-/5 4/5 4/5   Hip Extension 3/5 3/5 3/5 3/5 4/5 4/5   Hip Abduction 3+/5 3+/5 3+/5 3+/5 4/5 4/5   Hip IR 3+/5 3+/5 4/5 4/5 4+/5 4+/5   Hip ER 3+/5 P! 3+/5 P! 4+/5 4+/5 4+/5 4+/5   Knee Extension 4+/5 5/5 5/5 5/5 5/5 5/5   Knee Flexion 4+/5 5/5 5/5  5/5 5/5 5/5     Functional Testing     Evaluation Reassessment  Reassessment  4/26/2022   Timed Up and Go 12.76 sec 13.85 sec 9.7 sec   5 Time Sit to Stand w/out UE 22.41 sec 20.8 sec 14.5 sec   Single Limb Stance R LE 15.47 sec 13.33 sec 25.0 sec   Single Limb Stance L LE 12.86 sec 21.55 sec 24.7sec   2 Minute Walk Test 261 ft  354 ft  475 feet   6 Minute Walk Test 744 ft  1118 ft  1443 feet   Self Selected Walking Speed 0.63 m/sec  0.95 m/sec 1.22 m/sec             Limitation/Restriction for FOTO Lumbar Survey    Therapist reviewed FOTO scores for Carlos Snell on 4/26/2022.   FOTO documents entered into [employer] - see Media section.    Limitation Score: 71%  Predicted Score 56%  Visit 9: 53%   Visit 16: 41%       TREATMENT   Carlos received the Individualized Treatments listed below:      Therapeutic exercises to develop strength, endurance, ROM, flexibility, posture and core stabilization for 75 minutes including:      PT reassessment (see above)         PATIENT " EDUCATION AND HOME EXERCISES     Education provided: none    Written Home Exercises Provided: Patient instructed to cont prior HEP. Exercises were reviewed and Carlos was able to demonstrate them prior to the end of the session.  Carlos demonstrated fair  understanding of the education provided. See EMR under Patient Instructions for information provided during therapy sessions.    ASSESSMENT   Carlos w/ good improvements in her objective measures despite her reports of decreased time in resisted training. In gait, she demonstrated better ease of movement & some increased tolerance w/out exacerbating her back pain or LE symptoms. She met most goals & is appropriate for discharge at this time.            GOALS: Pt is in agreement with the following goals:      Goal Progress Towards Goal   Short Term: In 3 weeks, pt will:    Verbalize & demonstrate good understanding of resisted training with 3-1-3 second rep for [id number] contraction to encourage full muscle recruitment for strength & endurance Goal met 2/24/2022   Verbalize & demonstrate good understanding of home stretch routine to improve AROM, help decrease pain & improve mobility Goal met 2/24/2022   Demonstrate proper supine or seated diaphragmatic breathing with decreased chest excursion & emphasis on full abdominal excursion & increased expiration time to promote muscle relaxation & increased parasympathetic activity to improve patient tolerance to activities Goal met 2/24/2022   Demonstrate proper static standing posture in frontal & sagittal plane per PT visual assessment to decrease postural strain in ADLs Goal met 2/24/2022   Demonstrate good recall of names of resisted exercises w/ minimal to moderate verbal cues to promote independence w/ exercise at the end of the program Goal met 2/24/2022   Verbalize plan for continuing resisted exercises w/ specific location (i.e. commercial gym, home, community fitness center) indicating  preference for machine-based or free-weight exercises to incorporate all major muscle groups to maintain & progress therapeutic functional improvements Goal met 2/24/2022           Long Term: In 8 weeks, pt will:    Verbalize good understanding of activities planning/scheduling (stretching, mindfulness, resisted training, & cardio) prescribed by PT/OT following the end of the program to sustain & progress functional improvements Goal met 4/26/2022   Be independent w/ selected resisted training w/ minimal to no cuing while performing to continue w/ resisted strengthening independently at the end of the program Goal met 4/26/2022   Improve 2 Minute Walk Test (MWT) to 375 feet and 6 MWT to 1125 feet or greater to improve gait speed and mobility and reach community ambulator status Goal met 4/26/2022   Perform single leg stance 20 seconds or greater bilaterally to improve safety and balance in ADLs Goal met 4/26/2022   Demonstrate Timed Up & Go (with appropriate assistive device, if necessary) of 10 seconds or less to decrease fall risk and improve functional mobility Goal met 4/26/2022   Demonstrate 5 time sit to stand test without upper extremity assist to 15 sec or less to improve general mobility and decrease fall risk Goal met 4/26/2022   Score 55 % or less on Lumbar FOTO to indicate significant functional improvements in impaired functions secondary to low back pain Goal met 2/24/2022                 PLAN   Discharge from PT    Duy Camp, PT, DPT

## 2022-04-26 NOTE — PROGRESS NOTES
"[facility]   Functional Restoration Program  Occupational Therapy Progress Report  FRP 2 mo Follow Up    Name: Carlos Snell  Medical Record Number: [id number]    Therapy Diagnosis:   Encounter Diagnoses   Name Primary?    Decreased activities of daily living (ADL) Yes    Decreased strength of upper extremity     Fear of pain      Physician: Gwendolyn Zaidi NP    Visit Date: 4/26/2022    Physician Orders: OT Eval and Treat  Medical Diagnosis: Chronic pain disorder  Evaluation Date: 1/28/2022  Insurance Authorization Period Expiration: 12/31/2022  Plan of Care Certification Period: 5/6/2022 (UPDATED 2/24/22)  Visit # / Visits authorized: 14 / 20 (incuding eval)  FOTO: evaluation: 52% limitation  FOTO: reassessment: 36% limitation  FOTO: reassessment: 25% limitation    Time In: 10:15 AM  Time Out: 11:10 AM  Total Billable Time: 55 minutes    Subjective     Carlos reports "Leon been good. I would say overall leon been pretty consistent with functional lifting, mindfulness and meditation. The lifting component has definitely been the most helpful and now i'm aware of how i'm doing things and when i'm not doing things the right way. also having a consistent yoga and walking schedule, too has been really helpful. I have the mirror at home so I haven't gone into a studio. Overall, my pain is good. I walk at least 5x/week. The yoga and stretching has been really helpful for my pain. I went to [facility] this weekend and I wore cute shoes that were not comfortable and that messed up my back but if i'm being intentional the pain is really manageable and day to day the pain is like a 3-4/10. I had an injection in my SI joint 4/14 and I think it's helped but now I feel more pain on the opposite side of my body and I have a follow up 5/5. Driving is still painful for me, but that's the biggest thing but also household chores can also be a little bit of struggle because they can cause " "flare ups, but overall i'm doing good Id say. 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         Pain:  Current: 4/10  Worst: 8/10  Best: 3/10  Location: B hips and low back     Social and ADL History:  Activities of Daily Living Checklist:   Key to Score:   0: Unable to do the activity  1: Can do the activity with great difficulty  2: Can do the activity with little difficulty   3: No problem with activity  N/A: This is not an activity I do  H/T: Have not tried yet     Personal Care:  eval 22; reassess 22; reassessment 22 Homemaking:   eval 22; reassess 22; reassessment 22   Dressing Upper Body:  2 / 2 / 3 Meal preparation: 2 / 2   / 3   Dressing Lower Body:   Kitchen Cleanup:    Bathin Childcare:  na   Hair Care:  3 / 2 / 2 Grocery shoppin / 2 / 3   Sleep:   Vacuuming/moppin       Emptying trash/ garbage bag: 3 / 2 / 3   General Mobility:    Bed making changin   Sittin Laundry  2 / 2 / 3   Bendin       Getting in/out of bed:  2 / 2 / 3 Home Maintenance:     Standin Mowing, rakin / na    Walkin Gardenin / na   Twistin Home Repairs:  na   Liftin Painting:  na             Getting around:          Getting in and out of car:         Buckling up you seat belt:  3 / 3 / 3       Opening heavy doors:         Climbing/descending stairs:                      Response to previous treatment: Carlos noted: "Monday was hard. It was painful. Its just really hard with this leg pain".    Personal Functional Three Month Goals: Performance and satisfaction noted " "below.    OBJECTIVE     Carlos received the treatments listed below.    Pt participated in functional lifting/endurance/therapeutic activities in order to increase pt's participation with vocational, selfcare ADLs, and leisure activities in order to improve quality of life, for 60 minutes, which included:    Strength Testing             Evaluation  1/28/2022 [place] FRP  2/24/22  2 Month Follow Up  4/26/22   Motion Tested                 R L R L R L   Upper Extremity               Shoulder Flexion 4/5 4+/5 5/5   5/5   5/5  5/5     Shoulder Extension 5/5 5/5 5/5    5/5  5/5   5/5     Shoulder Abduction 4/5 3+/5  5/5   5/5  5/5   5/5     Shoulder IR 5/5 5/5  5/5   5/5  5/5   5/5     Shoulder ER 4/5 4/5  5/5   5/5  5/5    5/5    Elbow Flexion 4/5 4/5  4/5   4/5   5/5  5/5    Elbow Extension 4/5 4/5  4/5   4/5  5/5      5/5           Evaluation 1/28/22; Reassess 2/24/22 / Reassessment 4/26/22   5 times sit-stand  (adults 18-63 y/o) 22.41 seconds; Reassess 20.8 sec / 14.5 sec  >12 sec= fall risk for general elderly  >16 sec= fall risk for Parkinson's disease  >10 sec= balance/vestibular dysfunction (<59 y/o)  >14.2 sec= balance/vestibular dysfunction (>59 y/o)  >12 sec= fall risk for CVA      Endurance Testing: Modified Ramp Treadmill Test    GAP 1/28/22 Re-assessment 2/24/22 Follow-up  4/26/22   Minutes Completed  2 mins 15 secs  2 min 00 sec  4 min 15 sec   % Grades  5 %  5%  10%   Speed (mph)  1.3 mph  1.3 mph  2.1 mph   METS 3  3  5    bpm  129 bpm  166 bpm   Jono Rating Perceived Exertion Scale   7  6  5   Reason for stop point: Pt reported posture changes, fatigue, noted decline in maintaining pace safely. Attempted 1.7 at 10% but asked to stop after 10 seconds 2/2 B hip pain   Reason for stop: Reports pins and needles in RLE and hot fire in low"      Functional Strength Test:  Pile Testing: Lifting  (Pounds/Heart rate)   GAP/Eval (#/HR/JONO)  Reassessment  Day 9  2/24/22   Follow-up   Appointment  4/26/22 "    Repetitive Floor to Waist    8#/97bpm/5 15#/135bpm/4     25#/153bpm/3   Repetitive Waist to Shoulder    10#/81bpm/4 15#/111bpm/4           20#/139bpm/3      1- Time Maximum     12#/105bpm/5 20#/113bpm/4        35#/154bpm/4      Carry-2 Handed (40ft)     12#/95bpm/3 18#/112bpm/5        27#/133bpm/3      Work demand Level     Sedentary-Light light       medium   Reason for Stop point: Increased time required for completing repetitions. During physical testing, participation level consistent.    Full body stretch w/ 3-10 sec hold technique &/or 3-5 repetition technique on all of the following:   Cervical flx/ext   Cervical sidebending   Cervical rotation   Cervical protraction/retraction   Shld rolls   Shld depression/elevation   Scapular retraction/protraction/scaption   Posterior shld stretch (cross arm)   Shld ER stretch (chicken wing)   Elbow flx/ext   Forearm supination/pronation   Wrist flx/ext   Open/close hands/fingers   Seated trunk rotations   Seated trunk/thoracic ext/flx   Seated marches & knee to chest   Seated knee flx/ext   Seated hip ER/piriformis stretch   Seated hamstring stretch   Seated toe raise to heel raise    Seated ankle circles each way   Standing lumbar extensions   Standing lateral trunk stretch   Standing quad stretch    ASSESSMENT   Carlos presents late to appointment but with improved energy levels and mild affect upon arrival. She had good self report of improved functional activity tolerance, consistency with mindfulness/meditation/yoga practice as well as good walking program. 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 Overall, she made great progress on both subjective and objective measures. Notably, she improved her 5x sit to stand time by 6 seconds, and although still in low fall risk category, she continues to make progress. She was able to improve her weight in all aspects of PILE test and improved her MET level from 3 to 5,  progressing work demand level from light to medium. Subjectively, she made significant improvement on mod COPM as noted by goals chart below as well as some improved independence with ADLs as noted by chart above. Overall, made great progress towards personal goals and reports improved ability to manage pain and participate in meaningful activities.     Pt has made progress in fully participating in daily activities, recreational, and home-based activities 2/2 improved functional strength with strategies/exercises for improved AROM, endurance, positional tolerance, and decreased fear of re-injury and fear of increased pain.     Carlos's prognosis is Good due to good personal goals, willingness to make behavioral changes and good response to pain education and identification of current behaviors.     Pt's spiritual, cultural and educational needs considered and pt agreeable to plan of care and goals.  Anticipated barriers to occupational therapy: None identified    FRP Goals: While working towards the long-term (3 month) functional goals listed above, Carlos will accomplish the following short term goals during the 5-week intensive rehabilitation program. Carlos will:      1. Develop a viable return to work plan. ------------ MET 4/26/22  2. Demonstrate physical capacities consistent with a light-medium work demand level. ------------ MET 5/26/22  3. Demonstrate increased functional strength by lifting 20 lbs floor to waist and 20 lbs waist to shoulder in order to meet demand of work/home routine. ------------ MET 4/26/22  4. Increase her positional tolerance to the level needed for work and home activities. ------------ MET 4/26/22  5. Implement self-care strategies during the program and at home to control pain. ------------ MET 4/26/22  6.   Patient will demonstrate use of mindfulness, stress management, and coping  strategies for improved participation in daily routine. ------------ MET 4/26/22    ADDED  GOALS 2/24/22:   7. Pt will improve 5x sit to stand time by 5 seconds to progress towards decreased fall risk and improve confidence in community mobility ------------ MET 4/26/22     Specific patient expressed goals and demand levels:  Current Capacity Limit/ Physical  Demand Level (PDL)    Demand Levels of Functional Recovery Goals     Performance/  Satisfaction  Day 1 Performance/  Satisfaction  Day 9  2/24/22 Performance/  Satisfaction  Follow-up      Sedentary-Light Physical Demand  (Based above tested results)     Vocational:  Standing tolerance          Walking tolerance     Ascending stairs     Hauling supply bins      Recreational:  Cooking      Exercise      Daily Living:  Hair care     Lower body dressing      Housechores (vacuuming, dishes, etc)     Light-medium Physical Demand Level     4/4 (now after 15 minutes it becomes painful)    5/3    4/3    5/4      6/5    3/1      6/5    6/5      3/1     5 / 5         4 / 3     6 / 6      7 / 6        6 / 5      5 / 5        6 / 6      5 / 5        2 / 1      6 / 5         7 / 7     7 / 7      7 / 7       7 / 7      6 / 6        6 / 6      6 / 6 5 / 5   The PDL listed above is based on today's physical performance screening test. More comprehensive physical  testing integrated with clinical findings would be required to derived an accurate work capacity.      PLAN     Pt discharged from Occupational Therapy services at this time secondary to improved independence in ADLs & IADLs and improved functional capacity to participate in meaningful activities.     Juana Terry, OTR/L  4/26/2022

## 2022-05-04 ENCOUNTER — PATIENT OUTREACH (OUTPATIENT)
Dept: ADMINISTRATIVE | Facility: OTHER | Age: 32
End: 2022-05-04
Payer: COMMERCIAL

## 2022-05-05 ENCOUNTER — OFFICE VISIT (OUTPATIENT)
Dept: SPINE | Facility: CLINIC | Age: 32
End: 2022-05-05
Payer: COMMERCIAL

## 2022-05-05 VITALS
BODY MASS INDEX: 26.58 KG/M2 | HEIGHT: 66 IN | TEMPERATURE: 97 F | WEIGHT: 165.38 LBS | OXYGEN SATURATION: 100 % | SYSTOLIC BLOOD PRESSURE: 139 MMHG | DIASTOLIC BLOOD PRESSURE: 93 MMHG | HEART RATE: 77 BPM | RESPIRATION RATE: 18 BRPM

## 2022-05-05 DIAGNOSIS — M54.16 LUMBAR RADICULOPATHY: ICD-10-CM

## 2022-05-05 DIAGNOSIS — M96.1 LUMBAR POSTLAMINECTOMY SYNDROME: ICD-10-CM

## 2022-05-05 DIAGNOSIS — G89.4 CHRONIC PAIN SYNDROME: Primary | ICD-10-CM

## 2022-05-05 PROCEDURE — 99999 PR PBB SHADOW E&M-EST. PATIENT-LVL III: CPT | Mod: PBBFAC,,, | Performed by: ANESTHESIOLOGY

## 2022-05-05 PROCEDURE — 3080F PR MOST RECENT DIASTOLIC BLOOD PRESSURE >= 90 MM HG: ICD-10-PCS | Mod: CPTII,S$GLB,, | Performed by: ANESTHESIOLOGY

## 2022-05-05 PROCEDURE — 99999 PR PBB SHADOW E&M-EST. PATIENT-LVL III: ICD-10-PCS | Mod: PBBFAC,,, | Performed by: ANESTHESIOLOGY

## 2022-05-05 PROCEDURE — 3008F BODY MASS INDEX DOCD: CPT | Mod: CPTII,S$GLB,, | Performed by: ANESTHESIOLOGY

## 2022-05-05 PROCEDURE — 1159F MED LIST DOCD IN RCRD: CPT | Mod: CPTII,S$GLB,, | Performed by: ANESTHESIOLOGY

## 2022-05-05 PROCEDURE — 1159F PR MEDICATION LIST DOCUMENTED IN MEDICAL RECORD: ICD-10-PCS | Mod: CPTII,S$GLB,, | Performed by: ANESTHESIOLOGY

## 2022-05-05 PROCEDURE — 3075F PR MOST RECENT SYSTOLIC BLOOD PRESS GE 130-139MM HG: ICD-10-PCS | Mod: CPTII,S$GLB,, | Performed by: ANESTHESIOLOGY

## 2022-05-05 PROCEDURE — 1160F RVW MEDS BY RX/DR IN RCRD: CPT | Mod: CPTII,S$GLB,, | Performed by: ANESTHESIOLOGY

## 2022-05-05 PROCEDURE — 3080F DIAST BP >= 90 MM HG: CPT | Mod: CPTII,S$GLB,, | Performed by: ANESTHESIOLOGY

## 2022-05-05 PROCEDURE — 1160F PR REVIEW ALL MEDS BY PRESCRIBER/CLIN PHARMACIST DOCUMENTED: ICD-10-PCS | Mod: CPTII,S$GLB,, | Performed by: ANESTHESIOLOGY

## 2022-05-05 PROCEDURE — 99214 OFFICE O/P EST MOD 30 MIN: CPT | Mod: S$GLB,,, | Performed by: ANESTHESIOLOGY

## 2022-05-05 PROCEDURE — 99214 PR OFFICE/OUTPT VISIT, EST, LEVL IV, 30-39 MIN: ICD-10-PCS | Mod: S$GLB,,, | Performed by: ANESTHESIOLOGY

## 2022-05-05 PROCEDURE — 3008F PR BODY MASS INDEX (BMI) DOCUMENTED: ICD-10-PCS | Mod: CPTII,S$GLB,, | Performed by: ANESTHESIOLOGY

## 2022-05-05 PROCEDURE — 3075F SYST BP GE 130 - 139MM HG: CPT | Mod: CPTII,S$GLB,, | Performed by: ANESTHESIOLOGY

## 2022-05-05 NOTE — PROGRESS NOTES
Chronic Pain - Established Pt     Referring Physician: No ref. provider found    Chief Complaint:   Chief Complaint   Patient presents with    Leg Pain    Low-back Pain     F/u        SUBJECTIVE:    Interval history 5/4/2022:  Patient returns 4 weeks after right SIJ injection. She notes significant improvement in both back pain and pain radiating down her right leg. She feels her leg pain is more severe at this time, though manageable. She continues to endorse electrical pain shooting down her posterior leg with intermittent foot numbness. She denies overt weakness at this time. She denies bowel or bladder dysfunction, or saddle anesthesia. She continues her HEP. She is also 6 weeks pregnant at this time. She is no longer taking gabapentin or cymbalta.    Initial history:  Mrs Snell presents today for evaluation of right sided lower back pain down posterior leg. Pt has had prior L5/S1 fusion 2014. She had had formal PT and active in FRP program at this time. She has seen Dr Cee with Ortho/Spine and did not feel further surgical intervention indicated. Since surgical intervention she has not had procedures. She states pain worse with sitting and has to reposition frequently. She has no complaint of loss of b/b pr saddle paresthesias. She is currently Neurontin 300mg TID with some benefit and Cymbalta 30mg qd. She has no focal voicing of loss of b/b or saddle paresthesias.             Physical Therapy/Home Exercise: yes      Pain Disability Index Review:  Last 3 PDI Scores 5/5/2022 3/23/2022   Pain Disability Index (PDI) 36 41       Pain Medications:  -Gabapentin 300mg - Not taking while pregnant  -Cymbalta 30mg - Not taking while pregnant     report:  Not applicable    Pain Procedures:   Multiple EMMANUEL and injections prior to 2014 L5/S1 Fusion   4/14/2022 - Right SIJ injection, moderate relief (10/10 to 4/10)    Imaging:     MRI LUMBAR SPINE WITHOUT CONTRAST 12/15/2021     CLINICAL HISTORY:  Lumbar radiculopathy,  symptoms persist with conservative treatment; Radiculopathy, lumbar region     TECHNIQUE:  Multiplanar, multisequence MR images were acquired from the thoracolumbar junction to the sacrum without the administration of contrast.     COMPARISON:  Radiographs 11/23/2021     FINDINGS:  Status post L5-S1 disc arthroplasty and fusion.     The alignment of the lumbar spine is normal.     The disc spaces are well maintained.     The conus medullaris terminates in good position.     T12-L1: No disc abnormality, no canal stenosis or foraminal narrowing.  The facet joints appear normal.     L1-L2: No disc abnormality, no canal stenosis or foraminal narrowing.  The facet joints appear normal.     L2-L3: No disc abnormality, no canal stenosis or foraminal narrowing.  The facet joints appear normal.     L3-L4: No disc abnormality, no canal stenosis or foraminal narrowing.  The facet joints appear normal.     L4-5: No canal stenosis or foraminal narrowing.  No disc abnormality.     L5-S1: Postoperative change, no canal stenosis or foraminal narrowing.  The facet joints appear normal.     The upper sacrum and upper sacroiliac joints appear normal.     The paravertebral and paraspinal soft tissues appear normal.     Impression:     Postoperative changes L5-S1 disc arthroplasty and fusion.     No canal stenosis or foraminal narrowing at any level.    XR LUMBAR SPINE AP AND LAT WITH FLEX/EXT 11/19/2021     CLINICAL HISTORY:  Other intervertebral disc degeneration, lumbar region     FINDINGS:  Lumbar spine four views: There is a levoconvex curvature.  There is postoperative change with hardware and a disc implant at L5-S1.  There is mild DJD.  No instability seen.  No trauma seen.  No fracture dislocation bone destruction seen.     Impression:     Chronic change as above.      Past Medical History:   Diagnosis Date    ADD (attention deficit disorder)     Asthma     Chronic back pain     Migraine headache     Pyelonephritis       Past Surgical History:   Procedure Laterality Date    BACK SURGERY  2013    INJECTION OF JOINT Right 04/14/2022    Procedure: INJECTION, JOINT RIGHT SI NEEDS CONSENT;  Surgeon: Cristopher Simon MD;  Location: [facility];  Service: Pain Management;  Laterality: Right;    SPINE SURGERY  12/01/14     Social History     Socioeconomic History    Marital status:    Tobacco Use    Smoking status: Never Smoker    Smokeless tobacco: Never Used   Substance and Sexual Activity    Alcohol use: Yes     Alcohol/week: 3.0 standard drinks     Types: 3 Glasses of wine per week     Comment: 1-3 drinks every 2 weeks    Drug use: No    Sexual activity: Yes     Partners: Male     Birth control/protection: Coitus interruptus     Social Determinants of Health     Financial Resource Strain: Low Risk     Difficulty of Paying Living Expenses: Not hard at all   Food Insecurity: No Food Insecurity    Worried About Running Out of Food in the Last Year: Never true    Ran Out of Food in the Last Year: Never true   Transportation Needs: No Transportation Needs    Lack of Transportation (Medical): No    Lack of Transportation (Non-Medical): No   Physical Activity: Inactive    Days of Exercise per Week: 0 days    Minutes of Exercise per Session: 0 min   Stress: Stress Concern Present    Feeling of Stress : To some extent   Social Connections: Unknown    Frequency of Communication with Friends and Family: Twice a week    Frequency of Social Gatherings with Friends and Family: Once a week    Active Member of Clubs or Organizations: Yes    Attends Club or Organization Meetings: More than 4 times per year    Marital Status:    Housing Stability: Low Risk     Unable to Pay for Housing in the Last Year: No    Number of Places Lived in the Last Year: 1    Unstable Housing in the Last Year: No     Family History   Problem Relation Age of Onset    Depression Mother     Anxiety disorder Mother     Ovarian  "cancer Mother     Alcohol abuse Mother     Cancer Mother     No Known Problems Father     Diabetes Sister     Hypertension Maternal Grandmother     Breast cancer Neg Hx     Colon cancer Neg Hx        Review of patient's allergies indicates:   Allergen Reactions    Lactose Diarrhea    Gluten protein Itching     Inflammation, bloating, diarrhea and pain all over body         Current Outpatient Medications   Medication Sig    cetirizine (ZYRTEC) 10 MG tablet cetirizine 10 mg tablet    rizatriptan (MAXALT-MLT) 10 MG disintegrating tablet Take at onset of migraine, can repeat in 2 hrs if needed.  No more than 2 tabs per day or 3 days/wk.     No current facility-administered medications for this visit.       REVIEW OF SYSTEMS:    GENERAL:  No weight loss, malaise or fevers.  HEENT:  Negative for frequent or significant headaches.  NECK:  Negative for lumps, goiter, pain and significant neck swelling.  RESPIRATORY:  Negative for cough, wheezing or shortness of breath.  CARDIOVASCULAR:  Negative for chest pain, leg swelling or palpitations.  GI:  Negative for abdominal discomfort, blood in stools or black stools or change in bowel habits.  MUSCULOSKELETAL:  See HPI.  SKIN:  Negative for lesions, rash, and itching.  PSYCH:  Negative for sleep disturbance, mood disorder and recent psychosocial stressors.  HEMATOLOGY/LYMPHOLOGY:  Negative for prolonged bleeding, bruising easily or swollen nodes.  NEURO:   No history of headaches, syncope, paralysis, seizures or tremors.  All other reviewed and negative other than HPI.    OBJECTIVE:    BP (!) 139/93   Pulse 77   Temp 97.1 °F (36.2 °C)   Resp 18   Ht 5' 6" (1.676 m)   Wt 75 kg (165 lb 5.5 oz)   SpO2 100%   BMI 26.69 kg/m²     PHYSICAL EXAMINATION:    General appearance: Well appearing, in no acute distress, alert and oriented x3.  Psych:  Mood and affect appropriate.  Skin: Skin color, texture, turgor normal, no rashes or lesions, in both upper and lower " body.  Head/face:  Normocephalic, atraumatic. No palpable lymph nodes.  Neck: No pain to palpation over the cervical paraspinous muscles. Spurling Negative. No pain with neck flexion, extension, or lateral flexion.   Cor: RRR  Pulm: CTA  GI:  Non-distended   Back: Tender over right lumbar paraspinal muscles, No facet loading, no pain with forward flexion,+SLR to right  Musculoskeletal: Non-tender PSIS on right, +ANNA  Extremities: Bilateral upper and lower extremity coordination and muscle stretch reflexes are physiologic and symmetric. No loss of sensation is noted.  Gait: normal.    ASSESSMENT: 32 y.o. year old female with LBP and Buttock pain    1. Chronic pain syndrome     2. Lumbar radiculopathy     3. Lumbar postlaminectomy syndrome           PLAN:   - Prior imaging reviewed  - She had significant improvement in pain after recent SIJ injection, however she continues to have some myofacial back pain and some radicular pain in her right posterior leg and foot.  - Unfortunately, due to her pregnancy, we are severely limited in which procedures and medication management we can offer at this time.  - We have discussed limiting/discontinuing NSAIDs and her previous gabapentin, however, she can take Tylenol for pain.  - I have stressed the importance of physical activity and a home exercise plan to help with pain and improve health.  - RTC as needed or if pain symptoms worsen    The above plan and management options were discussed at length with patient. Patient is in agreement with the above and verbalized understanding. It will be communicated with the referring physician via electronic record, fax, or mail.    Amor Emerson  05/05/2022     I spent a total of 30 minutes on the day of the visit.  This includes face to face time and non-face to face time preparing to see the patient by reviewing previous labs/imaging, obtaining and/or reviewing separately obtained history, documenting clinical information in the  electronic or other health record, independently interpreting results and communicating results to the patient/family/caregiver.    I have reviewed and concur with the resident's history, physical, assessment, and plan.  I have personally interviewed and examined the patient at bedside.  See below addendum for my evaluation and additional findings.    Cristopher Simon MD

## 2022-05-17 ENCOUNTER — PATIENT MESSAGE (OUTPATIENT)
Dept: OBSTETRICS AND GYNECOLOGY | Facility: CLINIC | Age: 32
End: 2022-05-17
Payer: COMMERCIAL

## 2022-05-18 ENCOUNTER — PATIENT MESSAGE (OUTPATIENT)
Dept: OBSTETRICS AND GYNECOLOGY | Facility: CLINIC | Age: 32
End: 2022-05-18

## 2022-05-18 ENCOUNTER — INITIAL PRENATAL (OUTPATIENT)
Dept: OBSTETRICS AND GYNECOLOGY | Facility: CLINIC | Age: 32
End: 2022-05-18
Payer: COMMERCIAL

## 2022-05-18 VITALS — SYSTOLIC BLOOD PRESSURE: 118 MMHG | DIASTOLIC BLOOD PRESSURE: 62 MMHG

## 2022-05-18 DIAGNOSIS — N91.2 ABSENT MENSES: Primary | ICD-10-CM

## 2022-05-18 DIAGNOSIS — Z32.01 POSITIVE URINE PREGNANCY TEST: ICD-10-CM

## 2022-05-18 LAB
B-HCG UR QL: POSITIVE
CTP QC/QA: YES

## 2022-05-18 PROCEDURE — 87491 CHLMYD TRACH DNA AMP PROBE: CPT | Performed by: OBSTETRICS & GYNECOLOGY

## 2022-05-18 PROCEDURE — 99999 PR PBB SHADOW E&M-EST. PATIENT-LVL II: ICD-10-PCS | Mod: PBBFAC,,, | Performed by: OBSTETRICS & GYNECOLOGY

## 2022-05-18 PROCEDURE — 99999 PR PBB SHADOW E&M-EST. PATIENT-LVL II: CPT | Mod: PBBFAC,,, | Performed by: OBSTETRICS & GYNECOLOGY

## 2022-05-18 PROCEDURE — 0500F PR INITIAL PRENATAL CARE VISIT: ICD-10-PCS | Mod: CPTII,S$GLB,, | Performed by: OBSTETRICS & GYNECOLOGY

## 2022-05-18 PROCEDURE — 87591 N.GONORRHOEAE DNA AMP PROB: CPT | Performed by: OBSTETRICS & GYNECOLOGY

## 2022-05-18 PROCEDURE — 87086 URINE CULTURE/COLONY COUNT: CPT | Performed by: OBSTETRICS & GYNECOLOGY

## 2022-05-18 PROCEDURE — 88175 CYTOPATH C/V AUTO FLUID REDO: CPT | Performed by: OBSTETRICS & GYNECOLOGY

## 2022-05-18 PROCEDURE — 0500F INITIAL PRENATAL CARE VISIT: CPT | Mod: CPTII,S$GLB,, | Performed by: OBSTETRICS & GYNECOLOGY

## 2022-05-18 PROCEDURE — 84156 ASSAY OF PROTEIN URINE: CPT | Performed by: OBSTETRICS & GYNECOLOGY

## 2022-05-18 RX ORDER — DOXYLAMINE SUCCINATE AND PYRIDOXINE HYDROCHLORIDE, DELAYED RELEASE TABLETS 10 MG/10 MG 10; 10 MG/1; MG/1
TABLET, DELAYED RELEASE ORAL
Qty: 30 TABLET | Refills: 2 | Status: SHIPPED | OUTPATIENT
Start: 2022-05-18 | End: 2022-05-19

## 2022-05-18 RX ORDER — DEXTROAMPHETAMINE SULFATE, DEXTROAMPHETAMINE SACCHARATE, AMPHETAMINE SULFATE AND AMPHETAMINE ASPARTATE 1.25; 1.25; 1.25; 1.25 MG/1; MG/1; MG/1; MG/1
CAPSULE, EXTENDED RELEASE ORAL DAILY
COMMUNITY
Start: 2021-12-01 | End: 2022-07-06

## 2022-05-18 NOTE — PROGRESS NOTES
OBSTETRICS OFFICE NOTE  Reason for visit: Absence of menses    HPI: Pt is a 32 y.o.  female  who presents with complaint of absence of menstruation.  She reports nausea/vomiting. Denies abdominal pain/bleeding.  UPT is positive. Reports previously regular cycles.    Past Medical History:   Diagnosis Date    ADD (attention deficit disorder)     Asthma     Chronic back pain     Migraine headache     Pyelonephritis        Past Surgical History:   Procedure Laterality Date    BACK SURGERY  2013    INJECTION OF JOINT Right 2022    Procedure: INJECTION, JOINT RIGHT SI NEEDS CONSENT;  Surgeon: Cristopher Simon MD;  Location: [facility]IN [facility];  Service: Pain Management;  Laterality: Right;    SPINE SURGERY  14       Family History   Problem Relation Age of Onset    Depression Mother     Anxiety disorder Mother     Ovarian cancer Mother     Alcohol abuse Mother     Cancer Mother     No Known Problems Father     Diabetes Sister     Hypertension Maternal Grandmother     Breast cancer Neg Hx     Colon cancer Neg Hx        Social History     Tobacco Use    Smoking status: Never Smoker    Smokeless tobacco: Never Used   Substance Use Topics    Alcohol use: Yes     Alcohol/week: 3.0 standard drinks     Types: 3 Glasses of wine per week     Comment: 1-3 drinks every 2 weeks    Drug use: No       OB History    Para Term  AB Living   1 0 0 0 0 0   SAB IAB Ectopic Multiple Live Births   0 0 0 0 0      # Outcome Date GA Lbr Luis/2nd Weight Sex Delivery Anes PTL Lv   1 Current                Current Outpatient Medications   Medication Sig    cetirizine (ZYRTEC) 10 MG tablet cetirizine 10 mg tablet    ADDERALL XR 5 mg 24 hr capsule Take by mouth once daily.    doxylamine succinate (UNISOM, DOXYLAMINE,) 25 mg tablet Take 1 tablet (25 mg total) by mouth nightly as needed (nausea). To take along with  vitamin B6 for nausea and vomiting in pregnancy.    pyridoxine, vitamin B6, (B-6) 25 MG Tab Take 1 tablet (25 mg total) by mouth daily as needed (nausea).    rizatriptan (MAXALT-MLT) 10 MG disintegrating tablet Take at onset of migraine, can repeat in 2 hrs if needed.  No more than 2 tabs per day or 3 days/wk. (Patient not taking: Reported on 5/18/2022)     No current facility-administered medications for this visit.       Allergies: Lactose and Gluten protein     /62   LMP 09/11/2021 (Exact Date)     ROS:  GENERAL:Denies fever or chills.   SKIN: Denies rash or lesions.   HEAD: Denies head injury or headache.   CHEST: Denies chest pain or shortness of breath.   CARDIOVASCULAR: Denies palpitations or chest pain.   ABDOMEN: See HPI  URINARY: see HPI.  REPRODUCTIVE: See HPI.   BREASTS: Denies pain, lumps, or nipple discharge.   HEMATOLOGIC: No easy bruisability or excessive bleeding.   MUSCULOSKELETAL: Denies joint pain or swelling.   NEUROLOGIC: Denies syncope or weakness.     Physical Exam:  GENERAL: alert, appears stated age and cooperative  NEUROLOGIC: orientated to person, place and time, normal mood and affect   CHEST: Normal respiratory effort  NECK: normal appearance  SKIN: no acne, hirsutism  BREAST EXAM: breasts appear normal, no suspicious masses, no skin or nipple changes or axillary nodes  ABDOMEN: abdomen is soft without significant tenderness  EXTERNAL GENITALIA:  normal general appearance  URETHRA: normal urethra, normal urethral meatus  VAGINA:  Normal mucosa without tenderness, induration or masses  CERVIX:  Normal  UTERUS:  mobile, non-tender  ADNEXA: nontender    ASSESSMENT and PLAN:    ICD-10-CM ICD-9-CM    1. Absent menses  N91.2 626.0    2. Positive urine pregnancy test  Z32.01 V72.42 US OB/GYN Procedure ([facility])      US OB <14 Wks TransAbd & TransVag, Single Gestation (XPD)      C. trachomatis/N. gonorrhoeae by AMP DNA [facility]; Cervix      CBC Auto Differential      Comprehensive  Metabolic Panel      Hepatitis B Surface Antigen      Hepatitis C Antibody      Hemoglobin Electrophoresis,Hgb A2 Mateusz.      POCT urine pregnancy      Protein/Creatinine Ratio, Urine      RPR      Rubella Antibody, IgG      Type & Screen, Labor & Delivery      Urine culture      HIV 1/2 Ag/Ab (4th Gen)      CANCELED: Liquid-Based Pap Smear, Screening      CANCELED: Liquid-Based Pap Smear, Screening       Plan:   1. +UPT, PNL and dating U/S ordered  Patient was counseled today on routine 1T precautions, including vaginal bleeding and abdominal pain. Aneuploidy screening offered - patient does desire screening.  Weight: We discussed proper weight gain based on the [employer]'s recommendations based on her pre-pregnancy weight-BMI: 25-29.9= 15-25 lbs total and 0.6lb/week in 2nd-3rd trimester. Diet: Avoid raw meat ie sushi, unpasteurized cheese, and heat up deli meat. Eat fish that are high in mercury (horacio mackerel, swordfish, tuna) only 6-12 oz once  a week. Environment: Patient also given environmental precautions such as avoiding cat litter and gardening without gloves. Discussed daily prenatal vitamin with folate/iron options (i.e. stool softener, DHA) and avoidance of smoking. Regular and moderate exercise for 30 min or more per day with the avoidance of activities with a high risk of falling, prolonged supine positions, or abdominal trauma.      Face to Face time with patient: 30 minutes of total time spent on the encounter, which includes face to face time and non-face to face time preparing to see the patient (eg, review of tests), Obtaining and/or reviewing separately obtained history, Documenting clinical information in the electronic or other health record, Independently interpreting results (not separately reported) and communicating results to the patient/family/caregiver, or Care coordination (not separately reported).       Glory Fink MD  OB/GYN

## 2022-05-19 ENCOUNTER — TELEPHONE (OUTPATIENT)
Dept: ADMINISTRATIVE | Facility: OTHER | Age: 32
End: 2022-05-19
Payer: COMMERCIAL

## 2022-05-19 ENCOUNTER — LAB VISIT (OUTPATIENT)
Dept: LAB | Facility: HOSPITAL | Age: 32
End: 2022-05-19
Attending: OBSTETRICS & GYNECOLOGY
Payer: COMMERCIAL

## 2022-05-19 ENCOUNTER — PROCEDURE VISIT (OUTPATIENT)
Dept: OBSTETRICS AND GYNECOLOGY | Facility: CLINIC | Age: 32
End: 2022-05-19
Payer: COMMERCIAL

## 2022-05-19 ENCOUNTER — TELEPHONE (OUTPATIENT)
Dept: PHARMACY | Facility: CLINIC | Age: 32
End: 2022-05-19
Payer: COMMERCIAL

## 2022-05-19 DIAGNOSIS — Z32.01 POSITIVE URINE PREGNANCY TEST: ICD-10-CM

## 2022-05-19 LAB
ABO + RH BLD: NORMAL
ALBUMIN SERPL BCP-MCNC: 3.5 G/DL (ref 3.5–5.2)
ALP SERPL-CCNC: 57 U/L (ref 55–135)
ALT SERPL W/O P-5'-P-CCNC: 14 U/L (ref 10–44)
ANION GAP SERPL CALC-SCNC: 10 MMOL/L (ref 8–16)
AST SERPL-CCNC: 14 U/L (ref 10–40)
BASOPHILS # BLD AUTO: 0.02 K/UL (ref 0–0.2)
BASOPHILS NFR BLD: 0.2 % (ref 0–1.9)
BILIRUB SERPL-MCNC: 0.6 MG/DL (ref 0.1–1)
BLD GP AB SCN CELLS X3 SERPL QL: NORMAL
BUN SERPL-MCNC: 5 MG/DL (ref 6–20)
CALCIUM SERPL-MCNC: 9.1 MG/DL (ref 8.7–10.5)
CHLORIDE SERPL-SCNC: 108 MMOL/L (ref 95–110)
CO2 SERPL-SCNC: 21 MMOL/L (ref 23–29)
CREAT SERPL-MCNC: 0.7 MG/DL (ref 0.5–1.4)
CREAT UR-MCNC: 160 MG/DL (ref 15–325)
DIFFERENTIAL METHOD: ABNORMAL
EOSINOPHIL # BLD AUTO: 0.1 K/UL (ref 0–0.5)
EOSINOPHIL NFR BLD: 0.8 % (ref 0–8)
ERYTHROCYTE [DISTWIDTH] IN BLOOD BY AUTOMATED COUNT: 13.2 % (ref 11.5–14.5)
EST. GFR  (AFRICAN AMERICAN): >60 ML/MIN/1.73 M^2
EST. GFR  (NON AFRICAN AMERICAN): >60 ML/MIN/1.73 M^2
GLUCOSE SERPL-MCNC: 88 MG/DL (ref 70–110)
HCT VFR BLD AUTO: 35 % (ref 37–48.5)
HGB BLD-MCNC: 11.7 G/DL (ref 12–16)
IMM GRANULOCYTES # BLD AUTO: 0.02 K/UL (ref 0–0.04)
IMM GRANULOCYTES NFR BLD AUTO: 0.2 % (ref 0–0.5)
LYMPHOCYTES # BLD AUTO: 1.4 K/UL (ref 1–4.8)
LYMPHOCYTES NFR BLD: 14.9 % (ref 18–48)
MCH RBC QN AUTO: 28.7 PG (ref 27–31)
MCHC RBC AUTO-ENTMCNC: 33.4 G/DL (ref 32–36)
MCV RBC AUTO: 86 FL (ref 82–98)
MONOCYTES # BLD AUTO: 0.7 K/UL (ref 0.3–1)
MONOCYTES NFR BLD: 7.8 % (ref 4–15)
NEUTROPHILS # BLD AUTO: 7 K/UL (ref 1.8–7.7)
NEUTROPHILS NFR BLD: 76.1 % (ref 38–73)
NRBC BLD-RTO: 0 /100 WBC
PLATELET # BLD AUTO: 313 K/UL (ref 150–450)
PMV BLD AUTO: 10.1 FL (ref 9.2–12.9)
POTASSIUM SERPL-SCNC: 4.4 MMOL/L (ref 3.5–5.1)
PROT SERPL-MCNC: 6.9 G/DL (ref 6–8.4)
PROT UR-MCNC: <7 MG/DL (ref 0–15)
PROT/CREAT UR: NORMAL MG/G{CREAT} (ref 0–0.2)
RBC # BLD AUTO: 4.07 M/UL (ref 4–5.4)
SODIUM SERPL-SCNC: 139 MMOL/L (ref 136–145)
WBC # BLD AUTO: 9.15 K/UL (ref 3.9–12.7)

## 2022-05-19 PROCEDURE — 76801 PR US, OB <14WKS, TRANSABD, SINGLE GESTATION: ICD-10-PCS | Mod: S$GLB,,, | Performed by: OBSTETRICS & GYNECOLOGY

## 2022-05-19 PROCEDURE — 85025 COMPLETE CBC W/AUTO DIFF WBC: CPT | Performed by: OBSTETRICS & GYNECOLOGY

## 2022-05-19 PROCEDURE — 80053 COMPREHEN METABOLIC PANEL: CPT | Performed by: OBSTETRICS & GYNECOLOGY

## 2022-05-19 PROCEDURE — 86762 RUBELLA ANTIBODY: CPT | Performed by: OBSTETRICS & GYNECOLOGY

## 2022-05-19 PROCEDURE — 87389 HIV-1 AG W/HIV-1&-2 AB AG IA: CPT | Performed by: OBSTETRICS & GYNECOLOGY

## 2022-05-19 PROCEDURE — 87340 HEPATITIS B SURFACE AG IA: CPT | Performed by: OBSTETRICS & GYNECOLOGY

## 2022-05-19 PROCEDURE — 83020 HEMOGLOBIN ELECTROPHORESIS: CPT | Performed by: OBSTETRICS & GYNECOLOGY

## 2022-05-19 PROCEDURE — 76801 OB US < 14 WKS SINGLE FETUS: CPT | Mod: S$GLB,,, | Performed by: OBSTETRICS & GYNECOLOGY

## 2022-05-19 PROCEDURE — 86592 SYPHILIS TEST NON-TREP QUAL: CPT | Performed by: OBSTETRICS & GYNECOLOGY

## 2022-05-19 PROCEDURE — 86901 BLOOD TYPING SEROLOGIC RH(D): CPT | Performed by: OBSTETRICS & GYNECOLOGY

## 2022-05-19 PROCEDURE — 86803 HEPATITIS C AB TEST: CPT | Performed by: OBSTETRICS & GYNECOLOGY

## 2022-05-19 RX ORDER — DOXYLAMINE SUCCINATE 25 MG/1
25 TABLET ORAL NIGHTLY PRN
Qty: 30 TABLET | Refills: 0 | Status: SHIPPED | OUTPATIENT
Start: 2022-05-19 | End: 2022-06-07 | Stop reason: SDUPTHER

## 2022-05-19 RX ORDER — PYRIDOXINE HCL (VITAMIN B6) 25 MG
25 TABLET ORAL DAILY PRN
Qty: 30 TABLET | Refills: 0 | Status: SHIPPED | OUTPATIENT
Start: 2022-05-19 | End: 2022-06-07 | Stop reason: SDUPTHER

## 2022-05-19 NOTE — TELEPHONE ENCOUNTER
Called to inform pt of pharmacy's response. Willing to trial vit B6 and doxylamine separately - rx sent    Glory Fink MD, FACOG  OB/GYN

## 2022-05-20 LAB
BACTERIA UR CULT: NO GROWTH
HGB A2 MFR BLD HPLC: 2.5 % (ref 2.2–3.2)
HGB FRACT BLD ELPH-IMP: NORMAL
HGB FRACT BLD ELPH-IMP: NORMAL
RPR SER QL: NORMAL
RUBV IGG SER-ACNC: 35.1 IU/ML
RUBV IGG SER-IMP: REACTIVE

## 2022-05-21 LAB
C TRACH DNA SPEC QL NAA+PROBE: NOT DETECTED
N GONORRHOEA DNA SPEC QL NAA+PROBE: NOT DETECTED

## 2022-05-23 LAB
HBV SURFACE AG SERPL QL IA: NEGATIVE
HCV AB SERPL QL IA: NEGATIVE
HIV 1+2 AB+HIV1 P24 AG SERPL QL IA: NEGATIVE

## 2022-06-07 ENCOUNTER — ROUTINE PRENATAL (OUTPATIENT)
Dept: OBSTETRICS AND GYNECOLOGY | Facility: CLINIC | Age: 32
End: 2022-06-07
Payer: COMMERCIAL

## 2022-06-07 VITALS — DIASTOLIC BLOOD PRESSURE: 75 MMHG | SYSTOLIC BLOOD PRESSURE: 123 MMHG | WEIGHT: 236 LBS | BODY MASS INDEX: 38.09 KG/M2

## 2022-06-07 DIAGNOSIS — Z34.01 ENCOUNTER FOR SUPERVISION OF NORMAL FIRST PREGNANCY IN FIRST TRIMESTER: Primary | ICD-10-CM

## 2022-06-07 PROCEDURE — 0502F SUBSEQUENT PRENATAL CARE: CPT | Mod: CPTII,S$GLB,, | Performed by: OBSTETRICS & GYNECOLOGY

## 2022-06-07 PROCEDURE — 99999 PR PBB SHADOW E&M-EST. PATIENT-LVL II: ICD-10-PCS | Mod: PBBFAC,,, | Performed by: OBSTETRICS & GYNECOLOGY

## 2022-06-07 PROCEDURE — 99999 PR PBB SHADOW E&M-EST. PATIENT-LVL II: CPT | Mod: PBBFAC,,, | Performed by: OBSTETRICS & GYNECOLOGY

## 2022-06-07 PROCEDURE — 0502F PR SUBSEQUENT PRENATAL CARE: ICD-10-PCS | Mod: CPTII,S$GLB,, | Performed by: OBSTETRICS & GYNECOLOGY

## 2022-06-07 RX ORDER — PYRIDOXINE HCL (VITAMIN B6) 25 MG
25 TABLET ORAL DAILY PRN
Qty: 30 TABLET | Refills: 0 | Status: ON HOLD | OUTPATIENT
Start: 2022-06-07 | End: 2022-12-07

## 2022-06-07 RX ORDER — FAMOTIDINE 20 MG/1
20 TABLET, FILM COATED ORAL 2 TIMES DAILY
Qty: 60 TABLET | Refills: 1 | Status: ON HOLD | OUTPATIENT
Start: 2022-06-07 | End: 2022-12-07

## 2022-06-07 RX ORDER — DOXYLAMINE SUCCINATE 25 MG/1
25 TABLET ORAL NIGHTLY PRN
Qty: 30 TABLET | Refills: 5 | Status: ON HOLD | OUTPATIENT
Start: 2022-06-07 | End: 2022-12-07

## 2022-06-07 NOTE — PROGRESS NOTES
Patient without complaint of VB, abdominal pain, vaginal discharge.  nausea and vomiting.. Labs and ultrasound reviewed. Mt21 order form given. Rx pepcid. rtc in 4 weeks.

## 2022-06-08 ENCOUNTER — PATIENT MESSAGE (OUTPATIENT)
Dept: ADMINISTRATIVE | Facility: OTHER | Age: 32
End: 2022-06-08
Payer: COMMERCIAL

## 2022-07-05 ENCOUNTER — TELEPHONE (OUTPATIENT)
Dept: OBSTETRICS AND GYNECOLOGY | Facility: CLINIC | Age: 32
End: 2022-07-05
Payer: COMMERCIAL

## 2022-07-05 ENCOUNTER — PATIENT MESSAGE (OUTPATIENT)
Dept: OBSTETRICS AND GYNECOLOGY | Facility: CLINIC | Age: 32
End: 2022-07-05
Payer: COMMERCIAL

## 2022-07-05 NOTE — TELEPHONE ENCOUNTER
----- Message from Liya Hampton sent at 7/5/2022 11:00 AM CDT -----  Contact: [phone]  Who Called: PT  Regarding: pt would like to reschedule appt for later today   Would the patient rather a call back or a response via [facility]? Call back  Best Call Back Number: [phone]  Additional Information: n/a

## 2022-07-06 ENCOUNTER — ROUTINE PRENATAL (OUTPATIENT)
Dept: OBSTETRICS AND GYNECOLOGY | Facility: CLINIC | Age: 32
End: 2022-07-06
Payer: COMMERCIAL

## 2022-07-06 VITALS
WEIGHT: 236.75 LBS | SYSTOLIC BLOOD PRESSURE: 114 MMHG | BODY MASS INDEX: 38.22 KG/M2 | DIASTOLIC BLOOD PRESSURE: 66 MMHG

## 2022-07-06 DIAGNOSIS — Z34.01 ENCOUNTER FOR SUPERVISION OF NORMAL FIRST PREGNANCY IN FIRST TRIMESTER: Primary | ICD-10-CM

## 2022-07-06 PROCEDURE — 0502F SUBSEQUENT PRENATAL CARE: CPT | Mod: CPTII,S$GLB,, | Performed by: OBSTETRICS & GYNECOLOGY

## 2022-07-06 PROCEDURE — 0502F PR SUBSEQUENT PRENATAL CARE: ICD-10-PCS | Mod: CPTII,S$GLB,, | Performed by: OBSTETRICS & GYNECOLOGY

## 2022-07-06 PROCEDURE — 99999 PR PBB SHADOW E&M-EST. PATIENT-LVL III: CPT | Mod: PBBFAC,,, | Performed by: OBSTETRICS & GYNECOLOGY

## 2022-07-06 PROCEDURE — 99999 PR PBB SHADOW E&M-EST. PATIENT-LVL III: ICD-10-PCS | Mod: PBBFAC,,, | Performed by: OBSTETRICS & GYNECOLOGY

## 2022-07-06 RX ORDER — NAPROXEN SODIUM 220 MG/1
81 TABLET, FILM COATED ORAL DAILY
Qty: 150 TABLET | Refills: 0 | Status: ON HOLD | OUTPATIENT
Start: 2022-07-06 | End: 2022-12-07

## 2022-07-06 NOTE — PROGRESS NOTES
Denies issues with abdominal pain or vaginal bleeding. Asa rx sent. Awaiting MT21 results. RTC in 4 weeks with anatomy. Ob glucose/cbc ordered. All questions answered

## 2022-07-11 ENCOUNTER — OFFICE VISIT (OUTPATIENT)
Dept: URGENT CARE | Facility: CLINIC | Age: 32
End: 2022-07-11
Payer: COMMERCIAL

## 2022-07-11 VITALS
HEIGHT: 66 IN | WEIGHT: 230 LBS | TEMPERATURE: 98 F | BODY MASS INDEX: 36.96 KG/M2 | HEART RATE: 94 BPM | OXYGEN SATURATION: 96 % | DIASTOLIC BLOOD PRESSURE: 86 MMHG | SYSTOLIC BLOOD PRESSURE: 129 MMHG | RESPIRATION RATE: 18 BRPM

## 2022-07-11 DIAGNOSIS — R68.83 CHILLS: ICD-10-CM

## 2022-07-11 DIAGNOSIS — R52 BODY ACHES: ICD-10-CM

## 2022-07-11 DIAGNOSIS — R05.9 COUGH: Primary | ICD-10-CM

## 2022-07-11 LAB
CTP QC/QA: YES
CTP QC/QA: YES
POC MOLECULAR INFLUENZA A AGN: NEGATIVE
POC MOLECULAR INFLUENZA B AGN: NEGATIVE
SARS-COV-2 RDRP RESP QL NAA+PROBE: NEGATIVE

## 2022-07-11 PROCEDURE — 99214 OFFICE O/P EST MOD 30 MIN: CPT | Mod: S$GLB,,, | Performed by: NURSE PRACTITIONER

## 2022-07-11 PROCEDURE — 3079F DIAST BP 80-89 MM HG: CPT | Mod: CPTII,S$GLB,, | Performed by: NURSE PRACTITIONER

## 2022-07-11 PROCEDURE — 3079F PR MOST RECENT DIASTOLIC BLOOD PRESSURE 80-89 MM HG: ICD-10-PCS | Mod: CPTII,S$GLB,, | Performed by: NURSE PRACTITIONER

## 2022-07-11 PROCEDURE — 99214 PR OFFICE/OUTPT VISIT, EST, LEVL IV, 30-39 MIN: ICD-10-PCS | Mod: S$GLB,,, | Performed by: NURSE PRACTITIONER

## 2022-07-11 PROCEDURE — U0002: ICD-10-PCS | Mod: QW,S$GLB,, | Performed by: NURSE PRACTITIONER

## 2022-07-11 PROCEDURE — U0002 COVID-19 LAB TEST NON-CDC: HCPCS | Mod: QW,S$GLB,, | Performed by: NURSE PRACTITIONER

## 2022-07-11 PROCEDURE — 87502 INFLUENZA DNA AMP PROBE: CPT | Mod: QW,S$GLB,, | Performed by: NURSE PRACTITIONER

## 2022-07-11 PROCEDURE — 3074F SYST BP LT 130 MM HG: CPT | Mod: CPTII,S$GLB,, | Performed by: NURSE PRACTITIONER

## 2022-07-11 PROCEDURE — 87502 POCT INFLUENZA A/B MOLECULAR: ICD-10-PCS | Mod: QW,S$GLB,, | Performed by: NURSE PRACTITIONER

## 2022-07-11 PROCEDURE — 3008F PR BODY MASS INDEX (BMI) DOCUMENTED: ICD-10-PCS | Mod: CPTII,S$GLB,, | Performed by: NURSE PRACTITIONER

## 2022-07-11 PROCEDURE — 3074F PR MOST RECENT SYSTOLIC BLOOD PRESSURE < 130 MM HG: ICD-10-PCS | Mod: CPTII,S$GLB,, | Performed by: NURSE PRACTITIONER

## 2022-07-11 PROCEDURE — 1159F MED LIST DOCD IN RCRD: CPT | Mod: CPTII,S$GLB,, | Performed by: NURSE PRACTITIONER

## 2022-07-11 PROCEDURE — 1159F PR MEDICATION LIST DOCUMENTED IN MEDICAL RECORD: ICD-10-PCS | Mod: CPTII,S$GLB,, | Performed by: NURSE PRACTITIONER

## 2022-07-11 PROCEDURE — 3008F BODY MASS INDEX DOCD: CPT | Mod: CPTII,S$GLB,, | Performed by: NURSE PRACTITIONER

## 2022-07-11 NOTE — PROGRESS NOTES
"Subjective:       Patient ID: Carlos Camarena is a 32 y.o. female.    Vitals:  height is 5' 6" (1.676 m) and weight is 104.3 kg (230 lb). Her temperature is 97.9 °F (36.6 °C). Her blood pressure is 129/86 and her pulse is 94. Her respiration is 18 and oxygen saturation is 96%.     Chief Complaint: Cough    Pt complains of x7 days of cough, post nasal drip, nasal congestion, chest pain from cough, bodyache, headache. Took sudafed with no relief, tylenol cold and flu with no relief, robitussin with no relief.     PROVIDER NOTE:     33 y/o pregnant female presents to  for evaluation of cough, post nasal drainage, congestion, body aches, and headache for approx one week. She denies sick contacts or Covid exposure. She has been taking medications from list given by  OB that are safe during pregnancy.     Cough  This is a new problem. The current episode started in the past 7 days. The problem has been unchanged. The cough is non-productive. Associated symptoms include chills, headaches, nasal congestion, postnasal drip and a sore throat. Pertinent negatives include no chest pain, ear congestion, ear pain, fever, heartburn, hemoptysis, myalgias, rash, rhinorrhea, shortness of breath, sweats, weight loss or wheezing. She has tried OTC cough suppressant for the symptoms. The treatment provided no relief.       Constitution: Positive for chills. Negative for fever.   HENT: Positive for postnasal drip and sore throat. Negative for ear pain.    Cardiovascular: Negative for chest pain.   Respiratory: Positive for cough. Negative for bloody sputum, shortness of breath and wheezing.    Gastrointestinal: Negative for heartburn.   Musculoskeletal: Negative for muscle ache.   Skin: Negative for rash.   Neurological: Positive for headaches.       Objective:      Physical Exam   Constitutional:  Non-toxic appearance. She does not appear ill. No distress.   HENT:   Head: Normocephalic and atraumatic.   Ears:   Right Ear: Tympanic " membrane, external ear and ear canal normal.   Left Ear: Tympanic membrane, external ear and ear canal normal.   Nose: Rhinorrhea present.   Mouth/Throat: Mucous membranes are moist. Oropharynx is clear.   Eyes: Extraocular movement intact   Pulmonary/Chest: Effort normal and breath sounds normal. No stridor. No respiratory distress. She has no wheezes. She has no rhonchi. She has no rales. She exhibits no tenderness.         Comments: No acute respiratory distress  Able to speak in full sentences without difficulty or pause.    Abdominal: Normal appearance.   Neurological: no focal deficit. She is alert.   Skin: Skin is not diaphoretic.   Nursing note and vitals reviewed.        Results for orders placed or performed in visit on 07/11/22   POCT COVID-19 Rapid Screening   Result Value Ref Range    POC Rapid COVID Negative Negative     Acceptable Yes    POCT Influenza A/B MOLECULAR   Result Value Ref Range    POC Molecular Influenza A Ag Negative Negative, Not Reported    POC Molecular Influenza B Ag Negative Negative, Not Reported     Acceptable Yes      Assessment:       1. Cough    2. Chills    3. Body aches          Plan:       Labs ordered and reviewed at visit  Continue symptomatic treatment that is safe in pregnancy.   Likely viral URI, no indication or suspicion for bacterial infection at this time  Advised to follow up as needed. Pt verbalized understanding.  Cough  -     POCT COVID-19 Rapid Screening  -     POCT Influenza A/B MOLECULAR    Chills  -     POCT Influenza A/B MOLECULAR    Body aches  -     POCT Influenza A/B MOLECULAR

## 2022-07-21 ENCOUNTER — PATIENT MESSAGE (OUTPATIENT)
Dept: OBSTETRICS AND GYNECOLOGY | Facility: CLINIC | Age: 32
End: 2022-07-21
Payer: COMMERCIAL

## 2022-08-03 ENCOUNTER — TELEPHONE (OUTPATIENT)
Dept: OBSTETRICS AND GYNECOLOGY | Facility: CLINIC | Age: 32
End: 2022-08-03
Payer: COMMERCIAL

## 2022-08-09 ENCOUNTER — PROCEDURE VISIT (OUTPATIENT)
Dept: OBSTETRICS AND GYNECOLOGY | Facility: CLINIC | Age: 32
End: 2022-08-09
Payer: COMMERCIAL

## 2022-08-09 ENCOUNTER — ROUTINE PRENATAL (OUTPATIENT)
Dept: OBSTETRICS AND GYNECOLOGY | Facility: CLINIC | Age: 32
End: 2022-08-09
Payer: COMMERCIAL

## 2022-08-09 VITALS
DIASTOLIC BLOOD PRESSURE: 66 MMHG | BODY MASS INDEX: 38.13 KG/M2 | SYSTOLIC BLOOD PRESSURE: 110 MMHG | WEIGHT: 236.25 LBS

## 2022-08-09 DIAGNOSIS — Z3A.19 19 WEEKS GESTATION OF PREGNANCY: ICD-10-CM

## 2022-08-09 DIAGNOSIS — Z36.4 ANTENATAL SCREENING FOR FETAL GROWTH RETARDATION USING ULTRASONICS: ICD-10-CM

## 2022-08-09 DIAGNOSIS — Z34.02 ENCOUNTER FOR SUPERVISION OF NORMAL FIRST PREGNANCY IN SECOND TRIMESTER: Primary | ICD-10-CM

## 2022-08-09 DIAGNOSIS — Z32.01 POSITIVE URINE PREGNANCY TEST: ICD-10-CM

## 2022-08-09 DIAGNOSIS — Z36.3 ANTENATAL SCREENING FOR MALFORMATION USING ULTRASONICS: ICD-10-CM

## 2022-08-09 DIAGNOSIS — O99.210 OBESITY IN PREGNANCY: ICD-10-CM

## 2022-08-09 PROCEDURE — 99999 PR PBB SHADOW E&M-EST. PATIENT-LVL III: ICD-10-PCS | Mod: PBBFAC,,, | Performed by: OBSTETRICS & GYNECOLOGY

## 2022-08-09 PROCEDURE — 99999 PR PBB SHADOW E&M-EST. PATIENT-LVL III: CPT | Mod: PBBFAC,,, | Performed by: OBSTETRICS & GYNECOLOGY

## 2022-08-09 PROCEDURE — 0502F SUBSEQUENT PRENATAL CARE: CPT | Mod: CPTII,S$GLB,, | Performed by: OBSTETRICS & GYNECOLOGY

## 2022-08-09 PROCEDURE — 76811 PR US, OB FETAL EVAL & EXAM, TRANSABDOM,FIRST GESTATION: ICD-10-PCS | Mod: S$GLB,,, | Performed by: OBSTETRICS & GYNECOLOGY

## 2022-08-09 PROCEDURE — 0502F PR SUBSEQUENT PRENATAL CARE: ICD-10-PCS | Mod: CPTII,S$GLB,, | Performed by: OBSTETRICS & GYNECOLOGY

## 2022-08-09 PROCEDURE — 76811 OB US DETAILED SNGL FETUS: CPT | Mod: S$GLB,,, | Performed by: OBSTETRICS & GYNECOLOGY

## 2022-08-09 NOTE — PROGRESS NOTES
Patient reports normal fetal movement. Denies vaginal bleeding, regular contractions or leakage of fluid. US today with suboptimal views. Consents signed. Ob glucose screen/cbc ordered. Discussed normal MT21 results- female. RTC in 4 weeks with repeat u/S

## 2022-08-22 ENCOUNTER — PATIENT MESSAGE (OUTPATIENT)
Dept: OBSTETRICS AND GYNECOLOGY | Facility: CLINIC | Age: 32
End: 2022-08-22
Payer: COMMERCIAL

## 2022-08-22 ENCOUNTER — TELEPHONE (OUTPATIENT)
Dept: OBSTETRICS AND GYNECOLOGY | Facility: CLINIC | Age: 32
End: 2022-08-22
Payer: COMMERCIAL

## 2022-08-22 ENCOUNTER — HOSPITAL ENCOUNTER (EMERGENCY)
Facility: OTHER | Age: 32
Discharge: HOME | End: 2022-08-23
Attending: EMERGENCY MEDICINE
Payer: COMMERCIAL

## 2022-08-22 DIAGNOSIS — R06.02 SHORTNESS OF BREATH: ICD-10-CM

## 2022-08-22 DIAGNOSIS — R00.0 TACHYCARDIA: Primary | ICD-10-CM

## 2022-08-22 DIAGNOSIS — R06.02 SOB (SHORTNESS OF BREATH): ICD-10-CM

## 2022-08-22 LAB
ALBUMIN SERPL BCP-MCNC: 2.9 G/DL (ref 3.5–5.2)
ALP SERPL-CCNC: 60 U/L (ref 55–135)
ALT SERPL W/O P-5'-P-CCNC: 24 U/L (ref 10–44)
ANION GAP SERPL CALC-SCNC: 9 MMOL/L (ref 8–16)
AST SERPL-CCNC: 16 U/L (ref 10–40)
BASOPHILS # BLD AUTO: 0.01 K/UL (ref 0–0.2)
BASOPHILS NFR BLD: 0.1 % (ref 0–1.9)
BILIRUB SERPL-MCNC: 0.3 MG/DL (ref 0.1–1)
BILIRUB UR QL STRIP: NEGATIVE
BNP SERPL-MCNC: <10 PG/ML (ref 0–99)
BUN SERPL-MCNC: 3 MG/DL (ref 6–20)
CALCIUM SERPL-MCNC: 8.9 MG/DL (ref 8.7–10.5)
CHLORIDE SERPL-SCNC: 107 MMOL/L (ref 95–110)
CLARITY UR: CLEAR
CO2 SERPL-SCNC: 21 MMOL/L (ref 23–29)
COLOR UR: YELLOW
CREAT SERPL-MCNC: 0.6 MG/DL (ref 0.5–1.4)
CTP QC/QA: YES
D DIMER PPP IA.FEU-MCNC: 1.8 MG/L FEU
DIFFERENTIAL METHOD: ABNORMAL
EOSINOPHIL # BLD AUTO: 0.2 K/UL (ref 0–0.5)
EOSINOPHIL NFR BLD: 1.3 % (ref 0–8)
ERYTHROCYTE [DISTWIDTH] IN BLOOD BY AUTOMATED COUNT: 14.4 % (ref 11.5–14.5)
EST. GFR  (NO RACE VARIABLE): >60 ML/MIN/1.73 M^2
GLUCOSE SERPL-MCNC: 93 MG/DL (ref 70–110)
GLUCOSE UR QL STRIP: NEGATIVE
HCT VFR BLD AUTO: 31.8 % (ref 37–48.5)
HGB BLD-MCNC: 10.8 G/DL (ref 12–16)
HGB UR QL STRIP: NEGATIVE
IMM GRANULOCYTES # BLD AUTO: 0.03 K/UL (ref 0–0.04)
IMM GRANULOCYTES NFR BLD AUTO: 0.3 % (ref 0–0.5)
KETONES UR QL STRIP: NEGATIVE
LEUKOCYTE ESTERASE UR QL STRIP: NEGATIVE
LYMPHOCYTES # BLD AUTO: 1.5 K/UL (ref 1–4.8)
LYMPHOCYTES NFR BLD: 12.9 % (ref 18–48)
MAGNESIUM SERPL-MCNC: 1.7 MG/DL (ref 1.6–2.6)
MCH RBC QN AUTO: 29 PG (ref 27–31)
MCHC RBC AUTO-ENTMCNC: 34 G/DL (ref 32–36)
MCV RBC AUTO: 86 FL (ref 82–98)
MONOCYTES # BLD AUTO: 0.8 K/UL (ref 0.3–1)
MONOCYTES NFR BLD: 6.3 % (ref 4–15)
NEUTROPHILS # BLD AUTO: 9.5 K/UL (ref 1.8–7.7)
NEUTROPHILS NFR BLD: 79.1 % (ref 38–73)
NITRITE UR QL STRIP: NEGATIVE
NRBC BLD-RTO: 0 /100 WBC
PH UR STRIP: 7 [PH] (ref 5–8)
PLATELET # BLD AUTO: 301 K/UL (ref 150–450)
PMV BLD AUTO: 10 FL (ref 9.2–12.9)
POTASSIUM SERPL-SCNC: 3.7 MMOL/L (ref 3.5–5.1)
PROT SERPL-MCNC: 6.5 G/DL (ref 6–8.4)
PROT UR QL STRIP: NEGATIVE
RBC # BLD AUTO: 3.72 M/UL (ref 4–5.4)
SARS-COV-2 RDRP RESP QL NAA+PROBE: NEGATIVE
SODIUM SERPL-SCNC: 137 MMOL/L (ref 136–145)
SP GR UR STRIP: 1.01 (ref 1–1.03)
TROPONIN I SERPL DL<=0.01 NG/ML-MCNC: <0.006 NG/ML (ref 0–0.03)
URN SPEC COLLECT METH UR: NORMAL
UROBILINOGEN UR STRIP-ACNC: NEGATIVE EU/DL
WBC # BLD AUTO: 11.94 K/UL (ref 3.9–12.7)

## 2022-08-22 PROCEDURE — 81003 URINALYSIS AUTO W/O SCOPE: CPT | Performed by: EMERGENCY MEDICINE

## 2022-08-22 PROCEDURE — 63600175 PHARM REV CODE 636 W HCPCS: Performed by: EMERGENCY MEDICINE

## 2022-08-22 PROCEDURE — 25500020 PHARM REV CODE 255: Performed by: EMERGENCY MEDICINE

## 2022-08-22 PROCEDURE — 83880 ASSAY OF NATRIURETIC PEPTIDE: CPT | Performed by: EMERGENCY MEDICINE

## 2022-08-22 PROCEDURE — 93010 EKG 12-LEAD: ICD-10-PCS | Mod: ,,, | Performed by: INTERNAL MEDICINE

## 2022-08-22 PROCEDURE — 93010 ELECTROCARDIOGRAM REPORT: CPT | Mod: ,,, | Performed by: INTERNAL MEDICINE

## 2022-08-22 PROCEDURE — 93005 ELECTROCARDIOGRAM TRACING: CPT

## 2022-08-22 PROCEDURE — 96360 HYDRATION IV INFUSION INIT: CPT | Mod: 59

## 2022-08-22 PROCEDURE — 85025 COMPLETE CBC W/AUTO DIFF WBC: CPT | Performed by: EMERGENCY MEDICINE

## 2022-08-22 PROCEDURE — U0002 COVID-19 LAB TEST NON-CDC: HCPCS | Performed by: EMERGENCY MEDICINE

## 2022-08-22 PROCEDURE — 80053 COMPREHEN METABOLIC PANEL: CPT | Performed by: EMERGENCY MEDICINE

## 2022-08-22 PROCEDURE — 99285 EMERGENCY DEPT VISIT HI MDM: CPT | Mod: 25

## 2022-08-22 PROCEDURE — 83735 ASSAY OF MAGNESIUM: CPT | Performed by: EMERGENCY MEDICINE

## 2022-08-22 PROCEDURE — 84484 ASSAY OF TROPONIN QUANT: CPT | Performed by: EMERGENCY MEDICINE

## 2022-08-22 PROCEDURE — 85379 FIBRIN DEGRADATION QUANT: CPT | Performed by: EMERGENCY MEDICINE

## 2022-08-22 RX ADMIN — IOHEXOL 100 ML: 350 INJECTION, SOLUTION INTRAVENOUS at 10:08

## 2022-08-22 RX ADMIN — SODIUM CHLORIDE, SODIUM LACTATE, POTASSIUM CHLORIDE, AND CALCIUM CHLORIDE 1000 ML: .6; .31; .03; .02 INJECTION, SOLUTION INTRAVENOUS at 08:08

## 2022-08-22 NOTE — TELEPHONE ENCOUNTER
----- Message from Lorraine Griffith sent at 8/22/2022  2:30 PM CDT -----  Regarding: advice /  Contact: [phone]  Type:  Needs Medical Advice    Who Called:  self   Symptoms (please be specific):  exposed and symptoms of covid. Painful cough and short of breath when moving, body aches and severe headache   How long has patient had these symptoms:   Friday   Pharmacy name and phone #:    [facility] #[id number] - [facility] [address];  Would the patient rather a call back or a response via [facility]?  call  Best Call Back Number:  [phone]  Additional Information:

## 2022-08-22 NOTE — Clinical Note
"Carlos"Shawn Camarena was seen and treated in our emergency department on 8/22/2022.  She may return to work on 08/26/2022.       If you have any questions or concerns, please don't hesitate to call.      Vasquez Pinto MD"

## 2022-08-23 ENCOUNTER — TELEPHONE (OUTPATIENT)
Dept: OBSTETRICS AND GYNECOLOGY | Facility: HOSPITAL | Age: 32
End: 2022-08-23
Payer: COMMERCIAL

## 2022-08-23 ENCOUNTER — PATIENT MESSAGE (OUTPATIENT)
Dept: OBSTETRICS AND GYNECOLOGY | Facility: CLINIC | Age: 32
End: 2022-08-23
Payer: COMMERCIAL

## 2022-08-23 VITALS
WEIGHT: 235 LBS | TEMPERATURE: 99 F | HEART RATE: 92 BPM | DIASTOLIC BLOOD PRESSURE: 66 MMHG | SYSTOLIC BLOOD PRESSURE: 132 MMHG | RESPIRATION RATE: 17 BRPM | HEIGHT: 66 IN | BODY MASS INDEX: 37.77 KG/M2 | OXYGEN SATURATION: 99 %

## 2022-08-23 RX ORDER — DIPHENHYDRAMINE HCL 25 MG
25 CAPSULE ORAL EVERY 6 HOURS PRN
Qty: 20 CAPSULE | Refills: 0 | Status: ON HOLD | OUTPATIENT
Start: 2022-08-23 | End: 2022-12-07

## 2022-08-23 NOTE — ED NOTES
Pt resting comfortably in stretcher, VSS NAD noted. Side rails up X 2, call light is within reach. Toileting offered. Denies any needs at this time. Will continue to monitor.

## 2022-08-23 NOTE — ED NOTES
Pt resting comfortably in stretcher, VSS NAD noted. Side rails up X 2, call light is within reach. Family member at bedside. Denies any needs at this time. Will continue to monitor.

## 2022-08-23 NOTE — TELEPHONE ENCOUNTER
Just tried calling patient this AM, no answer. I left voicemail. Seems like she went to the ED overnight.     Glory Fink MD, FACOG  OB/GYN

## 2022-08-23 NOTE — ED PROVIDER NOTES
Encounter Date: 8/22/2022       History     Chief Complaint   Patient presents with    Chest Pain     Pt c/o chest tightness, and SOB, pt stats multiple neg covid test at home. +SOB on exertion, pt speaking in short phrases, aprrox 22wks pregnant      Very pleasant 32-year-old female who presents for evaluation of shortness of breath over last several days.  She is currently 22 weeks pregnant with her 1st gestation, she called her primary obstetrician who encouraged her to be evaluated in the emergency department.  She notes that 4 days prior she did have an exposure to somebody who tested positive for coronavirus, she developed nasal congestion cough and some shortness of breath the evening thereafter. She has been taking pseudophed to try and help with it with only minor improvement in her symptoms. She has never had anything like this in the past, nothing makes it much better, exertion makes it worse.         Review of patient's allergies indicates:   Allergen Reactions    Lactose Diarrhea    Gluten protein Itching     Inflammation, bloating, diarrhea and pain all over body       Past Medical History:   Diagnosis Date    ADD (attention deficit disorder)     Asthma     Chronic back pain     Migraine headache     Pyelonephritis      Past Surgical History:   Procedure Laterality Date    BACK SURGERY  2013    INJECTION OF JOINT Right 04/14/2022    Procedure: INJECTION, JOINT RIGHT SI NEEDS CONSENT;  Surgeon: Cristopher Simon MD;  Location: [facility] PAIN T;  Service: Pain Management;  Laterality: Right;    SPINE SURGERY  12/01/14     Family History   Problem Relation Age of Onset    Depression Mother     Anxiety disorder Mother     Ovarian cancer Mother     Alcohol abuse Mother     Cancer Mother     No Known Problems Father     Diabetes Sister     Hypertension Maternal Grandmother     Breast cancer Neg Hx     Colon cancer Neg Hx      Social History     Tobacco Use    Smoking status: Never Smoker     Smokeless tobacco: Never Used   Substance Use Topics    Alcohol use: Yes     Alcohol/week: 3.0 standard drinks     Types: 3 Glasses of wine per week     Comment: 1-3 drinks every 2 weeks    Drug use: No     Review of Systems  Constitutional-no fever  HEENT-no congestion  Eyes-no redness  Respiratory-+ shortness of breath  Cardio-no chest pain  GI-no abdominal pain  Endocrine-no cold intolerance  -no difficulty urinating  MSK-no myalgias  Skin-no rashes  Allergy-no environmental allergy  Neurologic-, no headache  Hematology-no swollen nodes  Behavioral-no confusion  Physical Exam     Initial Vitals [08/22/22 1821]   BP Pulse Resp Temp SpO2   136/87 (!) 134 (!) 24 98.9 °F (37.2 °C) 100 %      MAP       --         Physical Exam  Constitutional: 31 yo woman well appearing in moderate distress  Eyes: Conjunctivae normal.  ENT       Head: Normocephalic, atraumatic.       Nose: Normal external appearance        Mouth/Throat: no strigulous respirations   Hematological/Lymphatic/Immunilogical: no visible lymphadenopathy   Cardiovascular: rapid rate,   Respiratory: no wheezes, no rhonci  Gastrointestinal: obviously gravid abdomen, soft no rebound no guarding  Musculoskeletal: Normal range of motion in all extremities. No obvious deformities or swelling.  Neurologic: Alert, oriented. Normal speech and language. No gross focal neurologic deficits are appreciated.  Skin: Skin is warm, dry. No rash noted.  Psychiatric: Mood and affect are normal.   ED Course   Procedures  Labs Reviewed   CBC W/ AUTO DIFFERENTIAL - Abnormal; Notable for the following components:       Result Value    RBC 3.72 (*)     Hemoglobin 10.8 (*)     Hematocrit 31.8 (*)     Gran # (ANC) 9.5 (*)     Gran % 79.1 (*)     Lymph % 12.9 (*)     All other components within normal limits   D DIMER, QUANTITATIVE - Abnormal; Notable for the following components:    D-Dimer 1.80 (*)     All other components within normal limits   COMPREHENSIVE METABOLIC PANEL  - Abnormal; Notable for the following components:    CO2 21 (*)     BUN 3 (*)     Albumin 2.9 (*)     All other components within normal limits   TROPONIN I   B-TYPE NATRIURETIC PEPTIDE   URINALYSIS, REFLEX TO URINE CULTURE    Narrative:     Specimen Source->Urine   MAGNESIUM   SARS-COV-2 RDRP GENE        ECG Results          EKG 12-lead (Preliminary result)  Result time 08/22/22 18:46:48    ED Interpretation by Vasquez Pinto MD (08/22/22 18:46:48, [facility] Emergency [facility], Emergency Medicine)    My EKG interpretation, sinus tachycardia, 104 beats per minute, normal axis, no ST segment changes, interpretations slightly limited secondary to artifact, no previous EKG for comparison                            Imaging Results          CTA Chest Non-Coronary (PE Study) (Final result)  Result time 08/22/22 23:27:38    Final result by Jey Shin MD (08/22/22 23:27:38)                 Impression:      1.  Technically limited study as discussed above. No evidence of central pulmonary embolus or convincing evidence of pulmonary thromboembolism to the proximal segmental levels or pulmonary infarction. Pulmonary embolus distal to the proximal segmental levels, particularly at the lung bases cannot be excluded. Correlation with d-dimer and lower extremity venous Doppler ultrasound as clinically warranted.    2.  No confluent airspace consolidation or pleural effusion.  Mild subsegmental atelectasis or scarring at the left lung base.      Electronically signed by: Jey Shin MD  Date:    08/22/2022  Time:    23:27             Narrative:    EXAMINATION:  CTA CHEST NON CORONARY    CLINICAL HISTORY:  Pulmonary embolism (PE) suspected, positive D-dimer;    TECHNIQUE:  Low dose axial images, sagittal and coronal reformations were obtained from the thoracic inlet to the lung bases following the IV administration of 100 mL of Omnipaque 350.  Contrast timing was optimized to evaluate the pulmonary arteries.  MIP  images were performed.    COMPARISON:  Chest radiograph 10/24/2021    FINDINGS:  The visualized soft tissue structures at the base of the neck appear within normal limits.    The thoracic aorta maintains normal caliber, contour, and course without significant atherosclerotic calcification within its course.  There is no evidence of aneurysmal dilation or dissection. The heart is not enlarged and there is trace pericardial fluid.The esophagus maintains a normal course and caliber.There is no axillary, mediastinal, or hilar lymph node enlargement.    The trachea is midline and proximal airways are patent. The lungs are symmetrically expanded. There is no pneumothorax.  No confluent airspace consolidation identified.  Bandlike opacity at the left lung base is suggestive of platelike atelectasis or scarring..  No significant volume of pleural fluid present.    Evaluation of the pulmonary arteries is significantly limited by poor opacification.  No large saddle embolus or definite filling defect appreciated to the level of the proximal segmental arteries.  Evaluation more distally is essentially nondiagnostic.  No evidence to suggest pulmonary infarct.    Limited images of the upper abdomen obtained during the course of this dedicated thoracic CT demonstrate no acute abnormalities.    The osseous structures demonstrate mild degenerative changes of the spine.                                 Medications   lactated ringers bolus 1,000 mL (0 mLs Intravenous Stopped 8/22/22 2105)   iohexoL (OMNIPAQUE 350) injection 100 mL (100 mLs Intravenous Given 8/22/22 2234)     Medical Decision Making:   History:   I obtained history from: someone other than patient.  Old Medical Records: I decided to obtain old medical records.  Old Records Summarized: records from clinic visits.  Differential Diagnosis:   Arrythmia, PE, pneumonia, covid, medication effect, myocardial infarction, heart failure, eclampsia, preeclampsia   Clinical Tests:    Lab Tests: Ordered and Reviewed  Radiological Study: Ordered and Reviewed  Medical Tests: Ordered and Reviewed  ED Management:  31 yo with tachycardia and SOB.  Given pregnancy status do have some concern for PE, likely tachycardia is as a result of pseudophed use. Monitor shows a sinus rhythm on multiple rechecks of the monitor.  Troponin is negative, labs unrevealing mag normal, cbc stable.  Dimer elevated.   CTA shows no large PE and patient is well appearing. Likely she is probably suffering from a viral syndrome or a negative covid swab. With covid syncrome. Have discussed at length with her results.  Bedside ultrasound shows appreciable fetal cardiac activity with a rate of 140.  Discussed with OB who notes they can obtain fetal heart tones. No need  Evident at this time. Will plan for dc and followup with primary or OV as needed.              ED Course as of 08/23/22 0950   Mon Aug 22, 2022   1834 Taking Sudafed for COVID like symptoms, had a significant COVID exposure 4 days prior has had a cough and some chest tightness there since. [TK]   2003 Discussed with OB on-call, OB ED, as there is no specific pregnancy related complaint at this time they state patient should remain emergency department.  Will obtain markers including dimer, troponin, CBC and chemistry. [TK]   2118 Discussed with patient risks and benefits of CTA chest. Still SOB, still uncomfortable. Plan for CTA of chest given elevated dimer [TK]      ED Course User Index  [TK] Vasquez Pinto MD             Clinical Impression:   Final diagnoses:  [R06.02] SOB (shortness of breath)  [R00.0] Tachycardia (Primary)  [R06.02] Shortness of breath          ED Disposition Condition    Discharge Stable        ED Prescriptions     Medication Sig Dispense Start Date End Date Auth. Provider    diphenhydrAMINE (BENADRYL) 25 mg capsule Take 1 capsule (25 mg total) by mouth every 6 (six) hours as needed for Itching or Allergies. 20 capsule 8/23/2022   Vasquez Pinto MD        Follow-up Information     Follow up With Specialties Details Why Contact Info    Glory Fink MD Obstetrics, Obstetrics and Gynecology Call in 2 days For a follow up visit about today, If symptoms worsen [address] [place] [address] [id number] [phone]             Vasquez Pinto MD  08/23/22 2190

## 2022-08-23 NOTE — DISCHARGE INSTRUCTIONS
Mrs. Camarena,    Thank you for letting me care for you today! It was nice meeting you, and I hope you feel better soon.   If you would like access to your chart and what was done today please utilize the [facility].   Please come back to [facility] for all of your future medical needs.    Our goal in the emergency department is to always give you outstanding care and exceptional service. You may receive a survey by mail or e-mail in the next week regarding your experience in our ED. We would greatly appreciate you completing and returning the survey. Your feedback provides us with a way to recognize our staff who give very good care and it helps us learn how to improve when your experience was below our aspiration of excellence.     Sincerely,    Vasquez Pinto MD  Board Certified Emergency Physician

## 2022-08-23 NOTE — TELEPHONE ENCOUNTER
Called patient- no answer. Left voice messge. Seems like she went to the ED overnight. Will try calling again    Glory Fink MD, FACOG  OB/GYN

## 2022-08-23 NOTE — ED NOTES
Pt resting comfortably in stretcher, VSS NAD noted. Side rails up X 2, call light is within reach. Toileting offered. Family member remains at bedside. Denies any needs at this time. Will continue to monitor.

## 2022-08-23 NOTE — ED TRIAGE NOTES
Pt presents to ED c/o SOB and chest tightness onset Friday. Pt also reporting nasal congestion and dry cough. Pt is 21 wks OB, states she has been communicating with her OBGYN who told her to come to ED for Covid testing and monitoring. Pt states she has taken 4 negative tests at home.

## 2022-08-24 ENCOUNTER — TELEPHONE (OUTPATIENT)
Dept: OBSTETRICS AND GYNECOLOGY | Facility: HOSPITAL | Age: 32
End: 2022-08-24
Payer: COMMERCIAL

## 2022-08-24 RX ORDER — ALBUTEROL SULFATE 90 UG/1
2 AEROSOL, METERED RESPIRATORY (INHALATION) EVERY 6 HOURS PRN
Qty: 18 G | Refills: 0 | Status: SHIPPED | OUTPATIENT
Start: 2022-08-24 | End: 2023-08-24

## 2022-08-24 NOTE — TELEPHONE ENCOUNTER
Called patient- states she is having issues with breathing still. Symptoms started last Friday. Was exposed to BRIELLE who had covid. Tested negative for covid but states she is fatigued with a cough/sneezing. Pt denies anxiety. Reports hx of asthma but hasn't had to use an inhaler in 2-3 years. States with normal activity has SOB -even with washing dishes. Weight is still stable at 234lb, previously 235lb. I reviewed recent imaging/vitals from ED visit- no signs of PE-oxygen sats in 100%. Discussed likely covid related symptoms although recent testing is negative. Will try albuterol inhaler to see if symptoms resolve but if they persist or worsen she will need to come right back in for evaluation. Pt voiced understanding.       Glory Fink MD, FACOG  OB/GYN

## 2022-08-25 ENCOUNTER — TELEPHONE (OUTPATIENT)
Dept: OBSTETRICS AND GYNECOLOGY | Facility: CLINIC | Age: 32
End: 2022-08-25
Payer: COMMERCIAL

## 2022-08-25 ENCOUNTER — PATIENT MESSAGE (OUTPATIENT)
Dept: OBSTETRICS AND GYNECOLOGY | Facility: CLINIC | Age: 32
End: 2022-08-25
Payer: COMMERCIAL

## 2022-08-25 ENCOUNTER — OFFICE VISIT (OUTPATIENT)
Dept: URGENT CARE | Facility: CLINIC | Age: 32
End: 2022-08-25
Payer: COMMERCIAL

## 2022-08-25 VITALS
OXYGEN SATURATION: 98 % | SYSTOLIC BLOOD PRESSURE: 136 MMHG | RESPIRATION RATE: 20 BRPM | WEIGHT: 235 LBS | TEMPERATURE: 98 F | BODY MASS INDEX: 37.77 KG/M2 | HEART RATE: 98 BPM | DIASTOLIC BLOOD PRESSURE: 64 MMHG | HEIGHT: 66 IN

## 2022-08-25 DIAGNOSIS — R06.02 SHORTNESS OF BREATH: Primary | ICD-10-CM

## 2022-08-25 LAB
CTP QC/QA: YES
SARS-COV-2 RDRP RESP QL NAA+PROBE: NEGATIVE

## 2022-08-25 PROCEDURE — 93005 EKG 12-LEAD: ICD-10-PCS | Mod: S$GLB,,, | Performed by: NURSE PRACTITIONER

## 2022-08-25 PROCEDURE — 3008F PR BODY MASS INDEX (BMI) DOCUMENTED: ICD-10-PCS | Mod: CPTII,S$GLB,, | Performed by: NURSE PRACTITIONER

## 2022-08-25 PROCEDURE — 99214 PR OFFICE/OUTPT VISIT, EST, LEVL IV, 30-39 MIN: ICD-10-PCS | Mod: S$GLB,,, | Performed by: NURSE PRACTITIONER

## 2022-08-25 PROCEDURE — 3078F PR MOST RECENT DIASTOLIC BLOOD PRESSURE < 80 MM HG: ICD-10-PCS | Mod: CPTII,S$GLB,, | Performed by: NURSE PRACTITIONER

## 2022-08-25 PROCEDURE — 3075F PR MOST RECENT SYSTOLIC BLOOD PRESS GE 130-139MM HG: ICD-10-PCS | Mod: CPTII,S$GLB,, | Performed by: NURSE PRACTITIONER

## 2022-08-25 PROCEDURE — 93010 EKG 12-LEAD: ICD-10-PCS | Mod: S$GLB,,, | Performed by: INTERNAL MEDICINE

## 2022-08-25 PROCEDURE — 3075F SYST BP GE 130 - 139MM HG: CPT | Mod: CPTII,S$GLB,, | Performed by: NURSE PRACTITIONER

## 2022-08-25 PROCEDURE — 93010 ELECTROCARDIOGRAM REPORT: CPT | Mod: S$GLB,,, | Performed by: INTERNAL MEDICINE

## 2022-08-25 PROCEDURE — 3078F DIAST BP <80 MM HG: CPT | Mod: CPTII,S$GLB,, | Performed by: NURSE PRACTITIONER

## 2022-08-25 PROCEDURE — 1159F MED LIST DOCD IN RCRD: CPT | Mod: CPTII,S$GLB,, | Performed by: NURSE PRACTITIONER

## 2022-08-25 PROCEDURE — 99214 OFFICE O/P EST MOD 30 MIN: CPT | Mod: S$GLB,,, | Performed by: NURSE PRACTITIONER

## 2022-08-25 PROCEDURE — 3008F BODY MASS INDEX DOCD: CPT | Mod: CPTII,S$GLB,, | Performed by: NURSE PRACTITIONER

## 2022-08-25 PROCEDURE — U0002: ICD-10-PCS | Mod: QW,S$GLB,, | Performed by: NURSE PRACTITIONER

## 2022-08-25 PROCEDURE — 1160F PR REVIEW ALL MEDS BY PRESCRIBER/CLIN PHARMACIST DOCUMENTED: ICD-10-PCS | Mod: CPTII,S$GLB,, | Performed by: NURSE PRACTITIONER

## 2022-08-25 PROCEDURE — 1159F PR MEDICATION LIST DOCUMENTED IN MEDICAL RECORD: ICD-10-PCS | Mod: CPTII,S$GLB,, | Performed by: NURSE PRACTITIONER

## 2022-08-25 PROCEDURE — 1160F RVW MEDS BY RX/DR IN RCRD: CPT | Mod: CPTII,S$GLB,, | Performed by: NURSE PRACTITIONER

## 2022-08-25 PROCEDURE — 93005 ELECTROCARDIOGRAM TRACING: CPT | Mod: S$GLB,,, | Performed by: NURSE PRACTITIONER

## 2022-08-25 PROCEDURE — U0002 COVID-19 LAB TEST NON-CDC: HCPCS | Mod: QW,S$GLB,, | Performed by: NURSE PRACTITIONER

## 2022-08-25 NOTE — PROGRESS NOTES
"Subjective:       Patient ID: Carlos Camarena is a 32 y.o. female.    Vitals:  height is 5' 6" (1.676 m) and weight is 106.6 kg (235 lb 0.2 oz). Her temperature is 98.1 °F (36.7 °C). Her blood pressure is 136/64 and her pulse is 98. Her respiration is 20 and oxygen saturation is 98%.     Chief Complaint: Shortness of Breath    Patient states that she has been experiencing shortness of breath starting last Thursday. Patient has also been experiencing congestion, painful cough that is productive of yellow sputum, body aches.Patient tried taking Psuedofed and benadryl and had no relief.     Provider not begins here: patient here for c/o SOB x 8 days. Had exposure to covid last week and subsequently developed congestion, sore throat and SOB with cough.  She was seen in ER 8/22, EKG and CT was unremarkable, D- dimer consistent with 2nd trimester pregnancy, treating symptoms with sudafed and albuterol with improvement of symptoms in all but her SOB. Patient states P[facility] asthma. No fever or chills, wheezing, exertional dyspnea, pedal edema, palpations, chest pain    Shortness of Breath  This is a new problem. The current episode started 1 to 4 weeks ago. The problem occurs constantly. The problem has been gradually worsening. Associated symptoms include a sore throat. Pertinent negatives include no abdominal pain, chest pain, ear pain, fever, headaches, leg swelling, neck pain, rash, sputum production, vomiting or wheezing. She has tried OTC cough suppressants and beta agonist inhalers for the symptoms. The treatment provided no relief.       Constitution: Negative for chills, sweating, fatigue and fever.   HENT: Positive for sore throat. Negative for ear pain, ear discharge, tinnitus, hearing loss, congestion, sinus pain, sinus pressure, trouble swallowing and voice change.    Neck: Negative for neck pain and painful lymph nodes.   Cardiovascular: Negative for chest pain, leg swelling, palpitations and sob on exertion. "   Respiratory: Positive for shortness of breath. Negative for cough, sputum production and wheezing.    Gastrointestinal: Negative for abdominal pain, nausea, vomiting and diarrhea.   Musculoskeletal: Negative for muscle ache.   Skin: Negative for color change, pale and rash.   Allergic/Immunologic: Negative for sneezing.   Neurological: Negative for headaches, numbness and tingling.   Hematologic/Lymphatic: Negative for swollen lymph nodes.       Objective:      Physical Exam   Constitutional: She is oriented to person, place, and time. She appears well-developed. She is cooperative.  Non-toxic appearance. She does not appear ill. No distress.   HENT:   Head: Normocephalic and atraumatic.   Ears:   Right Ear: Hearing, tympanic membrane, external ear and ear canal normal.   Left Ear: Hearing, tympanic membrane, external ear and ear canal normal.   Nose: Nose normal. No mucosal edema, rhinorrhea, nasal deformity or congestion. No epistaxis. Right sinus exhibits no maxillary sinus tenderness and no frontal sinus tenderness. Left sinus exhibits no maxillary sinus tenderness and no frontal sinus tenderness.   Mouth/Throat: Uvula is midline and mucous membranes are normal. No trismus in the jaw. Normal dentition. No uvula swelling. Posterior oropharyngeal erythema present. No oropharyngeal exudate or posterior oropharyngeal edema.   Eyes: Conjunctivae and lids are normal. Right eye exhibits no discharge. Left eye exhibits no discharge. No scleral icterus.   Neck: Trachea normal and phonation normal. Neck supple. No edema present. No erythema present. No neck rigidity present.   Cardiovascular: Normal rate, regular rhythm, normal heart sounds and normal pulses.   No murmur heard.  Pulmonary/Chest: Effort normal and breath sounds normal. No stridor. No respiratory distress. She has no decreased breath sounds. She has no wheezes. She has no rhonchi. She has no rales. She exhibits no tenderness.   Abdominal: Normal  appearance. She exhibits no pulsatile midline mass. Soft. There is no abdominal tenderness.   Musculoskeletal: Normal range of motion.         General: No deformity. Normal range of motion.      Cervical back: She exhibits no tenderness.   Lymphadenopathy:     She has no cervical adenopathy.   Neurological: She is alert and oriented to person, place, and time. She exhibits normal muscle tone. Coordination normal.   Skin: Skin is warm, dry, intact, not diaphoretic and not pale.   Psychiatric: Her speech is normal and behavior is normal. Judgment and thought content normal.   Nursing note and vitals reviewed.        Results for orders placed or performed in visit on 08/25/22   POCT COVID-19 Rapid Screening   Result Value Ref Range    POC Rapid COVID Negative Negative     Acceptable Yes      EKG - NSR, when compared to previous EKG 8/22, no changes noted   Assessment:       1. Shortness of breath          Plan:         Shortness of breath  -     POCT COVID-19 Rapid Screening  -     IN OFFICE EKG 12-LEAD (to Muse)                 Patient Instructions   See additional patient Instructions provided    -stop sudafed as this may be exacerbating SOB  -continue with albuterol as directed  -f/u with PCP for further evaluation and management     After taking into careful account the patient's history, physical exam findings, as well as empirical and objective data obtained throughout urgent care workup, I feel no emergent medical condition has been identified. No further evaluation or admission was felt to be required, and the patient is stable for discharge from the . The patient and any additional family present were updated with test results, overall clinical impression, and recommended further plan of care, including discharge instructions as provided and outpatient follow-up for continued evaluation and management as needed. All questions were answered. The patient expressed understanding and agreed with  current plan for discharge and follow-up plan of care. Strict ED precautions were provided, including return/worsening of current symptoms, new symptoms, or any other concerns.  - You must understand that you have received an Urgent Care treatment only and that you may be released before all of your medical problems are known or treated.   - You, the patient, will arrange for follow up care as instructed.   - If your condition worsens or fails to improve we recommend that you receive another evaluation at the ER immediately or contact your PCP to discuss your concerns or return here.         -Answered all patient questions. Patient verbalized understanding and voiced agreement with current treatment plan.

## 2022-08-25 NOTE — PATIENT INSTRUCTIONS
See additional patient Instructions provided    -stop sudafed as this may be exacerbating SOB  -continue with albuterol as directed  -f/u with PCP for further evaluation and management     After taking into careful account the patient's history, physical exam findings, as well as empirical and objective data obtained throughout urgent care workup, I feel no emergent medical condition has been identified. No further evaluation or admission was felt to be required, and the patient is stable for discharge from the . The patient and any additional family present were updated with test results, overall clinical impression, and recommended further plan of care, including discharge instructions as provided and outpatient follow-up for continued evaluation and management as needed. All questions were answered. The patient expressed understanding and agreed with current plan for discharge and follow-up plan of care. Strict ED precautions were provided, including return/worsening of current symptoms, new symptoms, or any other concerns.  - You must understand that you have received an Urgent Care treatment only and that you may be released before all of your medical problems are known or treated.   - You, the patient, will arrange for follow up care as instructed.   - If your condition worsens or fails to improve we recommend that you receive another evaluation at the ER immediately or contact your PCP to discuss your concerns or return here.         -Answered all patient questions. Patient verbalized understanding and voiced agreement with current treatment plan.

## 2022-08-25 NOTE — TELEPHONE ENCOUNTER
----- Message from Mona Sewell sent at 8/24/2022  7:57 AM CDT -----  Contact: SHARITA TRIPP [[id number]] [phone]  GENERAL CALL BACK    Patient requests a phone call to discuss next steps after her 8/22/22 ED visit. Patient indicates she has been attempting to contact Dr. Fink for a couple days with no success.     Contact Preference: Phone call [phone]

## 2022-08-25 NOTE — LETTER
August 26, 2022      [facility]  [address]  [place] [address]  Phone: [phone]       Patient: Carlos Camarena   YOB: 1990  Date of Visit: 08/26/2022    To Whom It May Concern:    Hernandez Camarena  was at [facility] on 08/25/2022. It is my recommendation that she work from home due to current symptoms of extreme shortness of breath with ambulation. Pt will be provided with a letter to return to unrestricted work once her symptoms improve and resolve.  If you have any questions or concerns, or if I can be of further assistance, please do not hesitate to contact me.    Sincerely,          Gloyr Fink MD

## 2022-08-26 NOTE — TELEPHONE ENCOUNTER
Called patient - states she is feeling a little better since trying the albuterol but still having SOB and cold symptoms. Went to the urgent care and covid test was normal. Denies chest pain or fever. Discussed OTC medication that is safe to take for cold/congestion like symptoms. Pt voiced understanding. Letter for work- provided.     Glory Fink MD, FACOG  OB/GYN

## 2022-09-06 ENCOUNTER — PATIENT MESSAGE (OUTPATIENT)
Dept: OBSTETRICS AND GYNECOLOGY | Facility: CLINIC | Age: 32
End: 2022-09-06
Payer: COMMERCIAL

## 2022-09-07 ENCOUNTER — PROCEDURE VISIT (OUTPATIENT)
Dept: OBSTETRICS AND GYNECOLOGY | Facility: CLINIC | Age: 32
End: 2022-09-07
Payer: COMMERCIAL

## 2022-09-07 ENCOUNTER — ROUTINE PRENATAL (OUTPATIENT)
Dept: OBSTETRICS AND GYNECOLOGY | Facility: CLINIC | Age: 32
End: 2022-09-07
Payer: COMMERCIAL

## 2022-09-07 VITALS
SYSTOLIC BLOOD PRESSURE: 110 MMHG | DIASTOLIC BLOOD PRESSURE: 60 MMHG | BODY MASS INDEX: 37.84 KG/M2 | WEIGHT: 234.44 LBS

## 2022-09-07 DIAGNOSIS — J45.40 MODERATE PERSISTENT ASTHMA WITHOUT COMPLICATION: Primary | ICD-10-CM

## 2022-09-07 DIAGNOSIS — Z32.01 POSITIVE URINE PREGNANCY TEST: ICD-10-CM

## 2022-09-07 PROCEDURE — 76816 OB US FOLLOW-UP PER FETUS: CPT | Mod: S$GLB,,, | Performed by: OBSTETRICS & GYNECOLOGY

## 2022-09-07 PROCEDURE — 99999 PR PBB SHADOW E&M-EST. PATIENT-LVL III: ICD-10-PCS | Mod: PBBFAC,,, | Performed by: OBSTETRICS & GYNECOLOGY

## 2022-09-07 PROCEDURE — 0502F PR SUBSEQUENT PRENATAL CARE: ICD-10-PCS | Mod: CPTII,S$GLB,, | Performed by: OBSTETRICS & GYNECOLOGY

## 2022-09-07 PROCEDURE — 76816 US OB/GYN PROCEDURE (VIEWPOINT): ICD-10-PCS | Mod: S$GLB,,, | Performed by: OBSTETRICS & GYNECOLOGY

## 2022-09-07 PROCEDURE — 99999 PR PBB SHADOW E&M-EST. PATIENT-LVL III: CPT | Mod: PBBFAC,,, | Performed by: OBSTETRICS & GYNECOLOGY

## 2022-09-07 PROCEDURE — 0502F SUBSEQUENT PRENATAL CARE: CPT | Mod: CPTII,S$GLB,, | Performed by: OBSTETRICS & GYNECOLOGY

## 2022-09-07 RX ORDER — FLUTICASONE PROPIONATE AND SALMETEROL 100; 50 UG/1; UG/1
1 POWDER RESPIRATORY (INHALATION) 2 TIMES DAILY
Qty: 60 EACH | Refills: 11 | Status: SHIPPED | OUTPATIENT
Start: 2022-09-07 | End: 2022-10-19 | Stop reason: ALTCHOICE

## 2022-09-07 NOTE — PROGRESS NOTES
Pt states her SOB is persistent but not as bad as previously reported. Using albuterol but not noticing much of a change. Reports pulse ox at home is wnl. Will add low dose inhaled corticosteroid to regimen and albuterol for rescue if needed. Discussed peak flow and pulmonary function testing as well. Order for glucose screen previously placed as well.

## 2022-09-11 ENCOUNTER — PATIENT MESSAGE (OUTPATIENT)
Dept: OBSTETRICS AND GYNECOLOGY | Facility: CLINIC | Age: 32
End: 2022-09-11
Payer: COMMERCIAL

## 2022-09-11 ENCOUNTER — TELEPHONE (OUTPATIENT)
Dept: OBSTETRICS AND GYNECOLOGY | Facility: CLINIC | Age: 32
End: 2022-09-11
Payer: COMMERCIAL

## 2022-09-11 NOTE — TELEPHONE ENCOUNTER
----- Message from Glory Fink MD sent at 9/7/2022  6:36 PM CDT -----  Regarding: follow-up prenatal visit in 4 weeks and telemed in 1 week to f/u on asthma  follow-up prenatal visit in 4 weeks and telemed in 1 week to f/u on asthma. It can be an audio visit but I just dont want to forget to call her next week    Glory Fink MD, FACOG  OB/GYN

## 2022-09-13 ENCOUNTER — TELEPHONE (OUTPATIENT)
Dept: PAIN MEDICINE | Facility: CLINIC | Age: 32
End: 2022-09-13
Payer: COMMERCIAL

## 2022-09-13 NOTE — TELEPHONE ENCOUNTER
Good afternoon      This is an appointment reminder about your schedule Virtual Visit with Pain Management Provider  Shannen Argueta NP.   Your appointment is for 09 14, 2022 at 3:00pm.     Please log into your [facility] 15 min's prior to your appointment time to e-precheck, verify all corresponding pages, and troubleshoot any problems that may occur. Once your device has been verify as compatible, and you receive the green check,  you must hit/ select the start visit button, This will notify your provider that you are ready to start the Virtual Video Visit.     As [facility] is a teaching [facility], your visit may be started with a resident or a fellow that is rounding in clinic, then proceeded by your physician.    Once logged on, please allow the provider 20 -30 mins to check-in, in case running behind.       If you experience any technical issue please contact [phone]        Thank You for entrusting [facility] to handle your care       Patient verbalized and confirmed appt

## 2022-09-14 ENCOUNTER — OFFICE VISIT (OUTPATIENT)
Dept: OBSTETRICS AND GYNECOLOGY | Facility: CLINIC | Age: 32
End: 2022-09-14
Payer: COMMERCIAL

## 2022-09-14 ENCOUNTER — PATIENT MESSAGE (OUTPATIENT)
Dept: ADMINISTRATIVE | Facility: OTHER | Age: 32
End: 2022-09-14
Payer: COMMERCIAL

## 2022-09-14 DIAGNOSIS — J45.40 MODERATE PERSISTENT ASTHMA WITHOUT COMPLICATION: Primary | ICD-10-CM

## 2022-09-14 DIAGNOSIS — O12.02 SWELLING OF LOWER EXTREMITY DURING PREGNANCY IN SECOND TRIMESTER: ICD-10-CM

## 2022-09-14 PROCEDURE — 0502F PR SUBSEQUENT PRENATAL CARE: ICD-10-PCS | Mod: CPTII,95,, | Performed by: OBSTETRICS & GYNECOLOGY

## 2022-09-14 PROCEDURE — 1159F PR MEDICATION LIST DOCUMENTED IN MEDICAL RECORD: ICD-10-PCS | Mod: CPTII,95,, | Performed by: OBSTETRICS & GYNECOLOGY

## 2022-09-14 PROCEDURE — 1159F MED LIST DOCD IN RCRD: CPT | Mod: CPTII,95,, | Performed by: OBSTETRICS & GYNECOLOGY

## 2022-09-14 PROCEDURE — 0502F SUBSEQUENT PRENATAL CARE: CPT | Mod: CPTII,95,, | Performed by: OBSTETRICS & GYNECOLOGY

## 2022-09-14 PROCEDURE — 1160F PR REVIEW ALL MEDS BY PRESCRIBER/CLIN PHARMACIST DOCUMENTED: ICD-10-PCS | Mod: CPTII,95,, | Performed by: OBSTETRICS & GYNECOLOGY

## 2022-09-14 PROCEDURE — 1160F RVW MEDS BY RX/DR IN RCRD: CPT | Mod: CPTII,95,, | Performed by: OBSTETRICS & GYNECOLOGY

## 2022-09-14 NOTE — PROGRESS NOTES
The patient location is: home  The chief complaint leading to consultation is: follow-up    Visit type: audio only    Face to Face time with patient: 15 minutes of total time spent on the encounter, which includes face to face time and non-face to face time preparing to see the patient (eg, review of tests), Obtaining and/or reviewing separately obtained history, Documenting clinical information in the electronic or other health record, Independently interpreting results (not separately reported) and communicating results to the patient/family/caregiver, or Care coordination (not separately reported).         Each patient to whom he or she provides medical services by telemedicine is:  (1) informed of the relationship between the physician and patient and the respective role of any other health care provider with respect to management of the patient; and (2) notified that he or she may decline to receive medical services by telemedicine and may withdraw from such care at any time.    Notes:   Called patient as a f/u from visit last week secondary to addition of advair to albuterol. Pt using two puffs daily. States she is still having difficulty breathing with simple household task. No issues at rest. Instructed to use advair twice daily. Placed new orders for peak flow meter and referral to pulmonary for PFTs. Pt then reports increase in lower extremity swelling in the morning after sleeping. Currently using compression stockings. Order for echo placed as well. Also reports feeling a contraction. Given precautions for any regular contractions or worsening of respiratory symptoms to come straight in to ED as previously advised. Patient reports normal fetal movement. Denies vaginal bleeding, regular contractions or leakage of fluid.

## 2022-09-20 ENCOUNTER — LAB VISIT (OUTPATIENT)
Dept: LAB | Facility: HOSPITAL | Age: 32
End: 2022-09-20
Attending: OBSTETRICS & GYNECOLOGY
Payer: COMMERCIAL

## 2022-09-20 ENCOUNTER — PATIENT MESSAGE (OUTPATIENT)
Dept: OBSTETRICS AND GYNECOLOGY | Facility: CLINIC | Age: 32
End: 2022-09-20
Payer: COMMERCIAL

## 2022-09-20 DIAGNOSIS — Z34.01 ENCOUNTER FOR SUPERVISION OF NORMAL FIRST PREGNANCY IN FIRST TRIMESTER: ICD-10-CM

## 2022-09-20 DIAGNOSIS — R73.09 ELEVATED GLUCOSE TOLERANCE TEST: Primary | ICD-10-CM

## 2022-09-20 LAB
BASOPHILS # BLD AUTO: 0.01 K/UL (ref 0–0.2)
BASOPHILS NFR BLD: 0.1 % (ref 0–1.9)
DIFFERENTIAL METHOD: ABNORMAL
EOSINOPHIL # BLD AUTO: 0.1 K/UL (ref 0–0.5)
EOSINOPHIL NFR BLD: 0.6 % (ref 0–8)
ERYTHROCYTE [DISTWIDTH] IN BLOOD BY AUTOMATED COUNT: 14.3 % (ref 11.5–14.5)
GLUCOSE SERPL-MCNC: 157 MG/DL (ref 70–140)
HCT VFR BLD AUTO: 32.2 % (ref 37–48.5)
HGB BLD-MCNC: 10.7 G/DL (ref 12–16)
IMM GRANULOCYTES # BLD AUTO: 0.04 K/UL (ref 0–0.04)
IMM GRANULOCYTES NFR BLD AUTO: 0.3 % (ref 0–0.5)
LYMPHOCYTES # BLD AUTO: 1.1 K/UL (ref 1–4.8)
LYMPHOCYTES NFR BLD: 10 % (ref 18–48)
MCH RBC QN AUTO: 28.8 PG (ref 27–31)
MCHC RBC AUTO-ENTMCNC: 33.2 G/DL (ref 32–36)
MCV RBC AUTO: 87 FL (ref 82–98)
MONOCYTES # BLD AUTO: 0.6 K/UL (ref 0.3–1)
MONOCYTES NFR BLD: 5.2 % (ref 4–15)
NEUTROPHILS # BLD AUTO: 9.6 K/UL (ref 1.8–7.7)
NEUTROPHILS NFR BLD: 83.8 % (ref 38–73)
NRBC BLD-RTO: 0 /100 WBC
PLATELET # BLD AUTO: 298 K/UL (ref 150–450)
PMV BLD AUTO: 10.3 FL (ref 9.2–12.9)
RBC # BLD AUTO: 3.72 M/UL (ref 4–5.4)
WBC # BLD AUTO: 11.45 K/UL (ref 3.9–12.7)

## 2022-09-20 PROCEDURE — 85025 COMPLETE CBC W/AUTO DIFF WBC: CPT | Performed by: OBSTETRICS & GYNECOLOGY

## 2022-09-20 PROCEDURE — 82950 GLUCOSE TEST: CPT | Performed by: OBSTETRICS & GYNECOLOGY

## 2022-09-20 PROCEDURE — 36415 COLL VENOUS BLD VENIPUNCTURE: CPT | Performed by: OBSTETRICS & GYNECOLOGY

## 2022-09-20 RX ORDER — FERROUS SULFATE 325(65) MG
325 TABLET, DELAYED RELEASE (ENTERIC COATED) ORAL DAILY
Qty: 30 TABLET | Refills: 11 | Status: SHIPPED | OUTPATIENT
Start: 2022-09-20 | End: 2022-09-30

## 2022-09-24 ENCOUNTER — TELEPHONE (OUTPATIENT)
Dept: OBSTETRICS AND GYNECOLOGY | Facility: CLINIC | Age: 32
End: 2022-09-24
Payer: COMMERCIAL

## 2022-09-24 NOTE — TELEPHONE ENCOUNTER
----- Message from Glory Fink MD sent at 9/20/2022 12:24 PM CDT -----  Please assist with scheduling 3hr gtt with iron studies

## 2022-09-26 ENCOUNTER — HOSPITAL ENCOUNTER (OUTPATIENT)
Dept: CARDIOLOGY | Facility: HOSPITAL | Age: 32
Discharge: HOME | End: 2022-09-26
Attending: OBSTETRICS & GYNECOLOGY
Payer: COMMERCIAL

## 2022-09-26 VITALS — HEIGHT: 66 IN | BODY MASS INDEX: 37.61 KG/M2 | WEIGHT: 234 LBS

## 2022-09-26 DIAGNOSIS — O12.02 SWELLING OF LOWER EXTREMITY DURING PREGNANCY IN SECOND TRIMESTER: ICD-10-CM

## 2022-09-26 PROCEDURE — 93306 TTE W/DOPPLER COMPLETE: CPT | Mod: 26,,, | Performed by: INTERNAL MEDICINE

## 2022-09-26 PROCEDURE — 93306 ECHO (CUPID ONLY): ICD-10-PCS | Mod: 26,,, | Performed by: INTERNAL MEDICINE

## 2022-09-26 PROCEDURE — 93306 TTE W/DOPPLER COMPLETE: CPT

## 2022-09-27 ENCOUNTER — LAB VISIT (OUTPATIENT)
Dept: LAB | Facility: HOSPITAL | Age: 32
End: 2022-09-27
Attending: OBSTETRICS & GYNECOLOGY
Payer: COMMERCIAL

## 2022-09-27 DIAGNOSIS — R73.09 ELEVATED GLUCOSE TOLERANCE TEST: ICD-10-CM

## 2022-09-27 LAB
AV INDEX (PROSTH): 0.6
AV MEAN GRADIENT: 6 MMHG
AV PEAK GRADIENT: 10 MMHG
AV VALVE AREA: 1.8 CM2
AV VELOCITY RATIO: 0.57
BSA FOR ECHO PROCEDURE: 2.22 M2
CV ECHO LV RWT: 0.38 CM
DOP CALC AO PEAK VEL: 1.6 M/S
DOP CALC AO VTI: 24.9 CM
DOP CALC LVOT AREA: 3 CM2
DOP CALC LVOT DIAMETER: 1.96 CM
DOP CALC LVOT PEAK VEL: 0.91 M/S
DOP CALC LVOT STROKE VOLUME: 44.93 CM3
DOP CALC MV VTI: 16.3 CM
DOP CALCLVOT PEAK VEL VTI: 14.9 CM
E WAVE DECELERATION TIME: 158.14 MSEC
E/A RATIO: 0.95
E/E' RATIO: 6.45 M/S
ECHO LV POSTERIOR WALL: 1.04 CM (ref 0.6–1.1)
EJECTION FRACTION: 55 %
FERRITIN SERPL-MCNC: 11 NG/ML (ref 20–300)
FRACTIONAL SHORTENING: 30 % (ref 28–44)
GLUCOSE SERPL-MCNC: 111 MG/DL
GLUCOSE SERPL-MCNC: 158 MG/DL
GLUCOSE SERPL-MCNC: 80 MG/DL (ref 70–110)
GLUCOSE SERPL-MCNC: 90 MG/DL
INTERVENTRICULAR SEPTUM: 0.86 CM (ref 0.6–1.1)
IRON SERPL-MCNC: 38 UG/DL (ref 30–160)
IVRT: 55.19 MSEC
LA MAJOR: 4.72 CM
LA MINOR: 4.21 CM
LA WIDTH: 3.2 CM
LEFT ATRIUM SIZE: 3.9 CM
LEFT ATRIUM VOLUME INDEX MOD: 12.7 ML/M2
LEFT ATRIUM VOLUME INDEX: 22.1 ML/M2
LEFT ATRIUM VOLUME MOD: 27.12 CM3
LEFT ATRIUM VOLUME: 47.21 CM3
LEFT INTERNAL DIMENSION IN SYSTOLE: 3.79 CM (ref 2.1–4)
LEFT VENTRICLE DIASTOLIC VOLUME INDEX: 66.27 ML/M2
LEFT VENTRICLE DIASTOLIC VOLUME: 141.81 ML
LEFT VENTRICLE MASS INDEX: 91 G/M2
LEFT VENTRICLE SYSTOLIC VOLUME INDEX: 28.7 ML/M2
LEFT VENTRICLE SYSTOLIC VOLUME: 61.47 ML
LEFT VENTRICULAR INTERNAL DIMENSION IN DIASTOLE: 5.41 CM (ref 3.5–6)
LEFT VENTRICULAR MASS: 193.85 G
LV LATERAL E/E' RATIO: 5.92 M/S
LV SEPTAL E/E' RATIO: 7.1 M/S
LVOT MG: 1.8 MMHG
LVOT MV: 0.62 CM/S
MV MEAN GRADIENT: 1 MMHG
MV PEAK A VEL: 0.75 M/S
MV PEAK E VEL: 0.71 M/S
MV PEAK GRADIENT: 3 MMHG
MV STENOSIS PRESSURE HALF TIME: 45.86 MS
MV VALVE AREA BY CONTINUITY EQUATION: 2.76 CM2
MV VALVE AREA P 1/2 METHOD: 4.8 CM2
PISA TR MAX VEL: 1.67 M/S
PULM VEIN S/D RATIO: 0.6
PV MV: 0.8 M/S
PV PEAK D VEL: 0.57 M/S
PV PEAK S VEL: 0.34 M/S
PV PEAK VELOCITY: 1.24 CM/S
RA MAJOR: 4.86 CM
RA PRESSURE: 3 MMHG
RA WIDTH: 3.7 CM
RIGHT VENTRICULAR END-DIASTOLIC DIMENSION: 2.27 CM
SATURATED IRON: 6 % (ref 20–50)
TDI LATERAL: 0.12 M/S
TDI SEPTAL: 0.1 M/S
TDI: 0.11 M/S
TOTAL IRON BINDING CAPACITY: 614 UG/DL (ref 250–450)
TR MAX PG: 11 MMHG
TRANSFERRIN SERPL-MCNC: 415 MG/DL (ref 200–375)
TV REST PULMONARY ARTERY PRESSURE: 14 MMHG

## 2022-09-27 PROCEDURE — 84466 ASSAY OF TRANSFERRIN: CPT | Performed by: OBSTETRICS & GYNECOLOGY

## 2022-09-27 PROCEDURE — 82728 ASSAY OF FERRITIN: CPT | Performed by: OBSTETRICS & GYNECOLOGY

## 2022-09-27 PROCEDURE — 36415 COLL VENOUS BLD VENIPUNCTURE: CPT | Mod: PO | Performed by: OBSTETRICS & GYNECOLOGY

## 2022-09-27 PROCEDURE — 82951 GLUCOSE TOLERANCE TEST (GTT): CPT | Performed by: OBSTETRICS & GYNECOLOGY

## 2022-09-30 RX ORDER — FERROUS SULFATE 325(65) MG
325 TABLET, DELAYED RELEASE (ENTERIC COATED) ORAL 2 TIMES DAILY
Qty: 30 TABLET | Refills: 11 | Status: SHIPPED | OUTPATIENT
Start: 2022-09-30

## 2022-10-06 ENCOUNTER — HOSPITAL ENCOUNTER (EMERGENCY)
Facility: HOSPITAL | Age: 32
Discharge: HOME | End: 2022-10-06
Attending: EMERGENCY MEDICINE
Payer: COMMERCIAL

## 2022-10-06 VITALS
SYSTOLIC BLOOD PRESSURE: 127 MMHG | TEMPERATURE: 98 F | OXYGEN SATURATION: 100 % | HEART RATE: 95 BPM | WEIGHT: 240 LBS | BODY MASS INDEX: 38.57 KG/M2 | HEIGHT: 66 IN | DIASTOLIC BLOOD PRESSURE: 70 MMHG | RESPIRATION RATE: 20 BRPM

## 2022-10-06 DIAGNOSIS — Z3A.28 28 WEEKS GESTATION OF PREGNANCY: Primary | ICD-10-CM

## 2022-10-06 DIAGNOSIS — R06.02 SOB (SHORTNESS OF BREATH): ICD-10-CM

## 2022-10-06 LAB
ALBUMIN SERPL BCP-MCNC: 3.1 G/DL (ref 3.5–5.2)
ALP SERPL-CCNC: 73 U/L (ref 55–135)
ALT SERPL W/O P-5'-P-CCNC: 13 U/L (ref 10–44)
ANION GAP SERPL CALC-SCNC: 9 MMOL/L (ref 8–16)
AST SERPL-CCNC: 13 U/L (ref 10–40)
BASOPHILS # BLD AUTO: 0.01 K/UL (ref 0–0.2)
BASOPHILS NFR BLD: 0.1 % (ref 0–1.9)
BILIRUB SERPL-MCNC: 0.4 MG/DL (ref 0.1–1)
BUN SERPL-MCNC: 4 MG/DL (ref 6–20)
CALCIUM SERPL-MCNC: 9.1 MG/DL (ref 8.7–10.5)
CHLORIDE SERPL-SCNC: 109 MMOL/L (ref 95–110)
CO2 SERPL-SCNC: 19 MMOL/L (ref 23–29)
CREAT SERPL-MCNC: 0.6 MG/DL (ref 0.5–1.4)
DIFFERENTIAL METHOD: ABNORMAL
EOSINOPHIL # BLD AUTO: 0.1 K/UL (ref 0–0.5)
EOSINOPHIL NFR BLD: 0.4 % (ref 0–8)
ERYTHROCYTE [DISTWIDTH] IN BLOOD BY AUTOMATED COUNT: 14 % (ref 11.5–14.5)
EST. GFR  (NO RACE VARIABLE): >60 ML/MIN/1.73 M^2
GLUCOSE SERPL-MCNC: 82 MG/DL (ref 70–110)
HCT VFR BLD AUTO: 31.3 % (ref 37–48.5)
HGB BLD-MCNC: 10.5 G/DL (ref 12–16)
IMM GRANULOCYTES # BLD AUTO: 0.06 K/UL (ref 0–0.04)
IMM GRANULOCYTES NFR BLD AUTO: 0.5 % (ref 0–0.5)
LYMPHOCYTES # BLD AUTO: 1.2 K/UL (ref 1–4.8)
LYMPHOCYTES NFR BLD: 10.5 % (ref 18–48)
MCH RBC QN AUTO: 28.8 PG (ref 27–31)
MCHC RBC AUTO-ENTMCNC: 33.5 G/DL (ref 32–36)
MCV RBC AUTO: 86 FL (ref 82–98)
MONOCYTES # BLD AUTO: 0.6 K/UL (ref 0.3–1)
MONOCYTES NFR BLD: 5.8 % (ref 4–15)
NEUTROPHILS # BLD AUTO: 9.2 K/UL (ref 1.8–7.7)
NEUTROPHILS NFR BLD: 82.7 % (ref 38–73)
NRBC BLD-RTO: 0 /100 WBC
PLATELET # BLD AUTO: 305 K/UL (ref 150–450)
PMV BLD AUTO: 10.3 FL (ref 9.2–12.9)
POTASSIUM SERPL-SCNC: 3.7 MMOL/L (ref 3.5–5.1)
PROT SERPL-MCNC: 7.2 G/DL (ref 6–8.4)
RBC # BLD AUTO: 3.65 M/UL (ref 4–5.4)
SODIUM SERPL-SCNC: 137 MMOL/L (ref 136–145)
WBC # BLD AUTO: 11.13 K/UL (ref 3.9–12.7)

## 2022-10-06 PROCEDURE — 93010 EKG 12-LEAD: ICD-10-PCS | Mod: ,,, | Performed by: INTERNAL MEDICINE

## 2022-10-06 PROCEDURE — 85025 COMPLETE CBC W/AUTO DIFF WBC: CPT | Performed by: EMERGENCY MEDICINE

## 2022-10-06 PROCEDURE — 80053 COMPREHEN METABOLIC PANEL: CPT | Performed by: EMERGENCY MEDICINE

## 2022-10-06 PROCEDURE — 93005 ELECTROCARDIOGRAM TRACING: CPT

## 2022-10-06 PROCEDURE — 99284 EMERGENCY DEPT VISIT MOD MDM: CPT

## 2022-10-06 PROCEDURE — 93010 ELECTROCARDIOGRAM REPORT: CPT | Mod: ,,, | Performed by: INTERNAL MEDICINE

## 2022-10-06 NOTE — ED PROVIDER NOTES
Encounter Date: 10/6/2022    SCRIBE #1 NOTE: I, Zurdo Menendez Jr, am scribing for, and in the presence of,  Leonardo Soni MD. I have scribed the following portions of the note - Other sections scribed: HPI, ROS, PE, MDM.     History     Chief Complaint   Patient presents with    Shortness of Breath     Pt presents to ED from home for sob, visual disturbance, headache, and abd cramping x 2 days. Denies vaginal bleeding or dc. Pt is G1 28w and under the care of . pt reports high bp durign pregnancy but denies being on meds at this time.       Carlos Camarena is a 32 y.o. pregnant female (G1) who has a past medical history of asthma and fibromyalgia. The patient presents to the emergency department due to shortness of breath; onset this morning. Associated symptoms include abdominal cramping and chest tightness. The patient reported developing a moderate episode of shortness of breath when attempting to get ready this morning. Patient used albuterol due to symptom, but states no relief. Due to this, she informed Labor Delivery of the situation, and was advised to visit the emergency department. In the ED, patient's shortness of breath has slightly improved, but she does complain of mild abdominal cramping and chest tightness. She denies chills, cough, fever, pelvic pain, or vaginal bleeding. She was recently diagnosed with iron deficient anemia and has been taking iron supplements.     The history is provided by the patient. No  was used.   Review of patient's allergies indicates:   Allergen Reactions    Lactose Diarrhea    Gluten protein Itching     Inflammation, bloating, diarrhea and pain all over body       Past Medical History:   Diagnosis Date    ADD (attention deficit disorder)     Asthma     Chronic back pain     Fibromyalgia     Moderate persistent asthma      Past Surgical History:   Procedure Laterality Date    BACK SURGERY  2013    INJECTION OF JOINT Right 04/14/2022     Procedure: INJECTION, JOINT RIGHT SI NEEDS CONSENT;  Surgeon: Cristopher Simon MD;  Location: [facility];  Service: Pain Management;  Laterality: Right;    SPINE SURGERY  12/01/14     Family History   Problem Relation Age of Onset    Depression Mother     Anxiety disorder Mother     Ovarian cancer Mother     Alcohol abuse Mother     Cancer Mother     No Known Problems Father     Diabetes Sister     Hypertension Maternal Grandmother     Breast cancer Neg Hx     Colon cancer Neg Hx      Social History     Tobacco Use    Smoking status: Never    Smokeless tobacco: Never   Substance Use Topics    Alcohol use: Yes     Alcohol/week: 3.0 standard drinks     Types: 3 Glasses of wine per week     Comment: 1-3 drinks every 2 weeks    Drug use: No     Review of Systems   Constitutional:  Negative for chills and fever.   Respiratory:  Positive for chest tightness and shortness of breath. Negative for cough.    Cardiovascular:  Negative for leg swelling.   Gastrointestinal:  Negative for abdominal pain and vomiting.        Positive abdominal cramping.   Genitourinary:  Negative for pelvic pain and vaginal bleeding.   Neurological:  Negative for dizziness and syncope.   All other systems reviewed and are negative.    Physical Exam     Initial Vitals [10/06/22 1129]   BP Pulse Resp Temp SpO2   131/88 87 (!) 22 97.9 °F (36.6 °C) 100 %      MAP       --         Physical Exam    Nursing note and vitals reviewed.  Constitutional: No distress.   HENT:   Head: Normocephalic and atraumatic.   Eyes: Conjunctivae are normal. Pupils are equal, round, and reactive to light.   Neck: Neck supple.   Normal range of motion.  Cardiovascular:  Normal rate, regular rhythm and normal heart sounds.           Pulmonary/Chest: Breath sounds normal. She has no wheezes.   Abdominal: Abdomen is soft. There is no abdominal tenderness.   Musculoskeletal:         General: No edema.      Cervical back: Normal range of motion and neck supple.      Comments:  No calf tenderness.     Neurological: She is alert and oriented to person, place, and time.   Skin: Skin is warm and dry. No rash noted.   Psychiatric: She has a normal mood and affect. Thought content normal.       ED Course   Procedures  Labs Reviewed   CBC W/ AUTO DIFFERENTIAL - Abnormal; Notable for the following components:       Result Value    RBC 3.65 (*)     Hemoglobin 10.5 (*)     Hematocrit 31.3 (*)     Gran # (ANC) 9.2 (*)     Immature Grans (Abs) 0.06 (*)     Gran % 82.7 (*)     Lymph % 10.5 (*)     All other components within normal limits   COMPREHENSIVE METABOLIC PANEL - Abnormal; Notable for the following components:    CO2 19 (*)     BUN 4 (*)     Albumin 3.1 (*)     All other components within normal limits     EKG Readings: (Independently Interpreted)   Initial Reading: No STEMI. Rhythm: Normal Sinus Rhythm. Heart Rate: 96. ST Segments: Normal ST Segments. T Waves Flipped: III, AVR and V1.   ECG Results              EKG 12-lead (In process)  Result time 10/06/22 13:07:37      In process by Interface, Lab In [facility] (10/06/22 13:07:37)                   Narrative:    Test Reason : R06.02,    Vent. Rate : 096 BPM     Atrial Rate : 096 BPM     P-R Int : 128 ms          QRS Dur : 092 ms      QT Int : 348 ms       P-R-T Axes : 056 069 022 degrees     QTc Int : 439 ms    Normal sinus rhythm  Nonspecific T wave abnormality  Abnormal ECG  When compared with ECG of 25-AUG-2022 16:26,  Nonspecific T wave abnormality, worse in Anterior leads    Referred By: [email or web address]   SELF           Confirmed By:                                   Imaging Results    None          Medications - No data to display  Medical Decision Making:   History:   Old Medical Records: I decided to obtain old medical records.  Initial Assessment:   Carlos Camarena is a 32 y.o. female who has a past medical history of asthma, fibromyalgia, and moderate persistent asthma.The patient presents to the emergency department due to shortness of  breath; onset this morning. Associated symptoms include abdominal cramping and chest tightness.     Differential Diagnosis:   Differential Diagnosis includes, but is not limited to:  PE, MI/ACS, pneumothorax, pericardial effusion/tamonade, pneumonia, lung abscess, pericarditis/myocarditis, pleural effusion, lung mass, CHF exacerbation, asthma exacerbation, COPD exacerbation, aspirated/ingested foreign body, airway obstruction, CO poisoning, anemia, metabolic derangement, allergy/atopy, influenza, viral URI, viral syndrome.   Clinical Tests:   Lab Tests: Ordered and Reviewed  Medical Tests: Ordered and Reviewed  ED Management:  Patient has clear lungs to auscultation.  Oxygen saturations are 100 percent on room air.  Hemoglobin hematocrit are stable.  Patient was ambulated in the emergency department with no drop in her oxygen saturation.  Patient had a chest CTA to rule out pulmonary embolism about 6 weeks ago and I do not see any current need to repeat that.  Etiology may be anxiety or possibly due to increasing size of uterus.  She may continue her inhaler as needed but I have urged close follow-up with her obstetrician.  Most importantly, patient will return to the emergency department for any possible worsening.  Additional MDM:   PERC Rule:   Age is greater than or equal to 50 = 0.0  Heart Rate is greater than or equal to 100 = 0.0  SaO2 on room air < 95% = 0.0  Unilateral leg swelling = 0.0  Hemoptysis = 0.0  Recent surgery or trauma = 0.0  Prior PE or DVT =  0.0  Hormone use = 0.00  PERC Score = 0     Scribe Attestation:   Scribe #1: I performed the above scribed service and the documentation accurately describes the services I performed. I attest to the accuracy of the note.                 I, Dr. Leonardo Smith, personally performed the services described in this documentation. All medical record entries made by the scribe were at my direction and in my presence. I have reviewed the chart and agree that the  record reflects my personal performance and is accurate and complete. Leonardo Soni MD.  7:16 AM 10/07/2022    Clinical Impression:   Final diagnoses:  [R06.02] SOB (shortness of breath)  [Z3A.28] 28 weeks gestation of pregnancy (Primary)        ED Disposition Condition    Discharge Stable          ED Prescriptions    None       Follow-up Information       Follow up With Specialties Details Why Contact Info    Glory Fink MD Obstetrics, Obstetrics and Gynecology Schedule an appointment as soon as possible for a visit   [address]  [address]  [facility] [facility] [address]  [phone]      [facility] Emergency Medicine  If symptoms worsen [address] [address] [phone]             Leonardo Soni MD  10/07/22 0719

## 2022-10-06 NOTE — ED NOTES
Pt. C/o sob that she describes as asthma exacerbation, headache and mild mid. Abdominal cramping x2 days. Denies urinary symptoms, bleeding or vaginal discharge. She is 23 weeks pregnant. She reports history of asthma which generally has been worse during pregnancy but minimally responsive to current meds. X2 days. Pt. Is mildly anxious and hyperventilating. Denies fever/cough.

## 2022-10-06 NOTE — ED NOTES
Ambulated pt. Around unit with pulse oximetry monitoring. Sao2 maintained 100%. Hr maintained 102-110. Pt. Reports some increased dyspnea with exertion. Remains mildly tachypneic.

## 2022-10-06 NOTE — ED NOTES
Dr. Soni at bedside for reassessment and to discuss test results and plan of care-D-dimer and possible CTA. Pt. Reports she recently had a negative CTA for same symptoms.

## 2022-10-10 ENCOUNTER — ROUTINE PRENATAL (OUTPATIENT)
Dept: OBSTETRICS AND GYNECOLOGY | Facility: CLINIC | Age: 32
End: 2022-10-10
Payer: COMMERCIAL

## 2022-10-10 VITALS
DIASTOLIC BLOOD PRESSURE: 74 MMHG | WEIGHT: 243.38 LBS | SYSTOLIC BLOOD PRESSURE: 114 MMHG | BODY MASS INDEX: 39.28 KG/M2

## 2022-10-10 DIAGNOSIS — O99.891 BACK PAIN AFFECTING PREGNANCY IN THIRD TRIMESTER: Primary | ICD-10-CM

## 2022-10-10 DIAGNOSIS — M54.9 BACK PAIN AFFECTING PREGNANCY IN THIRD TRIMESTER: Primary | ICD-10-CM

## 2022-10-10 PROCEDURE — 99999 PR PBB SHADOW E&M-EST. PATIENT-LVL III: CPT | Mod: PBBFAC,,, | Performed by: OBSTETRICS & GYNECOLOGY

## 2022-10-10 PROCEDURE — 99999 PR PBB SHADOW E&M-EST. PATIENT-LVL III: ICD-10-PCS | Mod: PBBFAC,,, | Performed by: OBSTETRICS & GYNECOLOGY

## 2022-10-10 PROCEDURE — 0502F PR SUBSEQUENT PRENATAL CARE: ICD-10-PCS | Mod: CPTII,S$GLB,, | Performed by: OBSTETRICS & GYNECOLOGY

## 2022-10-10 PROCEDURE — 0502F SUBSEQUENT PRENATAL CARE: CPT | Mod: CPTII,S$GLB,, | Performed by: OBSTETRICS & GYNECOLOGY

## 2022-10-10 NOTE — PROGRESS NOTES
Reviewed connected mom values. Has pulm appointment next week. States advair helped with chest tightness but not as much with SOB. Reviewed recent ED visit as well. Will sign new orders to restart PT- back pain. Given precautions.

## 2022-10-11 ENCOUNTER — PATIENT MESSAGE (OUTPATIENT)
Dept: OBSTETRICS AND GYNECOLOGY | Facility: CLINIC | Age: 32
End: 2022-10-11
Payer: COMMERCIAL

## 2022-10-11 NOTE — LETTER
October 13, 2022      [facility]  [address], [place] [address]  Phone: [phone]       Patient: Carlos Camarena   YOB: 1990    To Whom It May Concern:    Hernandez Camarena is currently under my care for this pregnancy with an estimated due date of 12/28/2022. This pregnancy has been complicated by extreme shortness of breath and asthma. She has had a few ER visits secondary to her symptoms and is currently being referred to a specialist. Secondary to her condition, I recommend that she avoid any travel assignments and restrict any excessive ambulation that may exacerbate her respiratory distress.  If you have any questions or concerns, or if I can be of further assistance, please do not hesitate to contact me.    Sincerely,        Glory Fink MD

## 2022-10-12 NOTE — TELEPHONE ENCOUNTER
Pt is requesting a note for a school session that she would have to travel to, she is requesting documentation for her asthma and SOB. She would like documentation for the overall health of this current pregnancy.

## 2022-10-13 ENCOUNTER — TELEPHONE (OUTPATIENT)
Dept: OBSTETRICS AND GYNECOLOGY | Facility: CLINIC | Age: 32
End: 2022-10-13
Payer: COMMERCIAL

## 2022-10-13 ENCOUNTER — PATIENT MESSAGE (OUTPATIENT)
Dept: OBSTETRICS AND GYNECOLOGY | Facility: CLINIC | Age: 32
End: 2022-10-13
Payer: COMMERCIAL

## 2022-10-13 RX ORDER — NIFEDIPINE 30 MG/1
30 TABLET, EXTENDED RELEASE ORAL DAILY
Qty: 30 TABLET | Refills: 11 | Status: ON HOLD | OUTPATIENT
Start: 2022-10-13 | End: 2022-12-11 | Stop reason: HOSPADM

## 2022-10-13 NOTE — TELEPHONE ENCOUNTER
Called patient after alert regarding elevated BP. No answer. Will now have the diagnosis of GHTN and may benefit from starting BP medication. Rx sent to pharmacy    Glory Fink MD, FACOG  OB/GYN

## 2022-10-17 ENCOUNTER — PATIENT MESSAGE (OUTPATIENT)
Dept: OBSTETRICS AND GYNECOLOGY | Facility: CLINIC | Age: 32
End: 2022-10-17
Payer: COMMERCIAL

## 2022-10-18 ENCOUNTER — PATIENT MESSAGE (OUTPATIENT)
Dept: OBSTETRICS AND GYNECOLOGY | Facility: CLINIC | Age: 32
End: 2022-10-18
Payer: COMMERCIAL

## 2022-10-18 NOTE — TELEPHONE ENCOUNTER
The current documentation should be more than enough for a routine letter. 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    Glory Fink MD, FACOG  OB/GYN

## 2022-10-19 ENCOUNTER — OFFICE VISIT (OUTPATIENT)
Dept: PULMONOLOGY | Facility: CLINIC | Age: 32
End: 2022-10-19
Payer: COMMERCIAL

## 2022-10-19 VITALS
OXYGEN SATURATION: 98 % | DIASTOLIC BLOOD PRESSURE: 79 MMHG | WEIGHT: 247.13 LBS | BODY MASS INDEX: 39.72 KG/M2 | SYSTOLIC BLOOD PRESSURE: 120 MMHG | HEART RATE: 117 BPM | HEIGHT: 66 IN

## 2022-10-19 DIAGNOSIS — J45.40 MODERATE PERSISTENT ASTHMA WITHOUT COMPLICATION: ICD-10-CM

## 2022-10-19 DIAGNOSIS — R06.02 SHORTNESS OF BREATH: Primary | ICD-10-CM

## 2022-10-19 PROCEDURE — 3078F PR MOST RECENT DIASTOLIC BLOOD PRESSURE < 80 MM HG: ICD-10-PCS | Mod: CPTII,S$GLB,, | Performed by: INTERNAL MEDICINE

## 2022-10-19 PROCEDURE — 3074F PR MOST RECENT SYSTOLIC BLOOD PRESSURE < 130 MM HG: ICD-10-PCS | Mod: CPTII,S$GLB,, | Performed by: INTERNAL MEDICINE

## 2022-10-19 PROCEDURE — 1159F PR MEDICATION LIST DOCUMENTED IN MEDICAL RECORD: ICD-10-PCS | Mod: CPTII,S$GLB,, | Performed by: INTERNAL MEDICINE

## 2022-10-19 PROCEDURE — 99999 PR PBB SHADOW E&M-EST. PATIENT-LVL IV: ICD-10-PCS | Mod: PBBFAC,,, | Performed by: INTERNAL MEDICINE

## 2022-10-19 PROCEDURE — 3078F DIAST BP <80 MM HG: CPT | Mod: CPTII,S$GLB,, | Performed by: INTERNAL MEDICINE

## 2022-10-19 PROCEDURE — 99999 PR PBB SHADOW E&M-EST. PATIENT-LVL IV: CPT | Mod: PBBFAC,,, | Performed by: INTERNAL MEDICINE

## 2022-10-19 PROCEDURE — 3074F SYST BP LT 130 MM HG: CPT | Mod: CPTII,S$GLB,, | Performed by: INTERNAL MEDICINE

## 2022-10-19 PROCEDURE — 99204 PR OFFICE/OUTPT VISIT, NEW, LEVL IV, 45-59 MIN: ICD-10-PCS | Mod: S$GLB,,, | Performed by: INTERNAL MEDICINE

## 2022-10-19 PROCEDURE — 1159F MED LIST DOCD IN RCRD: CPT | Mod: CPTII,S$GLB,, | Performed by: INTERNAL MEDICINE

## 2022-10-19 PROCEDURE — 99204 OFFICE O/P NEW MOD 45 MIN: CPT | Mod: S$GLB,,, | Performed by: INTERNAL MEDICINE

## 2022-10-19 RX ORDER — FLUTICASONE PROPIONATE AND SALMETEROL 50; 500 UG/1; UG/1
1 POWDER RESPIRATORY (INHALATION) 2 TIMES DAILY
Qty: 60 EACH | Refills: 11 | Status: SHIPPED | OUTPATIENT
Start: 2022-10-19 | End: 2023-10-19

## 2022-10-19 RX ORDER — PREDNISONE 20 MG/1
60 TABLET ORAL DAILY
Qty: 15 TABLET | Refills: 0 | Status: SHIPPED | OUTPATIENT
Start: 2022-10-19 | End: 2022-10-24

## 2022-10-19 NOTE — PROGRESS NOTES
"Subjective:     Reason for visit: Shortness of breath     Patient ID:  Carlos Camarena is a 32 y.o. female    History:  32 year old female is here for sob. Has hx of asthma. Diagnosed at age 10. It was sports induced asthma. She used to get chronic bronchitis as a child. Would only get flare when doing intense physical activity and had bronchitis. Will get it bronchitis every year until college. Had a major ruptured disc in her spine and spinal fusion surgery at age 20. After surgery, she does low intensity exercise which did not flare her asthma, and she stopped taking her inhaler. She is 7 month pregnant now. Before pregnancy would take rescue inhaler once every 2-3 months. After pregnancy, started to notice breathing problem in August (when 5 month pregnant). In August, she had covid-like symptoms after being exposed to in law's family, some of whom had covid. Her breathing problem became worse after that. She had CT scan done afterwards. Has pleuritic chest pain and chest pain exacerbates when walking. Cannot walk more than 15 minutes. Has b/l leg and hand swelling. Sometimes sob at rest. Even small tasks can increase sob. Since august it is progressively getting worse. Albuterol and Advair helps minimally. Takes advair 2 puffs bid. Takes albuterol when only gets intolerably worse, usually once/day. No hx of miscarriage.       Occupational/Environmental Exposures: has allergy and takes claritin, and has been well controlled.   Exposure to Animals/Pets: Dog; sometimes his hair can irritate.   Travel History: No   History of exposures to TB: Non   Family History of Lung Cancer: No  Childhood history of Lung Disease: No   Chest surgery/trauma- No  Tobacco history- Smoked weed in 20s.   Recurrent PNA- see above on bronchitis, but now generally laryngitis.         CT scan on 08/2022 showed, "Technically limited study as discussed above. No evidence of central pulmonary embolus or convincing evidence of pulmonary " "thromboembolism to the proximal segmental levels or pulmonary infarction. Pulmonary embolus distal to the proximal segmental levels, particularly at the lung bases cannot be excluded. Correlation with d-dimer and lower extremity venous Doppler ultrasound as clinically warranted" EKG showed non specific T wave changes in anterior lids.     Echo showed:   The left ventricle is mildly enlarged with mild apical asymmetric hypertrophy and normal systolic function.  The estimated ejection fraction is 55%.  Normal left ventricular diastolic function.  The estimated PA systolic pressure is 14 mmHg.  Normal right ventricular size with normal right ventricular systolic function.  Normal central venous pressure (3 mmHg).  Does not have established pulm care.     D-dimer was elevated to 1.8 on 8/22.       Review of Systems   Constitutional:  Negative for chills, fever, malaise/fatigue and weight loss.   HENT:  Negative for congestion, hearing loss, nosebleeds and sinus pain.    Eyes:  Negative for blurred vision, pain and discharge.   Respiratory:  Positive for shortness of breath and wheezing. Negative for cough, hemoptysis and sputum production.    Cardiovascular:  Positive for leg swelling. Negative for chest pain and claudication.   Gastrointestinal:  Negative for abdominal pain, blood in stool, constipation and heartburn.   Genitourinary:  Negative for dysuria and frequency.   Musculoskeletal:  Negative for back pain and myalgias.   Skin:  Negative for rash.   Neurological:  Negative for dizziness, seizures and loss of consciousness.   Endo/Heme/Allergies:  Negative for environmental allergies. Does not bruise/bleed easily.   Psychiatric/Behavioral:  Negative for depression.       Objective:     Vitals:    10/19/22 1404   BP: 120/79   BP Location: Right arm   Patient Position: Sitting   Pulse: (!) 117   SpO2: 98%   Weight: 112.1 kg (247 lb 2.2 oz)   Height: 5' 6" (1.676 m)         Physical Exam  Constitutional:       " General: She is not in acute distress.     Appearance: She is not diaphoretic.   HENT:      Head: Normocephalic and atraumatic.      Right Ear: External ear normal.      Left Ear: External ear normal.   Eyes:      Conjunctiva/sclera: Conjunctivae normal.      Pupils: Pupils are equal, round, and reactive to light.   Neck:      Trachea: No tracheal deviation.   Cardiovascular:      Rate and Rhythm: Normal rate and regular rhythm.      Heart sounds: Normal heart sounds. No murmur heard.  Pulmonary:      Effort: Tachypnea and respiratory distress present.      Breath sounds: Normal breath sounds. No stridor. No wheezing or rales.   Abdominal:      General: Bowel sounds are normal. There is no distension.      Palpations: Abdomen is soft.      Tenderness: There is no abdominal tenderness.   Musculoskeletal:         General: Normal range of motion.      Cervical back: Normal range of motion and neck supple.   Skin:     General: Skin is warm and dry.      Findings: No erythema.   Neurological:      Mental Status: She is alert and oriented to person, place, and time.      Gait: Gait is intact.   Psychiatric:         Mood and Affect: Mood and affect normal.         Cognition and Memory: Memory normal.         Judgment: Judgment normal.        Personal Diagnostic Review and Interpretation  CTA done previously with no clear concern for PE.      Echocardiograms:   Results for orders placed during the hospital encounter of 09/26/22    Echo    Interpretation Summary  · The left ventricle is mildly enlarged with mild apical asymmetric hypertrophy and normal systolic function.  · The estimated ejection fraction is 55%.  · Normal left ventricular diastolic function.  · The estimated PA systolic pressure is 14 mmHg.  · Normal right ventricular size with normal right ventricular systolic function.  · Normal central venous pressure (3 mmHg).      Assessment:     1. Shortness of breath  US Lower Extremity Veins Bilateral      2.  Moderate persistent asthma without complication  Ambulatory referral/consult to Pulmonology    predniSONE (DELTASONE) 20 MG tablet    fluticasone-salmeterol 500-50 mcg/dose (ADVAIR DISKUS) 500-50 mcg/dose DsDv diskus inhaler          Current Outpatient Medications   Medication Instructions    albuterol (VENTOLIN HFA) 90 mcg/actuation inhaler 2 puffs, Inhalation, Every 6 hours PRN, Rescue    aspirin 81 mg, Oral, Daily    diphenhydrAMINE (BENADRYL) 25 mg, Oral, Every 6 hours PRN    famotidine (PEPCID) 20 mg, Oral, 2 times daily    ferrous sulfate 325 mg, Oral, 2 times daily    fluticasone-salmeterol 500-50 mcg/dose (ADVAIR DISKUS) 500-50 mcg/dose DsDv diskus inhaler 1 puff, Inhalation, 2 times daily, Controller    NIFEdipine (PROCARDIA-XL) 30 mg, Oral, Daily    predniSONE (DELTASONE) 60 mg, Oral, Daily    pyridoxine (vitamin B6) (B-6) 25 mg, Oral, Daily PRN    UNISOM (DOXYLAMINE) 25 mg, Oral, Nightly PRN, To take along with vitamin B6 for nausea and vomiting in pregnancy.      Carlos Camarena had no medications administered during this visit.     Orders Placed This Encounter   Procedures    US Lower Extremity Veins Bilateral     Standing Status:   Future     Standing Expiration Date:   11/19/2022     Order Specific Question:   Is the patient pregnant?     Answer:   Patient States- Unknown      Plan:       Problem List Items Addressed This Visit          Pulmonary    Moderate persistent asthma without complication    Current Assessment & Plan     Shortness of breath has worsened significantly with her pregnancy, initial concerns for PE but CTA was done with no evidence of large PE although contrast load wasn't the best.   - echo with no heart strain or evidence of cardiomyopathy  - giving a one week course or oral prednisone and increasing her advair to high dose   - adding ultrasounds of lower extremities to ensure no dvt.          Relevant Medications    predniSONE (DELTASONE) 20 MG tablet     fluticasone-salmeterol 500-50 mcg/dose (ADVAIR DISKUS) 500-50 mcg/dose DsDv diskus inhaler    Shortness of breath - Primary    Current Assessment & Plan     See above- concern for PE although CTA was initially not concerning.          Relevant Orders    US Lower Extremity Veins Bilateral    Fani Heath M.D.  Pulmonary/Critical Care

## 2022-10-19 NOTE — ASSESSMENT & PLAN NOTE
Shortness of breath has worsened significantly with her pregnancy, initial concerns for PE but CTA was done with no evidence of large PE although contrast load wasn't the best.   - echo with no heart strain or evidence of cardiomyopathy  - giving a one week course or oral prednisone and increasing her advair to high dose   - adding ultrasounds of lower extremities to ensure no dvt.

## 2022-10-25 ENCOUNTER — PATIENT MESSAGE (OUTPATIENT)
Dept: OBSTETRICS AND GYNECOLOGY | Facility: CLINIC | Age: 32
End: 2022-10-25

## 2022-10-25 ENCOUNTER — ROUTINE PRENATAL (OUTPATIENT)
Dept: OBSTETRICS AND GYNECOLOGY | Facility: CLINIC | Age: 32
End: 2022-10-25
Payer: COMMERCIAL

## 2022-10-25 ENCOUNTER — TELEPHONE (OUTPATIENT)
Dept: OBSTETRICS AND GYNECOLOGY | Facility: CLINIC | Age: 32
End: 2022-10-25

## 2022-10-25 VITALS
SYSTOLIC BLOOD PRESSURE: 122 MMHG | DIASTOLIC BLOOD PRESSURE: 60 MMHG | BODY MASS INDEX: 40.17 KG/M2 | WEIGHT: 248.88 LBS

## 2022-10-25 DIAGNOSIS — O13.3 GESTATIONAL HYPERTENSION, THIRD TRIMESTER: Primary | ICD-10-CM

## 2022-10-25 PROCEDURE — 0502F PR SUBSEQUENT PRENATAL CARE: ICD-10-PCS | Mod: CPTII,S$GLB,, | Performed by: OBSTETRICS & GYNECOLOGY

## 2022-10-25 PROCEDURE — 0502F SUBSEQUENT PRENATAL CARE: CPT | Mod: CPTII,S$GLB,, | Performed by: OBSTETRICS & GYNECOLOGY

## 2022-10-25 PROCEDURE — 84156 ASSAY OF PROTEIN URINE: CPT | Performed by: OBSTETRICS & GYNECOLOGY

## 2022-10-25 PROCEDURE — 99999 PR PBB SHADOW E&M-EST. PATIENT-LVL III: ICD-10-PCS | Mod: PBBFAC,,, | Performed by: OBSTETRICS & GYNECOLOGY

## 2022-10-25 PROCEDURE — 99999 PR PBB SHADOW E&M-EST. PATIENT-LVL III: CPT | Mod: PBBFAC,,, | Performed by: OBSTETRICS & GYNECOLOGY

## 2022-10-25 NOTE — PROGRESS NOTES
Patient reports normal fetal movement. Denies vaginal bleeding, regular contractions or leakage of fluid. Recently saw pulm- started on steroid and advair dose increased. Scheduled for doppler of lower extremities. Given letter for work- restrictions for work travel and working from home.  Bp doing ok with procardia. Discussed weekly labs and twice weekly  testing. Udip negative protein today will f/u urine PCR.

## 2022-10-25 NOTE — LETTER
October 25, 2022      [facility]  [address], [place]  [place] [address]  Phone: [phone]       Patient: Carlos Camarena   YOB: 1990  Date of Visit: 10/25/2022    To Whom It May Concern:    Hernandez Camarena  was at [facility] on 10/25/2022. She is currently under my care for this pregnancy with an estimated due date of 12/28/2022. Her pregnancy is complicated by extreme shortness of breath at rest and exertion and gestational hypertension. It is my recommendation that she no longer participates in work travel and exclusively works from home until cleared to return postpartum. If you have any questions or concerns, or if I can be of further assistance, please do not hesitate to contact me.    Sincerely,          Glory Fink MD      Refused

## 2022-10-26 ENCOUNTER — CLINICAL SUPPORT (OUTPATIENT)
Dept: REHABILITATION | Facility: OTHER | Age: 32
End: 2022-10-26
Payer: COMMERCIAL

## 2022-10-26 DIAGNOSIS — O99.891 BACK PAIN AFFECTING PREGNANCY IN THIRD TRIMESTER: ICD-10-CM

## 2022-10-26 DIAGNOSIS — M54.9 BACK PAIN AFFECTING PREGNANCY IN THIRD TRIMESTER: ICD-10-CM

## 2022-10-26 DIAGNOSIS — Z74.09 IMPAIRED FUNCTIONAL MOBILITY, BALANCE, GAIT, AND ENDURANCE: Primary | ICD-10-CM

## 2022-10-26 DIAGNOSIS — M62.81 WEAKNESS OF TRUNK MUSCULATURE: ICD-10-CM

## 2022-10-26 LAB
CREAT UR-MCNC: 52 MG/DL (ref 15–325)
PROT UR-MCNC: <7 MG/DL (ref 0–15)
PROT/CREAT UR: NORMAL MG/G{CREAT} (ref 0–0.2)

## 2022-10-26 PROCEDURE — 97530 THERAPEUTIC ACTIVITIES: CPT

## 2022-10-26 PROCEDURE — 97162 PT EVAL MOD COMPLEX 30 MIN: CPT

## 2022-10-26 PROCEDURE — 97110 THERAPEUTIC EXERCISES: CPT

## 2022-10-26 PROCEDURE — 97112 NEUROMUSCULAR REEDUCATION: CPT

## 2022-10-26 NOTE — PATIENT INSTRUCTIONS
Home Exercise Program: 10/26/2022    DIAPHRAGMATIC BREATHING     The diaphragm is a dome shaped muscle that forms the floor of the rib cage. It is the most efficient muscle for breathing and relaxation, although most people are not used to using the diaphragm. Diaphragmatic or belly breathing is an important technique to learn because it helps settle down or relax the autonomic nervous system. The correct use of diaphragmatic breathing can help to quiet brain activity resulting in the relaxation of all the muscles and organs of the body. This is accomplished by slow rhythmic breathing concentrated in the diaphragm muscle rather than the chest.    360 Breath - Inhale long, slow and deep. You should feel as if your lower ribs are expanding in all directions like the way an umbrella opens. You should feel the belly, back and sides gently expand and you may notice a relaxation in the pelvic floor. If you notice that your belly is pulling up and flattening during your inhale - try to reverse this and allow your belly to gently expand during the inhale while it recoils and flattens during the exhale      Continue to breath like this for 5 minutes. Repeat 1 times/day.

## 2022-10-26 NOTE — TELEPHONE ENCOUNTER
----- Message from Glory Fink MD sent at 10/25/2022  4:30 PM CDT -----  Regarding: prenatal testing- twice weekly (BPP/PNT)  Hey can you help get her set up for PNT for me pretty please    Glory Fink MD, FACOG  OB/GYN

## 2022-10-27 ENCOUNTER — HOSPITAL ENCOUNTER (OUTPATIENT)
Dept: RADIOLOGY | Facility: HOSPITAL | Age: 32
Discharge: HOME | End: 2022-10-27
Attending: INTERNAL MEDICINE
Payer: COMMERCIAL

## 2022-10-27 DIAGNOSIS — R06.02 SHORTNESS OF BREATH: ICD-10-CM

## 2022-10-27 PROCEDURE — 93970 EXTREMITY STUDY: CPT | Mod: 26,,, | Performed by: RADIOLOGY

## 2022-10-27 PROCEDURE — 93970 EXTREMITY STUDY: CPT | Mod: TC

## 2022-10-27 PROCEDURE — 93970 US LOWER EXTREMITY VEINS BILATERAL: ICD-10-PCS | Mod: 26,,, | Performed by: RADIOLOGY

## 2022-11-01 NOTE — PLAN OF CARE
[facility]  Pelvic Health Physical Therapy Initial Evaluation    Date: 10/26/2022   Name: Carlos Camarena  [facility] Number: [id number]  Therapy Diagnosis:   Encounter Diagnoses   Name Primary?    Back pain affecting pregnancy in third trimester     Impaired functional mobility, balance, gait, and endurance Yes    Weakness of trunk musculature      Physician: Glory Fink MD    Physician Orders: PT Eval and Treat - Pelvic Floor PT   Medical Diagnosis from Referral: O99.891,M54.9 (ICD-10-CM) - Back pain affecting pregnancy in third trimester  Evaluation Date: 10/26/2022  Authorization Period Expiration: 10/10/2023  Plan of Care Expiration: 1/26/2023  Visit # / Visits authorized: 1/ 1    Time In: 0915  Time Out: 1000  Total Appointment Time (timed & untimed codes): 45 minutes  Total Billing Time: 30    Precautions: universal, Pregnant - EDOD 12/7/2022     Subjective     Date of onset:  chronic condition - worsened 3 months ago     History of current condition - Carlos reports: Is currently pregnant; in 3rd trimester. Has been experiencing R -sided low back, buttock and LE pain for approximately 3 months. This is a pain that has come and gone over the years and has returned during pregnancy. Prior to pregnancy, she was receiving injections for SIJ pain. Has hx of fibromyalgia; diagnosed in 2011. Completed the functional restoration program in February, which she felt helped significantly. She learned how to better manage pain through regular resistance training, but due to complications with pregnancy, she has been unable to consistently do this. She feels this has worsened symptoms. Finds R sided pain is worsened by prolonged movement. Has progressed to presenting with even 10-15 minutes of walking or household activity. Also has been experiencing lower abdominal pain, which worsens transferring to and from supine. This is a new pain; has been experiencing approximately 4 months. Hx of L5-S1 fusion  in ; anterior approach. Has been diagnosed with L-sided scoliosis       OB/GYN History: , Hx of endometriosis   Sexually active? Yes, but limited by SOB  Pain with vaginal exams, intercourse or tampon use? Pain with certain positions during intercourse    Bladder/Bowel History: WFL     PAIN:  Location: R low back, hip, buttocks, LE (to calf)   Current 5/10, worst 9/10, best 4/10   Description: Sharp and Shooting  Aggravating Factors/Activities that cause symptoms: activity    Easing Factors: stretches, ice/heat pack     Medical History: Carlos  has a past medical history of ADD (attention deficit disorder), Asthma, Chronic back pain, Fibromyalgia, and Moderate persistent asthma.     Surgical History:  Carlos Camarena  has a past surgical history that includes Back surgery (); Injection of joint (Right, 2022); and Spine surgery (16).    Medications: Carlos has a current medication list which includes the following prescription(s): albuterol, aspirin, diphenhydramine, unisom (doxylamine), famotidine, ferrous sulfate, advair diskus, nifedipine, and pyridoxine (vitamin b6).    Allergies:   Review of patient's allergies indicates:   Allergen Reactions    Lactose Diarrhea    Gluten protein Itching     Inflammation, bloating, diarrhea and pain all over body          Imaging none recently     Prior Therapy/Previous treatment included: FRP - good results, prior injections for hip/SIJ   Social History:  lives with their spouse  Current exercise:10-15 minutes of walking 1x/week, yoga 1x/week due to SOB, daily stretching   Occupation: Pt works from home as a  for [employer]   Prior Level of Function: independent   Current Level of Function: limited by pain and shortness of breath     Habitus:well developed, well nourished  Abuse/Neglect: No     Pts goals: improve pain and prepare for childbirth     OBJECTIVE     See EMR under MEDIA for written consent provided  10/26/2022  Chaperone: declined    ORTHO SCREEN  Posture in sitting: slouched  and sacral sitting  Posture in standing: lateral trunk flexion to the R, decreased lordosis, and elevated iliac crest on the R (Left-sided thoracolumbar scoliotic curve)  Pelvic alignment: no sign of deviations noted in supine   SI Joint Palpation: Tenderness to the right SI joint palpation. Tenderness to the left SI joint palpation.  Adductor Palpation: increased tension     LUMBAR ROM - Moderate limitation in all planes     HIP STRENGTH: Not fully assessed due to SOB     Special Tests for Load Transfer Assessment Between the Trunk and Lower Extremities:   Active Straight Leg Test:  NP   Active Straight Leg Test with  Overpressure: NP   Single Leg Stance Test:    Right: impaired balance reactions   Left: impaired balance reactions     Balance Reactions:    Static standing: excellent   Dynamic standing:  good     ABDOMINAL WALL ASSESSMENT  Palpation: tender and increased tension   Abdominal strength: Rectus abdominus: NP     Transverse abdominus: NP  Pelvic Floor Muscle and Transverse Abdominus Synergy: present  Diastasis: NP    BREATHING MECHANICS ASSESSMENT   Thorax Assessment During Quiet Respiration: Decreased excursion of abdominal wall  and Decreased excursion bilaterally of lateral ribs   Thorax Assessment During Deep Respiration: Decreased excursion bilaterally of lateral ribs  and Excessive excursion of sternum      Limitation/Restriction for FOTO PFDI Pain Survey    Therapist reviewed FOTO scores for Carlos Camarena on 10/26/2022.   FOTO documents entered into [employer] - see Media section.    Limitation Score: 71%       TREATMENT     Treatment Time In: 0930  Treatment Time Out: 1000  Total Treatment time (time-based codes) separate from Evaluation: 30 minutes      Therapeutic Exercise to develop  strength, endurance, ROM, flexibility, posture, and core stabilization for 10 minutes including:     On PB: HS stretch, figure 4 stretch,  fwd trunk flexion, adductor side lunge stretch, pelvic tilts, lateral tilts, pelvic clocks     Neuromuscular Re-education to develop Coordination, Control, and Down training for 8 minutes including: pelvic floor relaxation/bulging training, 360 breathing , and diaphragmatic breathing    Therapeutic Activity Patient participated in dynamic functional therapeutic activities to improve functional performance for 12 minutes. Including: Education as described below.     Patient Education provided:   general anatomy/physiology of urinary/ bowel  system, benefits of treatment, risks of treatment, and alternative methods of treatment were discussed with the pt. Additionally, anatomy/physiology of pelvic floor, posture/body mechanices, diaphragmatic breathing, and behavior modifications were reviewed.     Home Exercises Provided:  Written Home Exercises Provided: yes.  Exercises were reviewed and Carlos was able to demonstrate them prior to the end of the session.    Carlos demonstrated good  understanding of the education provided.     See EMR under Patient Instructions for exercises provided 10/26/2022.    Assessment     Carlos is a 32 y.o. female referred to outpatient Physical Therapy with a medical diagnosis of back pain in pregnancy. Pt presents with altered posture, poor knowledge of body mechanics and posture, and poor trunk stability. Orthopedic assessment significantly limited this session by shortness of breath and nausea experienced during resisted muscle screening; however functional assessment revealed significant limitations in trunk range of motion and strength. Poor load transfer demonstrated as well as balance deficits. Based on presentation, Carlos likely also presents with limitations in pelvic floor mobility. Together, deficits have resulted in significant pain and limitations in mobility. Based on presentation, Carlos would benefit from continued pelvic floor physical therapy for improved  mobility, control, and coordination of trunk and pelvic floor in preparation for upcoming delivery.     Pt prognosis is Good.   Pt will benefit from skilled outpatient Physical Therapy to address the deficits stated above and in the chart below, provide pt/family education, and to maximize pt's level of independence.     Plan of care discussed with patient: Yes  Pt's spiritual, cultural and educational needs considered and patient is agreeable to the plan of care and goals as stated below:       Anticipated Barriers for therapy: chronicity of condition, pregnancy, upcoming delivery     Medical Necessity is demonstrated by the following    History  Co-morbidities and personal factors that may impact the plan of care Co-morbidities:   Migraine, MDD, ADD, dysphnonia, asthma, SOB, endometriosis, PMDD, obesity, cyanocobalamin deficiency, GERD, chronic back pain    Personal Factors:   coping style  lifestyle     moderate   Examination  Body Structures and Functions, activity limitations and participation restrictions that may impact the plan of care Body Regions/Systems/Functions:  altered posture, poor knowledge of body mechanics and posture, poor trunk stability, and decreased phasic ability of the pelvic muscles     Activity Limitations:  Pain with ADLs    Participation Restrictions:  social activities with friends/family, relationship with spouse/partner, ADL participation affected by pain, work duties, Sleep restrictions, and exercise restrictions due to pain     Activity limitations:   Learning and applying knowledge  no deficits    General Tasks and Commands  no deficits    Communication  no deficits    Mobility  lifting and carrying objects  walking    Self care  no deficits    Domestic Life  shopping  doing house work (cleaning house, washing dishes, laundry)    Interactions/Relationships  intimate relationships    Life Areas  employment    Community and Social Life  community life  recreation and leisure        moderate   Clinical Presentation evolving clinical presentation with changing clinical characteristics moderate   Decision Making/ Complexity Score: moderate       Goals:  Short Term Goals: 6 weeks     Pt to demonstrate proper diaphragmatic breathing to help with calming the nervous system in order to decrease pain and to improve abdominal wall musculature extensibility.   Pt to report being able to complete 10 minutes of exercise 4 days per week without significant increase in pain to demonstrate a return towards prior level of function.  Pt to demonstrate good body mechanics when performing ADLs such as lifting and bending to decrease strain to lumbopelvic structures and reduce risk of further injury.  Pt will report minimal to no pain with single digit pelvic assessment and display relaxation demonstrating improving pelvic floor muscle relaxation and coordination.   Pt to report a decrease in pain to no more than 7/10 at it's worst with daily chores in home when limited to 15 minute intervals.         Long Term Goals: 12 weeks     Pt to be discharged with home plan for carry over after discharge.   Pt will be trained and compliant with postural strategies in sitting and standing to improve alignment and decrease pain and muscle fatigue  Pt to report being able to have a comfortable vaginal exam without significant increase in pelvic pain.  Pt to report being able to complete 15 minutes of exercise 5 days per week without significant increase in pain to demonstrate a return towards prior level of function.  Pt to report a decrease in pain to no more than 5/10 at it's worst with daily chores in home when limited to 15 minute intervals.       Plan     Plan of care Certification: 10/26/2022 to 1/26/2023.    Outpatient Physical Therapy 1 times weekly for 12 weeks to include the following interventions: therapeutic exercises, therapeutic activity, neuromuscular re-education, manual therapy, modalities PRN,  patient/family education and self care/home management    Plan for next visit: consider external pelvic assessment of PFM mobility, review of DB, review HEP >> gentle strengthening     JINNY PAIZ, PT  10/26/2022          I have seen the patient, reviewed the therapist's plan of care, and I agree with the plan of care.      I certify the need for these services furnished under this plan of treatment and while under my care.     ___________________ ________ Physician/Referring Practitioner            ___________________________ Date of Signature

## 2022-11-02 ENCOUNTER — PROCEDURE VISIT (OUTPATIENT)
Dept: OBSTETRICS AND GYNECOLOGY | Facility: CLINIC | Age: 32
End: 2022-11-02
Payer: COMMERCIAL

## 2022-11-02 DIAGNOSIS — Z32.01 POSITIVE URINE PREGNANCY TEST: ICD-10-CM

## 2022-11-02 PROCEDURE — 76816 OB US FOLLOW-UP PER FETUS: CPT | Mod: S$GLB,,, | Performed by: OBSTETRICS & GYNECOLOGY

## 2022-11-02 PROCEDURE — 76819 US OB/GYN PROCEDURE (VIEWPOINT): ICD-10-PCS | Mod: S$GLB,,, | Performed by: OBSTETRICS & GYNECOLOGY

## 2022-11-02 PROCEDURE — 76816 US OB/GYN PROCEDURE (VIEWPOINT): ICD-10-PCS | Mod: S$GLB,,, | Performed by: OBSTETRICS & GYNECOLOGY

## 2022-11-02 PROCEDURE — 76819 FETAL BIOPHYS PROFIL W/O NST: CPT | Mod: S$GLB,,, | Performed by: OBSTETRICS & GYNECOLOGY

## 2022-11-04 ENCOUNTER — HOSPITAL ENCOUNTER (OUTPATIENT)
Dept: OBSTETRICS AND GYNECOLOGY | Facility: HOSPITAL | Age: 32
Discharge: HOME | End: 2022-11-04
Attending: OBSTETRICS & GYNECOLOGY
Payer: COMMERCIAL

## 2022-11-04 DIAGNOSIS — O13.3 GESTATIONAL HYPERTENSION, THIRD TRIMESTER: ICD-10-CM

## 2022-11-04 PROCEDURE — 59025 FETAL NON-STRESS TEST: CPT

## 2022-11-07 ENCOUNTER — CLINICAL SUPPORT (OUTPATIENT)
Dept: REHABILITATION | Facility: OTHER | Age: 32
End: 2022-11-07
Attending: OBSTETRICS & GYNECOLOGY
Payer: COMMERCIAL

## 2022-11-07 DIAGNOSIS — Z74.09 IMPAIRED FUNCTIONAL MOBILITY, BALANCE, GAIT, AND ENDURANCE: Primary | ICD-10-CM

## 2022-11-07 DIAGNOSIS — M62.81 WEAKNESS OF TRUNK MUSCULATURE: ICD-10-CM

## 2022-11-07 PROCEDURE — 97110 THERAPEUTIC EXERCISES: CPT

## 2022-11-07 PROCEDURE — 97112 NEUROMUSCULAR REEDUCATION: CPT

## 2022-11-07 NOTE — PROGRESS NOTES
"  Pelvic Health Physical Therapy   Treatment Note     Name: Carlos [facility] Number: [id number]    Therapy Diagnosis:   Encounter Diagnoses   Name Primary?    Impaired functional mobility, balance, gait, and endurance Yes    Weakness of trunk musculature      Physician: Glory Fink MD    Visit Date: 11/7/2022    Physician Orders: PT Eval and Treat - Pelvic Floor PT   Medical Diagnosis from Referral: O99.891,M54.9 (ICD-10-CM) - Back pain affecting pregnancy in third trimester  Evaluation Date: 10/26/2023  Authorization Period Expiration: 10/10/2024  Plan of Care Certification Period: 1/26/2023  Visit #/Visits authorized: 1/ 6     Cancelled Visits: 0  No Show Visits: 0    Time In: 1125  Time Out: 1220  Total Billable Time: 55 minutes    Precautions: universal, Pregnant - EDOD 12/7/2022     Subjective     Pt reports: dosage of medication within inhaler was increased, which has helped with SOB. Has not order a ball yet, but did perform exercises on bench. No difficulty noted.     She was fairly compliant with home exercise program.  Response to previous treatment: good   Functional change: decreased pain    Pain: 4/10 currently, 8/10 worst this week   Location: R low back, hip, buttocks, LE (to calf)     Objective     Hip Strength: 4+/5 B, except 4/5 at B adductors   Adductor palpation: increased tension bilaterally, tenderness on R   Pelvic Floor Assessment: fair mobility     Carlos received therapeutic exercises to develop  strength, endurance, ROM, flexibility, posture, and core stabilization for 47 minutes including:     Hip adductor stretch in seated butterfly 3x20"     On PB:    PPT + breathing 3x5 ea    Lateral hip lifts x10 ea    Clocks 2x5 ea    Marching RTB 3x4 ea    Seated row with asymmetrical stance 2x8 ea    Pallof press     Carlos received the following manual therapy techniques: to develop flexibility, extensibility, and desensitization for 00 minutes including:     Carlos participated in " neuromuscular re-education activities to develop Coordination and Control for 8 minutes includin breathing       Carlos participated in dynamic functional therapeutic activities to improve functional performance for 00  minutes, including:       Home Exercises Provided and Patient Education Provided     Education provided:   - anatomy/physiology of pelvic floor, posture/body mechanices, and diaphragmatic breathing  Discussed progression of plan of care with patient; educated pt in activity modification; reviewed HEP with pt. Pt demonstrated and verbalized understanding of all instruction and was provided with a handout of HEP (see Patient Instructions).    Written Home Exercises Provided: yes.  Exercises were reviewed and Carlos was able to demonstrate them prior to the end of the session.  Carlos demonstrated good  understanding of the education provided.     See EMR under Patient Instructions for exercises provided 2022.    Assessment     Carlos performed well in today's session, tolerating continuation of assessment and introduced of physioball mobility training without significant SOB or reports of nausea. VC required for improved form with mobility exercises initially, though significant improvement noted with continued practice. HEP updated to include, but PT to review as well NV. Decreased lumbosacral tension noted at completion of session.     Carlos Is progressing well towards her goals.   Pt prognosis is Good.     Pt will continue to benefit from skilled outpatient physical therapy to address the deficits listed in the problem list box on initial evaluation, provide pt/family education and to maximize pt's level of independence in the home and community environment.     Pt's spiritual, cultural and educational needs considered and pt agreeable to plan of care and goals.     Anticipated barriers to physical therapy: chronicity of condition, pregnancy, upcoming delivery     Goals:    Short Term Goals: 6 weeks      Pt to demonstrate proper diaphragmatic breathing to help with calming the nervous system in order to decrease pain and to improve abdominal wall musculature extensibility.   Pt to report being able to complete 10 minutes of exercise 4 days per week without significant increase in pain to demonstrate a return towards prior level of function.  Pt to demonstrate good body mechanics when performing ADLs such as lifting and bending to decrease strain to lumbopelvic structures and reduce risk of further injury.  Pt will report minimal to no pain with single digit pelvic assessment and display relaxation demonstrating improving pelvic floor muscle relaxation and coordination.   Pt to report a decrease in pain to no more than 7/10 at it's worst with daily chores in home when limited to 15 minute intervals.    Long Term Goals: 12 weeks      Pt to be discharged with home plan for carry over after discharge.   Pt will be trained and compliant with postural strategies in sitting and standing to improve alignment and decrease pain and muscle fatigue  Pt to report being able to have a comfortable vaginal exam without significant increase in pelvic pain.  Pt to report being able to complete 15 minutes of exercise 5 days per week without significant increase in pain to demonstrate a return towards prior level of function.  Pt to report a decrease in pain to no more than 5/10 at it's worst with daily chores in home when limited to 15 minute intervals.        Plan     Plan for next visit: delivery preparation, review mobility training    JINNY PAIZ, PT   11/07/2022

## 2022-11-08 ENCOUNTER — PATIENT MESSAGE (OUTPATIENT)
Dept: OBSTETRICS AND GYNECOLOGY | Facility: CLINIC | Age: 32
End: 2022-11-08

## 2022-11-08 ENCOUNTER — PROCEDURE VISIT (OUTPATIENT)
Dept: OBSTETRICS AND GYNECOLOGY | Facility: CLINIC | Age: 32
End: 2022-11-08
Payer: COMMERCIAL

## 2022-11-08 ENCOUNTER — ROUTINE PRENATAL (OUTPATIENT)
Dept: OBSTETRICS AND GYNECOLOGY | Facility: CLINIC | Age: 32
End: 2022-11-08
Payer: COMMERCIAL

## 2022-11-08 VITALS
WEIGHT: 245.81 LBS | BODY MASS INDEX: 39.68 KG/M2 | DIASTOLIC BLOOD PRESSURE: 72 MMHG | SYSTOLIC BLOOD PRESSURE: 118 MMHG

## 2022-11-08 DIAGNOSIS — O13.3 GESTATIONAL HYPERTENSION, THIRD TRIMESTER: Primary | ICD-10-CM

## 2022-11-08 DIAGNOSIS — Z32.01 POSITIVE URINE PREGNANCY TEST: ICD-10-CM

## 2022-11-08 PROCEDURE — 99999 PR PBB SHADOW E&M-EST. PATIENT-LVL III: ICD-10-PCS | Mod: PBBFAC,,, | Performed by: OBSTETRICS & GYNECOLOGY

## 2022-11-08 PROCEDURE — 99999 PR PBB SHADOW E&M-EST. PATIENT-LVL III: CPT | Mod: PBBFAC,,, | Performed by: OBSTETRICS & GYNECOLOGY

## 2022-11-08 PROCEDURE — 76819 FETAL BIOPHYS PROFIL W/O NST: CPT | Mod: S$GLB,,, | Performed by: OBSTETRICS & GYNECOLOGY

## 2022-11-08 PROCEDURE — 76819 US OB/GYN PROCEDURE (VIEWPOINT): ICD-10-PCS | Mod: S$GLB,,, | Performed by: OBSTETRICS & GYNECOLOGY

## 2022-11-08 PROCEDURE — 0502F PR SUBSEQUENT PRENATAL CARE: ICD-10-PCS | Mod: CPTII,S$GLB,, | Performed by: OBSTETRICS & GYNECOLOGY

## 2022-11-08 PROCEDURE — 0502F SUBSEQUENT PRENATAL CARE: CPT | Mod: CPTII,S$GLB,, | Performed by: OBSTETRICS & GYNECOLOGY

## 2022-11-08 NOTE — PROGRESS NOTES
Patient reports normal fetal movement. Denies vaginal bleeding, regular contractions or leakage of fluid. BP controlled on medication. States asthma is better controlled. Reminded that CBC/CMP/and 3rd T are due. Given strict precautions and IOL date scheduled.

## 2022-11-09 ENCOUNTER — HOSPITAL ENCOUNTER (OUTPATIENT)
Facility: HOSPITAL | Age: 32
Discharge: HOME | End: 2022-11-10
Attending: OBSTETRICS & GYNECOLOGY | Admitting: OBSTETRICS & GYNECOLOGY
Payer: COMMERCIAL

## 2022-11-09 ENCOUNTER — PATIENT MESSAGE (OUTPATIENT)
Dept: OBSTETRICS AND GYNECOLOGY | Facility: CLINIC | Age: 32
End: 2022-11-09
Payer: COMMERCIAL

## 2022-11-09 ENCOUNTER — TELEPHONE (OUTPATIENT)
Dept: OBSTETRICS AND GYNECOLOGY | Facility: CLINIC | Age: 32
End: 2022-11-09
Payer: COMMERCIAL

## 2022-11-09 LAB
ALBUMIN SERPL BCP-MCNC: 2.9 G/DL (ref 3.5–5.2)
ALP SERPL-CCNC: 110 U/L (ref 55–135)
ALT SERPL W/O P-5'-P-CCNC: 18 U/L (ref 10–44)
ANION GAP SERPL CALC-SCNC: 15 MMOL/L (ref 8–16)
AST SERPL-CCNC: 17 U/L (ref 10–40)
BASOPHILS # BLD AUTO: 0.01 K/UL (ref 0–0.2)
BASOPHILS NFR BLD: 0.1 % (ref 0–1.9)
BILIRUB SERPL-MCNC: 0.4 MG/DL (ref 0.1–1)
BUN SERPL-MCNC: 4 MG/DL (ref 6–20)
CALCIUM SERPL-MCNC: 9.5 MG/DL (ref 8.7–10.5)
CHLORIDE SERPL-SCNC: 108 MMOL/L (ref 95–110)
CO2 SERPL-SCNC: 15 MMOL/L (ref 23–29)
CREAT SERPL-MCNC: 0.6 MG/DL (ref 0.5–1.4)
CREAT UR-MCNC: 24.5 MG/DL (ref 15–325)
DIFFERENTIAL METHOD: ABNORMAL
EOSINOPHIL # BLD AUTO: 0.1 K/UL (ref 0–0.5)
EOSINOPHIL NFR BLD: 0.7 % (ref 0–8)
ERYTHROCYTE [DISTWIDTH] IN BLOOD BY AUTOMATED COUNT: 13.8 % (ref 11.5–14.5)
EST. GFR  (NO RACE VARIABLE): >60 ML/MIN/1.73 M^2
GLUCOSE SERPL-MCNC: 102 MG/DL (ref 70–110)
HCT VFR BLD AUTO: 30.2 % (ref 37–48.5)
HGB BLD-MCNC: 9.9 G/DL (ref 12–16)
IMM GRANULOCYTES # BLD AUTO: 0.04 K/UL (ref 0–0.04)
IMM GRANULOCYTES NFR BLD AUTO: 0.4 % (ref 0–0.5)
LYMPHOCYTES # BLD AUTO: 1.5 K/UL (ref 1–4.8)
LYMPHOCYTES NFR BLD: 16.1 % (ref 18–48)
MCH RBC QN AUTO: 27.7 PG (ref 27–31)
MCHC RBC AUTO-ENTMCNC: 32.8 G/DL (ref 32–36)
MCV RBC AUTO: 85 FL (ref 82–98)
MONOCYTES # BLD AUTO: 0.7 K/UL (ref 0.3–1)
MONOCYTES NFR BLD: 7 % (ref 4–15)
NEUTROPHILS # BLD AUTO: 7.1 K/UL (ref 1.8–7.7)
NEUTROPHILS NFR BLD: 75.7 % (ref 38–73)
NRBC BLD-RTO: 0 /100 WBC
PLATELET # BLD AUTO: 297 K/UL (ref 150–450)
PMV BLD AUTO: 10.2 FL (ref 9.2–12.9)
POTASSIUM SERPL-SCNC: 3.6 MMOL/L (ref 3.5–5.1)
PROT SERPL-MCNC: 6.8 G/DL (ref 6–8.4)
PROT UR-MCNC: <7 MG/DL (ref 0–15)
PROT/CREAT UR: NORMAL MG/G{CREAT} (ref 0–0.2)
RBC # BLD AUTO: 3.57 M/UL (ref 4–5.4)
SODIUM SERPL-SCNC: 138 MMOL/L (ref 136–145)
WBC # BLD AUTO: 9.4 K/UL (ref 3.9–12.7)

## 2022-11-09 PROCEDURE — 85025 COMPLETE CBC W/AUTO DIFF WBC: CPT | Performed by: OBSTETRICS & GYNECOLOGY

## 2022-11-09 PROCEDURE — 63600175 PHARM REV CODE 636 W HCPCS: Performed by: STUDENT IN AN ORGANIZED HEALTH CARE EDUCATION/TRAINING PROGRAM

## 2022-11-09 PROCEDURE — 80053 COMPREHEN METABOLIC PANEL: CPT | Performed by: OBSTETRICS & GYNECOLOGY

## 2022-11-09 PROCEDURE — 25000003 PHARM REV CODE 250: Performed by: STUDENT IN AN ORGANIZED HEALTH CARE EDUCATION/TRAINING PROGRAM

## 2022-11-09 PROCEDURE — 36415 COLL VENOUS BLD VENIPUNCTURE: CPT | Performed by: OBSTETRICS & GYNECOLOGY

## 2022-11-09 PROCEDURE — 11000001 HC ACUTE MED/SURG PRIVATE ROOM

## 2022-11-09 PROCEDURE — G0378 HOSPITAL OBSERVATION PER HR: HCPCS

## 2022-11-09 PROCEDURE — 82570 ASSAY OF URINE CREATININE: CPT | Performed by: OBSTETRICS & GYNECOLOGY

## 2022-11-09 RX ORDER — PROCHLORPERAZINE MALEATE 5 MG
10 TABLET ORAL ONCE
Status: COMPLETED | OUTPATIENT
Start: 2022-11-09 | End: 2022-11-09

## 2022-11-09 RX ORDER — PROCHLORPERAZINE MALEATE 5 MG
10 TABLET ORAL ONCE
Status: DISCONTINUED | OUTPATIENT
Start: 2022-11-09 | End: 2022-11-09

## 2022-11-09 RX ORDER — NIFEDIPINE 30 MG/1
30 TABLET, EXTENDED RELEASE ORAL DAILY
Status: DISCONTINUED | OUTPATIENT
Start: 2022-11-10 | End: 2022-11-10 | Stop reason: HOSPADM

## 2022-11-09 RX ORDER — BUTORPHANOL TARTRATE 2 MG/ML
2 INJECTION INTRAMUSCULAR; INTRAVENOUS ONCE AS NEEDED
Status: COMPLETED | OUTPATIENT
Start: 2022-11-09 | End: 2022-11-10

## 2022-11-09 RX ORDER — OXYCODONE AND ACETAMINOPHEN 5; 325 MG/1; MG/1
1 TABLET ORAL EVERY 4 HOURS PRN
Status: DISCONTINUED | OUTPATIENT
Start: 2022-11-09 | End: 2022-11-10 | Stop reason: HOSPADM

## 2022-11-09 RX ORDER — BUTALBITAL, ACETAMINOPHEN AND CAFFEINE 50; 325; 40 MG/1; MG/1; MG/1
2 TABLET ORAL ONCE
Status: COMPLETED | OUTPATIENT
Start: 2022-11-09 | End: 2022-11-09

## 2022-11-09 RX ADMIN — BUTALBITAL, ACETAMINOPHEN, AND CAFFEINE 2 TABLET: 50; 325; 40 TABLET ORAL at 08:11

## 2022-11-09 RX ADMIN — OXYCODONE HYDROCHLORIDE AND ACETAMINOPHEN 1 TABLET: 5; 325 TABLET ORAL at 10:11

## 2022-11-09 RX ADMIN — PROCHLORPERAZINE MALEATE 10 MG: 5 TABLET ORAL at 08:11

## 2022-11-09 NOTE — TELEPHONE ENCOUNTER
----- Message from Jessica Bowden sent at 11/9/2022 10:40 AM CST -----  Type:  Needs Medical Advice    Who Called:  pt  Symptoms (please be specific):  would like to get non stress test rescheduled to morning on 11/11/2022 or 11/10/2022      Would the patient rather a call back or a response via [facility]?  Call   Best Call Back Number: [phone]  Additional Information:

## 2022-11-10 VITALS
SYSTOLIC BLOOD PRESSURE: 105 MMHG | HEART RATE: 101 BPM | OXYGEN SATURATION: 97 % | RESPIRATION RATE: 18 BRPM | DIASTOLIC BLOOD PRESSURE: 68 MMHG

## 2022-11-10 PROCEDURE — G0378 HOSPITAL OBSERVATION PER HR: HCPCS

## 2022-11-10 PROCEDURE — 63600175 PHARM REV CODE 636 W HCPCS: Performed by: STUDENT IN AN ORGANIZED HEALTH CARE EDUCATION/TRAINING PROGRAM

## 2022-11-10 PROCEDURE — 99211 OFF/OP EST MAY X REQ PHY/QHP: CPT | Mod: 25

## 2022-11-10 PROCEDURE — 59025 FETAL NON-STRESS TEST: CPT

## 2022-11-10 PROCEDURE — 96372 THER/PROPH/DIAG INJ SC/IM: CPT

## 2022-11-10 RX ADMIN — BUTORPHANOL TARTRATE 2 MG: 2 INJECTION, SOLUTION INTRAMUSCULAR; INTRAVENOUS at 12:11

## 2022-11-10 NOTE — NURSING
Pt states headache is only a tiny bit better, now 7.5. dr mario notified. Will try stadol 2mg im x 1, monitor for improvement.

## 2022-11-10 NOTE — NURSING
Stadol 2mg im r gluteus for headache pain. Siderails raised, lights turned down, pt instructed not to get oob.

## 2022-11-10 NOTE — NURSING
Pt states that first percocet did not help headache. 2nd percocet 5/325mg po given. Comfort measures provided.

## 2022-11-10 NOTE — NURSING
Pt admitted from er for c/o migraine and elevated bp's. 33y/o  at 33-0 weeks. Pt c/o headache x 3days. Took 500mg tylenol this morning; didn't help. Took tylenol yesterday; did help. Some elevated bp's [id number]. Takes 30 procardia q d; takes at night ; not yet today. +fm. Denies ctxs but has some cramping from time to time. Drank 5 large bottles of water. Had some n/v in bathroom. Some dizziness along w/ headache. Reports spotting during migraines. +fm on monitor. No ctxs. Sve deferred. Ua obtained. Bp's elevated but not excessive. Pt reports these are normal for her.

## 2022-11-10 NOTE — NURSING
Pt reports headache much improved; not gone but tolerable, down to a 2. Feels sleepy. Offered to let her sleep longer but pt asked to go home. Given verbal and printed d/c instructions. Dr mario notified.

## 2022-11-11 ENCOUNTER — TELEPHONE (OUTPATIENT)
Dept: OBSTETRICS AND GYNECOLOGY | Facility: HOSPITAL | Age: 32
End: 2022-11-11

## 2022-11-11 ENCOUNTER — PATIENT MESSAGE (OUTPATIENT)
Dept: OBSTETRICS AND GYNECOLOGY | Facility: CLINIC | Age: 32
End: 2022-11-11
Payer: COMMERCIAL

## 2022-11-11 ENCOUNTER — OFFICE VISIT (OUTPATIENT)
Dept: URGENT CARE | Facility: CLINIC | Age: 32
End: 2022-11-11
Payer: COMMERCIAL

## 2022-11-11 VITALS
SYSTOLIC BLOOD PRESSURE: 133 MMHG | TEMPERATURE: 98 F | RESPIRATION RATE: 17 BRPM | HEIGHT: 66 IN | OXYGEN SATURATION: 97 % | DIASTOLIC BLOOD PRESSURE: 80 MMHG | WEIGHT: 244 LBS | BODY MASS INDEX: 39.21 KG/M2 | HEART RATE: 120 BPM

## 2022-11-11 DIAGNOSIS — U07.1 COVID-19: Primary | ICD-10-CM

## 2022-11-11 PROCEDURE — 3075F PR MOST RECENT SYSTOLIC BLOOD PRESS GE 130-139MM HG: ICD-10-PCS | Mod: CPTII,S$GLB,, | Performed by: INTERNAL MEDICINE

## 2022-11-11 PROCEDURE — 99214 OFFICE O/P EST MOD 30 MIN: CPT | Mod: S$GLB,,, | Performed by: INTERNAL MEDICINE

## 2022-11-11 PROCEDURE — 3008F BODY MASS INDEX DOCD: CPT | Mod: CPTII,S$GLB,, | Performed by: INTERNAL MEDICINE

## 2022-11-11 PROCEDURE — 1159F PR MEDICATION LIST DOCUMENTED IN MEDICAL RECORD: ICD-10-PCS | Mod: CPTII,S$GLB,, | Performed by: INTERNAL MEDICINE

## 2022-11-11 PROCEDURE — 3079F DIAST BP 80-89 MM HG: CPT | Mod: CPTII,S$GLB,, | Performed by: INTERNAL MEDICINE

## 2022-11-11 PROCEDURE — 99214 PR OFFICE/OUTPT VISIT, EST, LEVL IV, 30-39 MIN: ICD-10-PCS | Mod: S$GLB,,, | Performed by: INTERNAL MEDICINE

## 2022-11-11 PROCEDURE — 3079F PR MOST RECENT DIASTOLIC BLOOD PRESSURE 80-89 MM HG: ICD-10-PCS | Mod: CPTII,S$GLB,, | Performed by: INTERNAL MEDICINE

## 2022-11-11 PROCEDURE — 3075F SYST BP GE 130 - 139MM HG: CPT | Mod: CPTII,S$GLB,, | Performed by: INTERNAL MEDICINE

## 2022-11-11 PROCEDURE — 3008F PR BODY MASS INDEX (BMI) DOCUMENTED: ICD-10-PCS | Mod: CPTII,S$GLB,, | Performed by: INTERNAL MEDICINE

## 2022-11-11 PROCEDURE — 1159F MED LIST DOCD IN RCRD: CPT | Mod: CPTII,S$GLB,, | Performed by: INTERNAL MEDICINE

## 2022-11-11 NOTE — PATIENT INSTRUCTIONS
Stay home for 5 days.  If you have no symptoms or your symptoms are resolving after 5 days, you can leave your house.  Continue to wear a mask around others for 5 additional days.  If you have a fever, continue to stay home until your fever resolves.    Use tylenol for aches/pains fever. Mucinex can be used as an expectorant to help relieve mucus buildup. Antihistamines and flonase can be helpful for nasal congestion and runny nose. Drink lots of fluids. I have enrolled you in the home symptom monitoring program who will call or text you periodically over the next 14 days to monitor your symptoms and triage you to the appropriate location if you are not feeling well. If you are having difficulty breathing, inability to speak in full sentences, go to the ER immediately.     Patient Education       COVID-19 Discharge Instructions   About this topic   Coronavirus disease 2019 is also known as COVID-19. It is a viral illness that infects the lungs. It is caused by a virus called SARS-associated coronavirus (SARS-CoV-2).  The signs of COVID-19 most often start a few days after you have been infected. In some people, it takes longer to show signs. Others never show signs of the infection. You may have a cough, fever, shaking chills and it may be hard to breathe. You may be very tired, have muscle aches, a headache or sore throat. Some people have an upset stomach or loose stools. Others lose their sense of smell or taste. You may not have these signs all the time and they may come and go while you are sick.  The virus spreads easily through droplets when you talk, sneeze, or cough. You can pass the virus to others when you are talking close together, singing, hugging, sharing food, or shaking hands. Doctors believe the germs also survive on surfaces like tables, door handles, and telephones. However, this is not a common way that COVID-19 spreads. Doctors believe you can also spread the infection even if you dont have  any symptoms, but they do not know how that happens. This is why getting vaccinated is one of the best ways to keep you healthy and slow the spread of the virus.  Some people have a mild case of COVID-19 and are able to stay at home and away from others until they feel better. Others may need to be in the hospital if they are very sick. Some people with COVID-19 can have some symptoms for weeks or months. People with COVID-19 must isolate themselves. You can start to be around others when your doctor says it is safe to do so.       What care is needed at home?   Ask your doctor what you need to do when you go home. Make sure you ask questions if you do not understand what the doctor says.  Drink lots of water, juice, or broth to replace fluids lost from a fever.  You may use cool mist humidifiers to help ease congestion and coughing.  Use 2 to 3 pillows to prop yourself up when you lie down to make it easier to breathe and sleep.  Do not smoke and do not drink beer, wine, and mixed drinks (alcohol).  To lower the chance of passing the infection to others, get a COVID-19 vaccine after your infection has resolved.  If you have not been fully vaccinated:  Wear a mask over your mouth and nose if you are around others who are not sick. Cloth masks work best if they have more than one layer of fabric.  Wash your hands often.  Stay home in a separate room, if possible, away from others. Only go out to get medical care.  Use a separate bathroom if possible.  Do not make food for others.  What follow-up care is needed?   Your doctor may ask you to make visits to the office to check on your progress. Be sure to keep these visits. Make sure you wear a mask at these visits.  If you can, tell the staff you have COVID-19 ahead of time so they can take extra care to stop the disease from spreading.  It may take a few weeks before your health returns to normal.  What drugs may be needed?   The doctor may order drugs to:  Help with  breathing  Help with fever  Help with swelling in your airways and lungs  Control coughing  Ease a sore throat  Help a runny or stuffy nose  Will physical activity be limited?   You may have to limit your physical activity. Talk to your doctor about the right amount of activity for you. If you have been very sick with COVID-19, it can take some time to get your strength back.  Will there be any other care needed?   Doctors do not know how long you can pass the virus on to others after you are sick. This is why it is important to stay in a separate room, if possible, when you are sick. For now, doctors are giving general guidelines for you to follow after you have been sick. Before you go around other people, you should:  Be fever free for 24 hours without taking any drugs to lower the fever  Have no symptoms of cough or shortness of breath  Wait at least 10 days after first having symptoms or your first positive test, and you need to be symptom free as above. Some experts suggest waiting 20 days if you have had a more severe infection.  Talk with your doctor about getting a COVID-19 vaccine.  What problems could happen?   Fluid loss. This is dehydration.  Short-term or long-term lung damage  Heart problems  Death  When do I need to call the doctor?   You are having so much trouble breathing that you can only say one or two words at a time.  You need to sit upright at all times to be able to breathe and/or cannot lie down.  You are very confused or cannot stay awake.  Your lips or skin start to turn blue or grey.  You think you might be having a medical emergency. Some examples of medical emergencies are:  Severe chest pain.  Not able to speak or move normally.  You have trouble breathing when talking or sitting still.  You have new shortness of breath.  You become weak or dizzy.  You have very dark urine or do not pass urine for more than 8 hours.  You have new or worsening COVID-19 symptoms  like:  Fever  Cough  Feeling very tired  Shaking chills  Headache  Trouble swallowing  Throwing up  Loose stools  Reddish purple spots on your fingers or toes  Teach Back: Helping You Understand   The Teach Back Method helps you understand the information we are giving you. After you talk with the staff, tell them in your own words what you learned. This helps to make sure the staff has described each thing clearly. It also helps to explain things that may have been confusing. Before going home, make sure you can do these:  I can tell you about my condition.  I can tell you what may help ease my breathing.  I can tell you what I can do to help avoid passing the infection to others.  I can tell you what I will do if I have trouble breathing; feel sleepy or confused; or my fingertips, fingernails, skin, or lips are blue.  Where can I learn more?   Centers for Disease Control and Prevention  https://www.cdc.gov/coronavirus/2019-ncov/about/index.html   Centers for Disease Control and Prevention  https://www.cdc.gov/coronavirus/2019-ncov/hcp/disposition-in-home-patients.html   World Health Organization  https://www.who.int/news-room/q-a-detail/g-a-avxkmvvoqldyp   Last Reviewed Date   2021-10-05  Consumer Information Use and Disclaimer   This information is not specific medical advice and does not replace information you receive from your health care provider. This is only a brief summary of general information. It does NOT include all information about conditions, illnesses, injuries, tests, procedures, treatments, therapies, discharge instructions or life-style choices that may apply to you. You must talk with your health care provider for complete information about your health and treatment options. This information should not be used to decide whether or not to accept your health care providers advice, instructions or recommendations. Only your health care provider has the knowledge and training to provide advice  that is right for you.  Copyright   Copyright © 2021 [employer] and its affiliates and/or licensors. All rights reserved.

## 2022-11-11 NOTE — PROGRESS NOTES
"Subjective:       Patient ID: Carlos Camarena is a 32 y.o. female.    Vitals:  height is 5' 6" (1.676 m) and weight is 110.7 kg (244 lb). Her oral temperature is 98.2 °F (36.8 °C). Her blood pressure is 133/80 and her pulse is 120 (abnormal). Her respiration is 17 and oxygen saturation is 97%.     Chief Complaint: Cough    Pt states cough, sore throat, and body aches x 2 days.  Pt states she tested positive for covid at home this am. Pt.'s  has covid.  Pt is 33 weeks pregnant seeking covid advice.    Cough  This is a new problem. The current episode started yesterday. The problem has been unchanged. Associated symptoms include myalgias, nasal congestion and a sore throat. Pertinent negatives include no chest pain, chills, ear congestion, ear pain, fever, headaches, heartburn, hemoptysis, rash, rhinorrhea, shortness of breath, sweats, weight loss or wheezing. She has tried nothing for the symptoms.     Constitution: Negative for chills and fever.   HENT:  Positive for sore throat. Negative for ear pain.    Cardiovascular:  Negative for chest pain.   Respiratory:  Positive for cough. Negative for bloody sputum, shortness of breath and wheezing.    Gastrointestinal:  Negative for heartburn.   Musculoskeletal:  Positive for muscle ache.   Skin:  Negative for rash and erythema.   Neurological:  Negative for headaches.     Objective:      Physical Exam   Constitutional: She is oriented to person, place, and time. She appears well-developed.  Non-toxic appearance. She does not appear ill. No distress.   HENT:   Head: Normocephalic and atraumatic.   Ears:   Right Ear: External ear normal.   Left Ear: External ear normal.   Nose: Nose normal. No congestion.   Mouth/Throat: Oropharynx is clear and moist. Mucous membranes are moist. No oropharyngeal exudate. Oropharynx is clear.   Eyes: Conjunctivae and EOM are normal. Pupils are equal, round, and reactive to light. Right eye exhibits no discharge. Left eye exhibits no " discharge. No scleral icterus.   Neck: Neck supple.   Cardiovascular: Regular rhythm and normal heart sounds. Tachycardia present.   No murmur heard.Exam reveals no gallop and no friction rub.      Comments: Likely physiologic due to pregnancy   Pulmonary/Chest: Effort normal. No respiratory distress. She has no wheezes. She has no rales.   Abdominal: Bowel sounds are normal. She exhibits no distension. Soft.   Musculoskeletal:      Right lower leg: No edema.      Left lower leg: No edema.   Lymphadenopathy:     She has no cervical adenopathy.   Neurological: She is alert and oriented to person, place, and time.   Skin: Skin is warm, dry, not diaphoretic and no rash. Capillary refill takes less than 2 seconds. No erythema   Psychiatric: Her behavior is normal.   Nursing note and vitals reviewed.        Assessment:       1. COVID-19          Plan:         COVID-19  -     nirmatrelvir-ritonavir 300 mg (150 mg x 2)-100 mg copackaged tablets (EUA); Take 3 tablets by mouth 2 (two) times daily for 5 days. Each dose contains 2 nirmatrelvir (pink tablets) and 1 ritonavir (white tablet). Take all 3 tablets together  Dispense: 30 tablet; Refill: 0

## 2022-11-14 ENCOUNTER — PATIENT MESSAGE (OUTPATIENT)
Dept: REHABILITATION | Facility: OTHER | Age: 32
End: 2022-11-14
Payer: COMMERCIAL

## 2022-11-16 ENCOUNTER — PATIENT MESSAGE (OUTPATIENT)
Dept: OBSTETRICS AND GYNECOLOGY | Facility: CLINIC | Age: 32
End: 2022-11-16
Payer: COMMERCIAL

## 2022-11-18 ENCOUNTER — HOSPITAL ENCOUNTER (OUTPATIENT)
Dept: OBSTETRICS AND GYNECOLOGY | Facility: HOSPITAL | Age: 32
Discharge: HOME | End: 2022-11-18
Attending: OBSTETRICS & GYNECOLOGY
Payer: COMMERCIAL

## 2022-11-18 DIAGNOSIS — O13.3 GESTATIONAL HYPERTENSION, THIRD TRIMESTER: ICD-10-CM

## 2022-11-18 PROCEDURE — 59025 FETAL NON-STRESS TEST: CPT

## 2022-11-21 ENCOUNTER — PATIENT MESSAGE (OUTPATIENT)
Dept: REHABILITATION | Facility: OTHER | Age: 32
End: 2022-11-21
Payer: COMMERCIAL

## 2022-11-22 ENCOUNTER — PROCEDURE VISIT (OUTPATIENT)
Dept: OBSTETRICS AND GYNECOLOGY | Facility: CLINIC | Age: 32
End: 2022-11-22
Payer: COMMERCIAL

## 2022-11-22 ENCOUNTER — ROUTINE PRENATAL (OUTPATIENT)
Dept: OBSTETRICS AND GYNECOLOGY | Facility: CLINIC | Age: 32
End: 2022-11-22
Payer: COMMERCIAL

## 2022-11-22 ENCOUNTER — TELEPHONE (OUTPATIENT)
Dept: OBSTETRICS AND GYNECOLOGY | Facility: CLINIC | Age: 32
End: 2022-11-22

## 2022-11-22 VITALS
WEIGHT: 245.06 LBS | BODY MASS INDEX: 39.55 KG/M2 | SYSTOLIC BLOOD PRESSURE: 112 MMHG | DIASTOLIC BLOOD PRESSURE: 70 MMHG

## 2022-11-22 DIAGNOSIS — O13.3 GESTATIONAL HYPERTENSION, THIRD TRIMESTER: Primary | ICD-10-CM

## 2022-11-22 DIAGNOSIS — Z32.01 POSITIVE URINE PREGNANCY TEST: ICD-10-CM

## 2022-11-22 PROCEDURE — 0502F SUBSEQUENT PRENATAL CARE: CPT | Mod: S$GLB,,, | Performed by: OBSTETRICS & GYNECOLOGY

## 2022-11-22 PROCEDURE — 0502F PR SUBSEQUENT PRENATAL CARE: ICD-10-PCS | Mod: S$GLB,,, | Performed by: OBSTETRICS & GYNECOLOGY

## 2022-11-22 PROCEDURE — 99999 PR PBB SHADOW E&M-EST. PATIENT-LVL III: CPT | Mod: PBBFAC,,, | Performed by: OBSTETRICS & GYNECOLOGY

## 2022-11-22 PROCEDURE — 76819 FETAL BIOPHYS PROFIL W/O NST: CPT | Mod: S$GLB,,, | Performed by: OBSTETRICS & GYNECOLOGY

## 2022-11-22 PROCEDURE — 76819 US OB/GYN PROCEDURE (VIEWPOINT): ICD-10-PCS | Mod: S$GLB,,, | Performed by: OBSTETRICS & GYNECOLOGY

## 2022-11-22 PROCEDURE — 99999 PR PBB SHADOW E&M-EST. PATIENT-LVL III: ICD-10-PCS | Mod: PBBFAC,,, | Performed by: OBSTETRICS & GYNECOLOGY

## 2022-11-22 NOTE — TELEPHONE ENCOUNTER
----- Message from Glory Fink MD sent at 11/22/2022  4:20 PM CST -----  Regarding: tdap injection and labs  Can you assist with scheduling a visit for tdap injection and labs on 11/25/2022 ~3pm if available    Glory Fink MD, FACOG  OB/GYN

## 2022-11-22 NOTE — TELEPHONE ENCOUNTER
----- Message from Glory Fink MD sent at 11/22/2022  4:23 PM CST -----  Regarding: add on 11/29/2022 after US  Can you also add her on for a visit next week after her US for GBBS    Glory Fink MD, FACOG  OB/GYN

## 2022-11-22 NOTE — PROGRESS NOTES
Patient reports normal fetal movement. Denies vaginal bleeding, regular contractions or leakage of fluid. tdap and labs on Friday. Continue  testing. BPP . IOL previously set

## 2022-11-25 ENCOUNTER — HOSPITAL ENCOUNTER (OUTPATIENT)
Dept: OBSTETRICS AND GYNECOLOGY | Facility: HOSPITAL | Age: 32
Discharge: HOME | End: 2022-11-25
Attending: OBSTETRICS & GYNECOLOGY
Payer: COMMERCIAL

## 2022-11-25 ENCOUNTER — CLINICAL SUPPORT (OUTPATIENT)
Dept: OBSTETRICS AND GYNECOLOGY | Facility: CLINIC | Age: 32
End: 2022-11-25
Payer: COMMERCIAL

## 2022-11-25 DIAGNOSIS — Z23 NEED FOR TETANUS, DIPHTHERIA, AND ACELLULAR PERTUSSIS (TDAP) VACCINE: Primary | ICD-10-CM

## 2022-11-25 DIAGNOSIS — O13.3 GESTATIONAL HYPERTENSION, THIRD TRIMESTER: ICD-10-CM

## 2022-11-25 PROCEDURE — 99999 PR PBB SHADOW E&M-EST. PATIENT-LVL I: CPT | Mod: PBBFAC,,,

## 2022-11-25 PROCEDURE — 59025 FETAL NON-STRESS TEST: CPT

## 2022-11-25 PROCEDURE — 90471 TDAP VACCINE GREATER THAN OR EQUAL TO 7YO IM: ICD-10-PCS | Mod: S$GLB,,, | Performed by: OBSTETRICS & GYNECOLOGY

## 2022-11-25 PROCEDURE — 90471 IMMUNIZATION ADMIN: CPT | Mod: S$GLB,,, | Performed by: OBSTETRICS & GYNECOLOGY

## 2022-11-25 PROCEDURE — 99999 PR PBB SHADOW E&M-EST. PATIENT-LVL I: ICD-10-PCS | Mod: PBBFAC,,,

## 2022-11-25 PROCEDURE — 90715 TDAP VACCINE 7 YRS/> IM: CPT | Mod: S$GLB,,, | Performed by: OBSTETRICS & GYNECOLOGY

## 2022-11-25 PROCEDURE — 90715 TDAP VACCINE GREATER THAN OR EQUAL TO 7YO IM: ICD-10-PCS | Mod: S$GLB,,, | Performed by: OBSTETRICS & GYNECOLOGY

## 2022-11-25 NOTE — PROGRESS NOTES
Patient given TDAP in LEFT deltoid. VIS given. Patient asked to wait 15 min in the lobby to monitor for adverse reaction. Patient verbalized understanding. Patient has received immunizations in the past without any complications. Patient tolerated well.

## 2022-11-28 ENCOUNTER — TELEPHONE (OUTPATIENT)
Dept: PEDIATRICS | Facility: CLINIC | Age: 32
End: 2022-11-28
Payer: COMMERCIAL

## 2022-11-28 NOTE — TELEPHONE ENCOUNTER
----- Message from Raj Posey MA sent at 11/28/2022  4:04 PM CST -----  Contact: Patient @ [phone]  Patient calling to see if the meet and greet tomorrow can be changed to virtual due to having 2 other appointments right behind that one in [place]. Please give the patient a call back at [phone].

## 2022-11-29 ENCOUNTER — ROUTINE PRENATAL (OUTPATIENT)
Dept: OBSTETRICS AND GYNECOLOGY | Facility: CLINIC | Age: 32
End: 2022-11-29
Payer: COMMERCIAL

## 2022-11-29 ENCOUNTER — PROCEDURE VISIT (OUTPATIENT)
Dept: OBSTETRICS AND GYNECOLOGY | Facility: CLINIC | Age: 32
End: 2022-11-29
Payer: COMMERCIAL

## 2022-11-29 ENCOUNTER — OFFICE VISIT (OUTPATIENT)
Dept: PEDIATRICS | Facility: CLINIC | Age: 32
End: 2022-11-29
Payer: COMMERCIAL

## 2022-11-29 VITALS
WEIGHT: 241.88 LBS | BODY MASS INDEX: 39.03 KG/M2 | DIASTOLIC BLOOD PRESSURE: 78 MMHG | SYSTOLIC BLOOD PRESSURE: 124 MMHG

## 2022-11-29 DIAGNOSIS — Z76.81 EXPECTANT PARENT PREBIRTH PEDIATRICIAN VISIT: Primary | ICD-10-CM

## 2022-11-29 DIAGNOSIS — O13.3 GESTATIONAL HYPERTENSION, THIRD TRIMESTER: ICD-10-CM

## 2022-11-29 DIAGNOSIS — O13.3 GESTATIONAL HYPERTENSION, THIRD TRIMESTER: Primary | ICD-10-CM

## 2022-11-29 PROCEDURE — 99999 PR PBB SHADOW E&M-EST. PATIENT-LVL I: ICD-10-PCS | Mod: PBBFAC,,, | Performed by: PEDIATRICS

## 2022-11-29 PROCEDURE — 99999 PR PBB SHADOW E&M-EST. PATIENT-LVL III: ICD-10-PCS | Mod: PBBFAC,,, | Performed by: OBSTETRICS & GYNECOLOGY

## 2022-11-29 PROCEDURE — 0502F SUBSEQUENT PRENATAL CARE: CPT | Mod: CPTII,S$GLB,, | Performed by: OBSTETRICS & GYNECOLOGY

## 2022-11-29 PROCEDURE — 99499 UNLISTED E&M SERVICE: CPT | Mod: S$GLB,,, | Performed by: PEDIATRICS

## 2022-11-29 PROCEDURE — 87081 CULTURE SCREEN ONLY: CPT | Performed by: OBSTETRICS & GYNECOLOGY

## 2022-11-29 PROCEDURE — 99999 PR PBB SHADOW E&M-EST. PATIENT-LVL I: CPT | Mod: PBBFAC,,, | Performed by: PEDIATRICS

## 2022-11-29 PROCEDURE — 99499 NO LOS: ICD-10-PCS | Mod: S$GLB,,, | Performed by: PEDIATRICS

## 2022-11-29 PROCEDURE — 76819 FETAL BIOPHYS PROFIL W/O NST: CPT | Mod: S$GLB,,, | Performed by: OBSTETRICS & GYNECOLOGY

## 2022-11-29 PROCEDURE — 99999 PR PBB SHADOW E&M-EST. PATIENT-LVL III: CPT | Mod: PBBFAC,,, | Performed by: OBSTETRICS & GYNECOLOGY

## 2022-11-29 PROCEDURE — 76816 OB US FOLLOW-UP PER FETUS: CPT | Mod: S$GLB,,, | Performed by: OBSTETRICS & GYNECOLOGY

## 2022-11-29 PROCEDURE — 0502F PR SUBSEQUENT PRENATAL CARE: ICD-10-PCS | Mod: CPTII,S$GLB,, | Performed by: OBSTETRICS & GYNECOLOGY

## 2022-11-29 PROCEDURE — 76816 US OB/GYN PROCEDURE (VIEWPOINT): ICD-10-PCS | Mod: S$GLB,,, | Performed by: OBSTETRICS & GYNECOLOGY

## 2022-11-29 PROCEDURE — 76819 US OB/GYN PROCEDURE (VIEWPOINT): ICD-10-PCS | Mod: S$GLB,,, | Performed by: OBSTETRICS & GYNECOLOGY

## 2022-11-29 NOTE — PROGRESS NOTES
Patient reports normal fetal movement. Denies vaginal bleeding, regular contractions or leakage of fluid. GBBS collected. Orders for induction placed.

## 2022-12-02 ENCOUNTER — HOSPITAL ENCOUNTER (OUTPATIENT)
Dept: OBSTETRICS AND GYNECOLOGY | Facility: HOSPITAL | Age: 32
Discharge: HOME | End: 2022-12-02
Attending: OBSTETRICS & GYNECOLOGY
Payer: COMMERCIAL

## 2022-12-02 DIAGNOSIS — O13.3 GESTATIONAL HYPERTENSION, THIRD TRIMESTER: ICD-10-CM

## 2022-12-02 LAB — BACTERIA SPEC AEROBE CULT: NORMAL

## 2022-12-02 PROCEDURE — 59025 FETAL NON-STRESS TEST: CPT

## 2022-12-05 RX ORDER — MISOPROSTOL 100 MCG
50 TABLET ORAL EVERY 4 HOURS PRN
Status: CANCELLED | OUTPATIENT
Start: 2022-12-05

## 2022-12-05 RX ORDER — TRANEXAMIC ACID 100 MG/ML
1000 INJECTION, SOLUTION INTRAVENOUS ONCE AS NEEDED
Status: CANCELLED | OUTPATIENT
Start: 2022-12-05 | End: 2034-05-03

## 2022-12-05 RX ORDER — CALCIUM CARBONATE 200(500)MG
500 TABLET,CHEWABLE ORAL 3 TIMES DAILY PRN
Status: CANCELLED | OUTPATIENT
Start: 2022-12-05

## 2022-12-05 RX ORDER — ACETAMINOPHEN 325 MG/1
650 TABLET ORAL EVERY 6 HOURS PRN
Status: CANCELLED | OUTPATIENT
Start: 2022-12-05

## 2022-12-05 RX ORDER — TERBUTALINE SULFATE 1 MG/ML
0.25 INJECTION SUBCUTANEOUS
Status: CANCELLED | OUTPATIENT
Start: 2022-12-05

## 2022-12-05 RX ORDER — ONDANSETRON 8 MG/1
8 TABLET, ORALLY DISINTEGRATING ORAL EVERY 8 HOURS PRN
Status: CANCELLED | OUTPATIENT
Start: 2022-12-05

## 2022-12-05 RX ORDER — SODIUM CHLORIDE, SODIUM LACTATE, POTASSIUM CHLORIDE, CALCIUM CHLORIDE 600; 310; 30; 20 MG/100ML; MG/100ML; MG/100ML; MG/100ML
INJECTION, SOLUTION INTRAVENOUS CONTINUOUS
Status: CANCELLED | OUTPATIENT
Start: 2022-12-05

## 2022-12-05 RX ORDER — OXYTOCIN/RINGER'S LACTATE 30/500 ML
334 PLASTIC BAG, INJECTION (ML) INTRAVENOUS ONCE
Status: CANCELLED | OUTPATIENT
Start: 2022-12-05 | End: 2022-12-05

## 2022-12-05 RX ORDER — SIMETHICONE 80 MG
1 TABLET,CHEWABLE ORAL 4 TIMES DAILY PRN
Status: CANCELLED | OUTPATIENT
Start: 2022-12-05

## 2022-12-05 RX ORDER — OXYTOCIN/RINGER'S LACTATE 30/500 ML
0-30 PLASTIC BAG, INJECTION (ML) INTRAVENOUS CONTINUOUS
Status: CANCELLED | OUTPATIENT
Start: 2022-12-05

## 2022-12-05 RX ORDER — OXYTOCIN/RINGER'S LACTATE 30/500 ML
95 PLASTIC BAG, INJECTION (ML) INTRAVENOUS ONCE
Status: CANCELLED | OUTPATIENT
Start: 2022-12-05 | End: 2022-12-05

## 2022-12-05 RX ORDER — DIPHENOXYLATE HYDROCHLORIDE AND ATROPINE SULFATE 2.5; .025 MG/1; MG/1
1 TABLET ORAL 4 TIMES DAILY PRN
Status: CANCELLED | OUTPATIENT
Start: 2022-12-05

## 2022-12-05 RX ORDER — SODIUM CHLORIDE 9 MG/ML
INJECTION, SOLUTION INTRAVENOUS
Status: CANCELLED | OUTPATIENT
Start: 2022-12-05

## 2022-12-05 RX ORDER — LIDOCAINE HYDROCHLORIDE 10 MG/ML
10 INJECTION INFILTRATION; PERINEURAL ONCE AS NEEDED
Status: CANCELLED | OUTPATIENT
Start: 2022-12-05 | End: 2034-05-03

## 2022-12-05 RX ORDER — CARBOPROST TROMETHAMINE 250 UG/ML
250 INJECTION, SOLUTION INTRAMUSCULAR
Status: CANCELLED | OUTPATIENT
Start: 2022-12-05

## 2022-12-05 RX ORDER — MUPIROCIN 20 MG/G
OINTMENT TOPICAL
Status: CANCELLED | OUTPATIENT
Start: 2022-12-05

## 2022-12-05 RX ORDER — MISOPROSTOL 200 UG/1
800 TABLET ORAL
Status: CANCELLED | OUTPATIENT
Start: 2022-12-05

## 2022-12-05 RX ORDER — METHYLERGONOVINE MALEATE 0.2 MG/ML
200 INJECTION INTRAVENOUS
Status: CANCELLED | OUTPATIENT
Start: 2022-12-05

## 2022-12-05 RX ORDER — PROCHLORPERAZINE EDISYLATE 5 MG/ML
5 INJECTION INTRAMUSCULAR; INTRAVENOUS EVERY 6 HOURS PRN
Status: CANCELLED | OUTPATIENT
Start: 2022-12-05

## 2022-12-06 ENCOUNTER — ROUTINE PRENATAL (OUTPATIENT)
Dept: OBSTETRICS AND GYNECOLOGY | Facility: CLINIC | Age: 32
End: 2022-12-06
Payer: COMMERCIAL

## 2022-12-06 ENCOUNTER — PROCEDURE VISIT (OUTPATIENT)
Dept: OBSTETRICS AND GYNECOLOGY | Facility: CLINIC | Age: 32
End: 2022-12-06
Payer: COMMERCIAL

## 2022-12-06 ENCOUNTER — HOSPITAL ENCOUNTER (INPATIENT)
Facility: HOSPITAL | Age: 32
LOS: 5 days | Discharge: HOME | End: 2022-12-11
Attending: OBSTETRICS & GYNECOLOGY | Admitting: OBSTETRICS & GYNECOLOGY
Payer: COMMERCIAL

## 2022-12-06 VITALS
BODY MASS INDEX: 39.57 KG/M2 | WEIGHT: 245.13 LBS | DIASTOLIC BLOOD PRESSURE: 80 MMHG | SYSTOLIC BLOOD PRESSURE: 119 MMHG

## 2022-12-06 DIAGNOSIS — O13.3 GESTATIONAL HYPERTENSION, THIRD TRIMESTER: Primary | ICD-10-CM

## 2022-12-06 DIAGNOSIS — O13.3 GESTATIONAL HYPERTENSION, THIRD TRIMESTER: ICD-10-CM

## 2022-12-06 PROCEDURE — 0502F SUBSEQUENT PRENATAL CARE: CPT | Mod: CPTII,S$GLB,, | Performed by: OBSTETRICS & GYNECOLOGY

## 2022-12-06 PROCEDURE — 25000003 PHARM REV CODE 250: Performed by: OBSTETRICS & GYNECOLOGY

## 2022-12-06 PROCEDURE — 0502F PR SUBSEQUENT PRENATAL CARE: ICD-10-PCS | Mod: CPTII,S$GLB,, | Performed by: OBSTETRICS & GYNECOLOGY

## 2022-12-06 PROCEDURE — 11000001 HC ACUTE MED/SURG PRIVATE ROOM

## 2022-12-06 PROCEDURE — 99999 PR PBB SHADOW E&M-EST. PATIENT-LVL III: ICD-10-PCS | Mod: PBBFAC,,, | Performed by: OBSTETRICS & GYNECOLOGY

## 2022-12-06 PROCEDURE — 76819 US OB/GYN PROCEDURE (VIEWPOINT): ICD-10-PCS | Mod: S$GLB,,, | Performed by: OBSTETRICS & GYNECOLOGY

## 2022-12-06 PROCEDURE — 86850 RBC ANTIBODY SCREEN: CPT | Performed by: OBSTETRICS & GYNECOLOGY

## 2022-12-06 PROCEDURE — 80053 COMPREHEN METABOLIC PANEL: CPT | Performed by: OBSTETRICS & GYNECOLOGY

## 2022-12-06 PROCEDURE — 76819 FETAL BIOPHYS PROFIL W/O NST: CPT | Mod: S$GLB,,, | Performed by: OBSTETRICS & GYNECOLOGY

## 2022-12-06 PROCEDURE — 36415 COLL VENOUS BLD VENIPUNCTURE: CPT | Performed by: OBSTETRICS & GYNECOLOGY

## 2022-12-06 PROCEDURE — 85025 COMPLETE CBC W/AUTO DIFF WBC: CPT | Performed by: OBSTETRICS & GYNECOLOGY

## 2022-12-06 PROCEDURE — 99999 PR PBB SHADOW E&M-EST. PATIENT-LVL III: CPT | Mod: PBBFAC,,, | Performed by: OBSTETRICS & GYNECOLOGY

## 2022-12-06 RX ORDER — PROCHLORPERAZINE EDISYLATE 5 MG/ML
5 INJECTION INTRAMUSCULAR; INTRAVENOUS EVERY 6 HOURS PRN
Status: DISCONTINUED | OUTPATIENT
Start: 2022-12-06 | End: 2022-12-08

## 2022-12-06 RX ORDER — OXYTOCIN/RINGER'S LACTATE 30/500 ML
334 PLASTIC BAG, INJECTION (ML) INTRAVENOUS ONCE
Status: DISCONTINUED | OUTPATIENT
Start: 2022-12-06 | End: 2022-12-08

## 2022-12-06 RX ORDER — ONDANSETRON 8 MG/1
8 TABLET, ORALLY DISINTEGRATING ORAL EVERY 8 HOURS PRN
Status: DISCONTINUED | OUTPATIENT
Start: 2022-12-06 | End: 2022-12-08

## 2022-12-06 RX ORDER — MISOPROSTOL 100 MCG
50 TABLET ORAL EVERY 4 HOURS PRN
Status: DISCONTINUED | OUTPATIENT
Start: 2022-12-06 | End: 2022-12-08

## 2022-12-06 RX ORDER — LIDOCAINE HYDROCHLORIDE 10 MG/ML
10 INJECTION INFILTRATION; PERINEURAL ONCE AS NEEDED
Status: DISCONTINUED | OUTPATIENT
Start: 2022-12-06 | End: 2022-12-08

## 2022-12-06 RX ORDER — TERBUTALINE SULFATE 1 MG/ML
0.25 INJECTION SUBCUTANEOUS
Status: DISCONTINUED | OUTPATIENT
Start: 2022-12-06 | End: 2022-12-08

## 2022-12-06 RX ORDER — MISOPROSTOL 200 UG/1
800 TABLET ORAL
Status: DISCONTINUED | OUTPATIENT
Start: 2022-12-06 | End: 2022-12-08

## 2022-12-06 RX ORDER — SIMETHICONE 80 MG
1 TABLET,CHEWABLE ORAL 4 TIMES DAILY PRN
Status: DISCONTINUED | OUTPATIENT
Start: 2022-12-06 | End: 2022-12-08

## 2022-12-06 RX ORDER — SODIUM CHLORIDE, SODIUM LACTATE, POTASSIUM CHLORIDE, CALCIUM CHLORIDE 600; 310; 30; 20 MG/100ML; MG/100ML; MG/100ML; MG/100ML
INJECTION, SOLUTION INTRAVENOUS CONTINUOUS
Status: DISCONTINUED | OUTPATIENT
Start: 2022-12-06 | End: 2022-12-08

## 2022-12-06 RX ORDER — DIPHENOXYLATE HYDROCHLORIDE AND ATROPINE SULFATE 2.5; .025 MG/1; MG/1
1 TABLET ORAL 4 TIMES DAILY PRN
Status: DISCONTINUED | OUTPATIENT
Start: 2022-12-06 | End: 2022-12-08

## 2022-12-06 RX ORDER — SODIUM CHLORIDE 9 MG/ML
INJECTION, SOLUTION INTRAVENOUS
Status: DISCONTINUED | OUTPATIENT
Start: 2022-12-06 | End: 2022-12-08

## 2022-12-06 RX ORDER — CARBOPROST TROMETHAMINE 250 UG/ML
250 INJECTION, SOLUTION INTRAMUSCULAR
Status: DISCONTINUED | OUTPATIENT
Start: 2022-12-06 | End: 2022-12-08

## 2022-12-06 RX ORDER — TRANEXAMIC ACID 100 MG/ML
1000 INJECTION, SOLUTION INTRAVENOUS ONCE AS NEEDED
Status: DISCONTINUED | OUTPATIENT
Start: 2022-12-06 | End: 2022-12-08

## 2022-12-06 RX ORDER — CALCIUM CARBONATE 200(500)MG
500 TABLET,CHEWABLE ORAL 3 TIMES DAILY PRN
Status: DISCONTINUED | OUTPATIENT
Start: 2022-12-06 | End: 2022-12-08

## 2022-12-06 RX ORDER — ACETAMINOPHEN 325 MG/1
650 TABLET ORAL EVERY 6 HOURS PRN
Status: DISCONTINUED | OUTPATIENT
Start: 2022-12-06 | End: 2022-12-08

## 2022-12-06 RX ORDER — OXYTOCIN/RINGER'S LACTATE 30/500 ML
95 PLASTIC BAG, INJECTION (ML) INTRAVENOUS ONCE
Status: DISCONTINUED | OUTPATIENT
Start: 2022-12-06 | End: 2022-12-08

## 2022-12-06 RX ORDER — OXYTOCIN/RINGER'S LACTATE 30/500 ML
0-30 PLASTIC BAG, INJECTION (ML) INTRAVENOUS CONTINUOUS
Status: DISCONTINUED | OUTPATIENT
Start: 2022-12-06 | End: 2022-12-08

## 2022-12-06 RX ORDER — METHYLERGONOVINE MALEATE 0.2 MG/ML
200 INJECTION INTRAVENOUS
Status: DISCONTINUED | OUTPATIENT
Start: 2022-12-06 | End: 2022-12-08

## 2022-12-06 RX ADMIN — MISOPROSTOL 50 MCG: 100 TABLET ORAL at 11:12

## 2022-12-06 NOTE — PROGRESS NOTES
Patient reports normal fetal movement. Denies vaginal bleeding, regular contractions or leakage of fluid. Scheduled for IOL given precautions.

## 2022-12-07 PROBLEM — O13.3 GESTATIONAL HYPERTENSION, THIRD TRIMESTER: Status: ACTIVE | Noted: 2022-12-07

## 2022-12-07 LAB
ABO + RH BLD: NORMAL
ALBUMIN SERPL BCP-MCNC: 2.6 G/DL (ref 3.5–5.2)
ALP SERPL-CCNC: 128 U/L (ref 55–135)
ALT SERPL W/O P-5'-P-CCNC: 12 U/L (ref 10–44)
ANION GAP SERPL CALC-SCNC: 12 MMOL/L (ref 8–16)
AST SERPL-CCNC: 10 U/L (ref 10–40)
BASOPHILS # BLD AUTO: 0.01 K/UL (ref 0–0.2)
BASOPHILS NFR BLD: 0.1 % (ref 0–1.9)
BILIRUB SERPL-MCNC: 0.3 MG/DL (ref 0.1–1)
BLD GP AB SCN CELLS X3 SERPL QL: NORMAL
BUN SERPL-MCNC: 4 MG/DL (ref 6–20)
CALCIUM SERPL-MCNC: 8.8 MG/DL (ref 8.7–10.5)
CHLORIDE SERPL-SCNC: 107 MMOL/L (ref 95–110)
CO2 SERPL-SCNC: 18 MMOL/L (ref 23–29)
CREAT SERPL-MCNC: 0.6 MG/DL (ref 0.5–1.4)
DIFFERENTIAL METHOD: ABNORMAL
EOSINOPHIL # BLD AUTO: 0.1 K/UL (ref 0–0.5)
EOSINOPHIL NFR BLD: 0.5 % (ref 0–8)
ERYTHROCYTE [DISTWIDTH] IN BLOOD BY AUTOMATED COUNT: 15.7 % (ref 11.5–14.5)
EST. GFR  (NO RACE VARIABLE): >60 ML/MIN/1.73 M^2
GLUCOSE SERPL-MCNC: 110 MG/DL (ref 70–110)
HCT VFR BLD AUTO: 30.3 % (ref 37–48.5)
HGB BLD-MCNC: 9.7 G/DL (ref 12–16)
IMM GRANULOCYTES # BLD AUTO: 0.06 K/UL (ref 0–0.04)
IMM GRANULOCYTES NFR BLD AUTO: 0.7 % (ref 0–0.5)
LYMPHOCYTES # BLD AUTO: 1.5 K/UL (ref 1–4.8)
LYMPHOCYTES NFR BLD: 16.7 % (ref 18–48)
MCH RBC QN AUTO: 27 PG (ref 27–31)
MCHC RBC AUTO-ENTMCNC: 32 G/DL (ref 32–36)
MCV RBC AUTO: 84 FL (ref 82–98)
MONOCYTES # BLD AUTO: 0.8 K/UL (ref 0.3–1)
MONOCYTES NFR BLD: 9.1 % (ref 4–15)
NEUTROPHILS # BLD AUTO: 6.7 K/UL (ref 1.8–7.7)
NEUTROPHILS NFR BLD: 72.9 % (ref 38–73)
NRBC BLD-RTO: 0 /100 WBC
PLATELET # BLD AUTO: 289 K/UL (ref 150–450)
PMV BLD AUTO: 10.4 FL (ref 9.2–12.9)
POTASSIUM SERPL-SCNC: 3.7 MMOL/L (ref 3.5–5.1)
PROT SERPL-MCNC: 6.3 G/DL (ref 6–8.4)
RBC # BLD AUTO: 3.59 M/UL (ref 4–5.4)
SODIUM SERPL-SCNC: 137 MMOL/L (ref 136–145)
WBC # BLD AUTO: 9.17 K/UL (ref 3.9–12.7)

## 2022-12-07 PROCEDURE — 11000001 HC ACUTE MED/SURG PRIVATE ROOM

## 2022-12-07 PROCEDURE — 59200 INSERT CERVICAL DILATOR: CPT

## 2022-12-07 PROCEDURE — 25000003 PHARM REV CODE 250: Performed by: OBSTETRICS & GYNECOLOGY

## 2022-12-07 PROCEDURE — 63600175 PHARM REV CODE 636 W HCPCS: Performed by: OBSTETRICS & GYNECOLOGY

## 2022-12-07 PROCEDURE — 72100002 HC LABOR CARE, 1ST 8 HOURS

## 2022-12-07 PROCEDURE — 72100003 HC LABOR CARE, EA. ADDL. 8 HRS

## 2022-12-07 RX ORDER — ALBUTEROL SULFATE 90 UG/1
2 AEROSOL, METERED RESPIRATORY (INHALATION) EVERY 6 HOURS PRN
Status: DISCONTINUED | OUTPATIENT
Start: 2022-12-07 | End: 2022-12-08

## 2022-12-07 RX ORDER — FLUTICASONE FUROATE AND VILANTEROL 200; 25 UG/1; UG/1
1 POWDER RESPIRATORY (INHALATION) DAILY
Status: DISCONTINUED | OUTPATIENT
Start: 2022-12-07 | End: 2022-12-08

## 2022-12-07 RX ADMIN — Medication 2 MILLI-UNITS/MIN: at 08:12

## 2022-12-07 RX ADMIN — MISOPROSTOL 50 MCG: 100 TABLET ORAL at 01:12

## 2022-12-07 RX ADMIN — MISOPROSTOL 50 MCG: 100 TABLET ORAL at 03:12

## 2022-12-07 RX ADMIN — MISOPROSTOL 50 MCG: 100 TABLET ORAL at 08:12

## 2022-12-07 RX ADMIN — ACETAMINOPHEN 650 MG: 325 TABLET ORAL at 04:12

## 2022-12-07 NOTE — NURSING
Dr morrell in to see pt. Sve per md- cervix fingertip. Continue poc,     Pt states pain a little less,

## 2022-12-07 NOTE — PROGRESS NOTES
"S: 32 y.o.  at 37w0d  doing well.    O:  /83   Pulse 88   Temp 98.8 °F (37.1 °C)   Resp 16   Ht 5' 6" (1.676 m)   Wt 111.2 kg (245 lb 2.4 oz)   LMP 2021 (Exact Date)   SpO2 (!) 94%   Breastfeeding No   BMI 39.57 kg/m²     FHT: 140, mod BTBV, + accels  [address]: ctx Q 3 min  SVE: CRB in place 50/50       ASSESSMENT: 37w0d   Patient Active Problem List   Diagnosis    Dysphonia    Left ovarian cyst    GERD (gastroesophageal reflux disease)    Endometriosis    Moderate persistent asthma without complication    Moderate episode of recurrent major depressive disorder    Migraine    Chronic back pain    Class 2 obesity without serious comorbidity in adult    Cyanocobalamin deficiency    Medication overuse headache    PMDD (premenstrual dysphoric disorder)    Numbness and tingling    Impaired functional mobility, balance, gait, and endurance    Chronic pain    Weakness of both hips    Weakness of trunk musculature    Decreased activities of daily living (ADL)    Decreased strength of upper extremity    ADD (attention deficit disorder)    Encounter for supervision of normal first pregnancy in first trimester    Shortness of breath    Gestational hypertension, third trimester       PLAN:    Continue Close Maternal/Fetal Monitoring  -Recheck 4 hours or PRN  -GHTN- BP stable no meds currently  - Asthma stable  - s/p cytotec x 3, will proceed with pitocin +CRB once she eats    Glory Fink MD, FACOG  OB/GYN      "

## 2022-12-07 NOTE — NURSING
Pt c/o increased pain. Using her birthing ball, states it's helping. Interrupted tracing while on ball.

## 2022-12-07 NOTE — NURSING
Awakened on rounds. Assisted up to bathroom. Po cytotec 50mcg 2nd dose. Cervix still closed but more anterior. Continue poc

## 2022-12-07 NOTE — NURSING
Cervix closed. Fht's reactive. Labs drawn. 50mcg po cytotec dose #1. Poc explained. Comfort measures provided.

## 2022-12-07 NOTE — H&P
[facility] - Labor & Delivery  Obstetrics  History & Physical    Patient Name: Carlos Camarena  MRN: [id number]  Admission Date: 2022  Primary Care Provider: Azalea Mireles MD    Subjective:     Principal Problem:Gestational hypertension, third trimester    History of Present Illness:  Carlos Camarena is a 32 y.o.  female with IUP at 37w0d weeks gestation by 8w1d US who presents for IOL- GHTN. This IUP is complicated by GHTN-on procardia 30XL daily, iron def anemia in pregnancy and moderate persistent asthma.  Patient reports contractions, denies vaginal bleeding, denies LOF.   Fetal Movement: normal.  Denies HA/blurry vision/RUQ/epigastric pain. No CP/SOB.      OB History    Para Term  AB Living   1 0 0 0 0 0   SAB IAB Ectopic Multiple Live Births   0 0 0 0 0      # Outcome Date GA Lbr Luis/2nd Weight Sex Delivery Anes PTL Lv   1 Current              Past Medical History:   Diagnosis Date    ADD (attention deficit disorder)     Asthma     Chronic back pain     Fibromyalgia     Moderate persistent asthma      Past Surgical History:   Procedure Laterality Date    BACK SURGERY  2013    INJECTION OF JOINT Right 2022    Procedure: INJECTION, JOINT RIGHT SI NEEDS CONSENT;  Surgeon: Cristopher Simon MD;  Location: [facility];  Service: Pain Management;  Laterality: Right;    SPINE SURGERY  14       PTA Medications   Medication Sig    albuterol (VENTOLIN HFA) 90 mcg/actuation inhaler Inhale 2 puffs into the lungs every 6 (six) hours as needed for Wheezing. Rescue    ferrous sulfate 325 (65 FE) MG EC tablet Take 1 tablet (325 mg total) by mouth 2 (two) times daily.    fluticasone-salmeterol 500-50 mcg/dose (ADVAIR DISKUS) 500-50 mcg/dose DsDv diskus inhaler Inhale 1 puff into the lungs 2 (two) times daily. Controller    NIFEdipine (PROCARDIA-XL) 30 MG (OSM) 24 hr tablet Take 1 tablet (30 mg total) by mouth once daily.    [DISCONTINUED] aspirin 81 MG Chew Take 1 tablet (81 mg total) by mouth once  daily.    [DISCONTINUED] diphenhydrAMINE (BENADRYL) 25 mg capsule Take 1 capsule (25 mg total) by mouth every 6 (six) hours as needed for Itching or Allergies. (Patient not taking: Reported on 9/7/2022)    [DISCONTINUED] doxylamine succinate (UNISOM, DOXYLAMINE,) 25 mg tablet Take 1 tablet (25 mg total) by mouth nightly as needed (nausea). To take along with vitamin B6 for nausea and vomiting in pregnancy.    [DISCONTINUED] famotidine (PEPCID) 20 MG tablet Take 1 tablet (20 mg total) by mouth 2 (two) times daily.    [DISCONTINUED] pyridoxine, vitamin B6, (B-6) 25 MG Tab Take 1 tablet (25 mg total) by mouth daily as needed (nausea).       Review of patient's allergies indicates:   Allergen Reactions    Lactose Diarrhea    Gluten protein Itching     Inflammation, bloating, diarrhea and pain all over body          Family History       Problem Relation (Age of Onset)    Alcohol abuse Mother    Anxiety disorder Mother    Cancer Mother    Depression Mother    Diabetes Sister    Hypertension Maternal Grandmother    No Known Problems Father    Ovarian cancer Mother          Tobacco Use    Smoking status: Never     Passive exposure: Never    Smokeless tobacco: Never   Substance and Sexual Activity    Alcohol use: Yes     Alcohol/week: 3.0 standard drinks     Types: 3 Glasses of wine per week     Comment: 1-3 drinks every 2 weeks    Drug use: No    Sexual activity: Yes     Partners: Male     Birth control/protection: Coitus interruptus     Review of Systems   Constitutional: Negative.    HENT: Negative.     Eyes: Negative.    Respiratory:  Negative for cough and shortness of breath.    Cardiovascular:  Negative for chest pain.   Gastrointestinal:  Negative for blood in stool, diarrhea, nausea and vomiting.   Endocrine: Negative.    Integumentary:  Negative.   Neurological:  Negative for syncope, numbness and headaches.   Psychiatric/Behavioral: Negative.      Objective:     Vital Signs (Most Recent):  Temp: 98.2 °F (36.8 °C)  (22 0800)  Pulse: 86 (22 0815)  Resp: 16 (22 0800)  BP: 118/69 (22 0745)  SpO2: 95 % (22 0815) Vital Signs (24h Range):  Temp:  [98.1 °F (36.7 °C)-98.2 °F (36.8 °C)] 98.2 °F (36.8 °C)  Pulse:  [0-107] 86  Resp:  [16] 16  SpO2:  [83 %-98 %] 95 %  BP: ()/(52-80) 118/69     Weight: 111.2 kg (245 lb 2.4 oz)  Body mass index is 39.57 kg/m².    FHT: 135, mod BTBV, Cat 1 (reassuring)  TOCO:  Irregular contractions    Physical Exam:   Constitutional: She is oriented to person, place, and time. She appears well-developed and well-nourished. No distress.    HENT:   Head: Normocephalic and atraumatic.       Pulmonary/Chest: Effort normal.        Abdominal: Soft.             Musculoskeletal: Moves all extremeties. Edema present.       Neurological: She is alert and oriented to person, place, and time.    Skin: Skin is warm and dry.    Psychiatric: She has a normal mood and affect.     Cervix:  Dilation:  0.5  Effacement:  0  Station: -3  Presentation: Vertex     Significant Labs:  Lab Results   Component Value Date    GROUPTRH O POS 2022    HEPBSAG Negative 2022    STREPBCULT No Group B Streptococcus isolated 2022       CBC:   Recent Labs   Lab 22  2335   WBC 9.17   RBC 3.59*   HGB 9.7*   HCT 30.3*      MCV 84   MCH 27.0   MCHC 32.0     CMP:   Recent Labs   Lab 22  2335      CALCIUM 8.8   ALBUMIN 2.6*   PROT 6.3      K 3.7   CO2 18*      BUN 4*   CREATININE 0.6   ALKPHOS 128   ALT 12   AST 10   BILITOT 0.3       Assessment/Plan:     32 y.o. female  at 37w0d for:    Active Diagnoses:    Diagnosis Date Noted POA    PRINCIPAL PROBLEM:  Gestational hypertension, third trimester [O13.3] 2022 Yes    Moderate persistent asthma without complication [J45.40] 2019 Yes      Problems Resolved During this Admission:       - Admit to L&D  - Consents reviewed   - Epidural per Anesthesia  - Recheck in 4 hrs or PRN, s/p 2 doses of  cytotec, will proceed with 3rd and discussed CRB placement when dilated  - CBC, type and screen  - Hemorrhage risk moderate        Glory Fink MD  Obstetrics  [facility] - Labor & Delivery

## 2022-12-08 ENCOUNTER — ANESTHESIA EVENT (OUTPATIENT)
Dept: OBSTETRICS AND GYNECOLOGY | Facility: HOSPITAL | Age: 32
End: 2022-12-08
Payer: COMMERCIAL

## 2022-12-08 ENCOUNTER — ANESTHESIA (OUTPATIENT)
Dept: OBSTETRICS AND GYNECOLOGY | Facility: HOSPITAL | Age: 32
End: 2022-12-08
Payer: COMMERCIAL

## 2022-12-08 PROCEDURE — 25000003 PHARM REV CODE 250: Performed by: OBSTETRICS & GYNECOLOGY

## 2022-12-08 PROCEDURE — 37000008 HC ANESTHESIA 1ST 15 MINUTES: Performed by: OBSTETRICS & GYNECOLOGY

## 2022-12-08 PROCEDURE — 59514 PRA REAN DELIVERY ONLY: ICD-10-PCS | Mod: QX,CRNA,, | Performed by: NURSE ANESTHETIST, CERTIFIED REGISTERED

## 2022-12-08 PROCEDURE — 25000003 PHARM REV CODE 250: Performed by: NURSE ANESTHETIST, CERTIFIED REGISTERED

## 2022-12-08 PROCEDURE — 59510 PR FULL ROUT OBSTE CARE,CESAREAN DELIV: ICD-10-PCS | Mod: ,,, | Performed by: OBSTETRICS & GYNECOLOGY

## 2022-12-08 PROCEDURE — 59514 PRA REAN DELIVERY ONLY: ICD-10-PCS | Mod: QY,ANES,, | Performed by: ANESTHESIOLOGY

## 2022-12-08 PROCEDURE — 25000242 PHARM REV CODE 250 ALT 637 W/ HCPCS: Performed by: OBSTETRICS & GYNECOLOGY

## 2022-12-08 PROCEDURE — 63600175 PHARM REV CODE 636 W HCPCS: Performed by: NURSE ANESTHETIST, CERTIFIED REGISTERED

## 2022-12-08 PROCEDURE — 59514 CESAREAN DELIVERY ONLY: CPT | Mod: QX,CRNA,, | Performed by: NURSE ANESTHETIST, CERTIFIED REGISTERED

## 2022-12-08 PROCEDURE — 71000033 HC RECOVERY, INTIAL HOUR: Performed by: OBSTETRICS & GYNECOLOGY

## 2022-12-08 PROCEDURE — 36004724 HC OB OR TIME LEV III - 1ST 15 MIN: Performed by: OBSTETRICS & GYNECOLOGY

## 2022-12-08 PROCEDURE — 11000001 HC ACUTE MED/SURG PRIVATE ROOM

## 2022-12-08 PROCEDURE — 59514 CESAREAN DELIVERY ONLY: CPT | Mod: QY,ANES,, | Performed by: ANESTHESIOLOGY

## 2022-12-08 PROCEDURE — 72100003 HC LABOR CARE, EA. ADDL. 8 HRS

## 2022-12-08 PROCEDURE — 94761 N-INVAS EAR/PLS OXIMETRY MLT: CPT

## 2022-12-08 PROCEDURE — 63600175 PHARM REV CODE 636 W HCPCS: Performed by: OBSTETRICS & GYNECOLOGY

## 2022-12-08 PROCEDURE — 37000009 HC ANESTHESIA EA ADD 15 MINS: Performed by: OBSTETRICS & GYNECOLOGY

## 2022-12-08 PROCEDURE — 63700000 PHARM REV CODE 250 ALT 637 W/O HCPCS: Performed by: OBSTETRICS & GYNECOLOGY

## 2022-12-08 PROCEDURE — 36004725 HC OB OR TIME LEV III - EA ADD 15 MIN: Performed by: OBSTETRICS & GYNECOLOGY

## 2022-12-08 PROCEDURE — 59510 CESAREAN DELIVERY: CPT | Mod: ,,, | Performed by: OBSTETRICS & GYNECOLOGY

## 2022-12-08 PROCEDURE — 94640 AIRWAY INHALATION TREATMENT: CPT

## 2022-12-08 PROCEDURE — 71000039 HC RECOVERY, EACH ADD'L HOUR: Performed by: OBSTETRICS & GYNECOLOGY

## 2022-12-08 RX ORDER — OXYCODONE AND ACETAMINOPHEN 5; 325 MG/1; MG/1
1 TABLET ORAL EVERY 4 HOURS PRN
Status: DISCONTINUED | OUTPATIENT
Start: 2022-12-08 | End: 2022-12-11 | Stop reason: HOSPADM

## 2022-12-08 RX ORDER — MORPHINE SULFATE 4 MG/ML
2 INJECTION, SOLUTION INTRAMUSCULAR; INTRAVENOUS
Status: ACTIVE | OUTPATIENT
Start: 2022-12-08 | End: 2022-12-09

## 2022-12-08 RX ORDER — AMOXICILLIN 250 MG
1 CAPSULE ORAL NIGHTLY PRN
Status: DISCONTINUED | OUTPATIENT
Start: 2022-12-08 | End: 2022-12-11 | Stop reason: HOSPADM

## 2022-12-08 RX ORDER — OXYTOCIN/RINGER'S LACTATE 30/500 ML
95 PLASTIC BAG, INJECTION (ML) INTRAVENOUS ONCE
Status: DISCONTINUED | OUTPATIENT
Start: 2022-12-08 | End: 2022-12-11 | Stop reason: HOSPADM

## 2022-12-08 RX ORDER — PROCHLORPERAZINE EDISYLATE 5 MG/ML
5 INJECTION INTRAMUSCULAR; INTRAVENOUS EVERY 6 HOURS PRN
Status: DISCONTINUED | OUTPATIENT
Start: 2022-12-08 | End: 2022-12-11 | Stop reason: HOSPADM

## 2022-12-08 RX ORDER — MORPHINE SULFATE 1 MG/ML
INJECTION, SOLUTION EPIDURAL; INTRATHECAL; INTRAVENOUS
Status: DISCONTINUED | OUTPATIENT
Start: 2022-12-08 | End: 2022-12-08

## 2022-12-08 RX ORDER — ONDANSETRON 8 MG/1
8 TABLET, ORALLY DISINTEGRATING ORAL EVERY 8 HOURS PRN
Status: DISCONTINUED | OUTPATIENT
Start: 2022-12-08 | End: 2022-12-11 | Stop reason: HOSPADM

## 2022-12-08 RX ORDER — CEFAZOLIN SODIUM 2 G/50ML
2 SOLUTION INTRAVENOUS ONCE
Status: DISCONTINUED | OUTPATIENT
Start: 2022-12-08 | End: 2022-12-08

## 2022-12-08 RX ORDER — FLUCONAZOLE 100 MG/1
100 TABLET ORAL DAILY
Status: DISCONTINUED | OUTPATIENT
Start: 2022-12-08 | End: 2022-12-08

## 2022-12-08 RX ORDER — OXYCODONE AND ACETAMINOPHEN 10; 325 MG/1; MG/1
1 TABLET ORAL EVERY 4 HOURS PRN
Status: DISCONTINUED | OUTPATIENT
Start: 2022-12-08 | End: 2022-12-11 | Stop reason: HOSPADM

## 2022-12-08 RX ORDER — SODIUM CITRATE AND CITRIC ACID MONOHYDRATE 334; 500 MG/5ML; MG/5ML
30 SOLUTION ORAL ONCE
Status: COMPLETED | OUTPATIENT
Start: 2022-12-08 | End: 2022-12-08

## 2022-12-08 RX ORDER — BUPIVACAINE HYDROCHLORIDE 7.5 MG/ML
INJECTION, SOLUTION INTRASPINAL
Status: DISCONTINUED | OUTPATIENT
Start: 2022-12-08 | End: 2022-12-08

## 2022-12-08 RX ORDER — DIPHENHYDRAMINE HCL 25 MG
25 CAPSULE ORAL EVERY 4 HOURS PRN
Status: DISCONTINUED | OUTPATIENT
Start: 2022-12-08 | End: 2022-12-11 | Stop reason: HOSPADM

## 2022-12-08 RX ORDER — NALBUPHINE HYDROCHLORIDE 10 MG/ML
5 INJECTION, SOLUTION INTRAMUSCULAR; INTRAVENOUS; SUBCUTANEOUS ONCE AS NEEDED
Status: DISCONTINUED | OUTPATIENT
Start: 2022-12-08 | End: 2022-12-11 | Stop reason: HOSPADM

## 2022-12-08 RX ORDER — AZITHROMYCIN 100 MG/ML
INJECTION, POWDER, LYOPHILIZED, FOR SOLUTION INTRAVENOUS
Status: DISCONTINUED | OUTPATIENT
Start: 2022-12-08 | End: 2022-12-08

## 2022-12-08 RX ORDER — FAMOTIDINE 10 MG/ML
20 INJECTION INTRAVENOUS ONCE
Status: COMPLETED | OUTPATIENT
Start: 2022-12-08 | End: 2022-12-08

## 2022-12-08 RX ORDER — ADHESIVE BANDAGE
30 BANDAGE TOPICAL 2 TIMES DAILY PRN
Status: DISCONTINUED | OUTPATIENT
Start: 2022-12-09 | End: 2022-12-11 | Stop reason: HOSPADM

## 2022-12-08 RX ORDER — MUPIROCIN 20 MG/G
OINTMENT TOPICAL 2 TIMES DAILY
Status: DISCONTINUED | OUTPATIENT
Start: 2022-12-08 | End: 2022-12-11 | Stop reason: HOSPADM

## 2022-12-08 RX ORDER — BUPIVACAINE HYDROCHLORIDE 2.5 MG/ML
20 INJECTION, SOLUTION EPIDURAL; INFILTRATION; INTRACAUDAL ONCE
Status: DISCONTINUED | OUTPATIENT
Start: 2022-12-08 | End: 2022-12-08

## 2022-12-08 RX ORDER — OXYCODONE HYDROCHLORIDE 5 MG/1
10 TABLET ORAL EVERY 4 HOURS PRN
Status: DISPENSED | OUTPATIENT
Start: 2022-12-08 | End: 2022-12-09

## 2022-12-08 RX ORDER — SODIUM CHLORIDE 0.9 % (FLUSH) 0.9 %
10 SYRINGE (ML) INJECTION
Status: DISCONTINUED | OUTPATIENT
Start: 2022-12-08 | End: 2022-12-11 | Stop reason: HOSPADM

## 2022-12-08 RX ORDER — IBUPROFEN 600 MG/1
600 TABLET ORAL EVERY 6 HOURS
Status: DISCONTINUED | OUTPATIENT
Start: 2022-12-09 | End: 2022-12-11 | Stop reason: HOSPADM

## 2022-12-08 RX ORDER — BISACODYL 10 MG
10 SUPPOSITORY, RECTAL RECTAL ONCE AS NEEDED
Status: DISCONTINUED | OUTPATIENT
Start: 2022-12-08 | End: 2022-12-11 | Stop reason: HOSPADM

## 2022-12-08 RX ORDER — ONDANSETRON 2 MG/ML
INJECTION INTRAMUSCULAR; INTRAVENOUS
Status: DISCONTINUED | OUTPATIENT
Start: 2022-12-08 | End: 2022-12-08

## 2022-12-08 RX ORDER — OXYCODONE HYDROCHLORIDE 5 MG/1
5 TABLET ORAL EVERY 4 HOURS PRN
Status: ACTIVE | OUTPATIENT
Start: 2022-12-08 | End: 2022-12-09

## 2022-12-08 RX ORDER — DOCUSATE SODIUM 100 MG/1
200 CAPSULE, LIQUID FILLED ORAL 2 TIMES DAILY
Status: DISCONTINUED | OUTPATIENT
Start: 2022-12-08 | End: 2022-12-11 | Stop reason: HOSPADM

## 2022-12-08 RX ORDER — OXYTOCIN 10 [USP'U]/ML
INJECTION, SOLUTION INTRAMUSCULAR; INTRAVENOUS
Status: DISCONTINUED | OUTPATIENT
Start: 2022-12-08 | End: 2022-12-08

## 2022-12-08 RX ORDER — ACETAMINOPHEN 10 MG/ML
INJECTION, SOLUTION INTRAVENOUS
Status: DISCONTINUED | OUTPATIENT
Start: 2022-12-08 | End: 2022-12-08

## 2022-12-08 RX ORDER — FENTANYL CITRATE 50 UG/ML
INJECTION, SOLUTION INTRAMUSCULAR; INTRAVENOUS
Status: DISCONTINUED | OUTPATIENT
Start: 2022-12-08 | End: 2022-12-08

## 2022-12-08 RX ORDER — SIMETHICONE 80 MG
1 TABLET,CHEWABLE ORAL EVERY 6 HOURS PRN
Status: DISCONTINUED | OUTPATIENT
Start: 2022-12-08 | End: 2022-12-11 | Stop reason: HOSPADM

## 2022-12-08 RX ORDER — ACETAMINOPHEN 325 MG/1
650 TABLET ORAL EVERY 6 HOURS PRN
Status: DISPENSED | OUTPATIENT
Start: 2022-12-08 | End: 2022-12-09

## 2022-12-08 RX ORDER — FLUCONAZOLE 100 MG/1
100 TABLET ORAL ONCE
Status: COMPLETED | OUTPATIENT
Start: 2022-12-08 | End: 2022-12-08

## 2022-12-08 RX ORDER — MUPIROCIN 20 MG/G
OINTMENT TOPICAL
Status: DISCONTINUED | OUTPATIENT
Start: 2022-12-08 | End: 2022-12-08

## 2022-12-08 RX ORDER — PRENATAL WITH FERROUS FUM AND FOLIC ACID 3080; 920; 120; 400; 22; 1.84; 3; 20; 10; 1; 12; 200; 27; 25; 2 [IU]/1; [IU]/1; MG/1; [IU]/1; MG/1; MG/1; MG/1; MG/1; MG/1; MG/1; UG/1; MG/1; MG/1; MG/1; MG/1
1 TABLET ORAL DAILY
Status: DISCONTINUED | OUTPATIENT
Start: 2022-12-09 | End: 2022-12-11 | Stop reason: HOSPADM

## 2022-12-08 RX ORDER — KETOROLAC TROMETHAMINE 30 MG/ML
INJECTION, SOLUTION INTRAMUSCULAR; INTRAVENOUS
Status: DISCONTINUED | OUTPATIENT
Start: 2022-12-08 | End: 2022-12-08

## 2022-12-08 RX ADMIN — ACETAMINOPHEN 1000 MG: 10 INJECTION, SOLUTION INTRAVENOUS at 08:12

## 2022-12-08 RX ADMIN — MORPHINE SULFATE 0.1 MG: 1 INJECTION, SOLUTION EPIDURAL; INTRATHECAL; INTRAVENOUS at 07:12

## 2022-12-08 RX ADMIN — SODIUM CHLORIDE 2 G: 9 INJECTION, SOLUTION INTRAVENOUS at 07:12

## 2022-12-08 RX ADMIN — AZITHROMYCIN MONOHYDRATE 500 MG: 500 INJECTION, POWDER, LYOPHILIZED, FOR SOLUTION INTRAVENOUS at 07:12

## 2022-12-08 RX ADMIN — BUPIVACAINE HYDROCHLORIDE 50 MG: 2.5 INJECTION, SOLUTION EPIDURAL; INFILTRATION; INTRACAUDAL; PERINEURAL at 07:12

## 2022-12-08 RX ADMIN — DINOPROSTONE 10 MG: 10 INSERT VAGINAL at 08:12

## 2022-12-08 RX ADMIN — FLUTICASONE FUROATE AND VILANTEROL TRIFENATATE 1 PUFF: 200; 25 POWDER RESPIRATORY (INHALATION) at 09:12

## 2022-12-08 RX ADMIN — FAMOTIDINE 20 MG: 10 INJECTION, SOLUTION INTRAVENOUS at 06:12

## 2022-12-08 RX ADMIN — SODIUM CHLORIDE, SODIUM LACTATE, POTASSIUM CHLORIDE, AND CALCIUM CHLORIDE 1000 ML: .6; .31; .03; .02 INJECTION, SOLUTION INTRAVENOUS at 06:12

## 2022-12-08 RX ADMIN — PHENYLEPHRINE HYDROCHLORIDE 0.5 MCG/KG/MIN: 10 INJECTION INTRAVENOUS at 07:12

## 2022-12-08 RX ADMIN — SODIUM CITRATE AND CITRIC ACID MONOHYDRATE 30 ML: 500; 334 SOLUTION ORAL at 06:12

## 2022-12-08 RX ADMIN — FENTANYL CITRATE 10 MCG: 0.05 INJECTION, SOLUTION INTRAMUSCULAR; INTRAVENOUS at 07:12

## 2022-12-08 RX ADMIN — OXYTOCIN 10 UNITS: 10 INJECTION INTRAVENOUS at 07:12

## 2022-12-08 RX ADMIN — KETOROLAC TROMETHAMINE 30 MG: 30 INJECTION, SOLUTION INTRAMUSCULAR; INTRAVENOUS at 08:12

## 2022-12-08 RX ADMIN — FLUCONAZOLE 100 MG: 100 TABLET ORAL at 10:12

## 2022-12-08 RX ADMIN — BUPIVACAINE HYDROCHLORIDE 1.8 ML: 7.5 INJECTION, SOLUTION SUBARACHNOID at 07:12

## 2022-12-08 RX ADMIN — MUPIROCIN: 20 OINTMENT TOPICAL at 06:12

## 2022-12-08 RX ADMIN — ONDANSETRON 4 MG: 2 INJECTION, SOLUTION INTRAMUSCULAR; INTRAVENOUS at 07:12

## 2022-12-08 RX ADMIN — SODIUM CHLORIDE, SODIUM LACTATE, POTASSIUM CHLORIDE, AND CALCIUM CHLORIDE: .6; .31; .03; .02 INJECTION, SOLUTION INTRAVENOUS at 03:12

## 2022-12-08 NOTE — NURSING
Dr Fink phoned unit. MD updated on SVE and removal of hurt bulb. No new orders given at this time.

## 2022-12-08 NOTE — DISCHARGE INSTRUCTIONS
"Patient Discharge Instructions for Postpartum Women    Resume Regular Diet  Increase activity gradually, no heavy lifting  Shower  No tampons, douching or sexual intercourse.  Discuss birth control options with your physician.  Wear a support bra  Return to work/school when you've been cleared by a physician    Call your physician if     *Fever of 100.4 or higher  *Persistent nausea/ vomiting  *Incisional drainage  *Heavy vaginal bleeding or large clots (Heavy bleeding is soaking 1 pad in an hour)  *Swelling and pain in arms or legs  *Severe headaches, blurred vision or fainting  *Shortness of breath  *Frequency and burning with urination  *Signs of postpartum depression, discuss these signs with your physician    Call lactation services for questions regarding feeding, nipple and breast care, and general questions about lactation.  They can be reached at [phone]         Understanding Postpartum Depression    You've just had a baby.  You know you should be excited and happy.  But instead you find yourself crying for no reason.  You may have trouble coping with your daily tasks.  You feel sad, tired, and hopeless most of the time.  You may even feel ashamed or guilty.  But what you're going through is not your fault and you can feel better.  Talk to your doctor.  He or she can help.    Depression After Childbirth    You may be weepy and tired right after giving birth.  These feelings are normal.  They're sometimes called the "baby blues."  These blues go away 2-3 weeks.  However, postpartum (meaning "after birth") depression lasts much longer and is more sever than the "baby blues."  It can make you feel sad and hopeless.  You may also fear that your baby will be harmed and worry about being a bad mother.      What is Depression?    Depression is a mood disorder that affects the way you think and feel.  The most common symptom is a feeling of deep sadness.  You may also feel as if you just can't cope with life.  "   Other symptoms include:      * Gaining or loosing weight  * Sleeping too much or too little  * Feeling tired all the time  * Feeling restless  * Fears of harming your baby   * Lack of interest in your baby  * Feeling worthless or guilty  * No longer finding pleasure in things you used to  * Having trouble thinking clearly or making decisions  * Thoughts of hurting yourself or your baby    What Causes Postpartum Depression    The exact causes of postpartum depression isn't known.  It may be due to changes in your hormones during and after childbirth.  You may also be tired from caring for your baby and adjusting to being a mother.  All these factors may make you feel depressed.  In some cases, your genes may also play a role.    Depression Can Be Treated    The good news is that there are many ways to treat postpartum depression.  Talking to your doctor is the first step toward feeling better.    Resources:    * National [place] of Mental Health  -- 473.984.8797    www.nimh.nih.gov    * National [place] on Mental Illness --796.329.4987    Www.viv.org    * Mental Health [place] -- 240.647.9842     Www.[facility].org    * National Suicide Hotline --608.449.5452 (800-SUICIDE)    6873-4015 [employer]  All rights reserved.  This information is not intended as a substitute for professional medical care.  Always follow up with your healthcare professional's instructions.           Breastfeeding Discharge Instructions      Feed the baby at the earliest sign of hunger or comfort  Hands to mouth, sucking motions  Rooting or searching for something to suck on  Dont wait for crying - it is a sign of distress    The feedings may be 8-12 times per 24hrs and will not follow a schedule  Avoid pacifiers and bottles for the first 4 weeks  Alternate the breast you start the feeding with, or start with the breast that feels the fullest  Switch breasts when the baby takes himself off the breast or falls asleep  Keep  offering breasts until the baby looks full, no longer gives hunger signs, and stays asleep when placed on his back in the crib  If the baby is sleepy and wont wake for a feeding, put the baby skin-to-skin dressed in a diaper against the mothers bare chest  Sleep near your baby  The baby should be positioned and latched on to the breast correctly  Chest-to-chest, chin in the breast  Babys lips are flipped outward  Babys mouth is stretched open wide like a shout  Babys sucking should feel like tugging to the mother  The baby should be drinking at the breast:  You should hear swallowing or gulping throughout the feeding  You should see milk on the babys lips when he comes off the breast  Your breasts should be softer when the baby is finished feeding  The baby should look relaxed at the end of feedings  After the 4th day and your milk is in:  The babys poop should turn bright yellow and be loose, watery, and seedy  The baby should have at least 3-4 poops the size of the palm of your hand per day  The baby should have at least 5-6 wet diapers per day  The urine should be light yellow in color  You should drink when you are thirsty and eat a healthy diet when you are    hungry.     Take naps to get the rest you need.   Take medications and/or drink alcohol only with permission of your obstetrician    or the babys pediatrician.  You can also call the [facility] Risk [facility],   ([phone]), Monday-Friday, 8am-5pm Central time, to get the most   up-to-date evidence-based information on the use of medications during   pregnancy and breastfeeding.      The baby should be examined by a pediatrician at 3-5 days of age.  Once your   milk comes in, the baby should be gaining at least ½ - 1oz each day and should be back to birthweight no later than 10-14 days of age.          Community Resources    [facility] Breastfeeding Warmline: 970.774.9475  Local [facility] clinics: provide incentives and breastpumps to  eligible mothers  [employer] (LLLI):  mother-to-mother support group website        www.lll.org  [facility] La Leche League mother-to-mother support groups:        www.llcarolinaCatapult Health        La Leche League [facility]   Dr. True Chowdary website for latch videos and general information:        www.breastfeedinginc.ca  [facility] is a call center that provides information about the safety of taking medications while breastfeeding.  Call 1-618.697.4499, M-F, 8am-5pm, [place].  International Lactation Consultant Association provides resources for assistance:        www.ilca.org  [facility] Breastfeeding Coalition provides informationand resources for parents  and the community    http://[facility].org  Zip code search of breastfeeding resources and more:                                                                              www.LaBreastfeedingSupport.org          Carolina Wu is a mom-to-mom support group:                             www.nolanesting.com//breastfeedng-support/  Partners for Healthy Babies:  4-953-606-BABY(9463)  [place] [employer]: a breastfeeding support group for women of color, 561.911.2760

## 2022-12-08 NOTE — PROGRESS NOTES
"S: 32 y.o.  at 37w1d, Denies HA/blurry vision/RUQ/epigastric pain. No CP/SOB.      O:  /79   Pulse 92   Temp 98.8 °F (37.1 °C)   Resp 16   Ht 5' 6" (1.676 m)   Wt 111.2 kg (245 lb 2.4 oz)   LMP 2021 (Exact Date)   SpO2 (!) 90%   Breastfeeding No   BMI 39.57 kg/m²     FHT: 140, mod BTBV, + accels  Remer: ctx Q3-4, pit at 22 weeks  SVE:unchanged      ASSESSMENT: 37w1d   Patient Active Problem List   Diagnosis    Dysphonia    Left ovarian cyst    GERD (gastroesophageal reflux disease)    Endometriosis    Moderate persistent asthma without complication    Moderate episode of recurrent major depressive disorder    Migraine    Chronic back pain    Class 2 obesity without serious comorbidity in adult    Cyanocobalamin deficiency    Medication overuse headache    PMDD (premenstrual dysphoric disorder)    Numbness and tingling    Impaired functional mobility, balance, gait, and endurance    Chronic pain    Weakness of both hips    Weakness of trunk musculature    Decreased activities of daily living (ADL)    Decreased strength of upper extremity    ADD (attention deficit disorder)    Encounter for supervision of normal first pregnancy in first trimester    Shortness of breath    Gestational hypertension, third trimester       PLAN:    -Continue Close Maternal/Fetal Monitoring  -will allow for break- shower, eat  - cervix still pretty thick- will restart with cervical ripening --> pit    Glory Fink MD, FACOG  OB/GYN      "

## 2022-12-08 NOTE — NURSING
RAMÍREZ Carlos RN at bedside. Hurt bulb out after light tension applied to hurt bulb. SVE performed.

## 2022-12-08 NOTE — CONSULTS
Inpatient consult to Lactation  Consult performed by: Glory Fink MD  Consult ordered by: Glory Fink MD    Lactation Services following patient.

## 2022-12-08 NOTE — NURSING
Dr Fink at bedside for SVE. Plan of care reviewed with pt, significant other and RN. New orders placed per MD. Pit discontinued and pt to take a shower and eat breakfast. Pt and significant other verbalize understanding of plan of care and all in agreement.

## 2022-12-09 LAB
BASOPHILS # BLD AUTO: 0.01 K/UL (ref 0–0.2)
BASOPHILS NFR BLD: 0.1 % (ref 0–1.9)
DIFFERENTIAL METHOD: ABNORMAL
EOSINOPHIL # BLD AUTO: 0 K/UL (ref 0–0.5)
EOSINOPHIL NFR BLD: 0.4 % (ref 0–8)
ERYTHROCYTE [DISTWIDTH] IN BLOOD BY AUTOMATED COUNT: 16.1 % (ref 11.5–14.5)
HCT VFR BLD AUTO: 28.9 % (ref 37–48.5)
HGB BLD-MCNC: 9.4 G/DL (ref 12–16)
IMM GRANULOCYTES # BLD AUTO: 0.07 K/UL (ref 0–0.04)
IMM GRANULOCYTES NFR BLD AUTO: 0.7 % (ref 0–0.5)
LYMPHOCYTES # BLD AUTO: 1.4 K/UL (ref 1–4.8)
LYMPHOCYTES NFR BLD: 14.9 % (ref 18–48)
MCH RBC QN AUTO: 27.2 PG (ref 27–31)
MCHC RBC AUTO-ENTMCNC: 32.5 G/DL (ref 32–36)
MCV RBC AUTO: 84 FL (ref 82–98)
MONOCYTES # BLD AUTO: 0.8 K/UL (ref 0.3–1)
MONOCYTES NFR BLD: 8 % (ref 4–15)
NEUTROPHILS # BLD AUTO: 7.2 K/UL (ref 1.8–7.7)
NEUTROPHILS NFR BLD: 75.9 % (ref 38–73)
NRBC BLD-RTO: 0 /100 WBC
PLATELET # BLD AUTO: 235 K/UL (ref 150–450)
PMV BLD AUTO: 10.5 FL (ref 9.2–12.9)
RBC # BLD AUTO: 3.46 M/UL (ref 4–5.4)
WBC # BLD AUTO: 9.49 K/UL (ref 3.9–12.7)

## 2022-12-09 PROCEDURE — 25000242 PHARM REV CODE 250 ALT 637 W/ HCPCS: Performed by: OBSTETRICS & GYNECOLOGY

## 2022-12-09 PROCEDURE — 25000003 PHARM REV CODE 250: Performed by: OBSTETRICS & GYNECOLOGY

## 2022-12-09 PROCEDURE — 11000001 HC ACUTE MED/SURG PRIVATE ROOM

## 2022-12-09 PROCEDURE — 85025 COMPLETE CBC W/AUTO DIFF WBC: CPT | Performed by: OBSTETRICS & GYNECOLOGY

## 2022-12-09 PROCEDURE — 94640 AIRWAY INHALATION TREATMENT: CPT

## 2022-12-09 PROCEDURE — 94761 N-INVAS EAR/PLS OXIMETRY MLT: CPT

## 2022-12-09 PROCEDURE — 25000003 PHARM REV CODE 250: Performed by: NURSE ANESTHETIST, CERTIFIED REGISTERED

## 2022-12-09 PROCEDURE — 99900035 HC TECH TIME PER 15 MIN (STAT)

## 2022-12-09 PROCEDURE — 36415 COLL VENOUS BLD VENIPUNCTURE: CPT | Performed by: OBSTETRICS & GYNECOLOGY

## 2022-12-09 RX ORDER — FLUTICASONE FUROATE AND VILANTEROL 200; 25 UG/1; UG/1
1 POWDER RESPIRATORY (INHALATION) DAILY
Status: DISCONTINUED | OUTPATIENT
Start: 2022-12-09 | End: 2022-12-11 | Stop reason: HOSPADM

## 2022-12-09 RX ADMIN — OXYCODONE AND ACETAMINOPHEN 1 TABLET: 10; 325 TABLET ORAL at 09:12

## 2022-12-09 RX ADMIN — IBUPROFEN 600 MG: 600 TABLET ORAL at 05:12

## 2022-12-09 RX ADMIN — OXYCODONE AND ACETAMINOPHEN 1 TABLET: 10; 325 TABLET ORAL at 05:12

## 2022-12-09 RX ADMIN — DOCUSATE SODIUM 200 MG: 100 CAPSULE, LIQUID FILLED ORAL at 10:12

## 2022-12-09 RX ADMIN — OXYCODONE HYDROCHLORIDE 10 MG: 5 TABLET ORAL at 01:12

## 2022-12-09 RX ADMIN — PRENATAL VIT W/ FE FUMARATE-FA TAB 27-0.8 MG 1 TABLET: 27-0.8 TAB at 10:12

## 2022-12-09 RX ADMIN — FLUTICASONE FUROATE AND VILANTEROL TRIFENATATE 1 PUFF: 200; 25 POWDER RESPIRATORY (INHALATION) at 03:12

## 2022-12-09 RX ADMIN — MUPIROCIN: 20 OINTMENT TOPICAL at 10:12

## 2022-12-09 RX ADMIN — ACETAMINOPHEN 650 MG: 325 TABLET ORAL at 10:12

## 2022-12-09 RX ADMIN — IBUPROFEN 600 MG: 600 TABLET ORAL at 10:12

## 2022-12-09 RX ADMIN — OXYCODONE HYDROCHLORIDE 10 MG: 5 TABLET ORAL at 12:12

## 2022-12-09 RX ADMIN — IBUPROFEN 600 MG: 600 TABLET ORAL at 12:12

## 2022-12-09 RX ADMIN — SIMETHICONE CHEW TAB 80 MG 80 MG: 80 TABLET ORAL at 10:12

## 2022-12-09 NOTE — LACTATION NOTE
Demonstrated and educated mom on:  Pump setup, assembly of pump parts, turning pump on & off, increasing/decreasing suction, dismantling of pump parts, cleaning, sterilizing, & drying of pump parts, and storage of parts & equipment.  Proper positioning & placing of suction cups on breasts, maintaining proper sealing of suction cups, and proper collection/storage of colostrum/breastmilk.      Mom return demonstrated and verbalized understanding of:  Pump setup, assembly of pump parts, turning pump on & off, increasing/decreasing suction, dismantling of pump parts, cleaning, sterilizing, & drying of pump parts, and storage of parts & equipment.  Proper positioning & placing of suction cups on breasts, maintaining proper sealing of suction cups, and proper collection/storage of colostrum/breastmilk.      Informed mom:  Electric breast pumping may not yield much results at this time and that with hand expression she may see better results.  Mom verbalized understanding.    Encouraged to pump 8 or more in 24 hrs.  Encouraged to ask questions, all questions answered, and verbalized understanding.  Encouraged to call for breastfeeding/pumping assistance/evaluation/observation.  Encouragement, support, and positive reinforcement provided.

## 2022-12-09 NOTE — L&D DELIVERY NOTE
[facility] - Labor & Delivery   Section   Operative Note    SUMMARY    Section Operative Note    Date of Procedure:  2022        Procedure: Primary Low Transverse  Delivery via Pfannenstiel skin incision      Indications: failed induction            Pre-operative Diagnosis:  1. IUP at 37 week 1 day pregnancy  2. Gestational hypertension  3. Failed induction with cervical ripening, CRB, + pitocin    Post-operative Diagnosis: same    Surgeon: Glory Fink MD    Assistant: Lachelle Mehta    Anesthesia:Spinal anesthesia    Findings:  viable female infant, apgars pending. Normal uterus, tubes, and ovaries    Estimated Blood Loss:  400 ml           Total IV Fluids: 800 ml    Urine Output: 200 ml     Specimens: placenta discarded                  Complications:   None; patient tolerated the procedure well.               Condition: stable    Procedure Details:  The risks, benefits, complications, treatment options, and expected outcomes were discussed with the patient.  The patient concurred with the proposed plan, giving informed consent. The patient was taken to Operating Room and a time out was held.    After induction of anesthesia, the patient was prepped and draped in the usual sterile manner. A Pfannenstiel incision was made and carried down through the subcutaneous tissue to the fascia. Fascial incision was made and extended transversely. The fascia was grasped with Kocher clamps and  from the underlying rectus tissue superiorly and inferiorly. The peritoneum was identified, found to be free of adherent bowel and entered. Peritoneal incision was extended longitudinally. The vesico-uterine peritoneum was identified and bladder blade was inserted. The vesico-uterine peritoneum was incised transversely and the bladder flap was bluntly freed from the lower uterine segment. The bladder blade was reinserted to keep the bladder out of the operative field. A low transverse uterine  incision was made with knife. Infant was noted to be in vertex position. The head was brought to the incision and elevated out of the pelvis. The patient delivered a single viable female infant without difficulty.  Infant weight and Apgars pending. After the umbilical cord was clamped and cut cord blood was obtained for evaluation. The placenta was removed intact and appeared normal. The uterus was exteriorized and cleared of any remaining placenta membranes or tissue. The uterine outline, tubes and ovaries appeared normal. The uterine incision was closed with running locked sutures of 0 chromic. Hemostasis was observed. The uterus was returned to the abdominal cavity. Incision was reinspected and good hemostasis was noted. The abdominal cavity was irrigated to remove clots. The peritoneum was reapproximated with 2-0 Vicryl running. The muscle was reapproximated with 0 chromic gut in mattress fashion. The fascia was then reapproximated with running sutures of 0 Vicryl x 2 after injection 30ml of 0.25% bupivacaine subfascially. The subcutaneous tissue irrigated and reapproximated with 2-0 plain gut. The skin was reapproximated with 4-0 Monocryl and the incision dressed with Aquacel. Instrument, sponge, and needle counts were correct prior the abdominal closure and at the conclusion of the case.          Delivery Information for Wilton Camarena    Birth information:  YOB: 2020   Time of birth: 7:58 PM   Sex: female   Head Delivery Date/Time:     Delivery type:    Gestational Age: 37w1d    Delivery Providers    Delivering clinician:            Measurements    Weight:   Length:          Apgars    Living status:   Apgars:  1 min.:  5 min.:  10 min.:  15 min.:  20 min.:    Skin color:         Heart rate:         Reflex irritability:         Muscle tone:         Respiratory effort:         Total:                                  Interventions/Resuscitation           Cord    No data filed       Placenta     Placenta delivery date/time:   Placenta removal:            Labor Events:       labor:       Labor Onset Date/Time:         Dilation Complete Date/Time:         Start Pushing Date/Time:         Start Pushing Date/Time:       Rupture Date/Time:            Rupture type:            Fluid Amount:         Fluid Color:         Fluid Odor:         Membrane Status: INT (Intact)                 steroids:       Antibiotics given for GBS:       Induction:       Indications for induction:        Augmentation:       Indications for augmentation:       Labor complications:       Additional complications:          Cervical ripening:                     Delivery:      Episiotomy:       Indication for Episiotomy:       Perineal Lacerations:   Repaired:      Periurethral Laceration:   Repaired:     Labial Laceration:   Repaired:     Sulcus Laceration:   Repaired:     Vaginal Laceration:   Repaired:     Cervical Laceration:   Repaired:     Repair suture:       Repair # of packets:       Last Value - EBL - Nursing (mL):       Sum - EBL - Nursing (mL): 0     Last Value - EBL - Anesthesia (mL):        Calculated QBL (mL):         Vaginal Sweep Performed:       Surgicount Correct:         Other providers:            Details (if applicable):  Trial of Labor      Categorization:      Priority:     Indications for :     Incision Type:       Additional  information:  Forceps:    Vacuum:    Breech:    Observed anomalies    Other (Comments):           Glory Fink MD, FACOG  OB/GYN

## 2022-12-09 NOTE — LACTATION NOTE
[facility] - Mother & Baby  Lactation Note - Mom    SUMMARY     Maternal Assessment    Breast Shape: Bilateral:, pendulous  Breast Density: Bilateral:, soft  Nipples: Bilateral:, graspable (diaz w stim)      LATCH Score     N/a    Breasts WDL    Breast WDL: WDL    Maternal Infant Feeding    Maternal Preparation: breast care, hand hygiene  Maternal Emotional State: relaxed, assist needed  Pain with Feeding: no (w pumping)    Lactation Referrals    Community Referrals: other (see comments) (resources in NICU BR guide)    Lactation Interventions    Breastfeeding Assistance: electric breast pump used, support offered  Breastfeeding Assistance: electric breast pump used, support offered  Breastfeeding Support: diary/feeding log utilized, encouragement provided, maternal hydration promoted, maternal rest encouraged, maternal nutrition promoted       Breastfeeding Session    Breast Pumping Interventions: early pumping promoted, frequent pumping encouraged    Maternal Information

## 2022-12-09 NOTE — ANESTHESIA POSTPROCEDURE EVALUATION
Anesthesia Post Evaluation    Patient: Carlos Camarena    Procedure(s) Performed: Procedure(s) (LRB):   SECTION (N/A)    Final Anesthesia Type: spinal      Patient location during evaluation: labor & delivery  Patient participation: Yes- Able to Participate  Level of consciousness: awake and alert and oriented  Post-procedure vital signs: reviewed and stable  Pain management: adequate  Airway patency: patent    PONV status at discharge: No PONV  Anesthetic complications: no      Cardiovascular status: blood pressure returned to baseline and hemodynamically stable  Respiratory status: unassisted, room air and spontaneous ventilation  Hydration status: euvolemic  Follow-up not needed.          Vitals Value Taken Time   BP 97/72 22   Temp  22   Pulse 84 22   Resp  22   SpO2 98 % 22   Vitals shown include unvalidated device data.      Event Time   Out of Recovery 2022 22:45:00         Pain/Heena Score: Pain Rating Prior to Med Admin: 5 (2022  4:10 PM)

## 2022-12-09 NOTE — TRANSFER OF CARE
"Anesthesia Transfer of Care Note    Patient: Carlos Camarena    Procedure(s) Performed: Procedure(s) (LRB):   SECTION (N/A)    Patient location: Labor and Delivery    Anesthesia Type: MAC and spinal    Transport from OR: Transported from OR on room air with adequate spontaneous ventilation    Post pain: adequate analgesia    Post assessment: no apparent anesthetic complications    Post vital signs: stable    Level of consciousness: awake, alert and oriented    Nausea/Vomiting: no nausea/vomiting    Complications: none    Transfer of care protocol was followed      Last vitals:   Visit Vitals  /63   Pulse 101   Temp 37 °C (98.6 °F) (Oral)   Resp 18   Ht 5' 6" (1.676 m)   Wt 111.2 kg (245 lb 2.4 oz)   LMP 2021 (Exact Date)   SpO2 (!) 91%   Breastfeeding No   BMI 39.57 kg/m²     "

## 2022-12-09 NOTE — LACTATION NOTE
Rounded on mother. Infant remains in NICU. Symphony pump at bed side - mom reports pumped x1 last night but ready to pump more frequently. Discussed supply/demand and importance of pumping 8+times/24hrs. Ensured appropriate flange fit, proper suction settings, and proper use initiation phase. Encouraged hands-on pumping technique. Labels and syringes provided for storage PRN.    Mom will continue to pump/hand express at least 8+ times/24 hrs for nicu baby. Symphony pump at bs. Reviewed use/cleaning. Stressed importance of hand hygiene & keeping pump kit clean. Will collect, label, store & transport EBM as instructed. Will call for any needs.

## 2022-12-09 NOTE — PLAN OF CARE
Noted copied from Infant's chart (MRN: [id number])     SOCIAL WORK DISCHARGE PLANNING ASSESSMENT     Sw completed discharge planning assessment with pt's parents in mother's room K304. Pt's parents were easily engaged and education on the role of  was provided. Emotional support was provided through assessment. Pt's parents reported all necessities for patient were obtained, including a car seat. Pt's parents reported they have good family support and advised pt's maternal grandmother Carleen will provide assistance as needed after returning home. Pt's father will provide transportation to family home following discharge. Pt's parents were provided with a NICU resources packet. Sw went over NICU resource packet with pt's parents. No other needs for community resources were reported. Pt's parents were encouraged to call with any questions or concerns. Pt's parents verbalized understanding.      Legal Name: Gloria Camarena     :  2020  Address: [address] [place] [address]  Parent's Phone Numbers: pt's mother Carlos Camarena [phone] and pt's father Matty Camarena [phone]     Pediatrician:  Dr. Gail Turner      Education: Information given on NICU Education Classes; Physician/NNP daily rounds; and Postpartum Depression signs.   Potential Eligibility for SSI Benefits: No            Patient Active Problem List   Diagnosis    Term  delivered by , current hospitalization    [place] affected by maternal hypertensive disorder            Birth Hospital:[facility]           NILDA: unknown     Birth Weight:   3.2 kg (7 lb 0.9 oz)                  Birth Length: 48cm                  Gestational Age: 37w1d           Apgars    Living status: Living  Apgars:  1 min.:  5 min.:  10 min.:  15 min.:  20 min.:    Skin color:  1  1          Heart rate:  2  2          Reflex irritability:  2  2          Muscle tone:  2  2          Respiratory effort:  2  2          Total:  9  9           Apgars assigned by: DINORAH GREENERN           22 1306   NICU Assessment   Assessment Type Discharge Planning Assessment   Source of Information family   Verified Demographic and Insurance Information Yes   Insurance Commercial   Commercial Other (see comments)  ([facility])   Lives With mother;father   Relationship Status of Parents    Primary Source of Support/Comfort parent   Mother Employed Full Time   Mother's Employer [employer]   Father's Involvement Fully Involved   Is Father signing the birth certificate Yes   Father Name and  Matty Camarena   Father's Address [address] [facility] [address]   Family Involvement Moderate   Primary Contact Name and Number pt's mother Carlos Camarena [phone] and pt's father Matty Camarena [phone]   Other Contacts Names and Numbers pt's maternal grandmother Carleen Snell [phone]   Infant Feeding Plan breastfeeding   Breast Pump Needed no   Does baby have crib or safe sleep space? Yes   Do you have a car seat? Yes   Resources/Education Provided Support Resources for NICU Families;[facility] Financial Services;Preparing for Your Baby's Discharge Home;Post Partum Depression;My NICU Baby Jayshree   DCFS No indications (Indicators for Report)   Discharge Plan A Home with family

## 2022-12-09 NOTE — PLAN OF CARE
Problem: Adult Inpatient Plan of Care  Goal: Plan of Care Review  Outcome: Ongoing, Progressing  Goal: Patient-Specific Goal (Individualized)  Outcome: Ongoing, Progressing  Goal: Absence of Hospital-Acquired Illness or Injury  Outcome: Ongoing, Progressing  Goal: Optimal Comfort and Wellbeing  Outcome: Ongoing, Progressing  Goal: Readiness for Transition of Care  Outcome: Ongoing, Progressing     Problem: Infection  Goal: Absence of Infection Signs and Symptoms  Outcome: Ongoing, Progressing     Problem:  Fall Injury Risk  Goal: Absence of Fall, Infant Drop and Related Injury  Outcome: Ongoing, Progressing     Problem: Breastfeeding  Goal: Effective Breastfeeding  Outcome: Ongoing, Progressing

## 2022-12-09 NOTE — PROGRESS NOTES
Up to bedside to evaluate patient- cervix unchanged since my exam yesterday afternoon and this AM. Has received cytotec x3, CRB + pit, followed by cervidil. Pt still 1/thick/posterior. Discussed options to rest- replace CRB and pitocin vs C/D. Pt desired to think about it and let me know. Pt final decision to proceed with 1LTCD- failed IOL.     Glory Fink MD, FACOG  OB/GYN

## 2022-12-09 NOTE — ANESTHESIA PREPROCEDURE EVALUATION
2022  Carlos Camarena is a 32 y.o., female.      Pre-op Assessment    I have reviewed the Patient Summary Reports.     I have reviewed the Nursing Notes. I have reviewed the NPO Status.      Review of Systems  Anesthesia Hx:  No problems with previous Anesthesia  History of prior surgery of interest to airway management or planning: Previous anesthesia: General 2014 Lumbar fusion with general anesthesia.  Denies Family Hx of Anesthesia complications.   Denies Personal Hx of Anesthesia complications.   Hematology/Oncology:  Hematology Normal   Oncology Normal     EENT/Dental:EENT/Dental Normal   Cardiovascular:   Hypertension    Pulmonary:  Pulmonary Normal    Renal/:  Renal/ Normal     Hepatic/GI:   GERD    OB/GYN/PEDS:  Planned Primary  ,  class C - Scheduled (non-urgent).   1  , Para 0  Denies Issues with Current Pregnancy    Neurological:   Headaches    Psych:   Psychiatric History          Physical Exam  General: Well nourished, Cooperative, Alert and Oriented    Airway:  Mallampati: II   Mouth Opening: Normal  TM Distance: Normal  Tongue: Normal  Neck ROM: Normal ROM    Dental:  Intact        Anesthesia Plan  Type of Anesthesia, risks & benefits discussed:    Anesthesia Type: Spinal  Intra-op Monitoring Plan: Standard ASA Monitors  Post Op Pain Control Plan: multimodal analgesia  Informed Consent: Informed consent signed with the Patient and all parties understand the risks and agree with anesthesia plan.  All questions answered.   ASA Score: 2  Day of Surgery Review of History & Physical: H&P Update referred to the surgeon/provider.    Ready For Surgery From Anesthesia Perspective.     .

## 2022-12-09 NOTE — PROGRESS NOTES
"POD #0 s/p     Subjective: No complaints. No flatus    Objective: /65 (Patient Position: Lying)   Pulse 88   Temp 98.9 °F (37.2 °C) (Oral)   Resp 18   Ht 5' 6" (1.676 m)   Wt 111.2 kg (245 lb 2.4 oz)   LMP 2021 (Exact Date)   SpO2 96%   Breastfeeding Unknown   BMI 39.57 kg/m²     H/H:   Lab Results   Component Value Date    WBC 9.49 2022    HGB 9.4 (L) 2022    HCT 28.9 (L) 2022    MCV 84 2022     2022       Chest: Clear to auscultation  CV: Regular rate and rhythm  Abdomen: Non-tender, non-distended, soft, positive bowel sounds  Incision: Not visualized    Assessment: S/P     Plan: Routine progressive care      Shannen Ortiz MD  Obstetrics & Gynecology  [facility]    "

## 2022-12-10 PROCEDURE — 99900035 HC TECH TIME PER 15 MIN (STAT)

## 2022-12-10 PROCEDURE — 94640 AIRWAY INHALATION TREATMENT: CPT

## 2022-12-10 PROCEDURE — 11000001 HC ACUTE MED/SURG PRIVATE ROOM

## 2022-12-10 PROCEDURE — 25000003 PHARM REV CODE 250: Performed by: OBSTETRICS & GYNECOLOGY

## 2022-12-10 RX ADMIN — DOCUSATE SODIUM 200 MG: 100 CAPSULE, LIQUID FILLED ORAL at 08:12

## 2022-12-10 RX ADMIN — FLUTICASONE FUROATE AND VILANTEROL TRIFENATATE 1 PUFF: 200; 25 POWDER RESPIRATORY (INHALATION) at 09:12

## 2022-12-10 RX ADMIN — IBUPROFEN 600 MG: 600 TABLET ORAL at 11:12

## 2022-12-10 RX ADMIN — IBUPROFEN 600 MG: 600 TABLET ORAL at 12:12

## 2022-12-10 RX ADMIN — OXYCODONE AND ACETAMINOPHEN 1 TABLET: 10; 325 TABLET ORAL at 10:12

## 2022-12-10 RX ADMIN — OXYCODONE AND ACETAMINOPHEN 1 TABLET: 10; 325 TABLET ORAL at 05:12

## 2022-12-10 RX ADMIN — MUPIROCIN: 20 OINTMENT TOPICAL at 08:12

## 2022-12-10 RX ADMIN — OXYCODONE AND ACETAMINOPHEN 1 TABLET: 10; 325 TABLET ORAL at 07:12

## 2022-12-10 RX ADMIN — PRENATAL VIT W/ FE FUMARATE-FA TAB 27-0.8 MG 1 TABLET: 27-0.8 TAB at 08:12

## 2022-12-10 RX ADMIN — OXYCODONE AND ACETAMINOPHEN 1 TABLET: 10; 325 TABLET ORAL at 02:12

## 2022-12-10 RX ADMIN — IBUPROFEN 600 MG: 600 TABLET ORAL at 05:12

## 2022-12-10 RX ADMIN — OXYCODONE AND ACETAMINOPHEN 1 TABLET: 10; 325 TABLET ORAL at 11:12

## 2022-12-10 RX ADMIN — IBUPROFEN 600 MG: 600 TABLET ORAL at 07:12

## 2022-12-10 NOTE — PLAN OF CARE
POC reviewed with pt around 0800; verbalized acceptance and understanding.  Pt's VS stable.  Remains free from falls and injury.  Pain controlled with ordered meds.  Bonding well with baby.  Able to visit baby in NICU; using electronic breast pump and taking EBM to baby.  Family at bedside to offer support.     Refused flu vaccine.

## 2022-12-10 NOTE — PROGRESS NOTES
"POD #1 s/p     Subjective: No complaints. Positive flatus    Objective: /69   Pulse 88   Temp 97.9 °F (36.6 °C)   Resp 18   Ht 5' 6" (1.676 m)   Wt 111.2 kg (245 lb 2.4 oz)   LMP 2021 (Exact Date)   SpO2 98%   Breastfeeding Yes   BMI 39.57 kg/m²     H/H:   Lab Results   Component Value Date    WBC 9.49 2022    HGB 9.4 (L) 2022    HCT 28.9 (L) 2022    MCV 84 2022     2022       Chest: Clear to auscultation  CV: Regular rate and rhythm  Abdomen: most exam defer because binder on and patient was pumping but decreased bowel sounds  Extremities: Non-tender, 1+ edema    Assessment:   S/P   GHTN--BP in normal range    Plan: Routine progressive care      Shannen Ortiz MD  Obstetrics & Gynecology  [facility]    "

## 2022-12-10 NOTE — PLAN OF CARE
Pt on RA with documented sats. The proper method of use, as well as anticipated side effects, of this metered-dose inhaler are discussed and demonstrated to the patient.= Will continue to monitor.

## 2022-12-10 NOTE — LACTATION NOTE
Reviewed pump operation and milk storage/transport. Mom was given insulated bag for milk transportation along with extra bottles for milk storage.Discharge teaching done. Mom verbalized understanding.     [facility]  Lactation Note - Mom    SUMMARY     Maternal Assessment    Breast Shape: Bilateral:, pendulous  Breast Density: Bilateral:, soft  Nipples: Bilateral:, graspable (diaz w stim)  Left Nipple Symptoms:  (denies pain)  Right Nipple Symptoms:  (denies pain)      LATCH Score         Breasts WDL    Breast WDL: WDL  Left Nipple Symptoms:  (denies pain)  Right Nipple Symptoms:  (denies pain)    Maternal Infant Feeding    Maternal Preparation: breast care  Maternal Emotional State: independent, relaxed  Pain with Feeding: no (with pumping)  Comfort Measures Following Feeding: air-drying encouraged    Lactation Referrals    Community Referrals: outpatient lactation program, support group  Outpatient Lactation Program Lactation Follow-up Date/Time: [facility] lact ctr prn  Support Group Lactation Follow-up Date/Time: community resources given in bf guide    Lactation Interventions    Breast Care: Breastfeeding: open to air  Breastfeeding Assistance: electric breast pump used, support offered  Breast Care: Breastfeeding: open to air  Breastfeeding Assistance: electric breast pump used, support offered  Breastfeeding Support: diary/feeding log utilized, encouragement provided, lactation counseling provided, maternal hydration promoted, maternal nutrition promoted, maternal rest encouraged       Breastfeeding Session    Breast Pumping Interventions: early pumping promoted, frequent pumping encouraged    Maternal Information

## 2022-12-11 VITALS
TEMPERATURE: 98 F | BODY MASS INDEX: 39.4 KG/M2 | WEIGHT: 245.13 LBS | HEIGHT: 66 IN | HEART RATE: 89 BPM | SYSTOLIC BLOOD PRESSURE: 121 MMHG | RESPIRATION RATE: 18 BRPM | OXYGEN SATURATION: 97 % | DIASTOLIC BLOOD PRESSURE: 60 MMHG

## 2022-12-11 PROCEDURE — 25000003 PHARM REV CODE 250: Performed by: OBSTETRICS & GYNECOLOGY

## 2022-12-11 RX ORDER — OXYCODONE AND ACETAMINOPHEN 5; 325 MG/1; MG/1
1 TABLET ORAL EVERY 4 HOURS PRN
Qty: 28 TABLET | Refills: 0 | Status: ON HOLD | OUTPATIENT
Start: 2022-12-11 | End: 2023-01-05 | Stop reason: HOSPADM

## 2022-12-11 RX ORDER — IBUPROFEN 600 MG/1
600 TABLET ORAL EVERY 6 HOURS
Qty: 60 TABLET | Refills: 0 | Status: SHIPPED | OUTPATIENT
Start: 2022-12-11 | End: 2023-08-16

## 2022-12-11 RX ADMIN — OXYCODONE AND ACETAMINOPHEN 1 TABLET: 10; 325 TABLET ORAL at 05:12

## 2022-12-11 RX ADMIN — MUPIROCIN: 20 OINTMENT TOPICAL at 08:12

## 2022-12-11 RX ADMIN — OXYCODONE HYDROCHLORIDE AND ACETAMINOPHEN 1 TABLET: 5; 325 TABLET ORAL at 11:12

## 2022-12-11 RX ADMIN — PRENATAL VIT W/ FE FUMARATE-FA TAB 27-0.8 MG 1 TABLET: 27-0.8 TAB at 08:12

## 2022-12-11 RX ADMIN — IBUPROFEN 600 MG: 600 TABLET ORAL at 11:12

## 2022-12-11 RX ADMIN — IBUPROFEN 600 MG: 600 TABLET ORAL at 05:12

## 2022-12-11 RX ADMIN — DOCUSATE SODIUM 200 MG: 100 CAPSULE, LIQUID FILLED ORAL at 08:12

## 2022-12-11 NOTE — PROGRESS NOTES
"POD #2 s/p     Subjective: No complaints. Positive flatus. No headache, visual changes, RUQ pain. Has not had to start Procardia or other BP meds    Objective: /60 (BP Location: Left arm, Patient Position: Lying)   Pulse 89   Temp 98.3 °F (36.8 °C) (Oral)   Resp 18   Ht 5' 6" (1.676 m)   Wt 111.2 kg (245 lb 2.4 oz)   LMP 2021 (Exact Date)   SpO2 97%   Breastfeeding Yes   BMI 39.57 kg/m²     H/H:   Lab Results   Component Value Date    WBC 9.49 2022    HGB 9.4 (L) 2022    HCT 28.9 (L) 2022    MCV 84 2022     2022         Chest: Clear to auscultation  CV: Regular rate and rhythm  Abdomen: Non-tender, non-distended, soft, positive bowel sounds  Incision: Bandaged    Assessment:   S/P     GHTN-- BP normotensive range no BP meds required. Given pre-eclampsia precautions    Plan: Discharge home today. Follow up 1 week      Shannen Ortiz MD  Obstetrics & Gynecology  [facility]    "

## 2022-12-11 NOTE — PLAN OF CARE
POC reviewed with pt around 0845; verbalized acceptance and understanding.  Pt's VS stable.  Remains free from falls and injury.  Pain controlled with ordered meds.  Bonding well with baby.  Baby tolerating feedings; voiding/stooling appropriately.  Family at bedside to offer support.       Discharge instructions given to patient verbally and in writing at 1100. Verbalized understanding. Received Mother-Baby care guide during hospital stay. Prescriptions sent to pt's pharmacy with explanation for use. Verbalized understanding. CDC vaccine information statement given and risk/benifits of vaccine discussed. Flu vaccine refused per pt. request. States she feels comfortable taking care of baby and has demonstrated ability to care for  and herself. Says she will have assistance when she returns home.  To be d/c'd to home via wheelchair in stable condition with baby in her arms once ready.

## 2022-12-11 NOTE — DISCHARGE SUMMARY
Admit Date: 22  Discharge Date: 22    Attending physician: MD Aleks    Diagnosis:  Term Pregnancy  Viable female  Infant  GHTN  Asthma  Obesity  Failed induction of labor    Procedure:   Primary     Hospital Course: Afebrile and vital signs stable throughout hospital course. Routine progressive care. Vaginal bleeding stable. Tolerating oral intake. Positive flatus.    Discharged Condition: Improving    Disposition: Discharged to home or self care    Activity:  As tolerated  Pelvic rest x 6 weeks  Diet: Regular  Medications: Given to patient or sent electronically   Follow up:  1  Week for   Pre-clampsia precautions

## 2022-12-13 ENCOUNTER — HOSPITAL ENCOUNTER (EMERGENCY)
Facility: OTHER | Age: 32
Discharge: HOME | End: 2022-12-13
Attending: OBSTETRICS & GYNECOLOGY
Payer: COMMERCIAL

## 2022-12-13 ENCOUNTER — NURSE TRIAGE (OUTPATIENT)
Dept: ADMINISTRATIVE | Facility: CLINIC | Age: 32
End: 2022-12-13
Payer: COMMERCIAL

## 2022-12-13 VITALS
HEART RATE: 72 BPM | OXYGEN SATURATION: 96 % | TEMPERATURE: 98 F | DIASTOLIC BLOOD PRESSURE: 79 MMHG | SYSTOLIC BLOOD PRESSURE: 129 MMHG

## 2022-12-13 DIAGNOSIS — R03.0 ELEVATED BLOOD PRESSURE READING: ICD-10-CM

## 2022-12-13 DIAGNOSIS — R51.9 NONINTRACTABLE HEADACHE, UNSPECIFIED CHRONICITY PATTERN, UNSPECIFIED HEADACHE TYPE: Primary | ICD-10-CM

## 2022-12-13 LAB
ALBUMIN SERPL BCP-MCNC: 2.9 G/DL (ref 3.5–5.2)
ALP SERPL-CCNC: 119 U/L (ref 55–135)
ALT SERPL W/O P-5'-P-CCNC: 28 U/L (ref 10–44)
ANION GAP SERPL CALC-SCNC: 11 MMOL/L (ref 8–16)
AST SERPL-CCNC: 21 U/L (ref 10–40)
BASOPHILS # BLD AUTO: 0.02 K/UL (ref 0–0.2)
BASOPHILS NFR BLD: 0.3 % (ref 0–1.9)
BILIRUB SERPL-MCNC: 0.6 MG/DL (ref 0.1–1)
BUN SERPL-MCNC: 5 MG/DL (ref 6–20)
CALCIUM SERPL-MCNC: 8.8 MG/DL (ref 8.7–10.5)
CHLORIDE SERPL-SCNC: 109 MMOL/L (ref 95–110)
CO2 SERPL-SCNC: 20 MMOL/L (ref 23–29)
CREAT SERPL-MCNC: 0.7 MG/DL (ref 0.5–1.4)
DIFFERENTIAL METHOD: ABNORMAL
EOSINOPHIL # BLD AUTO: 0.1 K/UL (ref 0–0.5)
EOSINOPHIL NFR BLD: 1 % (ref 0–8)
ERYTHROCYTE [DISTWIDTH] IN BLOOD BY AUTOMATED COUNT: 15.6 % (ref 11.5–14.5)
EST. GFR  (NO RACE VARIABLE): >60 ML/MIN/1.73 M^2
GLUCOSE SERPL-MCNC: 82 MG/DL (ref 70–110)
HCT VFR BLD AUTO: 33.8 % (ref 37–48.5)
HGB BLD-MCNC: 11 G/DL (ref 12–16)
IMM GRANULOCYTES # BLD AUTO: 0.02 K/UL (ref 0–0.04)
IMM GRANULOCYTES NFR BLD AUTO: 0.3 % (ref 0–0.5)
LYMPHOCYTES # BLD AUTO: 1.4 K/UL (ref 1–4.8)
LYMPHOCYTES NFR BLD: 18.9 % (ref 18–48)
MCH RBC QN AUTO: 27.8 PG (ref 27–31)
MCHC RBC AUTO-ENTMCNC: 32.5 G/DL (ref 32–36)
MCV RBC AUTO: 86 FL (ref 82–98)
MONOCYTES # BLD AUTO: 0.5 K/UL (ref 0.3–1)
MONOCYTES NFR BLD: 6.3 % (ref 4–15)
NEUTROPHILS # BLD AUTO: 5.3 K/UL (ref 1.8–7.7)
NEUTROPHILS NFR BLD: 73.2 % (ref 38–73)
NRBC BLD-RTO: 0 /100 WBC
PLATELET # BLD AUTO: 306 K/UL (ref 150–450)
PMV BLD AUTO: 9.9 FL (ref 9.2–12.9)
POTASSIUM SERPL-SCNC: 4 MMOL/L (ref 3.5–5.1)
PROT SERPL-MCNC: 6.8 G/DL (ref 6–8.4)
RBC # BLD AUTO: 3.95 M/UL (ref 4–5.4)
SODIUM SERPL-SCNC: 140 MMOL/L (ref 136–145)
WBC # BLD AUTO: 7.26 K/UL (ref 3.9–12.7)

## 2022-12-13 PROCEDURE — 63600175 PHARM REV CODE 636 W HCPCS

## 2022-12-13 PROCEDURE — 96372 THER/PROPH/DIAG INJ SC/IM: CPT

## 2022-12-13 PROCEDURE — 99284 EMERGENCY DEPT VISIT MOD MDM: CPT | Mod: 25

## 2022-12-13 PROCEDURE — 80053 COMPREHEN METABOLIC PANEL: CPT

## 2022-12-13 PROCEDURE — 99284 PR EMERGENCY DEPT VISIT,LEVEL IV: ICD-10-PCS | Mod: ,,, | Performed by: OBSTETRICS & GYNECOLOGY

## 2022-12-13 PROCEDURE — 85025 COMPLETE CBC W/AUTO DIFF WBC: CPT

## 2022-12-13 PROCEDURE — 99284 EMERGENCY DEPT VISIT MOD MDM: CPT | Mod: ,,, | Performed by: OBSTETRICS & GYNECOLOGY

## 2022-12-13 RX ORDER — SUMATRIPTAN 6 MG/.5ML
6 INJECTION, SOLUTION SUBCUTANEOUS ONCE
Status: COMPLETED | OUTPATIENT
Start: 2022-12-13 | End: 2022-12-13

## 2022-12-13 RX ADMIN — SUMATRIPTAN 6 MG: 6 INJECTION, SOLUTION SUBCUTANEOUS at 04:12

## 2022-12-13 NOTE — ED PROVIDER NOTES
Encounter Date: 2022       History     Chief Complaint   Patient presents with    Headache    Leg Swelling     HPI  Carlos Camarena is a 32 y.o. [id number] POD#5 from pLTCS (2/2 failed IOL) presents complaining of headache and persistent leg swelling. Pregnancy was complicated by GHTN, asthma, obesity. Patient delivered at [place] and was discharged on  in stable condition. She was advised to discontinue anti-HTN meds after delivery due to nml BP. She has a h/o migraines, states this is similar to past migraines. Describes 8/10 sharp right sided headache. Took ibuprofen/percocet scheduled since yesterday with no relief of HA. Also reporting persistent leg swelling since delivery, occasional pain in foot/lower leg. Denies fevers/chills SOB, chest pain, vision changes, RUQ abdominal pain, diarrhea, constipation, urinary issues.      Review of patient's allergies indicates:   Allergen Reactions    Lactose Diarrhea    Gluten protein Itching     Inflammation, bloating, diarrhea and pain all over body       Past Medical History:   Diagnosis Date    ADD (attention deficit disorder)     Asthma     Chronic back pain     Fibromyalgia     Moderate persistent asthma      Past Surgical History:   Procedure Laterality Date    BACK SURGERY  2013     SECTION N/A 2022    Procedure:  SECTION;  Surgeon: Glory Fink MD;  Location: [facility] L&D;  Service: OB/GYN;  Laterality: N/A;    INJECTION OF JOINT Right 2022    Procedure: INJECTION, JOINT RIGHT SI NEEDS CONSENT;  Surgeon: Cristopher Simon MD;  Location: [facility];  Service: Pain Management;  Laterality: Right;    SPINE SURGERY  14     Family History   Problem Relation Age of Onset    Depression Mother     Anxiety disorder Mother     Ovarian cancer Mother     Alcohol abuse Mother     Cancer Mother     No Known Problems Father     Diabetes Sister     Hypertension Maternal Grandmother     Breast cancer Neg Hx     Colon cancer Neg Hx       Social History     Tobacco Use    Smoking status: Never     Passive exposure: Never    Smokeless tobacco: Never   Substance Use Topics    Alcohol use: Yes     Alcohol/week: 3.0 standard drinks     Types: 3 Glasses of wine per week     Comment: 1-3 drinks every 2 weeks    Drug use: No     Review of Systems   Constitutional:  Negative for chills and fever.   HENT:  Negative for sore throat.    Eyes:  Negative for visual disturbance.   Respiratory:  Negative for cough and shortness of breath.    Cardiovascular:  Positive for leg swelling. Negative for chest pain.   Gastrointestinal:  Negative for abdominal pain, diarrhea, nausea and vomiting.   Genitourinary:  Negative for difficulty urinating, pelvic pain, vaginal bleeding and vaginal discharge.   Musculoskeletal:  Negative for myalgias.   Skin:  Negative for rash.   Neurological:  Positive for headaches. Negative for dizziness.   Hematological:  Does not bruise/bleed easily.   Psychiatric/Behavioral:  Negative for confusion.      Physical Exam     Initial Vitals [12/13/22 1557]   BP Pulse Resp Temp SpO2   126/78 77 -- 97.6 °F (36.4 °C) 95 %      MAP       --       Temp:  [97.6 °F (36.4 °C)] 97.6 °F (36.4 °C)  Pulse:  [64-80] 72  SpO2:  [95 %-98 %] 96 %  BP: (126-154)/(75-88) 129/79    Physical Exam    Vitals reviewed.  Constitutional: She appears well-developed and well-nourished. She is not diaphoretic. No distress.   HENT:   Head: Normocephalic and atraumatic.   Eyes: EOM are normal.   Neck:   Normal range of motion.  Cardiovascular:  Normal rate.           Pulmonary/Chest: No respiratory distress.   Abdominal: Abdomen is soft. She exhibits distension (appropriate for POD#5). There is abdominal tenderness (appropriately TTP for POD#5). There is no rebound and no guarding.   Musculoskeletal:         General: Edema present. No tenderness. Normal range of motion.      Cervical back: Normal range of motion.      Comments: Symmetrical LE swelling bilaterally, no  skin changes/tenderness/masses concerning for DVT     Neurological: She is alert and oriented to person, place, and time.   Skin: Skin is warm and dry.   Psychiatric: She has a normal mood and affect. Her behavior is normal. Judgment and thought content normal.       ED Course   Procedures  Labs Reviewed   CBC W/ AUTO DIFFERENTIAL - Abnormal; Notable for the following components:       Result Value    RBC 3.95 (*)     Hemoglobin 11.0 (*)     Hematocrit 33.8 (*)     RDW 15.6 (*)     Gran % 73.2 (*)     All other components within normal limits   COMPREHENSIVE METABOLIC PANEL - Abnormal; Notable for the following components:    CO2 20 (*)     BUN 5 (*)     Albumin 2.9 (*)     All other components within normal limits          Imaging Results    None          Medications   SUMAtriptan succinate injection 6 mg (6 mg Subcutaneous Given 12/13/22 1646)     Medical Decision Making:   ED Management:  VSS and wnl, no acute distress  Reporting h/o migraine, current HA 8/10 with no relief from ibuprofen/percocet  Given sumatriptan, patient reports relief to 4-5/10  Patient with mild range BP after sumatriptan administration, previously normotensive - CBC, CMP within normal limits.   Exam with b/l symmetric LE edema, calf circumference 44-45 cm bilaterally, no s/s of DVT  Only one mild range pressure in [facility]. Does not meet criteria to begin antihypertensive medication  Patient discharged in stable condition  Strict ED return precautions discussed with patient  All questions answered    Miya Toro MD   OB/GYN PGY1  [facility]  Other:   I have discussed this case with another health care provider.          Attending Attestation:   Physician Attestation Statement for Resident:  As the supervising MD   Physician Attestation Statement: I have personally seen and examined this patient.   I agree with the above history.  -:   As the supervising MD I agree with the above PE.     As the supervising MD I agree with the  above treatment, course, plan, and disposition.   I was personally present during the critical portions of the procedure(s) performed by the resident and was immediately available in the ED to provide services and assistance as needed during the entire procedure.  I have reviewed and agree with the residents interpretation of the following: lab data.             Attending ED Notes:   I have personally seen and examined the patient. I agree with the resident's note . Questions welcomed and answered to patient satisfaction.      POD #5 from pLTCS complicated by PreE and history of migraines presenting for headache.   Bps normotensive, with intermittent mild range immediately following sumatriptan.   Continue to hold antihypertensives  Headache improved with sumatriptan but would not recommend repeat doses     Agree with discharge to home, and given the following instructions: Monitor blood pressures and symptoms at home. Return immediately to MAGO if blood pressure greater than 160/110 on two consecutive readings or if experiencing the following symptoms: headache not relieved with tylenol, dizziness, visual changes, chest pain, shortness of breath, nausea/ vomiting, abdominal pain or vaginal bleeding.     Return to clinic for 1` week PP visit.     Vera Lynch MD  2022 9:28 PM                     Clinical Impression:   Final diagnoses:  [R03.0] Elevated blood pressure reading (Primary)  [R51.9] Nonintractable headache, unspecified chronicity pattern, unspecified headache type        ED Disposition Condition    Discharge Stable          ED Prescriptions    None       Follow-up Information    None         Gretta Agrawal MD  [facility]   OBGYN PGY1       Gretta Agrawal MD  Resident  22       Vera Lynch MD  22

## 2022-12-13 NOTE — TELEPHONE ENCOUNTER
Carlos franco states she is 5 days post . Currently c/o bilateral feet and calf swelling and headache rated as 8/10 that began yesterday and pesistent until now. Unrelieved by IBU & Oxycodone. Advised per triage protocol to go to nearest ED now for physician anthony. V/u.   Reason for Disposition   SEVERE headache and after spinal (epidural) anesthesia    Additional Information   Negative: Sounds like a life-threatening emergency to the triager   Negative: Chest pain   Negative: Difficulty breathing   Negative: Bright red, wide-spread, sunburn-like rash    Protocols used: Post-Op Symptoms and [id number]-[id number]

## 2022-12-14 ENCOUNTER — POSTPARTUM VISIT (OUTPATIENT)
Dept: OBSTETRICS AND GYNECOLOGY | Facility: CLINIC | Age: 32
End: 2022-12-14
Payer: COMMERCIAL

## 2022-12-14 VITALS
BODY MASS INDEX: 38.38 KG/M2 | DIASTOLIC BLOOD PRESSURE: 70 MMHG | WEIGHT: 237.75 LBS | SYSTOLIC BLOOD PRESSURE: 120 MMHG

## 2022-12-14 PROCEDURE — 0503F PR POSTPARTUM CARE VISIT: ICD-10-PCS | Mod: CPTII,S$GLB,, | Performed by: OBSTETRICS & GYNECOLOGY

## 2022-12-14 PROCEDURE — 99999 PR PBB SHADOW E&M-EST. PATIENT-LVL III: CPT | Mod: PBBFAC,,, | Performed by: OBSTETRICS & GYNECOLOGY

## 2022-12-14 PROCEDURE — 0503F POSTPARTUM CARE VISIT: CPT | Mod: CPTII,S$GLB,, | Performed by: OBSTETRICS & GYNECOLOGY

## 2022-12-14 PROCEDURE — 99999 PR PBB SHADOW E&M-EST. PATIENT-LVL III: ICD-10-PCS | Mod: PBBFAC,,, | Performed by: OBSTETRICS & GYNECOLOGY

## 2022-12-14 RX ORDER — BUTALBITAL, ACETAMINOPHEN AND CAFFEINE 50; 325; 40 MG/1; MG/1; MG/1
1 TABLET ORAL EVERY 4 HOURS PRN
Qty: 30 TABLET | Refills: 2 | Status: ON HOLD | OUTPATIENT
Start: 2022-12-14 | End: 2023-01-05 | Stop reason: HOSPADM

## 2022-12-14 NOTE — PROGRESS NOTES
CC: Post-partum follow-up    Carlos Camarena is a 32 y.o. female  who presents for post-partum visit.  Pregnancy was complicated by GHTN. She is S/P a .  She and the baby are doing well. No fever.  No bowel /breast/ bladder complaints. Recently seen in ED with headaches. States they have improved but still present. BP normal- given pree precautions along with rx fioricet    Delivery Date: 2022  Delivery MD: Aleks  Gender: female  Birth Weight: 7 pounds 0 ounces  Breast Feeding: YES  Depression: NO  - reports good support system,  Contraception: no method    /70   Wt 107.9 kg (237 lb 12.3 oz)   LMP 2021 (Exact Date)   Breastfeeding Yes Comment: trying to  BMI 38.38 kg/m²       ROS:  GENERAL: Denies fever or chills.   SKIN: Denies rash or lesions.   HEAD: Denies head injury or headache.   CHEST: Denies chest pain or shortness of breath.   CARDIOVASCULAR: Denies palpitations or chest pain.   ABDOMEN: No constipation, diarrhea, nausea, vomiting or rectal bleeding.   URINARY: see HPI  REPRODUCTIVE: See HPI.   BREASTS: see HPI  NEUROLOGIC: Denies syncope or weakness.     Physical Exam:  GENERAL: alert, appears stated age and cooperative  NEUROLOGIC: orientated to person, place and time  CHEST: Normal respiratory effort  NECK: normal appearance,  SKIN: no acne, striae, hirsutism  ABDOMEN: abdomen is soft without significant tenderness  PELVIC: DEFERRED  INCISION- C/D/I      ASSESSMENT/PLAN:  No diagnosis found.    Doing well S/P  1LTCD  Instructions / precautions reviewed  Contraceptive counseling      Orders Placed This Encounter    butalbital-acetaminophen-caffeine -40 mg (FIORICET, ESGIC) -40 mg per tablet       RTC in 3 weeks for PPV. Given precautions        Glory Fink MD  OB/GYN

## 2022-12-17 ENCOUNTER — TELEPHONE (OUTPATIENT)
Dept: LACTATION | Facility: HOSPITAL | Age: 32
End: 2022-12-17
Payer: COMMERCIAL

## 2022-12-18 ENCOUNTER — PATIENT MESSAGE (OUTPATIENT)
Dept: OBSTETRICS AND GYNECOLOGY | Facility: CLINIC | Age: 32
End: 2022-12-18
Payer: COMMERCIAL

## 2022-12-21 ENCOUNTER — CLINICAL SUPPORT (OUTPATIENT)
Dept: REHABILITATION | Facility: OTHER | Age: 32
End: 2022-12-21
Attending: OBSTETRICS & GYNECOLOGY
Payer: COMMERCIAL

## 2022-12-21 DIAGNOSIS — M62.81 WEAKNESS OF TRUNK MUSCULATURE: ICD-10-CM

## 2022-12-21 DIAGNOSIS — Z74.09 IMPAIRED FUNCTIONAL MOBILITY, BALANCE, GAIT, AND ENDURANCE: Primary | ICD-10-CM

## 2022-12-21 PROCEDURE — 97530 THERAPEUTIC ACTIVITIES: CPT

## 2022-12-21 PROCEDURE — 97140 MANUAL THERAPY 1/> REGIONS: CPT

## 2022-12-21 PROCEDURE — 97112 NEUROMUSCULAR REEDUCATION: CPT

## 2022-12-21 NOTE — PATIENT INSTRUCTIONS
: Reasons, Risks, Recovery    A  section (or ) is a very common, well-established operation that many women have when delivering a child. In fact, the  section surgery rate is about a third of all births in the [place]. This procedure is used for many reasons including prolonged labor, high risk pregnancy, large babies, multiples, fetal distress, breech presentation, a previous  where vaginal birth after  () is not advised or desired, placenta previa, and cord prolapse.    When the decision to have a  delivery is made before labor, it is often called a planned or scheduled . This decision might also happen during labor if mother or baby is not tolerating labor well, it is thought to be taking too long, or other issues arise such as discovering the baby is in a malposition. This is typically called an unplanned . In a few cases it will be a true emergency, as is the case with a placental abruption, severe bleeding, or fetal distress.    Knowing what to expect may help you feel more comfortable with the procedure as well as contribute to your healing, because knowledge is power.    Reasons for a  Birth    A  section might be performed for a number of reasons, including:  Placenta previa: Part of the placenta is covering the cervix (the opening where the baby exits the uterus).  A breech baby: The baby is not in a head-down position; instead, they are feet or bottom first.  Fetal distress: The baby is not tolerating labor, or labor is not progressing which might necessitate an immediate delivery.  Higher order multiples (triplets, quadruplets, etc.)  Other maternal or fetal complications.  Talking to your practitioner before labor about why a  may be necessary can give you specific information particular to your pregnancy.    You should also ask your doctor or midwife about their specific rates for   section, even if you do not think you will have a  birth. Be sure to ask about their low-risk  rate. This is based on the number of women who fall into a category called NTSV (nulliparous term wolfe vertex), or first-time mothers delivering at term with one head-down baby. The NTSV  rate is more accurate in determining your risk of needing a .      Risks of     A  section is major abdominal surgery. In cases where there is an obvious need for the surgery as a life-saving tool, it is easier to weigh the benefits versus the risks when deciding on a  versus a vaginal birth. What is harder to define is when these added risks are not acceptable. This will vary from practitioner to practitioner and family to family.    There are a few major categories of risk: to the mother, to the baby, and to future pregnancies. The risks to the mother include:  Infection  Blood clots  Surgical injury to the urinary tract or the gastrointestinal tract  Bleeding too much (hemorrhage)  Needing a hysterectomy (removal of the uterus)  A very small risk of dying    There are also risks to the baby, though some are difficult to tease out when the added risk is due to the reason a  is needed, particularly in the case of fetal distress. These risks include:  Breathing difficulties  Being in the  intensive care unit (NICU)  Iatrogenic prematurity (accidental prematurity due to the timing of the surgery)  Breastfeeding difficulties  Being injured or cut during the surgery  Incision-related complications: The risk of incision-related problems (such as a hernia) increases as the number of previous abdominal incisions grows. Surgical repairs might be needed.    While there are additional risks from a  birth, it should also be noted that this is the most common surgical procedure in the [place], with well over 1.3 million surgeries performed every  year. This means that there is constant work and improvement when possible to reduce these risks on all fronts.      Subsequent pregnancies and deliveries:    Once you have had a , some practices prefer or have a policy that only allows c-sections for subsequent births, rather than a vaginal birth. However, a vaginal birth after  () can sometimes be an option. While its hard to say how many c-sections is too many for each individual, its generally accepted that risks increase as the number of c-sections increases. The evidence shows that the risk begins to increase even faster after the third .    Subsequent :  Benefits:  Ability to plan the birth date and increased likelihood of avoiding an emergency .  Prevalence of urinary incontinence and pelvic organ prolapse is lower in women who have only given birth by  than for those who have given birth vaginally (the difference in rate of urinary incontinence appears to level out with increasing age).  If fertility is no longer desired, the option for surgical sterilization.  Lower rate of  mortality and hypoxic ischemic encephalopathy compared to planned  (0.05% vs 0.13% and 0.01% vs 0.08%).    Risks:  Women who have multiple  deliveries are at increased risk of:   Problems with the placenta.  The more c-sections you've had, the greater your risk of developing problems with the placenta, such as the placenta implanting too deeply into the uterine wall (placenta accreta) or the placenta partially or completely covering the opening of the cervix (placenta previa). Both conditions increase the risk of premature birth, excessive bleeding, the need for blood transfusion and the surgical removal of the uterus (hysterectomy).  Complications related to adhesions.  Bands of scar-like tissue (adhesions) develop during each . Dense adhesions can make a  more difficult and increase the  risk of a bladder or bowel injury and excessive bleeding.  Incision-related complications.  The risk of incision-related problems (such as a hernia) increases as the number of previous abdominal incisions grows. Surgical repair might be needed.  Uterine rupture (where the scar separates during pregnancy or in labor).  Fertility issues, miscarriage, or stillbirth.  Decreased likelihood of breastfeeding at birth, hospital discharge at 6-8 weeks postpartum.    Every patient is different, and every case is unique. However, from the current medical evidence, most medical authorities state that if multiple c-sections are planned, the expert recommendation is to adhere to the maximum number of three.    Vaginal birth after  ():  Women have a 72-75% chance of vaginal birth after .    Benefits:  [place] hospital stay with faster recovery.  Avoidance of major surgery and multiple c-sections in the future.  Increased likelihood of future vaginal birth.  Sense of satisfaction and empowerment in having a vaginal birth, if desired.  Reduced risk of maternal mortality compared to planned  (even though it is still an extremely rare event regardless of mode of birth, 0.004% vs 0.013%).  Increased likelihood of breastfeeding compared to  (increased likelihood remains even if planned  results in emergency ).    Risks:  25-28% chance of emergency  (which has an increased morbidity rate compared to a planned ).  0.5% risk of uterine rupture, with increased risk with induction and augmentation of labor.  Potential trauma to perineum and pelvic floor.  Increased risk of anal sphincter injury for women having a second birth following one previous  compared with nulliparous women (birth weight is strongest predictor, rate of instrumental birth also increases this risk).  Increased risk of  mortality (0.13% vs 0.05%).  Increased risk of hypoxic ischaemic  encephalopathy (0.08% vs 0.01%, with majority of cases associated with uterine rupture).  0.1% prospective risk of antepartum stillbirth beyond 39+0 weeks (recommended timing for planned ) while awaiting spontaneous labor.      Preparing for a   Both the anaesthetic and bed rest associated with surgery can affect your lung function. Before your operation, it is important to optimize lung function and fitness to achieve the best results from the surgery and minimize the risk of complications. Things you can do before surgery are maintain fitness by walking daily, aiming for up to 30 minutes a day; practice deep breathing exercises, connecting with your diaphragm and pelvic floor; activate your lower TAs; and practice huffing and coughing, which will help to clear any secretions following the surgery.      Recovery from a   Recovering from a  section is different from healing after a vaginal birth. Not only did you birth a baby, but you also underwent major surgery. Post- recovery takes time, but there are ways to ease the process. It is important to remember that even though you did not give birth vaginally, your pelvic floor was affected due to postural changes and the weight of the baby on your pelvic floor during pregnancy (more so if you had any active pushing in labor), so you will need to monitor and connect with what is happening with your pelvic floor during this recovery.      How long does a  take?  An average  takes around 45 minutes to perform.      The initial transition after surgery   recovery is done in stages. Right after your surgery is over, you will be wheeled into a post-operative recovery room. Usually there are several beds in one room that are  by curtains.    You will remain in the recovery area for a varied amount of time, depending on the type of anesthesia (general or regional) that you had. It's typically  about a two to four-hour period. If you had an epidural or spinal anesthesia, you'll stay in recovery until you are able to wiggle your legs. If you've had general anesthesia, you may fall asleep and wake up repeatedly and possibly feel nauseated.    During this time, the healthcare team will monitor your vitals, check the firmness of your uterus, and assess the amount of vaginal bleeding you are having.    The best advice for recovery is to begin slowly moving as quickly as you can. You will want to start with simple things like breathing. While breathing sounds like an easy thing to do, taking a deep breath can be difficult after a . You'll need to try and breathe deeply as soon as you can after surgery, and continue to do so frequently as you recover.    Some of your equipment will come with you when you move to your regular room, including your bladder catheter, blood pressure monitor, and IV. The catheter will usually be removed the day after surgery. (Make sure that you are not starting to unconsciously tighten your pelvic floor.) The IV will stay until your intestines begin working again, as evidenced by rumbling sounds and possible gas pain. Avoiding carbonated, hot or cold drinks (which can make gas worse) can help reduce your pain.    You will feel pain from the surgery, and it's important to manage it early on. The less pain you feel the more likely you are to be up and moving about, which is key to a speedy recovery. Be sure to tell your provider if you are nursing, as the pain medications can pass into breast milk. Many women stop taking their post-surgery pain medications too early or don't take them on the recommended schedule, which can lead to unnecessary pain. Your doctor may recommend around-the-clock pain management after your  for at least the first several days, so take your medications as prescribed and make sure you take them before the pain becomes too bad to  "manage.    This can help prevent the vicious cycle of "chasing the pain" where you never fully find relief. (If non-narcotic medications don't relieve your pain, talk to your doctor.) Once the first few days have passed, you can slowly alter your pain medication schedule to ease away from painkillers until you're medication-free.      Brace Yourself, and Move Carefully    Remember, you've just had a baby and major surgery! Its important to rest and increase your activity level slowly over the course of the next six to eight weeks. Use a pillow to splint your incision when standing for the first few days, placing it directly over your incision and applying firm pressure. Supporting your incision can reduce pain and help you feel more stable. The pillow brace can also be used when coughing, laughing, sneezing, or moving from a seated to a standing position to help with discomfort. Some people also like to use a belly band or binder for support, which can help you move with less pain. Make sure you do not have it on too tightly -- this way it will help to give you a little extra support without creating too much pressure on your pelvic floor. Wean off it as soon as you can move without feeling a lot of pain.    To get out of bed, bend your knees up and roll onto your side, keeping your knees together. Push up, using your forearms and hands to get to a sitting position, and as you do so, swing your legs down over the side of the bed. Sit on the edge of the bed with your feet flat on the floor, lean forward, and stand up. To get back into bed, reverse the procedure. Make sure you breathe well throughout the whole movement, and aim for good posture. Sleeping on your side with a pillow between your legs may give you more comfort and support. Avoid sleeping on your stomach for as long as this hurts.    Once you are home, you can resume daily activities -- walking, light housework such as dusting and tidying, going up and " down stairs, reaching and stretching. However, its important to monitor daily movements to help with your recovery. Because of the pain, you may prefer to lie, sit, stand or walk in a bent position. However, try to be upright as soon as possible so the scar can heal in this position. If you are bent over for too long, there may be significant restriction of the scar and shortening of the abdominal muscles. It is especially important to have good posture to keep the scar from healing in a shortened position. Good posture will put the abdominal muscles and pelvic floor in the best position for working, providing better support and reducing the strain on your back, lower abdomen and pelvic floor.    It is important to walk as soon as you can after surgery to help prevent deep vein thrombosis (DVT), and your first walk is a big milestone in your recovery. However, hold off on rigorous walks or other exercise until you get the all-clear from your doctor at your 6-week checkup. Until then, don't lift, bend, reach high overhead, drive, or climb stairs (or limit them as much as you can, trying to do no more than two trips per day the first few weeks). A good rule of thumb is to avoid picking up anything heavier than your baby. As you transition into progressively walking and moving more, you can begin walking with help. Avoid the tendency to lean forward or look down, but instead try to stand up straight and focus on a short distance goal ahead of you. Splint your incision (your insides will feel like they are falling out, but rest assured that they are held in place by several layers of stitches and staples), and it is better to walk frequent, short bouts versus one or two longer bouts as you build up your endurance.    Make sure to ask for plenty of help, and avoid carrying anything heavier than your baby for the first 6 weeks. Also avoid such activities such as unloading washing machines, carrying loads of wet washing,  scrubbing or mopping and vacuuming floors, making and stripping beds, carrying bags of shopping, picking up children, bending and squatting. When you do start lifting, caution is required for a further 6-8 weeks. Gradually increase the weight you lift until you have safely resumed your usual activities, e.g. start with light bags of shopping, half a basket of wet washing, hanging light washing on a line, etc., making sure to minimize any strain by using proper lifting techniques. Keep your posture tall, hold your baby or object close to your body, breathe, and make sure you arent bearing down or bracing out. If you need to hold your breath or you feel any pain as you lift, the object is too heavy. Seek help.    Also remember that even though your baby was born via your abdomen, you will still bleed vaginally and your pelvic floor is still affected during pregnancy, particularly if you did any pushing before having the . You may notice an increase in the amount of bleeding if you do too much too soon, and always monitor for feelings of heaviness or a tampon feeling. Dont be afraid to ask for help, and remember that even if you had time to plan ahead for  recovery, aspects of your recovery might still catch you off guard.    If you have older children, choose your words carefully when talking about your recovery. Try to avoid blaming the baby for your need to rest and inability to pick them up. Instead, blame the incision. Shifting the blame can help your older child(sd) avoid bad feelings about their new sibling.      Good Bladder and Bowel Habits    Postpartum constipation is common, but it can be made even worse by a  delivery and narcotic pain medication, both of which can slow digestion. Stool softeners are commonly offered in the hospital post-birth and are recommended in the early postpartum recovery period at home. The last thing you want to be doing at this point is bearing down  to try and have a bowel movement! This will add pressure to your pelvic floor,  stitches, and linea alba that are all healing from your pregnancy and birth. Chronic constipation can increase the risk of prolapse, even if you had a . You need to make sure you do not strain to open your bowels, as this may put pressure on your stitches as well as keep your pelvic floor from feeling happy postpartum.    Also note that water retention will get worse after the  before it gets better, meaning you might have increased swelling in your ankles and feet. Its important to stay hydrated, get up and move, and take your meds as prescribed by your doctor, as all of this helps with water retention as well as your healing.       incision  Don't be afraid to look at your incision. It's actually very important that you do, as you will need to monitor it for signs of an infection. (This is also a good time to connect with your belly, and take a moment to appreciate it from the bottom of your heart.) The incision might be covered by gauze on the first day, and you might also have special drains to help remove fluids that are collecting on the inside. There are different types of external incisions that may not match the incision on your uterus. Make sure to ask the provider who performed the surgery about your uterine incision.    The area may look bruised, red, and irritated. You will notice that there are staples or stitches. These will usually be removed within a few days of the surgery or they will dissolve on their own, like the internal stitches. Looking at the incision now will help you note and report changes that could indicate infection to your provider.    One thing that surprises many people after surgery is the numbness and itching. Numbness after  is completely normal. It will usually go away in a few weeks but doesn't always, and it does not mean that there is something wrong.  Itching is a sign your body is healing -- but excessive itchiness or itching that gets worse instead of better could be a sign of infection. If itching is excessive or if you have a fever, difficulty breathing, severe pain, abnormal drainage, or bleeding, contact your doctor right away. Try your best not to scratch your incision. Ice packs applied to the incision can help reduce unpleasant sensations on and around the incision site, including itching, pain, and swelling. Research has found that ice packs reduce postoperative pain and narcotic use when used after major abdominal surgery. Dermatologists recommend using petroleum jelly on the incision to keep it moist and help prevent itching.    As long as the wound is not weeping or moist at all and you are cleared by your doctor, you may begin gently massaging along and across the scar at about 6 weeks post-op with a topical moisturizer, and progressively work the scar more intensely to make sure that all the layers move well. This will help the  scar tissue become flatter and smoother and also prevent dryness, as well as improve the ability for you to engage your lower abs. This may take some consistent work every day or every other day, depending on how your body reacts. The goal is to lift your scar and have it feel almost the same as the tissue surrounding the scar. You might also want to add abdominal massage to your nightly routine after you are cleared by your doctor. This helps to release the different layers of your scar tissue in more depth. Having an appointment with a pelvic floor physical therapist can help educate you on proper scar and abdominal massage for optimal healing, and would also be advised if you are having problems with painful scar tissue.    Sunscreens (SPF 30 or higher) may help prevent dark discoloration of the scar. It can take 1-2 years for the discoloration to fade. To promote general healing of the scar, Vitamin E, aloe  vera and silicone gels are shown to be helpful. Also, after the wound has healed, a tape applied over the scar can work wonders!      When Can I Exercise After ?  Rest is important, but so too is appropriate exercise, and the two should be balanced. Thirty minutes of moderate exercise daily will improve your recovery, help achieve and maintain a healthy weight, facilitate mental well-being, improve sleep patterns, and help keep your bowels moving, all of which can make you feel happy and healthy. It is important to let your incision and body heal from the surgery and to ease into increases in your activity level, even as you get the all clear from your doctor at the typical 6-week checkup to resume activity. Listen to your body! If an activity gives you pain or a pulling sensation, STOP and rest. Wait a few days before trying that activity again.    Breathing exercises  Breathing exercises help reduce the effects of the anaesthetic and prevent complications with the lungs, such as collapse and pneumonia. Good, deep breathing will help with your recovery. Sit up in bed with your knees bent and feet flat. Relax your shoulders and support the incision with both hands to increase your feeling of comfort. Breathe out gently, then take a slow deep breath, getting in as much air as possible, sending the air into your sides, back and pelvic floor. Relax, and breathe out gently. Take your time. Try to do 10 deep breaths each waking hour. Try also to do the deep breathing exercises sitting in a chair, standing and walking. You may even ask for a respiratory therapist to come to your hospital room to work with you on this. Some of the harder breathing with forceful exhales will hurt for a while, and may be strenuous on your abs for about 4-6 weeks. Youll want to continue working on breathing and posture as you heal, knowing that both will affect how your abs and pelvic floor function and assist in your  recovery.    Pelvic floor and core exercises  Getting your pelvic floor muscles and TAs in a healthy balance is very important to help with your  recovery. You may start these around 4 weeks post-surgery or wait until the 6 week post-op clinic visit before starting them to ensure everything is well-healed and you are doing the exercises correctly.    General fitness  Walking is an easy and safe way to start your recovery and stay fit, however any exercise program must be started slowly and gradually increased. You can even start this journey the day after your surgery (unless restricted by medical or nursing staff) by walking in your room and around the [place] several times a day, increasing the duration as you feel stronger with less pain. When you leave the hospital, gradually increase the time you walk from 30 minutes daily in the first week, up to 60 minutes daily (unless restricted for other reasons) by 6 weeks. Initially you should break the walk into small bursts, e.g. 6x5 minutes or 3x10 minutes. Avoid walking up or down steep slopes and over uneven or unstable ground in the first 3-4 weeks, as this may cause more strain on your incision site.    After 10-12 weeks, if your incision is healing well, you have had no complications, and are cleared by the doctor, you may begin to transition into exercising more physically. Slowly increase the demand and make sure to monitor form and technique and how your abs engage during all activities to optimize your recovery. More on this can be found here:    Activities to be avoided  Sports such as netball, squash, running, and high impact aerobics can put a lot of strain on healing tissues and may need to be avoided for up to 6 months. Swimming can typically be started 6-8 weeks after your incision has fully healed, your lochia (vaginal bleeding after birth) has finished, and you are cleared by your doctor, again gently progressing into it. If you have a specific  sporting interest, discuss this with your physiotherapist. Everyone's body has a different timeline for what it can handle on this recovery journey, so its important to ease into activities and see how your body responds before advancing further. Be especially careful with activities that cause a sudden and/or high increase in intra-abdominal pressure, such as walking a dog, because they can put strain on your abdomen as well as your pelvic floor.    Overall, it is important to remember that a  is a major abdominal surgery and it will take some time for your body to heal and recover from it and your pregnancy. Make sure to get adequate rest and assistance in the beginning of the recovery, stay ahead of the pain, and slowly and progressively increase your activity as tolerated.

## 2022-12-21 NOTE — PROGRESS NOTES
Pelvic Health Physical Therapy   Treatment Note     Name: Carlos [facility] Number: [id number]    Therapy Diagnosis:   Encounter Diagnoses   Name Primary?    Impaired functional mobility, balance, gait, and endurance Yes    Weakness of trunk musculature      Physician: Glory Fink MD    Visit Date: 2022    Physician Orders: PT Eval and Treat - Pelvic Floor PT   Medical Diagnosis from Referral: O99.891,M54.9 (ICD-10-CM) - Back pain affecting pregnancy in third trimester  Evaluation Date: 10/26/2023  Authorization Period Expiration: 10/10/2024  Plan of Care Certification Period: 2023  Visit #/Visits authorized:      Cancelled Visits: 0  No Show Visits: 0    Time In: 0915  Time Out: 1000  Total Billable Time: 45 minutes    Precautions: universal, Pregnant - EDOD 2022     Subjective     Pt reports: Was induced on the 6th and delivered daughter on the 8th. Tried everything but was unable to dilate fully.  performed; has been healing fairly. Has been increasing pressure on back and increasing pain. Has been moving around fairly well. Has been wearing a binder each day. Still taking pain medication, which has decreased bowel frequency.     She was fairly compliant with home exercise program.  Response to previous treatment: good   Functional change: delivered daughter     Pain: 4/10 currently, 7/10 worst this week   Location: R low back, hip, buttocks, LE (to calf)     Objective     Carlos received therapeutic exercises to develop  strength, endurance, ROM, flexibility, posture, and core stabilization for 00 minutes including:     Carlos received the following manual therapy techniques: to develop flexibility, extensibility, and desensitization for 10 minutes including:      scar mobilization - indirect this session    Carlos participated in neuromuscular re-education activities to develop Coordination and Control for 15 minutes includin breathing     Breathing  review   + coordinated pelvic mobility, gentle transverse abdominus recruitment       Carlos participated in dynamic functional therapeutic activities to improve functional performance for 20  minutes, including:      scar care - mobilization, desensitization  Postpartum return to activity       Home Exercises Provided and Patient Education Provided     Education provided:   - anatomy/physiology of pelvic floor, posture/body mechanices, and diaphragmatic breathing  Discussed progression of plan of care with patient; educated pt in activity modification; reviewed HEP with pt. Pt demonstrated and verbalized understanding of all instruction and was provided with a handout of HEP (see Patient Instructions).    Written Home Exercises Provided: yes.  Exercises were reviewed and Carlos was able to demonstrate them prior to the end of the session.  Carlos demonstrated good  understanding of the education provided.     See EMR under Patient Instructions for exercises provided  2022 .    Assessment     Carlos presented s/p . Scar assessed with fair mobility demonstrated. Scar mobilization and desensitization introduced and reviewed for independent performance. Slight abdominopelvic coordination training also introduce, with fair performance demonstrated. Based on performance, Carlos would benefit from further review and reassessment prior to progression NV.     Carlos Is progressing well towards her goals.   Pt prognosis is Good.     Pt will continue to benefit from skilled outpatient physical therapy to address the deficits listed in the problem list box on initial evaluation, provide pt/family education and to maximize pt's level of independence in the home and community environment.     Pt's spiritual, cultural and educational needs considered and pt agreeable to plan of care and goals.     Anticipated barriers to physical therapy: chronicity of condition, pregnancy, upcoming delivery      Goals:   Short Term Goals: 6 weeks      Pt to demonstrate proper diaphragmatic breathing to help with calming the nervous system in order to decrease pain and to improve abdominal wall musculature extensibility.   Pt to report being able to complete 10 minutes of exercise 4 days per week without significant increase in pain to demonstrate a return towards prior level of function.  Pt to demonstrate good body mechanics when performing ADLs such as lifting and bending to decrease strain to lumbopelvic structures and reduce risk of further injury.  Pt will report minimal to no pain with single digit pelvic assessment and display relaxation demonstrating improving pelvic floor muscle relaxation and coordination.   Pt to report a decrease in pain to no more than 7/10 at it's worst with daily chores in home when limited to 15 minute intervals.    Long Term Goals: 12 weeks      Pt to be discharged with home plan for carry over after discharge.   Pt will be trained and compliant with postural strategies in sitting and standing to improve alignment and decrease pain and muscle fatigue  Pt to report being able to have a comfortable vaginal exam without significant increase in pelvic pain.  Pt to report being able to complete 15 minutes of exercise 5 days per week without significant increase in pain to demonstrate a return towards prior level of function.  Pt to report a decrease in pain to no more than 5/10 at it's worst with daily chores in home when limited to 15 minute intervals.        Plan     Plan for next visit: post partum return to activity, review mobility training    JINNY PAIZ, PT   12/21/2022

## 2022-12-31 ENCOUNTER — NURSE TRIAGE (OUTPATIENT)
Dept: ADMINISTRATIVE | Facility: CLINIC | Age: 32
End: 2022-12-31
Payer: COMMERCIAL

## 2022-12-31 ENCOUNTER — HOSPITAL ENCOUNTER (OUTPATIENT)
Facility: OTHER | Age: 32
Discharge: HOME | End: 2023-01-02
Attending: OBSTETRICS & GYNECOLOGY | Admitting: STUDENT IN AN ORGANIZED HEALTH CARE EDUCATION/TRAINING PROGRAM
Payer: COMMERCIAL

## 2022-12-31 DIAGNOSIS — O13.3 GESTATIONAL HYPERTENSION, THIRD TRIMESTER: Primary | ICD-10-CM

## 2022-12-31 LAB
ALBUMIN SERPL BCP-MCNC: 3.4 G/DL (ref 3.5–5.2)
ALP SERPL-CCNC: 93 U/L (ref 55–135)
ALT SERPL W/O P-5'-P-CCNC: 24 U/L (ref 10–44)
ANION GAP SERPL CALC-SCNC: 10 MMOL/L (ref 8–16)
AST SERPL-CCNC: 17 U/L (ref 10–40)
BASOPHILS # BLD AUTO: 0.02 K/UL (ref 0–0.2)
BASOPHILS NFR BLD: 0.3 % (ref 0–1.9)
BILIRUB SERPL-MCNC: 0.2 MG/DL (ref 0.1–1)
BUN SERPL-MCNC: 12 MG/DL (ref 6–20)
CALCIUM SERPL-MCNC: 9 MG/DL (ref 8.7–10.5)
CHLORIDE SERPL-SCNC: 110 MMOL/L (ref 95–110)
CO2 SERPL-SCNC: 22 MMOL/L (ref 23–29)
CREAT SERPL-MCNC: 0.7 MG/DL (ref 0.5–1.4)
DIFFERENTIAL METHOD: ABNORMAL
EOSINOPHIL # BLD AUTO: 0.1 K/UL (ref 0–0.5)
EOSINOPHIL NFR BLD: 1.1 % (ref 0–8)
ERYTHROCYTE [DISTWIDTH] IN BLOOD BY AUTOMATED COUNT: 15 % (ref 11.5–14.5)
EST. GFR  (NO RACE VARIABLE): >60 ML/MIN/1.73 M^2
GLUCOSE SERPL-MCNC: 104 MG/DL (ref 70–110)
HCT VFR BLD AUTO: 35.3 % (ref 37–48.5)
HGB BLD-MCNC: 11.6 G/DL (ref 12–16)
IMM GRANULOCYTES # BLD AUTO: 0.02 K/UL (ref 0–0.04)
IMM GRANULOCYTES NFR BLD AUTO: 0.3 % (ref 0–0.5)
LYMPHOCYTES # BLD AUTO: 2 K/UL (ref 1–4.8)
LYMPHOCYTES NFR BLD: 26.4 % (ref 18–48)
MCH RBC QN AUTO: 27.6 PG (ref 27–31)
MCHC RBC AUTO-ENTMCNC: 32.9 G/DL (ref 32–36)
MCV RBC AUTO: 84 FL (ref 82–98)
MONOCYTES # BLD AUTO: 0.5 K/UL (ref 0.3–1)
MONOCYTES NFR BLD: 6.8 % (ref 4–15)
NEUTROPHILS # BLD AUTO: 5 K/UL (ref 1.8–7.7)
NEUTROPHILS NFR BLD: 65.1 % (ref 38–73)
NRBC BLD-RTO: 0 /100 WBC
PLATELET # BLD AUTO: 336 K/UL (ref 150–450)
PMV BLD AUTO: 10.3 FL (ref 9.2–12.9)
POTASSIUM SERPL-SCNC: 3.8 MMOL/L (ref 3.5–5.1)
PROT SERPL-MCNC: 6.8 G/DL (ref 6–8.4)
RBC # BLD AUTO: 4.2 M/UL (ref 4–5.4)
SODIUM SERPL-SCNC: 142 MMOL/L (ref 136–145)
WBC # BLD AUTO: 7.61 K/UL (ref 3.9–12.7)

## 2022-12-31 PROCEDURE — 99285 EMERGENCY DEPT VISIT HI MDM: CPT | Mod: 25

## 2022-12-31 PROCEDURE — 80053 COMPREHEN METABOLIC PANEL: CPT

## 2022-12-31 PROCEDURE — 85025 COMPLETE CBC W/AUTO DIFF WBC: CPT

## 2022-12-31 PROCEDURE — 25000003 PHARM REV CODE 250

## 2022-12-31 RX ORDER — CYCLOBENZAPRINE HCL 5 MG
5 TABLET ORAL ONCE
Status: COMPLETED | OUTPATIENT
Start: 2022-12-31 | End: 2022-12-31

## 2022-12-31 RX ORDER — DIPHENHYDRAMINE HYDROCHLORIDE 50 MG/ML
25 INJECTION INTRAMUSCULAR; INTRAVENOUS ONCE
Status: DISCONTINUED | OUTPATIENT
Start: 2022-12-31 | End: 2022-12-31

## 2022-12-31 RX ORDER — METOCLOPRAMIDE HYDROCHLORIDE 5 MG/ML
10 INJECTION INTRAMUSCULAR; INTRAVENOUS ONCE
Status: DISCONTINUED | OUTPATIENT
Start: 2022-12-31 | End: 2022-12-31

## 2022-12-31 RX ORDER — IBUPROFEN 600 MG/1
600 TABLET ORAL ONCE
Status: COMPLETED | OUTPATIENT
Start: 2022-12-31 | End: 2022-12-31

## 2022-12-31 RX ADMIN — IBUPROFEN 600 MG: 600 TABLET, FILM COATED ORAL at 11:12

## 2022-12-31 RX ADMIN — CYCLOBENZAPRINE HYDROCHLORIDE 5 MG: 5 TABLET, FILM COATED ORAL at 11:12

## 2023-01-01 PROCEDURE — 63600175 PHARM REV CODE 636 W HCPCS: Performed by: STUDENT IN AN ORGANIZED HEALTH CARE EDUCATION/TRAINING PROGRAM

## 2023-01-01 PROCEDURE — 96376 TX/PRO/DX INJ SAME DRUG ADON: CPT

## 2023-01-01 PROCEDURE — 25500020 PHARM REV CODE 255: Performed by: STUDENT IN AN ORGANIZED HEALTH CARE EDUCATION/TRAINING PROGRAM

## 2023-01-01 PROCEDURE — G0378 HOSPITAL OBSERVATION PER HR: HCPCS

## 2023-01-01 PROCEDURE — 25000003 PHARM REV CODE 250: Performed by: STUDENT IN AN ORGANIZED HEALTH CARE EDUCATION/TRAINING PROGRAM

## 2023-01-01 PROCEDURE — 96375 TX/PRO/DX INJ NEW DRUG ADDON: CPT | Mod: 59

## 2023-01-01 PROCEDURE — 63600175 PHARM REV CODE 636 W HCPCS

## 2023-01-01 PROCEDURE — 94640 AIRWAY INHALATION TREATMENT: CPT

## 2023-01-01 PROCEDURE — 94761 N-INVAS EAR/PLS OXIMETRY MLT: CPT

## 2023-01-01 PROCEDURE — 99284 PR EMERGENCY DEPT VISIT,LEVEL IV: ICD-10-PCS | Mod: ,,, | Performed by: OBSTETRICS & GYNECOLOGY

## 2023-01-01 PROCEDURE — 99284 EMERGENCY DEPT VISIT MOD MDM: CPT | Mod: ,,, | Performed by: OBSTETRICS & GYNECOLOGY

## 2023-01-01 PROCEDURE — 96365 THER/PROPH/DIAG IV INF INIT: CPT

## 2023-01-01 PROCEDURE — 96366 THER/PROPH/DIAG IV INF ADDON: CPT

## 2023-01-01 PROCEDURE — A9585 GADOBUTROL INJECTION: HCPCS | Performed by: STUDENT IN AN ORGANIZED HEALTH CARE EDUCATION/TRAINING PROGRAM

## 2023-01-01 PROCEDURE — 25000242 PHARM REV CODE 250 ALT 637 W/ HCPCS: Performed by: STUDENT IN AN ORGANIZED HEALTH CARE EDUCATION/TRAINING PROGRAM

## 2023-01-01 RX ORDER — SIMETHICONE 80 MG
1 TABLET,CHEWABLE ORAL EVERY 6 HOURS PRN
Status: DISCONTINUED | OUTPATIENT
Start: 2023-01-01 | End: 2023-01-02 | Stop reason: HOSPADM

## 2023-01-01 RX ORDER — DOCUSATE SODIUM 100 MG/1
200 CAPSULE, LIQUID FILLED ORAL 2 TIMES DAILY PRN
Status: DISCONTINUED | OUTPATIENT
Start: 2023-01-01 | End: 2023-01-02 | Stop reason: HOSPADM

## 2023-01-01 RX ORDER — ACETAMINOPHEN 325 MG/1
650 TABLET ORAL EVERY 6 HOURS PRN
Status: DISCONTINUED | OUTPATIENT
Start: 2023-01-01 | End: 2023-01-02 | Stop reason: HOSPADM

## 2023-01-01 RX ORDER — ONDANSETRON 8 MG/1
8 TABLET, ORALLY DISINTEGRATING ORAL EVERY 8 HOURS PRN
Status: DISCONTINUED | OUTPATIENT
Start: 2023-01-01 | End: 2023-01-02 | Stop reason: HOSPADM

## 2023-01-01 RX ORDER — NIFEDIPINE 30 MG/1
30 TABLET, EXTENDED RELEASE ORAL ONCE
Status: COMPLETED | OUTPATIENT
Start: 2023-01-01 | End: 2023-01-01

## 2023-01-01 RX ORDER — OXYTOCIN/RINGER'S LACTATE 30/500 ML
95 PLASTIC BAG, INJECTION (ML) INTRAVENOUS ONCE
Status: DISCONTINUED | OUTPATIENT
Start: 2023-01-01 | End: 2023-01-02 | Stop reason: HOSPADM

## 2023-01-01 RX ORDER — SODIUM CHLORIDE, SODIUM LACTATE, POTASSIUM CHLORIDE, CALCIUM CHLORIDE 600; 310; 30; 20 MG/100ML; MG/100ML; MG/100ML; MG/100ML
INJECTION, SOLUTION INTRAVENOUS CONTINUOUS
Status: DISCONTINUED | OUTPATIENT
Start: 2023-01-01 | End: 2023-01-02 | Stop reason: HOSPADM

## 2023-01-01 RX ORDER — DIPHENHYDRAMINE HYDROCHLORIDE 50 MG/ML
25 INJECTION INTRAMUSCULAR; INTRAVENOUS ONCE
Status: COMPLETED | OUTPATIENT
Start: 2023-01-01 | End: 2023-01-01

## 2023-01-01 RX ORDER — HYDROCODONE BITARTRATE AND ACETAMINOPHEN 10; 325 MG/1; MG/1
1 TABLET ORAL EVERY 4 HOURS PRN
Status: DISCONTINUED | OUTPATIENT
Start: 2023-01-01 | End: 2023-01-02 | Stop reason: HOSPADM

## 2023-01-01 RX ORDER — GADOBUTROL 604.72 MG/ML
10 INJECTION INTRAVENOUS
Status: COMPLETED | OUTPATIENT
Start: 2023-01-01 | End: 2023-01-01

## 2023-01-01 RX ORDER — IBUPROFEN 600 MG/1
600 TABLET ORAL EVERY 6 HOURS PRN
Status: DISCONTINUED | OUTPATIENT
Start: 2023-01-01 | End: 2023-01-02 | Stop reason: HOSPADM

## 2023-01-01 RX ORDER — MAGNESIUM SULFATE HEPTAHYDRATE 40 MG/ML
4 INJECTION, SOLUTION INTRAVENOUS ONCE
Status: COMPLETED | OUTPATIENT
Start: 2023-01-01 | End: 2023-01-01

## 2023-01-01 RX ORDER — IBUPROFEN 600 MG/1
600 TABLET ORAL EVERY 6 HOURS
Status: DISCONTINUED | OUTPATIENT
Start: 2023-01-01 | End: 2023-01-01

## 2023-01-01 RX ORDER — PROCHLORPERAZINE EDISYLATE 5 MG/ML
5 INJECTION INTRAMUSCULAR; INTRAVENOUS ONCE
Status: COMPLETED | OUTPATIENT
Start: 2023-01-01 | End: 2023-01-01

## 2023-01-01 RX ORDER — MAGNESIUM SULFATE HEPTAHYDRATE 40 MG/ML
2 INJECTION, SOLUTION INTRAVENOUS CONTINUOUS
Status: DISCONTINUED | OUTPATIENT
Start: 2023-01-01 | End: 2023-01-02

## 2023-01-01 RX ORDER — SODIUM CHLORIDE 0.9 % (FLUSH) 0.9 %
10 SYRINGE (ML) INJECTION
Status: DISCONTINUED | OUTPATIENT
Start: 2023-01-01 | End: 2023-01-02 | Stop reason: HOSPADM

## 2023-01-01 RX ORDER — METOCLOPRAMIDE HYDROCHLORIDE 5 MG/ML
10 INJECTION INTRAMUSCULAR; INTRAVENOUS ONCE
Status: COMPLETED | OUTPATIENT
Start: 2023-01-01 | End: 2023-01-01

## 2023-01-01 RX ORDER — DIPHENHYDRAMINE HCL 25 MG
25 CAPSULE ORAL EVERY 4 HOURS PRN
Status: DISCONTINUED | OUTPATIENT
Start: 2023-01-01 | End: 2023-01-02 | Stop reason: HOSPADM

## 2023-01-01 RX ORDER — HYDROCODONE BITARTRATE AND ACETAMINOPHEN 5; 325 MG/1; MG/1
1 TABLET ORAL EVERY 4 HOURS PRN
Status: DISCONTINUED | OUTPATIENT
Start: 2023-01-01 | End: 2023-01-02 | Stop reason: HOSPADM

## 2023-01-01 RX ORDER — PRENATAL WITH FERROUS FUM AND FOLIC ACID 3080; 920; 120; 400; 22; 1.84; 3; 20; 10; 1; 12; 200; 27; 25; 2 [IU]/1; [IU]/1; MG/1; [IU]/1; MG/1; MG/1; MG/1; MG/1; MG/1; MG/1; UG/1; MG/1; MG/1; MG/1; MG/1
1 TABLET ORAL DAILY
Status: DISCONTINUED | OUTPATIENT
Start: 2023-01-01 | End: 2023-01-02 | Stop reason: HOSPADM

## 2023-01-01 RX ORDER — KETOROLAC TROMETHAMINE 30 MG/ML
30 INJECTION, SOLUTION INTRAMUSCULAR; INTRAVENOUS ONCE
Status: COMPLETED | OUTPATIENT
Start: 2023-01-01 | End: 2023-01-01

## 2023-01-01 RX ORDER — PROCHLORPERAZINE EDISYLATE 5 MG/ML
5 INJECTION INTRAMUSCULAR; INTRAVENOUS EVERY 6 HOURS PRN
Status: DISCONTINUED | OUTPATIENT
Start: 2023-01-01 | End: 2023-01-02 | Stop reason: HOSPADM

## 2023-01-01 RX ORDER — NIFEDIPINE 30 MG/1
60 TABLET, EXTENDED RELEASE ORAL DAILY
Status: DISCONTINUED | OUTPATIENT
Start: 2023-01-01 | End: 2023-01-02 | Stop reason: HOSPADM

## 2023-01-01 RX ORDER — DIPHENHYDRAMINE HYDROCHLORIDE 50 MG/ML
25 INJECTION INTRAMUSCULAR; INTRAVENOUS EVERY 4 HOURS PRN
Status: DISCONTINUED | OUTPATIENT
Start: 2023-01-01 | End: 2023-01-02 | Stop reason: HOSPADM

## 2023-01-01 RX ORDER — FLUTICASONE FUROATE AND VILANTEROL 200; 25 UG/1; UG/1
1 POWDER RESPIRATORY (INHALATION) 2 TIMES DAILY
Status: DISCONTINUED | OUTPATIENT
Start: 2023-01-01 | End: 2023-01-02 | Stop reason: HOSPADM

## 2023-01-01 RX ORDER — ALBUTEROL SULFATE 90 UG/1
2 AEROSOL, METERED RESPIRATORY (INHALATION) EVERY 6 HOURS PRN
Status: DISCONTINUED | OUTPATIENT
Start: 2023-01-01 | End: 2023-01-02 | Stop reason: HOSPADM

## 2023-01-01 RX ORDER — CALCIUM GLUCONATE 98 MG/ML
1 INJECTION, SOLUTION INTRAVENOUS
Status: DISCONTINUED | OUTPATIENT
Start: 2023-01-01 | End: 2023-01-02

## 2023-01-01 RX ORDER — HYDROCORTISONE 25 MG/G
CREAM TOPICAL 3 TIMES DAILY PRN
Status: DISCONTINUED | OUTPATIENT
Start: 2023-01-01 | End: 2023-01-02 | Stop reason: HOSPADM

## 2023-01-01 RX ADMIN — DIPHENHYDRAMINE HYDROCHLORIDE 25 MG: 50 INJECTION INTRAMUSCULAR; INTRAVENOUS at 02:01

## 2023-01-01 RX ADMIN — PROCHLORPERAZINE EDISYLATE 5 MG: 5 INJECTION INTRAMUSCULAR; INTRAVENOUS at 10:01

## 2023-01-01 RX ADMIN — IBUPROFEN 600 MG: 600 TABLET, FILM COATED ORAL at 06:01

## 2023-01-01 RX ADMIN — MAGNESIUM SULFATE IN WATER 2 G/HR: 40 INJECTION, SOLUTION INTRAVENOUS at 09:01

## 2023-01-01 RX ADMIN — LORAZEPAM 1 MG: 2 INJECTION INTRAMUSCULAR; INTRAVENOUS at 03:01

## 2023-01-01 RX ADMIN — IBUPROFEN 600 MG: 600 TABLET, FILM COATED ORAL at 12:01

## 2023-01-01 RX ADMIN — HYDROCODONE BITARTRATE AND ACETAMINOPHEN 1 TABLET: 10; 325 TABLET ORAL at 09:01

## 2023-01-01 RX ADMIN — METOCLOPRAMIDE 10 MG: 5 INJECTION, SOLUTION INTRAMUSCULAR; INTRAVENOUS at 12:01

## 2023-01-01 RX ADMIN — NIFEDIPINE 30 MG: 30 TABLET, FILM COATED, EXTENDED RELEASE ORAL at 12:01

## 2023-01-01 RX ADMIN — PRENATAL VIT W/ FE FUMARATE-FA TAB 27-0.8 MG 1 TABLET: 27-0.8 TAB at 09:01

## 2023-01-01 RX ADMIN — FLUTICASONE FUROATE AND VILANTEROL TRIFENATATE 1 PUFF: 200; 25 POWDER RESPIRATORY (INHALATION) at 12:01

## 2023-01-01 RX ADMIN — SODIUM CHLORIDE, SODIUM LACTATE, POTASSIUM CHLORIDE, AND CALCIUM CHLORIDE: .6; .31; .03; .02 INJECTION, SOLUTION INTRAVENOUS at 12:01

## 2023-01-01 RX ADMIN — MAGNESIUM SULFATE IN WATER 2 G/HR: 40 INJECTION, SOLUTION INTRAVENOUS at 12:01

## 2023-01-01 RX ADMIN — METOCLOPRAMIDE 10 MG: 5 INJECTION, SOLUTION INTRAMUSCULAR; INTRAVENOUS at 02:01

## 2023-01-01 RX ADMIN — GADOBUTROL 10 ML: 604.72 INJECTION INTRAVENOUS at 04:01

## 2023-01-01 RX ADMIN — NIFEDIPINE 60 MG: 30 TABLET, FILM COATED, EXTENDED RELEASE ORAL at 09:01

## 2023-01-01 RX ADMIN — FLUTICASONE FUROATE AND VILANTEROL TRIFENATATE 1 PUFF: 200; 25 POWDER RESPIRATORY (INHALATION) at 08:01

## 2023-01-01 RX ADMIN — DIPHENHYDRAMINE HYDROCHLORIDE 25 MG: 50 INJECTION INTRAMUSCULAR; INTRAVENOUS at 12:01

## 2023-01-01 RX ADMIN — MAGNESIUM SULFATE HEPTAHYDRATE 4 G: 40 INJECTION, SOLUTION INTRAVENOUS at 12:01

## 2023-01-01 RX ADMIN — KETOROLAC TROMETHAMINE 30 MG: 30 INJECTION, SOLUTION INTRAMUSCULAR; INTRAVENOUS at 03:01

## 2023-01-01 NOTE — PLAN OF CARE
Pt resting comfortably in bed the past couple of hours. Pt states no pain at this time. Meds infusing as ordered. All vital signs WNL. Will continue to monitor closely.

## 2023-01-01 NOTE — NURSING
Pt states headache back with pain 7/10. MD notified. Medications for h/a ordered. All vital signs wnl.

## 2023-01-01 NOTE — PLAN OF CARE
VSS. Pt ambulating with supervision, voiding spontaneously, passing flatus. Pt c/o headache overnight. Headache cocktail given at 0230- pt asleep for 4 hours following. Plan of care reviewed w/pt. No new concerns expressed at this time. Will continue to monitor and intervene as necessary.        Problem: Adult Inpatient Plan of Care  Goal: Plan of Care Review  Outcome: Ongoing, Progressing  Goal: Patient-Specific Goal (Individualized)  Outcome: Ongoing, Progressing  Goal: Absence of Hospital-Acquired Illness or Injury  Outcome: Ongoing, Progressing  Goal: Optimal Comfort and Wellbeing  Outcome: Ongoing, Progressing  Goal: Readiness for Transition of Care  Outcome: Ongoing, Progressing     Problem: Fall Injury Risk  Goal: Absence of Fall and Fall-Related Injury  Outcome: Ongoing, Progressing     Problem: Hypertensive Disorders in Pregnancy  Goal: Maternal-Fetal Stabilization  Outcome: Ongoing, Progressing

## 2023-01-01 NOTE — H&P
History & Physical  OB      SUBJECTIVE:     Chief Complaint: Dizziness and Headache       History of Present Illness:  Carlos Camarena is a 32 y.o.  who is now POD#24 s/p primary LTCS who presented to the hospital complaining of a worsening headache. She has been seen in the [facility] and by her primary OBGYN on multiple other occasions for similar complaints. She was prescribed fioricet but states this has not provided relief. She describes her headache as right sided occipital/neck pain that radiates down her shoulder rated 8/10. She also reports episodes of dizziness over the past week. She reports she has episodes of feeling like she is spinning and is not steady on her feet at random times throughout the day. No known triggers. Denies F/C, vision changes, CP, SOB, RUQ pain, LE edema.       Her IUP was complicated by gHTN (was started on procardia 30XL, discontinued at her postpartum visit) and moderate asthma      Review of patient's allergies indicates:   Allergen Reactions    Lactose Diarrhea    Gluten protein Itching     Inflammation, bloating, diarrhea and pain all over body         Past Medical History:   Diagnosis Date    ADD (attention deficit disorder)     Asthma     Chronic back pain     Fibromyalgia     Moderate persistent asthma      Past Surgical History:   Procedure Laterality Date    BACK SURGERY  2013     SECTION N/A 2022    Procedure:  SECTION;  Surgeon: Glory Fink MD;  Location: [facility] L&D;  Service: OB/GYN;  Laterality: N/A;    INJECTION OF JOINT Right 2022    Procedure: INJECTION, JOINT RIGHT SI NEEDS CONSENT;  Surgeon: Cristopher Simon MD;  Location: [facility], operated by [facility];  Service: Pain Management;  Laterality: Right;    SPINE SURGERY  14     OB History          1    Para   1    Term   1       0    AB   0    Living   1         SAB   0    IAB   0    Ectopic   0    Multiple   0    Live Births   1               Family History   Problem Relation Age of  Onset    Depression Mother     Anxiety disorder Mother     Ovarian cancer Mother     Alcohol abuse Mother     Cancer Mother     No Known Problems Father     Diabetes Sister     Hypertension Maternal Grandmother     Breast cancer Neg Hx     Colon cancer Neg Hx      Social History     Tobacco Use    Smoking status: Never     Passive exposure: Never    Smokeless tobacco: Never   Substance Use Topics    Alcohol use: Yes     Alcohol/week: 3.0 standard drinks     Types: 3 Glasses of wine per week     Comment: 1-3 drinks every 2 weeks    Drug use: No       Current Facility-Administered Medications   Medication    acetaminophen tablet 650 mg    albuterol inhaler 2 puff    benzocaine-lanolin (DERMOPLAST) topical spray    calcium gluconate 100 mg/mL (10%) injection 1 g    diphenhydrAMINE capsule 25 mg    diphenhydrAMINE injection 25 mg    docusate sodium capsule 200 mg    fluticasone furoate-vilanteroL 200-25 mcg/dose diskus inhaler 1 puff    HYDROcodone-acetaminophen  mg per tablet 1 tablet    HYDROcodone-acetaminophen 5-325 mg per tablet 1 tablet    hydrocortisone 2.5 % rectal cream    ibuprofen tablet 600 mg    lactated ringers infusion    lanolin cream    magnesium sulfate in water 40 gram/1,000 mL (4 %) infusion    NIFEdipine 24 hr tablet 60 mg    ondansetron disintegrating tablet 8 mg    oxytocin 30 units in 500 mL lactated ringers infusion (non-titrating)    prenatal vitamin oral tablet    prochlorperazine injection Soln 5 mg    simethicone chewable tablet 80 mg    sodium chloride 0.9% flush 10 mL         Review of Systems:  Review of Systems   Constitutional:  Negative for chills, fatigue and fever.   HENT:  Negative for nasal congestion.    Eyes:  Negative for visual disturbance.   Respiratory:  Negative for cough and shortness of breath.    Cardiovascular:  Negative for chest pain and leg swelling.   Gastrointestinal:  Negative for abdominal pain, nausea and vomiting.   Endocrine: Negative for hot flashes.    Genitourinary:  Negative for dysuria, vaginal bleeding and vaginal discharge.   Musculoskeletal:  Negative for back pain.   Integumentary:  Negative for rash, breast skin changes and breast tenderness.   Neurological:  Positive for headaches. Negative for numbness.        Dizziness/ pre-syncopal feelings intermittently   Psychiatric/Behavioral:  Negative for depression.    Breast: Negative for skin changes and tenderness     OBJECTIVE:     Physical Exam:  Physical Exam  Vitals reviewed.   Constitutional:       Appearance: She is well-developed.   HENT:      Head: Normocephalic and atraumatic.   Eyes:      Extraocular Movements: Extraocular movements intact.   Pulmonary:      Effort: Pulmonary effort is normal.   Abdominal:      General: Bowel sounds are normal. There is no distension.      Palpations: Abdomen is soft.      Tenderness: There is no abdominal tenderness. There is no guarding or rebound.   Musculoskeletal:         General: Normal range of motion.      Cervical back: Normal range of motion.   Skin:     General: Skin is warm and dry.   Neurological:      Mental Status: She is alert and oriented to person, place, and time.         ASSESSMENT:     Carlos Camarena is a 32 y.o.  who is now admitted for PP pre-eclampsia with severe features (severe headache)       Plan:      PP Pre-e w/SF  - CBC and CMP wnl  - BP high mild range on admission, has been on Procardia 30XL outpatient, given an additional dose of 30XL in the MAGO and will increased to procardia 60XL on .   - Magnesium for seizure ppx ordered given concerning features of severe HA  - No other acute s/s of severe pre-e  - Repeat CBC and CMP ordered for AM  - s/p flexeril and ibuprofen in the [facility] with very minimal relief of HA.   - If HA unchanged in the AM, will consider imaging and possible neuro consult    2. Moderate asthma  - advair BID ordered  - Albuterol PRN    3. History of migraines  - Not currently on any controller  medications    Adriana Haddad MD  OBGYN, PGY-2

## 2023-01-01 NOTE — TELEPHONE ENCOUNTER
Patient states she is 3 weeks postpartum and getting dizzy spells and has a migraine headache, 8/10. Migraine started Wednesday. She says on her right side her neck is tingling and her arm is numb. She agrees to call 911.  is home with her. Please contact caller directly to discuss any further care advice.    Reason for Disposition   [1] Numbness of the face, arm or leg on one side of the body AND [2] new-onset    Additional Information   Negative: Difficult to awaken or acting confused (e.g., disoriented, slurred speech)   Negative: [1] Weakness of the face, arm or leg on one side of the body AND [2] new-onset    Protocols used: Postpartum - Headache-A-AH

## 2023-01-01 NOTE — ED NOTES
POD#24 s/p 1LTCS   *PP READMIT*  Dx: PP pre-e w/SF (HA), asthma   Meds: *MAG*, Procardia 60 XL (1/1), ibuprofen, norco, albuterol PRN  Labs: 7/12/35/336  Cr 0.7  AST/ALT 17/24  BP: (136-154)/(73-93) 136/73  [ ] AM CBC/CMP  [ ] image in AM if HA remains unchanged

## 2023-01-01 NOTE — LACTATION NOTE
Pt is PP re-admit with infant at home.    Breakout [employer] pump, tubing, collections containers and labels brought to bedside.  Discussed proper pump setting of initiation phase.  Instructed on proper usage of pump and to adjust suction according to maximum comfort level.  Verified appropriate flange fit.  Educated on the frequency and duration of pumping in order to promote and maintain a full milk supply.  Hands on pumping technique reviewed.  Encouraged hand expression after pumping.  Instructed on cleaning of breast pump parts.  Written instructions also given.  Pt verbalized understanding and appropriate recall for proper milk handling, collection, labeling, storage and transportation.

## 2023-01-01 NOTE — NURSING
Pt back to MBU. Resting in bed, states h/a now 5/10. No other c/o. Mag running at 50 LR @ 75. Continuous pulse ox in place. All vital signs WNL.

## 2023-01-01 NOTE — NURSING
Pt transported to MRI. Mag and LR still running. Lung sounds clear, equal in all fields. DTR 2+.  VSS. Pt still c/o of h/a 7/10.

## 2023-01-02 VITALS
HEART RATE: 75 BPM | OXYGEN SATURATION: 97 % | RESPIRATION RATE: 16 BRPM | TEMPERATURE: 98 F | SYSTOLIC BLOOD PRESSURE: 116 MMHG | DIASTOLIC BLOOD PRESSURE: 64 MMHG

## 2023-01-02 PROCEDURE — G0378 HOSPITAL OBSERVATION PER HR: HCPCS

## 2023-01-02 PROCEDURE — 96376 TX/PRO/DX INJ SAME DRUG ADON: CPT

## 2023-01-02 PROCEDURE — 63600175 PHARM REV CODE 636 W HCPCS: Performed by: STUDENT IN AN ORGANIZED HEALTH CARE EDUCATION/TRAINING PROGRAM

## 2023-01-02 PROCEDURE — 99232 PR SUBSEQUENT HOSPITAL CARE,LEVL II: ICD-10-PCS | Mod: ,,, | Performed by: OBSTETRICS & GYNECOLOGY

## 2023-01-02 PROCEDURE — 99232 SBSQ HOSP IP/OBS MODERATE 35: CPT | Mod: ,,, | Performed by: OBSTETRICS & GYNECOLOGY

## 2023-01-02 PROCEDURE — 25000003 PHARM REV CODE 250: Performed by: STUDENT IN AN ORGANIZED HEALTH CARE EDUCATION/TRAINING PROGRAM

## 2023-01-02 PROCEDURE — 94761 N-INVAS EAR/PLS OXIMETRY MLT: CPT

## 2023-01-02 PROCEDURE — 96366 THER/PROPH/DIAG IV INF ADDON: CPT

## 2023-01-02 PROCEDURE — 94640 AIRWAY INHALATION TREATMENT: CPT

## 2023-01-02 RX ORDER — DIPHENHYDRAMINE HCL 50 MG
50 CAPSULE ORAL EVERY 6 HOURS PRN
Qty: 30 CAPSULE | Refills: 2 | Status: SHIPPED | OUTPATIENT
Start: 2023-01-02 | End: 2023-08-16

## 2023-01-02 RX ORDER — PROCHLORPERAZINE MALEATE 10 MG
10 TABLET ORAL 3 TIMES DAILY PRN
Qty: 45 TABLET | Refills: 1 | Status: ON HOLD | OUTPATIENT
Start: 2023-01-02 | End: 2023-01-05 | Stop reason: SDUPTHER

## 2023-01-02 RX ORDER — PROCHLORPERAZINE EDISYLATE 5 MG/ML
5 INJECTION INTRAMUSCULAR; INTRAVENOUS ONCE
Status: COMPLETED | OUTPATIENT
Start: 2023-01-02 | End: 2023-01-02

## 2023-01-02 RX ADMIN — PROCHLORPERAZINE EDISYLATE 5 MG: 5 INJECTION INTRAMUSCULAR; INTRAVENOUS at 05:01

## 2023-01-02 RX ADMIN — PRENATAL VIT W/ FE FUMARATE-FA TAB 27-0.8 MG 1 TABLET: 27-0.8 TAB at 08:01

## 2023-01-02 RX ADMIN — IBUPROFEN 600 MG: 600 TABLET, FILM COATED ORAL at 01:01

## 2023-01-02 RX ADMIN — NIFEDIPINE 60 MG: 30 TABLET, FILM COATED, EXTENDED RELEASE ORAL at 08:01

## 2023-01-02 RX ADMIN — FLUTICASONE FUROATE AND VILANTEROL TRIFENATATE 1 PUFF: 200; 25 POWDER RESPIRATORY (INHALATION) at 09:01

## 2023-01-02 RX ADMIN — IBUPROFEN 600 MG: 600 TABLET, FILM COATED ORAL at 12:01

## 2023-01-02 RX ADMIN — DOCUSATE SODIUM 200 MG: 100 CAPSULE, LIQUID FILLED ORAL at 08:01

## 2023-01-02 NOTE — PLAN OF CARE
VSS over night. No severe pressures. Patient taken to Imaging for doppler of carotid; neuro consult to be done tomorrow during the day. Patient safety maintained, side rails up, bed low and locked position. Pt ambulating and voiding independently. Pain moderately controlled with PO and IV pain medication. Fundus midline and firm with scant lochia. Incision site RINA; incision clean, dry, and intact. No additional needs at this time.

## 2023-01-02 NOTE — DISCHARGE SUMMARY
Delivery Discharge Summary  Obstetrics      Primary OB Clinician: Glory Fink    Admission date: 2022  Discharge date: 2023    Disposition: To home, self care    Discharge Diagnosis List:      Patient Active Problem List   Diagnosis    Dysphonia    Left ovarian cyst    GERD (gastroesophageal reflux disease)    Endometriosis    Moderate persistent asthma without complication    Moderate episode of recurrent major depressive disorder    Migraine    Chronic back pain    Class 2 obesity without serious comorbidity in adult    Cyanocobalamin deficiency    Medication overuse headache    PMDD (premenstrual dysphoric disorder)    Numbness and tingling    Impaired functional mobility, balance, gait, and endurance    Chronic pain    Weakness of both hips    Weakness of trunk musculature    Decreased activities of daily living (ADL)    Decreased strength of upper extremity    ADD (attention deficit disorder)    Encounter for supervision of normal first pregnancy in first trimester    Shortness of breath    Gestational hypertension, third trimester    Pre-eclampsia in postpartum period       History of Present Illness:  Carlos Camarena is a 32 y.o.  who is now POD#24 s/p primary LTCS who presented to the hospital complaining of a worsening headache. She has been seen in the [facility] and by her primary OBGYN on multiple other occasions for similar complaints. She was prescribed fioricet but states this has not provided relief. She describes her headache as right sided occipital/neck pain that radiates down her shoulder rated 8/10. She also reports episodes of dizziness over the past week. She reports she has episodes of feeling like she is spinning and is not steady on her feet at random times throughout the day. No known triggers. Denies F/C, vision changes, CP, SOB, RUQ pain, LE edema.        Her IUP was complicated by gHTN (was started on procardia 30XL, discontinued at her postpartum visit) and moderate  asthma    Hospital Course:   Patient was admitted for severe PP pre-e given her persisently severe headache. Started on magnesium for seizure ppx x 24 hours. Her procardia was increased from 30XL to 60XL and BP has remained overall stable. Had an MRI/MRA/MRV of the head/neck which showed no acute changes/ abnormalities and discussed results with tele vascular neurology who recommended carotid doppler which were negative, TVN team reviewed case and states no acute changes, consider indocin if HA persists. Patients headache was moderately improved with compazine and benadryl the most, so she was discharged home with these medications and an outpatient referral to neurology.       Pertinent studies:  CBC  Recent Labs   Lab 22  2330   WBC 7.61   HGB 11.6*   HCT 35.3*      BUN 12   CREATININE 0.7   ALT 24   AST 17          Immunization History   Administered Date(s) Administered    COVID-19, MRNA, LN-S, PF (Pfizer) (Purple Cap) 2021, 2021    Tdap 2022        Delivery:    Episiotomy: None   Lacerations: None   Repair suture: None   Repair # of packets:     Blood loss (ml):       Birth information:  YOB: [birth year]22   Time of birth: 7:58 PM   Sex: female   Delivery type: , Low Transverse   Gestational Age: 37w1d    Delivery Clinician:      Other providers:       Additional  information:  Forceps:    Vacuum:    Breech:    Observed anomalies      Living?:           APGARS  One minute Five minutes Ten minutes   Skin color:         Heart rate:         Grimace:         Muscle tone:         Breathing:         Totals: 9  9        Placenta: Delivered:       appearance    Patient Instructions:   Current Discharge Medication List        START taking these medications    Details   diphenhydrAMINE (BENADRYL) 50 MG capsule Take 1 capsule (50 mg total) by mouth every 6 (six) hours as needed (headaches).  Qty: 30 capsule, Refills: 2      prochlorperazine (COMPAZINE) 10 MG tablet Take 1  tablet (10 mg total) by mouth 3 (three) times daily as needed (headaches).  Qty: 45 tablet, Refills: 1           CONTINUE these medications which have NOT CHANGED    Details   albuterol (VENTOLIN HFA) 90 mcg/actuation inhaler Inhale 2 puffs into the lungs every 6 (six) hours as needed for Wheezing. Rescue  Qty: 18 g, Refills: 0      butalbital-acetaminophen-caffeine -40 mg (FIORICET, ESGIC) -40 mg per tablet Take 1 tablet by mouth every 4 (four) hours as needed for Headaches.  Qty: 30 tablet, Refills: 2      ferrous sulfate 325 (65 FE) MG EC tablet Take 1 tablet (325 mg total) by mouth 2 (two) times daily.  Qty: 30 tablet, Refills: 11      fluticasone-salmeterol 500-50 mcg/dose (ADVAIR DISKUS) 500-50 mcg/dose DsDv diskus inhaler Inhale 1 puff into the lungs 2 (two) times daily. Controller  Qty: 60 each, Refills: 11    Associated Diagnoses: Moderate persistent asthma without complication      ibuprofen (ADVIL,MOTRIN) 600 MG tablet Take 1 tablet (600 mg total) by mouth every 6 (six) hours.  Qty: 60 tablet, Refills: 0      oxyCODONE-acetaminophen (PERCOCET) 5-325 mg per tablet Take 1 tablet by mouth every 4 (four) hours as needed for Pain.  Qty: 28 tablet, Refills: 0             Discharge Procedure Orders   Ambulatory referral/consult to Neurology   Standing Status: Future   Referral Priority: Urgent Referral Type: Consultation   Referral Reason: Specialty Services Required   Requested Specialty: Neurology   Number of Visits Requested: 1     Diet Adult Regular     Notify your health care provider if you experience any of the following:  temperature >100.4     Notify your health care provider if you experience any of the following:  persistent nausea and vomiting or diarrhea     Notify your health care provider if you experience any of the following:  severe uncontrolled pain     Notify your health care provider if you experience any of the following:  difficulty breathing or increased cough     Notify your  health care provider if you experience any of the following:  severe persistent headache     Notify your health care provider if you experience any of the following:  worsening rash     Notify your health care provider if you experience any of the following:  persistent dizziness, light-headedness, or visual disturbances     Notify your health care provider if you experience any of the following:  increased confusion or weakness     Notify your health care provider if you experience any of the following:   Order Comments: Heavy vaginal bleeding saturating more than 1 pad per hour for at least 2 hours.     Activity as tolerated            Yoko Ching M.D.   OB/GYN  PGY-4

## 2023-01-02 NOTE — PROGRESS NOTES
Obstetrics & Gynecology  Progress Note    Patient Name: Carlos Camarena  MRN: [id number]  Admission Date: 2022  Principal Problem: Pre-eclampsia in postpartum period    Subjective:   Chief Complaint: Dizziness and Headache     History of Present Illness:  Carlos aCmarena is a 32 y.o.  who is now POD#24 s/p primary LTCS who presented to the hospital complaining of a worsening headache. She has been seen in the [facility] and by her primary OBGYN on multiple other occasions for similar complaints. She was prescribed fioricet but states this has not provided relief. She describes her headache as right sided occipital/neck pain that radiates down her shoulder rated 8/10. She also reports episodes of dizziness over the past week. She reports she has episodes of feeling like she is spinning and is not steady on her feet at random times throughout the day. No known triggers. Denies F/C, vision changes, CP, SOB, RUQ pain, LE edema.        Her IUP was complicated by gHTN (was started on procardia 30XL, discontinued at her postpartum visit) and moderate asthma    Interval History:   No acute events overnight. Patient resting comfortably in bed, no acute distress. Reports sleeping well s/p benadryl, HA currently 6/10 compared to 9/10 on admit. Reports compazine reduced HA to 3/10. Reports occasional dizziness, concerned for vertigo, states she will f/u outpatient with ENT. Reports tolerating diet well without N/V. No issues with voiding.     Scheduled Meds:   fluticasone furoate-vilanteroL  1 puff Inhalation BID    NIFEdipine  60 mg Oral Daily    oxytocin in lactated ringers  95 brenda-units/min Intravenous Once    prenatal vitamin  1 tablet Oral Daily    prochlorperazine  5 mg Intravenous Once     Continuous Infusions:   benzocaine-lanolin      lactated ringers 75 mL/hr at 23 0029     PRN Meds:acetaminophen, albuterol, benzocaine-lanolin, diphenhydrAMINE, diphenhydrAMINE, docusate sodium, HYDROcodone-acetaminophen,  HYDROcodone-acetaminophen, hydrocortisone, ibuprofen, lanolin, ondansetron, prochlorperazine, simethicone, sodium chloride 0.9%    Review of patient's allergies indicates:   Allergen Reactions    Lactose Diarrhea    Gluten protein Itching     Inflammation, bloating, diarrhea and pain all over body         Objective:     Vital Signs (Most Recent):  Temp: 98.6 °F (37 °C) (01/02/23 0000)  Pulse: 83 (01/02/23 0100)  Resp: 16 (01/02/23 0000)  BP: 130/69 (01/02/23 0100)  SpO2: 97 % (01/02/23 0100) Vital Signs (24h Range):  Temp:  [97.5 °F (36.4 °C)-98.6 °F (37 °C)] 98.6 °F (37 °C)  Pulse:  [66-90] 83  Resp:  [14-20] 16  SpO2:  [96 %-99 %] 97 %  BP: (102-130)/(57-77) 130/69        There is no height or weight on file to calculate BMI.  Patient's last menstrual period was 09/11/2021 (exact date).    I&O (Last 24H):    Intake/Output Summary (Last 24 hours) at 1/2/2023 0502  Last data filed at 1/2/2023 0100  Gross per 24 hour   Intake 2548.64 ml   Output 2400 ml   Net 148.64 ml       Physical Exam  Vitals reviewed.   Constitutional:       Appearance: She is well-developed.   HENT:      Head: Normocephalic and atraumatic.   Eyes:      Extraocular Movements: Extraocular movements intact.   Pulmonary:      Effort: Pulmonary effort is normal.   Abdominal:      General: Bowel sounds are normal. There is no distension.      Palpations: Abdomen is soft.      Tenderness: There is no abdominal tenderness. There is no guarding or rebound.   Musculoskeletal:         General: Normal range of motion.      Cervical back: Normal range of motion.   Skin:     General: Skin is warm and dry.   Neurological:      Mental Status: She is alert and oriented to person, place, and time.       Diagnostic Results:  1/1/2023 MRV BRAIN WITHOUT CONTRAST  CLINICAL HISTORY: R/o PRES;  TECHNIQUE: 2D time-of-flight.  Without IV contrast.  Sagittal and coronal images are submitted.  COMPARISON: None  FINDINGS: Superior sagittal sinus appears adequately  maintained.  Dominant venous flow is to the left.  The right transverse and sagittal sinus are small but patent, likely a chronic change.  No acute thrombus is detected.  The straight sinus is patent though small in caliber.  Impression: See above comments.  No definite acute abnormality.     1/1/2023 MRA BRAIN WITHOUT CONTRAST  CLINICAL HISTORY: R/o PRES; .??  TECHNIQUE: Non-contrast 3-D time-of-flight intracranial MR angiography was performed through the [place] [facility] [place] with MIP reformatting.  COMPARISON: None.  FINDINGS:   Anterior intracranial circulation: No high-grade focal stenosis, occlusion, aneurysm, or vascular malformation.   The anterior, middle and posterior cerebral arteries are patent bilaterally.     There is subtle asymmetric diminished flow signal in the left distal ICA and left MCA distribution of indeterminate etiology.  This should be correlated clinically.  Proximal M2 segments on the left is slightly diminished in caliber compared to the right also.  CTA head and neck follow-up may be helpful.  Recommend neurovascular consultation and follow-up.     No dissection flap is detected.  Recommend clinical correlation and follow-up.  Posterior intracranial circulation: No high-grade focal stenosis, occlusion, aneurysm, or vascular malformation.     Hypoplastic posterior communicating artery on the left, likely an anatomic variant.     Basilar artery appears adequately maintained.  Right vertebral artery is the dominant larger vertebral artery.  Left vertebral artery is small and likely hypoplastic distally.     Impression:  1. There is subtle asymmetric diminished flow signal in the left distal ICA and left MCA distribution of indeterminate etiology. This should be correlated clinically. Proximal M2 segments on the left are slightly diminished in caliber compared to the right also. CTA head and neck follow-up may be helpful. Recommend neurovascular consultation and follow-up.  No dissection flap  "is detected. Recommend clinical correlation and follow-up.  2. See above comments also.  3.  This report was flagged in Epic as abnormal.    1/1/2023 MRI BRAIN W WO CONTRAST  CLINICAL HISTORY: Rule out PRES;.  ??  TECHNIQUE: Multiplanar multisequence MR imaging of the brain was performed before and after the administration of 10 mL Gadavist  intravenous contrast.  COMPARISON: None.  FINDINGS:   Intracranial compartment:  Ventricles and sulci are normal in size for age without evidence of hydrocephalus. No extra-axial blood or fluid collections.  Minimal possible chronic microvascular ischemic changes in the periventricular white matter.  No mass lesion, acute hemorrhage, edema or acute infarct. No abnormal enhancement.  Normal vascular flow voids are preserved.  Skull/extracranial contents (limited evaluation): Bone marrow signal intensity is normal.  No evidence of posterior reversible encephalopathy syndrome.    Impression: No acute intracranial process.  See above comments.    1/1/2023 US CAROTID BILATERAL  CLINICAL HISTORY: MRA w/ "diminished flow signal in the left distal ICA and left MCA distribution of indeterminate etiology", imaging recommended per vascular neurology;  TECHNIQUE: Grayscale and color Doppler ultrasound examination of the carotid and vertebral artery systems bilaterally.  Stenosis estimates are per the NASCET measurement criteria.  COMPARISON: None.  FINDINGS:   Right:  Internal Carotid Artery (ICA) peak systolic velocity 113 cm/sec  ICA/CCA peak systolic velocity ratio: 1.0  Plaque formation: No significant  Vertebral artery: Antegrade flow and normal waveform.     Left:  Internal Carotid Artery (ICA)  peak systolic velocity 92 cm/sec  ICA/CCA peak systolic velocity ratio: 0.8  Plaque formation: No significant  Vertebral artery: Antegrade flow and normal waveform.     Impression: No evidence of a hemodynamically significant carotid stenosis.      Assessment/Plan:     Active Diagnoses:    " Diagnosis Date Noted POA    PRINCIPAL PROBLEM:  Pre-eclampsia in postpartum period [O14.95] 01/01/2023 Unknown    Moderate persistent asthma without complication [J45.40] 11/26/2019 Yes      Problems Resolved During this Admission:       PP Pre-e w/SF  - CBC and CMP wnl  - BP high mild range on admission, has been on Procardia 30XL outpatient, given an additional dose of 30XL in the [facility] and increased to procardia 60XL on 1/1 with improved control of BP  - s/p Magnesium for seizure ppx given concerning features of severe HA  - No other acute s/s of severe pre-e  - s/p flexeril and ibuprofen in the [facility] with very minimal relief of HA.   - imaging as above, discussed with tele-vascular neurology team, recommended carotid doppler which were negative, TVN team reviewed case and states no acute changes, consider indocin if HA persists  - this AM, patient reports improvement of HA with compazine and benadryl, will send rx on discharge     2. Moderate asthma  - advair BID ordered  - Albuterol PRN     3. History of migraines  - Not currently on any controller medications  - will consider outpatient neurology f/u    4. Dizziness  - concern for vertigo  - will place ENT outpatient referral on discharge      Miya Toro MD  OB/GYN PGY1  [facility]

## 2023-01-03 ENCOUNTER — HOSPITAL ENCOUNTER (OUTPATIENT)
Facility: HOSPITAL | Age: 33
Discharge: HOME | End: 2023-01-05
Attending: EMERGENCY MEDICINE | Admitting: FAMILY MEDICINE
Payer: COMMERCIAL

## 2023-01-03 ENCOUNTER — OFFICE VISIT (OUTPATIENT)
Dept: OBSTETRICS AND GYNECOLOGY | Facility: CLINIC | Age: 33
End: 2023-01-03
Payer: COMMERCIAL

## 2023-01-03 ENCOUNTER — PATIENT MESSAGE (OUTPATIENT)
Dept: OBSTETRICS AND GYNECOLOGY | Facility: CLINIC | Age: 33
End: 2023-01-03

## 2023-01-03 DIAGNOSIS — G45.9 TIA (TRANSIENT ISCHEMIC ATTACK): Primary | ICD-10-CM

## 2023-01-03 DIAGNOSIS — R51.9 FREQUENT HEADACHES: ICD-10-CM

## 2023-01-03 DIAGNOSIS — I63.9 STROKE: ICD-10-CM

## 2023-01-03 DIAGNOSIS — G43.109 MIGRAINE WITH AURA AND WITHOUT STATUS MIGRAINOSUS, NOT INTRACTABLE: ICD-10-CM

## 2023-01-03 PROBLEM — G44.40 MEDICATION OVERUSE HEADACHE: Status: RESOLVED | Noted: 2020-01-22 | Resolved: 2023-01-03

## 2023-01-03 LAB
ALBUMIN SERPL BCP-MCNC: 3.9 G/DL (ref 3.5–5.2)
ALLENS TEST: NORMAL
ALLENS TEST: NORMAL
ALP SERPL-CCNC: 109 U/L (ref 55–135)
ALT SERPL W/O P-5'-P-CCNC: 26 U/L (ref 10–44)
ANION GAP SERPL CALC-SCNC: 10 MMOL/L (ref 8–16)
AST SERPL-CCNC: 18 U/L (ref 10–40)
BASOPHILS # BLD AUTO: 0.02 K/UL (ref 0–0.2)
BASOPHILS NFR BLD: 0.2 % (ref 0–1.9)
BILIRUB SERPL-MCNC: 0.4 MG/DL (ref 0.1–1)
BUN SERPL-MCNC: 8 MG/DL (ref 6–20)
CALCIUM SERPL-MCNC: 9.5 MG/DL (ref 8.7–10.5)
CHLORIDE SERPL-SCNC: 105 MMOL/L (ref 95–110)
CHOLEST SERPL-MCNC: 223 MG/DL (ref 120–199)
CHOLEST/HDLC SERPL: 4.3 {RATIO} (ref 2–5)
CO2 SERPL-SCNC: 25 MMOL/L (ref 23–29)
CREAT SERPL-MCNC: 0.8 MG/DL (ref 0.5–1.4)
CREAT SERPL-MCNC: 0.8 MG/DL (ref 0.5–1.4)
CTP QC/QA: YES
DELSYS: NORMAL
DELSYS: NORMAL
DIFFERENTIAL METHOD: ABNORMAL
EOSINOPHIL # BLD AUTO: 0.1 K/UL (ref 0–0.5)
EOSINOPHIL NFR BLD: 1.1 % (ref 0–8)
ERYTHROCYTE [DISTWIDTH] IN BLOOD BY AUTOMATED COUNT: 15.4 % (ref 11.5–14.5)
EST. GFR  (NO RACE VARIABLE): >60 ML/MIN/1.73 M^2
GLUCOSE SERPL-MCNC: 98 MG/DL (ref 70–110)
HCT VFR BLD AUTO: 40.4 % (ref 37–48.5)
HDLC SERPL-MCNC: 52 MG/DL (ref 40–75)
HDLC SERPL: 23.3 % (ref 20–50)
HGB BLD-MCNC: 12.8 G/DL (ref 12–16)
IMM GRANULOCYTES # BLD AUTO: 0.02 K/UL (ref 0–0.04)
IMM GRANULOCYTES NFR BLD AUTO: 0.2 % (ref 0–0.5)
INR PPP: 1 (ref 0.8–1.2)
LDLC SERPL CALC-MCNC: 149.6 MG/DL (ref 63–159)
LYMPHOCYTES # BLD AUTO: 1.9 K/UL (ref 1–4.8)
LYMPHOCYTES NFR BLD: 21.1 % (ref 18–48)
MCH RBC QN AUTO: 26.7 PG (ref 27–31)
MCHC RBC AUTO-ENTMCNC: 31.7 G/DL (ref 32–36)
MCV RBC AUTO: 84 FL (ref 82–98)
MONOCYTES # BLD AUTO: 0.7 K/UL (ref 0.3–1)
MONOCYTES NFR BLD: 7.7 % (ref 4–15)
NEUTROPHILS # BLD AUTO: 6.2 K/UL (ref 1.8–7.7)
NEUTROPHILS NFR BLD: 69.7 % (ref 38–73)
NONHDLC SERPL-MCNC: 171 MG/DL
NRBC BLD-RTO: 0 /100 WBC
PLATELET # BLD AUTO: 367 K/UL (ref 150–450)
PMV BLD AUTO: 10.7 FL (ref 9.2–12.9)
POC PTINR: 1.2 (ref 0.9–1.2)
POCT GLUCOSE: 89 MG/DL (ref 70–110)
POTASSIUM SERPL-SCNC: 4.4 MMOL/L (ref 3.5–5.1)
PROT SERPL-MCNC: 8.1 G/DL (ref 6–8.4)
PROTHROMBIN TIME: 10.2 SEC (ref 9–12.5)
RBC # BLD AUTO: 4.8 M/UL (ref 4–5.4)
SAMPLE: NORMAL
SAMPLE: NORMAL
SARS-COV-2 RDRP RESP QL NAA+PROBE: NEGATIVE
SITE: NORMAL
SITE: NORMAL
SODIUM SERPL-SCNC: 140 MMOL/L (ref 136–145)
TRIGL SERPL-MCNC: 107 MG/DL (ref 30–150)
TSH SERPL DL<=0.005 MIU/L-ACNC: 1.06 UIU/ML (ref 0.4–4)
WBC # BLD AUTO: 8.83 K/UL (ref 3.9–12.7)

## 2023-01-03 PROCEDURE — 87635 SARS-COV-2 COVID-19 AMP PRB: CPT | Performed by: EMERGENCY MEDICINE

## 2023-01-03 PROCEDURE — 93005 ELECTROCARDIOGRAM TRACING: CPT

## 2023-01-03 PROCEDURE — 99214 PR OFFICE/OUTPT VISIT, EST, LEVL IV, 30-39 MIN: ICD-10-PCS | Mod: GT,,, | Performed by: PSYCHIATRY & NEUROLOGY

## 2023-01-03 PROCEDURE — 85610 PROTHROMBIN TIME: CPT

## 2023-01-03 PROCEDURE — 0503F POSTPARTUM CARE VISIT: CPT | Mod: CPTII,95,, | Performed by: OBSTETRICS & GYNECOLOGY

## 2023-01-03 PROCEDURE — 99214 OFFICE O/P EST MOD 30 MIN: CPT | Mod: GT,,, | Performed by: PSYCHIATRY & NEUROLOGY

## 2023-01-03 PROCEDURE — 82962 GLUCOSE BLOOD TEST: CPT

## 2023-01-03 PROCEDURE — 84443 ASSAY THYROID STIM HORMONE: CPT | Performed by: EMERGENCY MEDICINE

## 2023-01-03 PROCEDURE — G0378 HOSPITAL OBSERVATION PER HR: HCPCS

## 2023-01-03 PROCEDURE — 93010 EKG 12-LEAD: ICD-10-PCS | Mod: ,,, | Performed by: INTERNAL MEDICINE

## 2023-01-03 PROCEDURE — 25500020 PHARM REV CODE 255: Performed by: EMERGENCY MEDICINE

## 2023-01-03 PROCEDURE — 85025 COMPLETE CBC W/AUTO DIFF WBC: CPT | Performed by: EMERGENCY MEDICINE

## 2023-01-03 PROCEDURE — 0503F PR POSTPARTUM CARE VISIT: ICD-10-PCS | Mod: CPTII,95,, | Performed by: OBSTETRICS & GYNECOLOGY

## 2023-01-03 PROCEDURE — 80061 LIPID PANEL: CPT | Performed by: EMERGENCY MEDICINE

## 2023-01-03 PROCEDURE — 82565 ASSAY OF CREATININE: CPT

## 2023-01-03 PROCEDURE — 99900035 HC TECH TIME PER 15 MIN (STAT)

## 2023-01-03 PROCEDURE — 93010 ELECTROCARDIOGRAM REPORT: CPT | Mod: ,,, | Performed by: INTERNAL MEDICINE

## 2023-01-03 PROCEDURE — 99285 EMERGENCY DEPT VISIT HI MDM: CPT | Mod: 25

## 2023-01-03 PROCEDURE — 85610 PROTHROMBIN TIME: CPT | Performed by: EMERGENCY MEDICINE

## 2023-01-03 PROCEDURE — 80053 COMPREHEN METABOLIC PANEL: CPT | Performed by: EMERGENCY MEDICINE

## 2023-01-03 RX ORDER — AMOXICILLIN 250 MG
1 CAPSULE ORAL 2 TIMES DAILY
Status: DISCONTINUED | OUTPATIENT
Start: 2023-01-04 | End: 2023-01-05 | Stop reason: HOSPADM

## 2023-01-03 RX ORDER — LOPERAMIDE HYDROCHLORIDE 2 MG/1
4 CAPSULE ORAL ONCE
Status: DISCONTINUED | OUTPATIENT
Start: 2023-01-04 | End: 2023-01-05 | Stop reason: HOSPADM

## 2023-01-03 RX ORDER — CYCLOBENZAPRINE HCL 5 MG
5 TABLET ORAL 3 TIMES DAILY PRN
Qty: 30 TABLET | Refills: 1 | Status: SHIPPED | OUTPATIENT
Start: 2023-01-03 | End: 2023-01-13

## 2023-01-03 RX ORDER — SODIUM CHLORIDE 0.9 % (FLUSH) 0.9 %
10 SYRINGE (ML) INJECTION
Status: DISCONTINUED | OUTPATIENT
Start: 2023-01-04 | End: 2023-01-05 | Stop reason: HOSPADM

## 2023-01-03 RX ORDER — ONDANSETRON 2 MG/ML
4 INJECTION INTRAMUSCULAR; INTRAVENOUS EVERY 8 HOURS PRN
Status: DISCONTINUED | OUTPATIENT
Start: 2023-01-04 | End: 2023-01-05 | Stop reason: HOSPADM

## 2023-01-03 RX ORDER — NAPROXEN SODIUM 220 MG/1
81 TABLET, FILM COATED ORAL DAILY
Status: DISCONTINUED | OUTPATIENT
Start: 2023-01-04 | End: 2023-01-05 | Stop reason: HOSPADM

## 2023-01-03 RX ORDER — ATORVASTATIN CALCIUM 40 MG/1
40 TABLET, FILM COATED ORAL DAILY
Status: DISCONTINUED | OUTPATIENT
Start: 2023-01-04 | End: 2023-01-04

## 2023-01-03 RX ORDER — ENOXAPARIN SODIUM 100 MG/ML
40 INJECTION SUBCUTANEOUS EVERY 24 HOURS
Status: DISCONTINUED | OUTPATIENT
Start: 2023-01-04 | End: 2023-01-05 | Stop reason: HOSPADM

## 2023-01-03 RX ORDER — LABETALOL HYDROCHLORIDE 5 MG/ML
10 INJECTION, SOLUTION INTRAVENOUS EVERY 6 HOURS PRN
Status: DISCONTINUED | OUTPATIENT
Start: 2023-01-04 | End: 2023-01-05 | Stop reason: HOSPADM

## 2023-01-03 RX ADMIN — IOHEXOL 100 ML: 350 INJECTION, SOLUTION INTRAVENOUS at 09:01

## 2023-01-03 NOTE — PROGRESS NOTES
CC: Post-partum follow-up    Carlos Camarena is a 32 y.o. female  who presents for post-partum visit- 3week telemed..  Pregnancy was complicated by GHTN and recently treated for postpartum preeclampsia. She is S/P a . Previously seen at 1 week ppv with c/o of headaches after recent ED visit. Was given rx for Fioricet which helped but didn't 100% resolve.. States after visit started having dizziness in addition to headaches and presented to ED at [facility]. She was admitted for postpartum pree and given mag. Was discharged home without the requirement of BP meds due to controlled BP but still has all the same symptoms as at the time of presentation. Was discharged with benadryl and compazine- breast milk supply now decreased.    Delivery Date: 2022  Delivery MD: Aleks  Gender: female  Birth Weight: 7 pounds 0 ounces  Breast Feeding: YES  Depression: NO  - reports good support system,  Contraception: no method    LMP 2021 (Exact Date)       ROS:  GENERAL: Denies fever or chills.   SKIN: Denies rash or lesions.   HEAD: Denies head injury or headache.   CHEST: Denies chest pain or shortness of breath.   CARDIOVASCULAR: Denies palpitations or chest pain.   ABDOMEN: No constipation, diarrhea, nausea, vomiting or rectal bleeding.   URINARY: see HPI  REPRODUCTIVE: See HPI.   BREASTS: see HPI  NEUROLOGIC: Denies syncope or weakness.     Physical Exam:  GENERAL: alert, appears stated age and cooperative  NEUROLOGIC: orientated to person, place and time  CHEST: Normal respiratory effort  NECK: normal appearance,  Limited by telemed      ASSESSMENT/PLAN:  1. Preeclampsia in postpartum period    2. Frequent headaches        Referral to neurology previously scheduled. Referral to ENT placed. Reports remote hx of migraines prior to pregnancy but wasn't taking medication frequently even during that time. Rx flexeril to try with tylenol.      Orders Placed This Encounter    Ambulatory referral/consult to  ENT    cyclobenzaprine (FLEXERIL) 5 MG tablet       RTC in 1-2 weeks. Tech support number given for BP cuff to check while at home.         Glory Fink MD  OB/GYN

## 2023-01-04 ENCOUNTER — PATIENT MESSAGE (OUTPATIENT)
Dept: PRIMARY CARE CLINIC | Facility: CLINIC | Age: 33
End: 2023-01-04
Payer: COMMERCIAL

## 2023-01-04 ENCOUNTER — TELEPHONE (OUTPATIENT)
Dept: ADMINISTRATIVE | Facility: OTHER | Age: 33
End: 2023-01-04
Payer: COMMERCIAL

## 2023-01-04 PROBLEM — G45.9 TIA (TRANSIENT ISCHEMIC ATTACK): Status: ACTIVE | Noted: 2023-01-04

## 2023-01-04 LAB
BILIRUB UR QL STRIP: NEGATIVE
CLARITY UR: CLEAR
COLOR UR: COLORLESS
FOLATE SERPL-MCNC: 11.7 NG/ML (ref 4–24)
GLUCOSE UR QL STRIP: NEGATIVE
HGB UR QL STRIP: NEGATIVE
KETONES UR QL STRIP: ABNORMAL
LEUKOCYTE ESTERASE UR QL STRIP: NEGATIVE
NITRITE UR QL STRIP: NEGATIVE
PH UR STRIP: 6 [PH] (ref 5–8)
PROT UR QL STRIP: NEGATIVE
SP GR UR STRIP: >1.03 (ref 1–1.03)
URN SPEC COLLECT METH UR: ABNORMAL
UROBILINOGEN UR STRIP-ACNC: NEGATIVE EU/DL
VIT B12 SERPL-MCNC: 267 PG/ML (ref 210–950)

## 2023-01-04 PROCEDURE — 96374 THER/PROPH/DIAG INJ IV PUSH: CPT

## 2023-01-04 PROCEDURE — G0378 HOSPITAL OBSERVATION PER HR: HCPCS

## 2023-01-04 PROCEDURE — 99214 OFFICE O/P EST MOD 30 MIN: CPT | Mod: ,,, | Performed by: PSYCHIATRY & NEUROLOGY

## 2023-01-04 PROCEDURE — 81003 URINALYSIS AUTO W/O SCOPE: CPT | Performed by: CLINICAL NURSE SPECIALIST

## 2023-01-04 PROCEDURE — 99900035 HC TECH TIME PER 15 MIN (STAT)

## 2023-01-04 PROCEDURE — 25000003 PHARM REV CODE 250: Performed by: NURSE PRACTITIONER

## 2023-01-04 PROCEDURE — 25500020 PHARM REV CODE 255: Performed by: HOSPITALIST

## 2023-01-04 PROCEDURE — 82746 ASSAY OF FOLIC ACID SERUM: CPT | Performed by: NURSE PRACTITIONER

## 2023-01-04 PROCEDURE — 97535 SELF CARE MNGMENT TRAINING: CPT

## 2023-01-04 PROCEDURE — 82607 VITAMIN B-12: CPT | Performed by: NURSE PRACTITIONER

## 2023-01-04 PROCEDURE — 97165 OT EVAL LOW COMPLEX 30 MIN: CPT

## 2023-01-04 PROCEDURE — 63600175 PHARM REV CODE 636 W HCPCS: Performed by: CLINICAL NURSE SPECIALIST

## 2023-01-04 PROCEDURE — 25000003 PHARM REV CODE 250: Performed by: CLINICAL NURSE SPECIALIST

## 2023-01-04 PROCEDURE — 97530 THERAPEUTIC ACTIVITIES: CPT

## 2023-01-04 PROCEDURE — 96361 HYDRATE IV INFUSION ADD-ON: CPT

## 2023-01-04 PROCEDURE — 99214 PR OFFICE/OUTPT VISIT, EST, LEVL IV, 30-39 MIN: ICD-10-PCS | Mod: ,,, | Performed by: PSYCHIATRY & NEUROLOGY

## 2023-01-04 PROCEDURE — A9585 GADOBUTROL INJECTION: HCPCS | Performed by: HOSPITALIST

## 2023-01-04 PROCEDURE — 96372 THER/PROPH/DIAG INJ SC/IM: CPT | Performed by: CLINICAL NURSE SPECIALIST

## 2023-01-04 PROCEDURE — 63600175 PHARM REV CODE 636 W HCPCS: Performed by: NURSE PRACTITIONER

## 2023-01-04 PROCEDURE — 96376 TX/PRO/DX INJ SAME DRUG ADON: CPT

## 2023-01-04 PROCEDURE — 96375 TX/PRO/DX INJ NEW DRUG ADDON: CPT

## 2023-01-04 PROCEDURE — 97161 PT EVAL LOW COMPLEX 20 MIN: CPT

## 2023-01-04 PROCEDURE — 92610 EVALUATE SWALLOWING FUNCTION: CPT

## 2023-01-04 PROCEDURE — 94761 N-INVAS EAR/PLS OXIMETRY MLT: CPT

## 2023-01-04 PROCEDURE — 36415 COLL VENOUS BLD VENIPUNCTURE: CPT | Performed by: NURSE PRACTITIONER

## 2023-01-04 RX ORDER — SODIUM CHLORIDE 9 MG/ML
INJECTION, SOLUTION INTRAVENOUS CONTINUOUS
Status: DISCONTINUED | OUTPATIENT
Start: 2023-01-04 | End: 2023-01-05 | Stop reason: HOSPADM

## 2023-01-04 RX ORDER — BUTALBITAL, ACETAMINOPHEN AND CAFFEINE 50; 325; 40 MG/1; MG/1; MG/1
1 TABLET ORAL EVERY 4 HOURS PRN
Status: DISCONTINUED | OUTPATIENT
Start: 2023-01-04 | End: 2023-01-05 | Stop reason: HOSPADM

## 2023-01-04 RX ORDER — DIPHENHYDRAMINE HYDROCHLORIDE 50 MG/ML
12.5 INJECTION INTRAMUSCULAR; INTRAVENOUS EVERY 6 HOURS PRN
Status: DISCONTINUED | OUTPATIENT
Start: 2023-01-04 | End: 2023-01-05 | Stop reason: HOSPADM

## 2023-01-04 RX ORDER — ACETAMINOPHEN 325 MG/1
650 TABLET ORAL EVERY 6 HOURS PRN
Status: DISCONTINUED | OUTPATIENT
Start: 2023-01-04 | End: 2023-01-05 | Stop reason: HOSPADM

## 2023-01-04 RX ORDER — MORPHINE SULFATE 4 MG/ML
4 INJECTION, SOLUTION INTRAMUSCULAR; INTRAVENOUS ONCE
Status: DISCONTINUED | OUTPATIENT
Start: 2023-01-04 | End: 2023-01-04

## 2023-01-04 RX ORDER — PROCHLORPERAZINE MALEATE 5 MG
10 TABLET ORAL 3 TIMES DAILY PRN
Status: DISCONTINUED | OUTPATIENT
Start: 2023-01-04 | End: 2023-01-05 | Stop reason: HOSPADM

## 2023-01-04 RX ORDER — GADOBUTROL 604.72 MG/ML
10 INJECTION INTRAVENOUS
Status: COMPLETED | OUTPATIENT
Start: 2023-01-04 | End: 2023-01-04

## 2023-01-04 RX ORDER — SUMATRIPTAN 6 MG/.5ML
6 INJECTION, SOLUTION SUBCUTANEOUS ONCE
Status: DISCONTINUED | OUTPATIENT
Start: 2023-01-04 | End: 2023-01-04

## 2023-01-04 RX ORDER — ATORVASTATIN CALCIUM 10 MG/1
10 TABLET, FILM COATED ORAL DAILY
Status: DISCONTINUED | OUTPATIENT
Start: 2023-01-04 | End: 2023-01-05 | Stop reason: HOSPADM

## 2023-01-04 RX ORDER — ALBUTEROL SULFATE 90 UG/1
2 AEROSOL, METERED RESPIRATORY (INHALATION) EVERY 6 HOURS PRN
Status: DISCONTINUED | OUTPATIENT
Start: 2023-01-04 | End: 2023-01-05 | Stop reason: HOSPADM

## 2023-01-04 RX ORDER — CLOPIDOGREL BISULFATE 75 MG/1
300 TABLET ORAL ONCE
Status: COMPLETED | OUTPATIENT
Start: 2023-01-04 | End: 2023-01-04

## 2023-01-04 RX ORDER — METOCLOPRAMIDE HYDROCHLORIDE 5 MG/ML
10 INJECTION INTRAMUSCULAR; INTRAVENOUS ONCE
Status: COMPLETED | OUTPATIENT
Start: 2023-01-04 | End: 2023-01-04

## 2023-01-04 RX ORDER — FLUTICASONE FUROATE AND VILANTEROL 200; 25 UG/1; UG/1
1 POWDER RESPIRATORY (INHALATION) DAILY
Status: DISCONTINUED | OUTPATIENT
Start: 2023-01-05 | End: 2023-01-05 | Stop reason: HOSPADM

## 2023-01-04 RX ADMIN — ATORVASTATIN CALCIUM 10 MG: 10 TABLET, FILM COATED ORAL at 08:01

## 2023-01-04 RX ADMIN — ACETAMINOPHEN 650 MG: 325 TABLET ORAL at 06:01

## 2023-01-04 RX ADMIN — DOCUSATE SODIUM - SENNOSIDES 1 TABLET: 50; 8.6 TABLET, FILM COATED ORAL at 08:01

## 2023-01-04 RX ADMIN — GADOBUTROL 10 ML: 604.72 INJECTION INTRAVENOUS at 02:01

## 2023-01-04 RX ADMIN — CLOPIDOGREL BISULFATE 300 MG: 75 TABLET ORAL at 08:01

## 2023-01-04 RX ADMIN — BUTALBITAL, ACETAMINOPHEN, AND CAFFEINE 1 TABLET: 50; 325; 40 TABLET ORAL at 08:01

## 2023-01-04 RX ADMIN — DIPHENHYDRAMINE HYDROCHLORIDE 12.5 MG: 50 INJECTION INTRAMUSCULAR; INTRAVENOUS at 03:01

## 2023-01-04 RX ADMIN — ENOXAPARIN SODIUM 40 MG: 40 INJECTION SUBCUTANEOUS at 04:01

## 2023-01-04 RX ADMIN — ASPIRIN 81 MG CHEWABLE TABLET 81 MG: 81 TABLET CHEWABLE at 08:01

## 2023-01-04 RX ADMIN — DIPHENHYDRAMINE HYDROCHLORIDE 12.5 MG: 50 INJECTION INTRAMUSCULAR; INTRAVENOUS at 04:01

## 2023-01-04 RX ADMIN — SODIUM CHLORIDE: 0.9 INJECTION, SOLUTION INTRAVENOUS at 12:01

## 2023-01-04 RX ADMIN — SODIUM CHLORIDE: 0.9 INJECTION, SOLUTION INTRAVENOUS at 10:01

## 2023-01-04 RX ADMIN — PROCHLORPERAZINE MALEATE 10 MG: 5 TABLET ORAL at 11:01

## 2023-01-04 RX ADMIN — DIPHENHYDRAMINE HYDROCHLORIDE 12.5 MG: 50 INJECTION INTRAMUSCULAR; INTRAVENOUS at 11:01

## 2023-01-04 RX ADMIN — BUTALBITAL, ACETAMINOPHEN, AND CAFFEINE 1 TABLET: 50; 325; 40 TABLET ORAL at 01:01

## 2023-01-04 RX ADMIN — METOCLOPRAMIDE 10 MG: 5 INJECTION, SOLUTION INTRAMUSCULAR; INTRAVENOUS at 12:01

## 2023-01-04 NOTE — LACTATION NOTE
Rounded on pt. Provided pt with list of current medications and written levels next to medications.  Pt reports that she has been pumping and dumping milk. Discussed importance of reviewing all  medications with baby's pediatrician prior to giving any breast milk that she has collected to baby. Verbalized understanding.   Provided pt with extra milk collection bottles and encouraged pt and spouse to contact lact. Dept with any questions,concerns, or issues. Pt verbalized understanding.

## 2023-01-04 NOTE — PROGRESS NOTES
Patient examined by Reyes.  R sided numbness and tingling, R facial droop, HA, R unilateral weakness. RN denied aphasia, confusion, neglect, visual deficit   LKN: Unknown  NIHSS-3  BP-      No TPA as last seen normal not clear. Per , LKN- 8 AM.   Recommend aspirin now and if CTA is with no occlusion and NIHSS stays below 4, can give Plavix 300 mg    Head of bed flat   Recent MRI reviewed   Depending on CTA  results, admit for MRI brain and neuro consult.  Case discussed with ER physician

## 2023-01-04 NOTE — PLAN OF CARE
OT evaluation completed, detailed note to follow. Patient whom is recently postpartem presenting with dizziness and fuzziness during transition movements to sitting eob and standing. Demonstrates swaying posterolaterally and requiring steadying assist for minimal side steps.  Demonstrates slowed movements and slight weakness in RUE. Recommending ongoing assessment pending patient's progress due to limited evaluation and patient with inability to perform stepping away from bed or full ADL assessment 2/2 pain/dizziness in comparison to PLOF of independence.    Problem: Occupational Therapy  Goal: Occupational Therapy Goal  Description: Goals to be met by: 2/1/2023     Patient will increase functional independence with ADLs by performing:    LE Dressing with Modified [place].  Toileting from toilet with Modified [place] for hygiene and clothing management.   Toilet transfer to toilet with Modified [place].  In room functional mobility without assistive device to access closet/bathroom to prepare for ADL morning regimen with modified independence  100% reciprocation for recommendations/task modifications to support integration of joint protection/fatigue management in ADL/IADL tasks to support return occupations of wife/mother    Outcome: Ongoing, Progressing

## 2023-01-04 NOTE — PT/OT/SLP EVAL
Occupational Therapy   Evaluation and Treatment    Name: Carlos Camarena  MRN: [id number]  Admitting Diagnosis: TIA (transient ischemic attack)  Recent Surgery: * No surgery found *      Recommendations:     Discharge Recommendations: other (see comments) (TBD pending progress and improved functional status)  Discharge Equipment Recommendations:  other (see comments) (tbd)  Barriers to discharge:  Other (Comment) (unable to amubulate;limited assessment)    Assessment:     Carlos Camarena is a 32 y.o. female with a medical diagnosis of TIA (transient ischemic attack).  She presents with ..The primary encounter diagnosis was TIA (transient ischemic attack). A diagnosis of Stroke was also pertinent to this visit.  . Performance deficits affecting function: weakness, gait instability, decreased upper extremity function, decreased ROM, impaired endurance, impaired balance, impaired sensation, visual deficits, impaired coordination, impaired self care skills, impaired functional mobility, pain.      Skilled OT services warranted. Patient whom is recently postpartem presenting with dizziness and fuzziness during transition movements to sitting eob and standing. Demonstrates swaying posterolaterally and requiring steadying assist for minimal side steps.  Demonstrates slowed movements and slight weakness in RUE. Recommending ongoing assessment pending patient's progress due to limited evaluation and patient with inability to perform stepping away from bed or full ADL assessment 2/2 pain/dizziness in comparison to PLOF of independence.    Rehab Prognosis: Good; patient would benefit from acute skilled OT services to address these deficits and reach maximum level of function.       Plan:     Patient to be seen 5 x/week to address the above listed problems via self-care/home management, therapeutic activities, therapeutic exercises, neuromuscular re-education  Plan of Care Expires: 02/01/23  Plan of Care Reviewed with: patient,  "spouse    Subjective     Chief Complaint: Patient endorses dizziness during transition movements. Reports "fuzziness"  Patient reporting headaches since C section , intermittent weakness leading up to , worsening weakness R sided with facial weakness on  (seen at [facility]), and further worsening of symptoms with addition of diplopia/vision challenges leading up to this hospitalization. On eval reports no double vision but 7/10 headache.  Patient/Family Comments/goals: Patient desires to return to normal movement and ability to care for  daughter. Patient indicates fear 2/2 abnormal feelings and "not feeling like myself". Spouse present and highly supportive during session.    Occupational Profile:  Living Environment: Patient lives with spouse and  baby (postpartem  C section) in a single story home with ~9 steps to enter with B hand rails far apart  Previous level of function: prior to admission, patient was functioning independently without AD. Drives. Performs home management tasks. Does not work.  Roles and Routines: homemaker; unable to report prior leisure activities  Equipment Used at Home: none  Assistance upon Discharge: Will have support of spouse who works a flexible schedule and has ability to work partially from home. Patient's mother in law at this time is being her primary caretaker of her     Pain/Comfort:  Pain Rating 1: 7/10 (headache R side)  Pain Addressed 1: Pre-medicate for activity, Reposition, Nurse notified  Pain Rating Post-Intervention 1: other (see comments) (endorses headache at 7/10; during transition movements increased dizziness; upon return to supine reduced dizziness)      Objective:     Communicated with: nursing prior to session.  Patient found  sitting eob with PT present  with bed alarm upon OT entry to room.    General Precautions: Standard, fall  Orthopedic Precautions: N/A  Braces: N/A  Respiratory Status: Room air    Occupational " "Performance:    Bed Mobility:    Patient completed Supine to Sit with supervision; instructed for modified log roll 2/2 recent C section  Patient completed Sit to Supine with supervision to SBA; instructed for modified log roll 2/2 recent C section    Functional Mobility/Transfers:  Patient completed Sit <> Stand Transfer with CGA/min assist  with  hand-held assist   Functional Mobility: sits eob unsupport with primarily SBA but with occasional CGA due to posterolateral (toward R side) leaning and closing eyes. Unable to tolerate standing for more than ~ 20 seconds with increased "fuzziness" despite attempted instruction for visual gaze fixation. Patient completed side steps eob with min assist with noted leaning posteriorlaterally.    Activities of Daily Living:  Feeding:  setup ; slow performance ; minimal PO intake (SLP overlap with OT eval for swallow assessment) with OT instructing on importance of upright sitting. Due to dizziness, required to stop feeding self and return to HOB elevated  Due to dizziness, limited eval. Patient unable to attempt dressing/toileting but with report of earlier in AM walking with assist (with signficant dizziness) to bathroom; educated / instructed on recommend modification of bedside commode toileting with staff assist until further assessment and noted clearance    Cognitive/Visual Perceptual:  Cognitive/Psychosocial Skills:     -       Oriented to: Person, Place, Time, and Situation   -       Follows Commands/attention:Follows multistep  commands  -       Communication: slower speed of verbalization  -       Memory: functional cognition intact  -       Safety awareness/insight to disability: fair   -       Mood/Affect/Coping skills/emotional control: fluctuations during assessment; pleasant / cooperative but also tearful and occasionally with decreased / flat affect  Visual/Perceptual:      -WFL at time of assessment;  endorses yesterday having diploplia; endorses hx of " migraines with visual auras/floaters    Physical Exam:  Postural examination/scapula alignment:    -       Rounded shoulders  Skin integrity: visible skin intact; recent C section  Dominant hand:    -       left  Upper Extremity Range of Motion:     -       Right Upper Extremity: WFL  -       Left Upper Extremity: WNL  Upper Extremity Strength:    -       Right Upper Extremity: WFL however noted decreased than LUE; in supine - full ROM/functional strength; in seated eob noted increased effort and moves into scaption  -       Left Upper Extremity: WFL   Strength:    -       Right Upper Extremity: WFL  -       Left Upper Extremity: WFL  Fine Motor Coordination:    -       Intact but delayed . Left hand, finger to nose, Right hand, finger to nose, Left hand thumb/finger opposition skills, and Right hand thumb/finger opposition skills. Requires 2x amount of time to perform object in hand manipulation with R hand vs L hand.  -functional finger to nose  -sensation: slight deficits reported in RUE/R face    AMPAC 6 Click ADL:  AMPAC Total Score: 19    Treatment & Education:  Patient eob with PT with noted dizziness and thus assisted patient for return to supine after failed first attempt for achieving accurate BP read in sitting. Vital signs assessed as below. Educated patient/spouse on role of OT/collaborative POC development and patient hx interview. Initiated assessment in supine followed by patient again transitioning to eob. Min dizziness reported. Tolerated one stand and side stepping as above. Unable to complete self care or mobility comprehensive assessment. Returned to supine. PT exited. OT educated on self advocacy techniques and guided breathing. SLP overlap for swallow assessment with OT monitoring patient's dizziness/ educating on upright posture.    01/04/23 0933 01/04/23 0936 01/04/23 0938   Vital Signs   Pulse 84 95 99   /71 139/85 (!) 142/88    MAP (mmHg) 87 106 106   BP Location Right arm Right  arm Right arm   Patient Position Lying Sitting Sitting post stand      *OT educated nursing staff recommendation for bedside commode use instead of ambulation at this time to bathroom due to dizziness and risk for falling.  Patient left HOB elevated with all lines intact, call button in reach, bed alarm on, nursing notified, and spouse present    GOALS:   Multidisciplinary Problems       Occupational Therapy Goals          Problem: Occupational Therapy    Goal Priority Disciplines Outcome Interventions   Occupational Therapy Goal     OT, PT/OT Ongoing, Progressing    Description: Goals to be met by: 2023     Patient will increase functional independence with ADLs by performing:    LE Dressing with Modified [place].  Toileting from toilet with Modified [place] for hygiene and clothing management.   Toilet transfer to toilet with Modified [place].  In room functional mobility without assistive device to access closet/bathroom to prepare for ADL morning regimen with modified independence  100% reciprocation for recommendations/task modifications to support integration of joint protection/fatigue management in ADL/IADL tasks to support return occupations of wife/mother                         History:     Past Medical History:   Diagnosis Date    ADD (attention deficit disorder)     Asthma     Chronic back pain     Fibromyalgia     Moderate persistent asthma          Past Surgical History:   Procedure Laterality Date    BACK SURGERY  2013     SECTION N/A 2022    Procedure:  SECTION;  Surgeon: Glory Fink MD;  Location: [facility] L&D;  Service: OB/GYN;  Laterality: N/A;    INJECTION OF JOINT Right 2022    Procedure: INJECTION, JOINT RIGHT SI NEEDS CONSENT;  Surgeon: Cristopher Simon MD;  Location: [facility] PAIN MGT;  Service: Pain Management;  Laterality: Right;    SPINE SURGERY  14       Time Tracking:     OT Date of Treatment: 23  OT Start Time: 925  OT Stop Time:  1001  OT Total Time (min): 36 min    Billable Minutes:Evaluation 13 min  Self Care/Home Management 8 min  Therapeutic Activity 15 min    1/4/2023

## 2023-01-04 NOTE — PT/OT/SLP EVAL
"Physical Therapy Evaluation and Treatment    Patient Name:  Carlos Camarena   MRN:  [id number]    Recommendations:     Discharge Recommendations:  (TBD - dizziness and "fuzzy" head limiting tolerate to further activity)   Discharge Equipment Recommendations:  (TBD)   Barriers to discharge: limited activity tolerance 2/2 dizziness and head feeling "fuzzy" throughout sitting/standing    Assessment:     Carlos Camarena is a 32 y.o. female admitted with a medical diagnosis of TIA (transient ischemic attack).  She presents with the following impairments/functional limitations: weakness, impaired endurance, impaired self care skills, impaired functional mobility, gait instability, impaired balance, decreased coordination, decreased upper extremity function, decreased lower extremity function, pain, decreased ROM, impaired coordination, impaired fine motor.  Pt presenting with mild impairment in RUE/R face sensation reported at ~85-90% of her normal. Pt presenting with dizziness and head feeling "fuzzy" during sitting/standing activity with pt's eyes closing and pt with sway and posterior leaning. Required min A to take side steps towards HOB 2/2 posterior leaning and further gait limited by pt's dizziness. BP assessed and no noted orthostatics. Recommendations ongoing pending pt's progress as pt unable to tolerate further ambulation at this time and was independent prior to admission- pt with recent childbirth ~3 weeks ago.     Rehab Prognosis: Good; patient would benefit from acute skilled PT services to address these deficits and reach maximum level of function.    Recent Surgery: * No surgery found *      Plan:     During this hospitalization, patient to be seen 3 x/week to address the identified rehab impairments via gait training, therapeutic activities, therapeutic exercises, neuromuscular re-education and progress toward the following goals:    Plan of Care Expires:  01/18/23    Subjective     Chief Complaint: head " "feeling "fuzzy" upon sitting EOB and remained throughout sitting/standing  Patient/Family Comments/goals: pt and her  are very pleasant and agreeable to participate in therapy session. Pt became tearful when she returned to supine 2/2 dizziness, stating she does not feel like herself.  Pain/Comfort:  Pain Rating 1: 5/10  Location - Side 1: Right  Location - Orientation 1: posterior  Location 1: thigh (reporting she has been dealing with his sciatic pain at baseline)  Pain Addressed 1: Reposition, Distraction, Nurse notified  Pain Rating Post-Intervention 1:  (not rated)  Pain Rating 2:  (endorsing R upper trap pain at rest but not rated. Endorsing R sided headache upon sitting EOB that was not present in supine)    Patients cultural, spiritual, Bahai conflicts given the current situation: no    Living Environment:  Pt lives with her  and 3 week old daughter in a [facility] with 8-9STE with B rails (can only reach one at a time) and tub/shower combo.  Prior to admission, patients level of function was independent without AD for mobility and ADLs, drives.  Equipment used at home: none.  DME owned (not currently used): none.  Upon discharge, patient will have assistance from her .    Objective:     Communicated with nurse prior to session. Patient found HOB elevated with bed alarm  upon PT entry to room.    General Precautions: Standard, fall  Orthopedic Precautions:N/A   Braces: N/A  Respiratory Status: Room air    Exams:  Cognitive Exam:  Patient is alert and oriented  Fine Motor Coordination:    -       Intact  although delayed with increased effort on R hand  Gross Motor Coordination:  posterior leaning/sway in standing  Postural Exam:  Patient presented with the following abnormalities:    -       Rounded shoulders  -       Forward head  Sensation:    -       Impaired  light/touch to R side of face/RUE reported as 85-90% compared to her L side  Skin Integrity/Edema:      -       Skin integrity: " "Visible skin intact and recent history of   RLE ROM: WFL  RLE Strength: WFLs - increased effort provided from pt but still WFL  LLE ROM: WFL  LLE Strength: WFL    Functional Mobility:  Bed Mobility:     Rolling Left:  supervision  Scooting: modified independence  Supine to Sit: supervision and cues for log roll method due to recent   Sit to Supine: supervision and cues for log roll method 2/2 recent   Transfers:     Sit to Stand:  contact guard assistance with hand-held assist  Gait: 4 side steps left with HHA and min A as pt with R posterolateral leaning - pt endorsing fuzzy-headed feeling throughout, closing eyes and instability noted      AM-PAC 6 CLICK MOBILITY  Total Score:14       Treatment & Education:  Educated pt on role of PT and pt agreeable to participate in therapy session.  Pt educated on log roll method for supine<>sit transitions due to pt with recent .  Transitioned to sit EOB and was educated on APs.  Completed LE assessment and coordination assessment then noted pt with eyes closing and pt more lethargic. Endorsing dizziness and head feeling "fuzzy." Pt's eyes closing and noted pt to be more lethargic. Attempted to assess pt's BP but unable to get a reading. Pt returned to supine.  Vitals assessed from supine then sitting EOB again.     23 0933 23 0936 23 0938   Vital Signs   Pulse 84 95 99   /71 139/85 (!) 142/88    MAP (mmHg) 87 106 106   BP Location Right arm Right arm Right arm   Patient Position Lying Sitting Sitting post stand     Pt again with dizziness in sitting. Completed stand and only tolerated side stepping as reported above with further standing/gait limited by pt's dizziness.    Patient left HOB elevated with all lines intact, call button in reach, bed alarm on, nurse notified, and OT present.    GOALS:   Multidisciplinary Problems       Physical Therapy Goals          Problem: Physical Therapy    Goal Priority Disciplines " Outcome Goal Variances Interventions   Physical Therapy Goal     PT, PT/OT Ongoing, Progressing     Description: Goals to be met by: 23     Patient will increase functional independence with mobility by performin. Supine <> sit with Modified [place]  2. Sit to stand transfer with mod I  3. Bed to chair transfer with Modified [place]   4. Gait  x 150 feet with Supervision   5. Ascend/descend 8 stairs with bilateral Handrails Stand-by Assistance                          History:     Past Medical History:   Diagnosis Date    ADD (attention deficit disorder)     Asthma     Chronic back pain     Fibromyalgia     Moderate persistent asthma        Past Surgical History:   Procedure Laterality Date    BACK SURGERY  2013     SECTION N/A 2022    Procedure:  SECTION;  Surgeon: Glory Fink MD;  Location: [facility] L&D;  Service: OB/GYN;  Laterality: N/A;    INJECTION OF JOINT Right 2022    Procedure: INJECTION, JOINT RIGHT SI NEEDS CONSENT;  Surgeon: Cristopher Simon MD;  Location: [facility];  Service: Pain Management;  Laterality: Right;    SPINE SURGERY  14       Time Tracking:     PT Received On: 23  PT Start Time: 914     PT Stop Time: 944  PT Total Time (min): 30 min     Billable Minutes: Evaluation 15 and Therapeutic Activity 15      2023

## 2023-01-04 NOTE — TREATMENT PLAN
[facility] RN met with patient regarding [facility] hospital follow up upon discharge. Pt agreeable to hospital follow up .RN informed pt of scheduled appointment and that appointment will also appear on d/c AVS. Patient informed to all medication bottles to PCC follow up appointment. PCC information handout, appointment letter and folder provided to patient. Pt states spouse will provide transportation to appointment.    Patient Contact info:[phone] (Mobile)     Person providing transportation contact info: Matty Camarena (Spouse)   [phone] (Mobile)    Barriers to attending PCC visit: none    Future Appointments   Date Time Provider Department Center   1/17/2023  9:30 AM Shima Sandy MD [facility] [place]   1/17/2023 11:15 AM Glory Fink MD [facility] [place]

## 2023-01-04 NOTE — ASSESSMENT & PLAN NOTE
-Patient started having numbness, tingling and facial droopiness since 12/31/2022.     -Patient had a complete stroke work up and atient results from [facility] on new year's Annmar[place] MRV Brain results shows superior sagittal sinus appears adequately maintained.  Dominant venous flow is to the left.  The right transverse and sagittal sinus are small but patent, likely a chronic change.  No acute thrombus is detected.  The straight sinus is patent though small in caliber. MRA Brain results shows There is subtle asymmetric diminished flow signal in the left distal ICA and left MCA distribution of indeterminate etiology. This should be correlated clinically. Proximal M2 segments on the left are slightly diminished in caliber compared to the right also.No dissection flap is detected. MRI Brain results shows no acute intracranial process. Carotid US results shows no evidence of a hemodynamically significant carotid stenosis. CTA head and neck results shows No acute abnormality. No high-grade stenosis or major vessel occlusion. Patient lab work results shows an elevated Cholesterol of 223.    -Patient examined by Vidyo.with recommendation of No TPA as last seen normal not clear.   -Recommend aspirin now and if CTA is with no occlusion and NIHSS stays below 4, can give Plavix 300 mg  -Head of bed flat   -Recent MRI reviewed   -Depending on CTA  results, admit for MRI brain and neuro consult    -Will repeat MRI brain in a.m.    -Started Atorvastatin 10 mg patient Cholesterol 223      -Started ASA 81 mg    -Continue Neuro checks Q4 hrs     -Will consult Neuro for further assistance appreciated their assistance

## 2023-01-04 NOTE — PROGRESS NOTES
01/04/23 0123   Admission   Initial VN Admission Questions Complete   Shift   Virtual Nurse - Patient Verbalized Approval Of Camera Use;VN Rounding   Safety/Activity   Patient Rounds bed in low position;call light in patient/parent reach;clutter free environment maintained;visualized patient;placement of personal items at bedside   Safety Promotion/Fall Prevention assistive device/personal item within reach;Fall Risk reviewed with patient/family;side rails raised x 2   Positioning   Body Position supine   Head of Bed (HOB) Positioning HOB at 30-45 degrees

## 2023-01-04 NOTE — PT/OT/SLP EVAL
Speech Language Pathology Evaluation  Bedside Swallow    Patient Name:  Carlos Camarena   MRN:  [id number]  Admitting Diagnosis: TIA (transient ischemic attack)    Recommendations:                 Diet recommendations:  Regular, Thin   Aspiration Precautions: upright for meals, slow rate, alternate sips and bites, whole meds   General Precautions: Standard, fall  Communication strategies:  none    History per MD      HPI: Carlos  is a 32-year-old female who presents to the emergency department with numbness and tingling to the right side of her face along with right-sided facial droop today. Patient has a past medical history pregnancy induced hypertension and migraines. Patient was seen at [facility] on new year's Annmarie for numbness and heaviness to her right arm and was told she had a normal CT scan.  The patient is postpartum, delivery was 12/08/2022. Patient complains of intermittent headaches since delivery. Patient denies fever or chills. Denies nausea, vomiting or diarrhea. Denies chest pain or shortness of breath.         Upon arrival to the emergency department patient had a stroke work up. Patient results from [facility] on new year's Annmar[place] MRV Brain results shows superior sagittal sinus appears adequately maintained.  Dominant venous flow is to the left.  The right transverse and sagittal sinus are small but patent, likely a chronic change.  No acute thrombus is detected.  The straight sinus is patent though small in caliber. MRA Brain results shows There is subtle asymmetric diminished flow signal in the left distal ICA and left MCA distribution of indeterminate etiology. This should be correlated clinically. Proximal M2 segments on the left are slightly diminished in caliber compared to the right also. CTA head and neck follow-up may be helpful. Recommend neurovascular consultation and follow-up.  No dissection flap is detected. MRI Brain results shows no acute intracranial process. Carotid US  "results shows no evidence of a hemodynamically significant carotid stenosis. CTA head and neck results shows No acute abnormality. No high-grade stenosis or major vessel occlusion. Patient lab work results shows an elevated Cholesterol of 223, all other labs unremarkable.     On assessment patient alert and oriented x4, Patient has positive right side weakness, no facial droopiness noted. Patient continue to voiced a headache without photophobia. Patient endorsed right arm numbness and tingling. Patient examined by Phuongo.     Patient will be admitted to hospital medicine for further evaluation and medical management.        Past Medical History:   Diagnosis Date    ADD (attention deficit disorder)     Asthma     Chronic back pain     Fibromyalgia     Moderate persistent asthma        Past Surgical History:   Procedure Laterality Date    BACK SURGERY       SECTION N/A 2022    Procedure:  SECTION;  Surgeon: Glory Fink MD;  Location: [facility] L&D;  Service: OB/GYN;  Laterality: N/A;    INJECTION OF JOINT Right 2022    Procedure: INJECTION, JOINT RIGHT SI NEEDS CONSENT;  Surgeon: Cristopher Simon MD;  Location: [facility];  Service: Pain Management;  Laterality: Right;    SPINE SURGERY  14       Social History: Patient lives with , recently had a baby.    Prior Intubation HX:  n/a    Modified Barium Swallow: none per EMR  CTA: No acute abnormality.     Prior diet: regular diet and thin liquids.    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 Pt with hx of severe migraines.   Patient goals: "I need some water."     Pain/Comfort:  Pain Rating 1: 7/10  Location 1:  (headache on R side)    Respiratory Status: room air    Objective:   Pt seen in room, OT present and assisted with repositioning in bed.   Pt awake, alert, ox4.   Pt able to express immediate thoughts and words. Pt tends to tighten mouth when expressing " self; however, no drooling or facial weakness present. Speech is clear and intelligible.     Oral Musculature Evaluation  Oral Musculature: WFL (reports numbness on R side of face but no droop present)  Dentition: present and adequate  Mucosal Quality: adequate  Mandibular Strength and Mobility: WFL  Oral Labial Strength and Mobility: WFL  Lingual Strength and Mobility: WFL  Buccal Strength and Mobility: WFL  Volitional Cough: elicited  Volitional Swallow: timely swallow  Voice Prior to PO Intake: clear voice    Bedside Swallow Eval:   Consistencies Assessed:  Thin liquids water by straw  Puree applesauce by tsp self fed  Solids diced peach      Oral Phase:   WFL  Timely mastication and good oral clearance    Pharyngeal Phase:   Timely swallow trigger  No coughing/no choking or audible voice changes across PO    Compensatory Strategies  None    Treatment: Pt safe to initiate regular diet and thin liquids. Notified primary NP team in person.     Assessment:     Carlos Camarena is a 32 y.o. female admitted with R sided weakness, work up for TIA who presents with an SLP diagnosis of no audible dysphagia signs or symptoms.    Goals:   Multidisciplinary Problems       SLP Goals       Not on file                    Plan:     Patient to be seen:      Plan of Care expires:     Plan of Care reviewed with:  patient, spouse   SLP Follow-Up:  No       Discharge recommendations:   (TBD pending progress and improvement in status)   Barriers to Discharge:  None    Time Tracking:     SLP Treatment Date:   01/04/23  Speech Start Time:  1000  Speech Stop Time:  1008     Speech Total Time (min):  8 min    Billable Minutes: Eval Swallow and Oral Function 8    01/04/2023

## 2023-01-04 NOTE — PLAN OF CARE
VN note: VN completed AVS and attachments and notified bedside nurseAnselmo. Will cont to be available and intervene prn.

## 2023-01-04 NOTE — CONSULTS
NEUROLOGY FLOOR CONSULT    Reason for consult: Numbness and tingling with facial droop    CC:  Headache and dizziness    HPI:     Carlos  is a 32-year-old female who presents to the emergency department with numbness and tingling to the right side of her face along with right-sided facial droop today. Patient has a past medical history pregnancy induced hypertension and migraines. Patient report her headache started after her delivery which occurred on Dec 8th 2022. Prior to labor he was giving epidural. Report her headache started right after her labor. Headache start from the right side of the Neck to the right side of the face. Rated headache as 8/10, worse standing or sitting, and associated with dizziness.  It improves while lying down. Deny any association with nausea, vomit. Patient report sometimes she does have right sided numbness and tingling, weakness and facial droop. Report going to [facility] last week where she was giving compazine, Fioricet for which didn't help her symptoms. As of last night it got worse and she decided to come to [facility] for further evaluation.  Patient doesn't use any illicit drug or smoke cig, no alcohol use. Lives in the house with his BF. Deny any joint pain, fever, sob, chest pain, visual deficit.    ROS: Negative except as above    Histories:     Allergies:  Lactose and Gluten protein    Current Medications:    Current Facility-Administered Medications   Medication Dose Route Frequency Provider Last Rate Last Admin    0.9%  NaCl infusion   Intravenous Continuous Gwendolyn Cohen  mL/hr at 01/04/23 1219 New Bag at 01/04/23 1219    acetaminophen tablet 650 mg  650 mg Oral Q6H PRN Gwendolyn Cohen NP        aspirin chewable tablet 81 mg  81 mg Per NG tube Daily Josefina Camarena NP   81 mg at 01/04/23 0850    atorvastatin tablet 10 mg  10 mg Oral Daily Josefina Camarena NP   10 mg at 01/04/23 0850    butalbital-acetaminophen-caffeine -40 mg per tablet 1  tablet  1 tablet Oral Q4H PRN Josefina King, NP   1 tablet at 23 1351    diphenhydrAMINE injection 12.5 mg  12.5 mg Intravenous Q6H PRN Josefina King, NP   12.5 mg at 23 0342    enoxaparin injection 40 mg  40 mg Subcutaneous Daily Josefina King, ANIBAL        labetaloL injection 10 mg  10 mg Intravenous Q6H PRN Josefina King, ANIBAL        loperamide capsule 4 mg  4 mg Oral Once Josefina ANIBAL Camarena        ondansetron injection 4 mg  4 mg Intravenous Q8H PRN Josefinajocelyn Camarena NP        prochlorperazine tablet 10 mg  10 mg Oral TID PRN Josefina King, NP   10 mg at 23 1114    senna-docusate 8.6-50 mg per tablet 1 tablet  1 tablet Oral BID Josefina King, NP   1 tablet at 23 0850    sodium chloride 0.9% flush 10 mL  10 mL Intravenous PRN Josefinajocelyn Camarena NP           Past Medical/Surgical/Family History:  Medical:   Past Medical History:   Diagnosis Date    ADD (attention deficit disorder)     Asthma     Chronic back pain     Fibromyalgia     Moderate persistent asthma       Surgeries:   Past Surgical History:   Procedure Laterality Date    BACK SURGERY  2013     SECTION N/A 2022    Procedure:  SECTION;  Surgeon: Glory Fink MD;  Location: [facility] L&D;  Service: OB/GYN;  Laterality: N/A;    INJECTION OF JOINT Right 2022    Procedure: INJECTION, JOINT RIGHT SI NEEDS CONSENT;  Surgeon: Cristopher Simon MD;  Location: [facility];  Service: Pain Management;  Laterality: Right;    SPINE SURGERY  14      Family:   Family History   Problem Relation Age of Onset    Depression Mother     Anxiety disorder Mother     Ovarian cancer Mother     Alcohol abuse Mother     Cancer Mother     No Known Problems Father     Diabetes Sister     Hypertension Maternal Grandmother     Breast cancer Neg Hx     Colon cancer Neg Hx        Current Evaluation:     Vital Signs:   Vitals:    23 1100   BP: 132/82   Pulse: 80   Resp: 18   Temp: 97.5 °F (36.4 °C)        Neurological Examination    Orientation  Alert, awake, oriented to self, place, time, and situation.  Memory  Recent and remote memory intact.  Language  No dysarthria, No aphasia.   Cranial Nerves  PERRL, VF intact, EOMI, V1-V3 intact, symmetric facial expression, hearing grossly intact, SCM & TPZ 5/5, tongue midline, symmetric palate elevation.  Motor  Normal Bulk  Normal Tone  5/5 strength in left extremities  4/5 in the right side   Sensory  Decrease sensation on the right side of the body which is episodic.  DTR   +2 symmetric  Cerebellar/Gait  Normal finger to nose and heel to shin.   Normal gait and stance.     RADIOLOGY STUDIES:  I have personally reviewed the images performed.     HEAD CT:No infarct    BRAIN MRI; No lesion on the image or infarct    Assessment:  Plan:     aCrlos  is a 32-year-old female who presents to the emergency department with numbness and tingling to the right side of her face along with right-sided facial droop today. Report symptoms onset post partum. Has been taking Fioricet and compazine from OSH after her last visit for headache. Report no improvement with her medication. On my evaluation patient with no facial droop but do endorse decrease sensation on the right side. Report her numbness and paresthesia is episodic. Patient has  had multiple imaging done which has been unremarkable. Giving her history, exam and image findings, it is likely she might have CSF leak, other differentials include complicated migraines, or a form of psychogenic symptoms. Patient has reported worsening of headache upon standing up and improved while laying down, I will recommend to treat patient with blood patch and see if there will be improvement.  Plan  -Recommend continue Triptans for her migraine history  -Recommend more labs including: B.12, folate, TSH. Result pending.  -Can also use Tylenol for symptoms   -Can continue compazine   -Recommend Blood patch, can consult IR for the procedure.   -rest of care per primary  team    Case discussed with Dr. Nascimento    Will sign off at this time.   Danae Rendon MD  [employer] [facility] PGY-3  [facility] Consult Service

## 2023-01-04 NOTE — HPI
Carlos  is a 32-year-old female who presents to the emergency department with numbness and tingling to the right side of her face along with right-sided facial droop today. Patient has a past medical history pregnancy induced hypertension and migraines. Patient was seen at [facility] on new year's Annmarie for numbness and heaviness to her right arm and was told she had a normal CT scan.  The patient is postpartum, delivery was 12/08/2022. Patient complains of intermittent headaches since delivery. Patient denies fever or chills. Denies nausea, vomiting or diarrhea. Denies chest pain or shortness of breath.       Upon arrival to the emergency department patient had a stroke work up. Patient results from [facility] on new year's Annmarie MRV Brain results shows superior sagittal sinus appears adequately maintained.  Dominant venous flow is to the left.  The right transverse and sagittal sinus are small but patent, likely a chronic change.  No acute thrombus is detected.  The straight sinus is patent though small in caliber. MRA Brain results shows There is subtle asymmetric diminished flow signal in the left distal ICA and left MCA distribution of indeterminate etiology. This should be correlated clinically. Proximal M2 segments on the left are slightly diminished in caliber compared to the right also. CTA head and neck follow-up may be helpful. Recommend neurovascular consultation and follow-up.  No dissection flap is detected. MRI Brain results shows no acute intracranial process. Carotid US results shows no evidence of a hemodynamically significant carotid stenosis. CTA head and neck results shows No acute abnormality. No high-grade stenosis or major vessel occlusion. Patient lab work results shows an elevated Cholesterol of 223, all other labs unremarkable.    On assessment patient alert and oriented x4, Patient has positive right side weakness, no facial droopiness noted. Patient continue to voiced a headache  without photophobia. Patient endorsed right arm numbness and tingling. Patient examined by Reyes.    Patient will be admitted to hospital medicine for further evaluation and medical management.

## 2023-01-04 NOTE — ED PROVIDER NOTES
Encounter Date: 1/3/2023       History     Chief Complaint   Patient presents with    Numbness     Numbness and tingling to right side of face along with right sided facial droop that began about 20-30 min pta. Was seen [facility] on  for numbness and heaviness to right arm and had CT done and was told it was normal . Gave birth on  and had gestational HTN Hx of migraines and currently complains of headache x multiple days     The patient is a 32-year-old female who presents to the emergency department with numbness and tingling to the right side of her face along with right-sided facial droop today.  The patient was seen at [facility] on new year's Annmarie for numbness and heaviness to her right arm and was told she had a normal CT scan.  The patient is postpartum, delivery was 2022.  She had a history of gestational hypertension and migraine headaches.  She complains of intermittent headaches since delivery.    Review of patient's allergies indicates:   Allergen Reactions    Lactose Diarrhea    Gluten protein Itching     Inflammation, bloating, diarrhea and pain all over body       Past Medical History:   Diagnosis Date    ADD (attention deficit disorder)     Asthma     Chronic back pain     Fibromyalgia     Moderate persistent asthma      Past Surgical History:   Procedure Laterality Date    BACK SURGERY  2013     SECTION N/A 2022    Procedure:  SECTION;  Surgeon: Glory Fink MD;  Location: [facility] L&D;  Service: OB/GYN;  Laterality: N/A;    INJECTION OF JOINT Right 2022    Procedure: INJECTION, JOINT RIGHT SI NEEDS CONSENT;  Surgeon: Cristopher Simon MD;  Location: [facility] PAIN MGT;  Service: Pain Management;  Laterality: Right;    SPINE SURGERY  14     Family History   Problem Relation Age of Onset    Depression Mother     Anxiety disorder Mother     Ovarian cancer Mother     Alcohol abuse Mother     Cancer Mother     No Known Problems Father     Diabetes  Sister     Hypertension Maternal Grandmother     Breast cancer Neg Hx     Colon cancer Neg Hx      Social History     Tobacco Use    Smoking status: Never     Passive exposure: Never    Smokeless tobacco: Never   Substance Use Topics    Alcohol use: Yes     Alcohol/week: 3.0 standard drinks     Types: 3 Glasses of wine per week     Comment: 1-3 drinks every 2 weeks    Drug use: No     Review of Systems   Constitutional:  Negative for activity change, appetite change and fever.   HENT:  Negative for sore throat.    Respiratory:  Negative for shortness of breath.    Cardiovascular:  Negative for chest pain.   Gastrointestinal:  Negative for abdominal pain and nausea.   Genitourinary:  Negative for dysuria.   Musculoskeletal:  Negative for back pain.   Skin:  Negative for rash.   Neurological:  Positive for facial asymmetry, weakness, numbness and headaches. Negative for dizziness.   Hematological:  Does not bruise/bleed easily.     Physical Exam     Initial Vitals [01/03/23 1910]   BP Pulse Resp Temp SpO2   (!) 148/81 94 20 98.9 °F (37.2 °C) 99 %      MAP       --         Physical Exam    Nursing note and vitals reviewed.  Constitutional: She appears well-developed and well-nourished.   HENT:   Head: Normocephalic and atraumatic.   Mouth/Throat: Oropharynx is clear and moist.   Eyes: Conjunctivae and EOM are normal. Pupils are equal, round, and reactive to light.   Neck: Neck supple.   Normal range of motion.  Cardiovascular:  Normal rate, regular rhythm, normal heart sounds and intact distal pulses.     Exam reveals no gallop and no friction rub.       No murmur heard.  Pulmonary/Chest: Breath sounds normal.   Abdominal: Abdomen is soft. Bowel sounds are normal. She exhibits no distension. There is no abdominal tenderness. There is no rebound and no guarding.   Musculoskeletal:         General: No tenderness or edema. Normal range of motion.      Cervical back: Normal range of motion and neck supple.      Lymphadenopathy:     She has no cervical adenopathy.   Neurological: She is alert and oriented to person, place, and time. She has normal reflexes. A sensory deficit (Numbness to the right side of her face) is present. GCS score is 15. GCS eye subscore is 4. GCS verbal subscore is 5. GCS motor subscore is 6.   Decreased hand  on the right   Skin: Skin is warm and dry.   Psychiatric: She has a normal mood and affect. Her behavior is normal. Judgment and thought content normal.       ED Course   Critical Care    Date/Time: 1/3/2023 10:45 PM  Performed by: Kita Griffith MD  Authorized by: Kita Grifftih MD   Total critical care time (exclusive of procedural time) : 45 minutes  Critical care time was exclusive of separately billable procedures and treating other patients.  Critical care was necessary to treat or prevent imminent or life-threatening deterioration of the following conditions: CNS failure or compromise.  Critical care was time spent personally by me on the following activities: development of treatment plan with patient or surrogate, discussions with consultants, examination of patient, obtaining history from patient or surrogate, evaluation of patient's response to treatment, ordering and performing treatments and interventions, ordering and review of laboratory studies, ordering and review of radiographic studies, pulse oximetry, re-evaluation of patient's condition and review of old charts.      Labs Reviewed   CBC W/ AUTO DIFFERENTIAL - Abnormal; Notable for the following components:       Result Value    MCH 26.7 (*)     MCHC 31.7 (*)     RDW 15.4 (*)     All other components within normal limits   LIPID PANEL - Abnormal; Notable for the following components:    Cholesterol 223 (*)     All other components within normal limits   SARS-COV-2 RDRP GENE - Normal   COMPREHENSIVE METABOLIC PANEL   PROTIME-INR   TSH   POCT GLUCOSE   ISTAT PROCEDURE   ISTAT CREATININE     EKG Readings: (Independently  Interpreted)   Initial: 1948. Rhythm: Normal Sinus Rhythm. Heart Rate: 91. Ectopy: No Ectopy. Conduction: Normal. ST Segments: Normal ST Segments. T Waves: Normal. Clinical Impression: Normal Sinus Rhythm     Imaging Results              CTA Head and Neck (xpd) (Final result)  Result time 01/03/23 21:46:52      Final result by Tez Roberts MD (01/03/23 21:46:52)                   Impression:      No acute abnormality.    No high-grade stenosis or major vessel occlusion.      Electronically signed by: Tez Roberts  Date:    01/03/2023  Time:    21:46               Narrative:    EXAMINATION:  CTA HEAD AND NECK (XPD)    CLINICAL HISTORY:  Stroke/TIA, determine embolic source;    TECHNIQUE:  Non contrast low dose axial images were obtained through the head. CT angiogram was performed from the level of the erik to the top of the head following the IV administration of 100mL of Omnipaque 350.   Sagittal and coronal reconstructions and maximum intensity projection reconstructions were performed. Arterial stenosis percentages are based on NASCET measurement criteria.    COMPARISON:  None    FINDINGS:  Intracranial Compartment:    Ventricles and sulci are stable in size for age without evidence of hydrocephalus. No extra-axial blood or fluid collections.    The brain parenchyma appears stable.  No parenchymal mass, hemorrhage, edema, or major vascular distribution infarct.    Sinus disease is stable.    Non-Vascular Structures of the Neck/Thoracic Inlet (limited evaluation): Normal.    Aorta: Normal 3 vessel arch.    Extracranial carotid circulation: No hemodynamically significant stenosis, aneurysmal dilatation, or dissection.    Extracranial vertebral circulation: No hemodynamically significant stenosis, aneurysmal dilatation, or dissection.    Intracranial Arteries: No focal high-grade stenosis, occlusion, or aneurysm.    Venous structures (limited evaluation): Normal.                                       CT  Head Without Contrast (Final result)  Result time 01/03/23 19:34:06      Final result by Nikita Maki MD (01/03/23 19:34:06)                   Impression:      1. Allowing for motion artifact, no acute intracranial abnormalities.  2. Sinus disease as above.      Electronically signed by: Nikita Maki MD  Date:    01/03/2023  Time:    19:34               Narrative:    EXAMINATION:  CT HEAD WITHOUT CONTRAST    CLINICAL HISTORY:  Neuro deficit, acute, stroke suspected;Headache, new or worsening, neuro deficit (Age 19-49y);    TECHNIQUE:  Low dose axial images were obtained through the head.  Coronal and sagittal reformations were also performed. Contrast was not administered.    COMPARISON:  01/01/2023    FINDINGS:  There is motion artifact.  There is no evidence of acute major vascular territory infarct, hemorrhage, or mass.  There is no hydrocephalus.  There are no abnormal extra-axial fluid collections.  There is a mucous retention cyst or polyp within the left maxillary sinus, otherwise the visualized paranasal sinuses and mastoid air cells are clear, and there is no evidence of calvarial fracture.  The visualized soft tissues are unremarkable.                                       Medications   iohexoL (OMNIPAQUE 350) injection 100 mL (100 mLs Intravenous Given 1/3/23 2102)     Medical Decision Making:   ED Management:  CBC ordered and reviewed, results within normal limits  CMP ordered and reviewed.  Results within normal limits  CT head ordered and reviewed.  Findings are normal.    The patient was evaluated by tele stroke Neurology.  He states that since the patient was last known normal at an unknown time ago, the patient does not qualify for tPA.  Recommends a CTA head and neck admit the patient for MRI in the morning.    CTA head and neck are negative for any pathology.  2245: [facility] hospitalist service consulted for admission.    Additional MDM:     NIH Stroke Scale:   Interval = baseline (upon  arrival/admit)  Level of consciousness = 0 - alert  LOC questions = 0 - answers both correctly  LOC commands = 0 - performs both correctly  Best gaze = 0 - normal  Visual = 0 - no visual loss  Facial palsy = 1 - minor  Motor left arm =  0 - no drift  Motor right arm =  0 - no drift  Motor left leg = 0 - no drift  Motor right leg =  0 - no drift  Limb ataxia = 0 - absent  Sensory = 1 - mild to moderate loss  Best language = 0 - no aphasia  Dysarthria = 0 - normal articulation  Extinction and inattention = 0 - no neglect  NIH Stroke Scale Total = 2                     Clinical Impression:   Final diagnoses:  [I63.9] Stroke               Kita Griffith MD  01/03/23 5711

## 2023-01-04 NOTE — PLAN OF CARE
Swallow eval completed, rec: regular texture diet and thin liquids but upright as much as possible when eating. Notified primary MD team.

## 2023-01-04 NOTE — PHARMACY MED REC
"        [facility]           Pharmacy  Admission Medication History     The home medication history was taken by Jammie Carrera PharmD.      Medication history obtained from Medications listed below were obtained from: Patient/family    Based on information gathered for medication list, you may go to "Admission" then "Reconcile Home Medications" tabs to review and/or act upon those items.     The home medication list has been updated by the Pharmacy department.   Please read ALL comments highlighted in yellow.   Please address this information as you see fit.    Feel free to contact us if you have any questions or require assistance.    The current inpatient medication list has been compared to the home medication list and the following discrepancies were noted:    Patient reports NOT TAKING the following medication(s):    Patient reports he/she IS TAKING the following which was not ordered upon admit  Advair Inhaler   Albuterol Inhaler    Patient reports taking a DIFFERENT DRUG than that ordered upon admit    Patient reports taking a drug DIFFERENTLY than how ordered upon admit      Potential issues to be addressed PRIOR TO DISCHARGE      No current facility-administered medications on file prior to encounter.     Current Outpatient Medications on File Prior to Encounter   Medication Sig Dispense Refill    albuterol (VENTOLIN HFA) 90 mcg/actuation inhaler Inhale 2 puffs into the lungs every 6 (six) hours as needed for Wheezing. Rescue 18 g 0    butalbital-acetaminophen-caffeine -40 mg (FIORICET, ESGIC) -40 mg per tablet Take 1 tablet by mouth every 4 (four) hours as needed for Headaches. 30 tablet 2    cyclobenzaprine (FLEXERIL) 5 MG tablet Take 1 tablet (5 mg total) by mouth 3 (three) times daily as needed (headaches). 30 tablet 1    ferrous sulfate 325 (65 FE) MG EC tablet Take 1 tablet (325 mg total) by mouth 2 (two) times daily. 30 tablet 11    fluticasone-salmeterol 500-50 " mcg/dose (ADVAIR DISKUS) 500-50 mcg/dose DsDv diskus inhaler Inhale 1 puff into the lungs 2 (two) times daily. Controller 60 each 11    ibuprofen (ADVIL,MOTRIN) 600 MG tablet Take 1 tablet (600 mg total) by mouth every 6 (six) hours. 60 tablet 0    prochlorperazine (COMPAZINE) 10 MG tablet Take 1 tablet (10 mg total) by mouth 3 (three) times daily as needed (headaches). 45 tablet 1    diphenhydrAMINE (BENADRYL) 50 MG capsule Take 1 capsule (50 mg total) by mouth every 6 (six) hours as needed (headaches). 30 capsule 2    oxyCODONE-acetaminophen (PERCOCET) 5-325 mg per tablet Take 1 tablet by mouth every 4 (four) hours as needed for Pain. 28 tablet 0       Please address this information as you see fit.  Feel free to contact us if you have any questions or require assistance.    Jammie Carrera, PharmD  [phone]         .

## 2023-01-04 NOTE — PLAN OF CARE
D/C recs noted. Pt to have PCC, neuro, and OBGYN follow up. Pharmacist will go over home medications and reasons for medications. VN and bedside nurse to reiterate final discharge instructions.       At time of discharge pt will be transported home by spouse at bedside.     DME at discharge: none  Home Health: none    Pt has follow up appointments added to AVS.         01/04/23 1620   Final Note   Assessment Type Final Discharge Note   Anticipated Discharge Disposition Home   What phone number can be called within the next 1-3 days to see how you are doing after discharge? [phone]   Hospital Resources/Appts/Education Provided Appointments scheduled and added to AVS   Post-Acute Status   Discharge Delays None known at this time       Future Appointments   Date Time Provider Department Center   1/17/2023  9:30 AM Shima Sandy MD [facility] IMPRI [facility]   1/17/2023 11:15 AM Glory Fink MD [facility] OBGYN [facility]   1/18/2023  9:30 AM Jeanette Quezada PA-C [facility] NEURO [facility]  [phone]

## 2023-01-04 NOTE — PLAN OF CARE
Problem: Adult Inpatient Plan of Care  Goal: Plan of Care Review  Outcome: Ongoing, Progressing  Goal: Patient-Specific Goal (Individualized)  Outcome: Ongoing, Progressing  Goal: Absence of Hospital-Acquired Illness or Injury  Outcome: Ongoing, Progressing  Goal: Optimal Comfort and Wellbeing  Outcome: Ongoing, Progressing  Goal: Readiness for Transition of Care  Outcome: Ongoing, Progressing     Problem: Infection  Goal: Absence of Infection Signs and Symptoms  Outcome: Ongoing, Progressing     Problem: Adjustment to Illness (Stroke, Ischemic/Transient Ischemic Attack)  Goal: Optimal Coping  Outcome: Ongoing, Progressing     Problem: Bowel Elimination Impaired (Stroke, Ischemic/Transient Ischemic Attack)  Goal: Effective Bowel Elimination  Outcome: Ongoing, Progressing     Problem: Cerebral Tissue Perfusion (Stroke, Ischemic/Transient Ischemic Attack)  Goal: Optimal Cerebral Tissue Perfusion  Outcome: Ongoing, Progressing     Problem: Cognitive Impairment (Stroke, Ischemic/Transient Ischemic Attack)  Goal: Optimal Cognitive Function  Outcome: Ongoing, Progressing     Problem: Communication Impairment (Stroke, Ischemic/Transient Ischemic Attack)  Goal: Improved Communication Skills  Outcome: Ongoing, Progressing     Problem: Sensorimotor Impairment (Stroke, Ischemic/Transient Ischemic Attack)  Goal: Improved Sensorimotor Function  Outcome: Ongoing, Progressing     Problem: Respiratory Compromise (Stroke, Ischemic/Transient Ischemic Attack)  Goal: Effective Oxygenation and Ventilation  Outcome: Ongoing, Progressing     Problem: Swallowing Impairment (Stroke, Ischemic/Transient Ischemic Attack)  Goal: Optimal Eating and Swallowing without Aspiration  Outcome: Ongoing, Progressing     Problem: Urinary Elimination Impaired (Stroke, Ischemic/Transient Ischemic Attack)  Goal: Effective Urinary Elimination  Outcome: Ongoing, Progressing     Received from emergency room via wheelchair, orientation to room provided. Care  plan reviewed with patient, AAOx4, spouse at bedside, no respiratory distress noted, on room air. NSR per cardiac monitor, no red alarm noted. Denies any pain of discomfort, education provided on medication effect and side effect, voices understanding. Slurred speech noted, patient passed swallowing Treviño evaluation.    Pump from mother baby provided for breast milk.     Call light within her reach, no apparent distress noted, bed in low position, bed alarm on, educated on the importance of calling as needed, voices understanding, stable at this time.

## 2023-01-04 NOTE — HPI
Patient at [facility], presenting with R sided numbness and tingling, R facial droop, HA, R unilateral weakness. RN denied aphasia, confusion, neglect, visual deficit . LKN- Unknown

## 2023-01-04 NOTE — CONSULTS
[facility]  Vascular Neurology  Comprehensive Stroke Center  TeleVascular Neurology Acute Consultation Note      Consults    Consulting Provider: SURAJ DORSEY  Current Providers  No providers found    Patient Location:  [facility] TELEMETRY UNIT Emergency Department  Spoke hospital nurse at bedside with patient assisting consultant.     Patient information was obtained from patient.         Assessment/Plan:   Patient examined by Reyes.  Patient arriving at [facility]. Patient presenting with right sided numbness and tingling, R facial droop, HA, R unilateral weakness. RN denied aphasia, confusion, neglect, visual deficit   NIHSS-3. No TPA as last seen normal not clear. Per , possibly LKN- 8 AM.     DDX: TIA/stroke  Recommend aspirin now and as CTA is with no occlusion and NIHSS stays below 4, can give Plavix 300 mg  Head of bed flat   Recent MRI reviewed   Depending on CTA  results, admit for repeat MRI brain and neuro consult.  Case discussed with ER physician      Diagnoses:   Stroke like symptoms    STROKE DOCUMENTATION     Acute Stroke Times:   Acute Stroke Times   Last Known Normal Date: 01/03/23  Unknown Normal Time: Unknown Time  Symptom Onset Date: 01/03/23  Symptom Onset Time: 1830  Stroke Team Called Date: 01/03/23  Stroke Team Called Time: 1920  Stroke Team Arrival Date: 01/03/23  Thrombolytic Therapy Recommended: No    NIH Scale:  1a. Level of Consciousness: 0-->Alert, keenly responsive  1b. LOC Questions: 0-->Answers both questions correctly  1c. LOC Commands: 0-->Performs both tasks correctly  2. Best Gaze: 0-->Normal  3. Visual: 0-->No visual loss  4. Facial Palsy: 1-->Minor paralysis (flattened nasolabial fold, asymmetry on smiling)  5a. Motor Arm, Left: 0-->No drift, limb holds 90 (or 45) degrees for full 10 secs  5b. Motor Arm, Right: 0-->No drift, limb holds 90 (or 45) degrees for full 10 secs  6a. Motor Leg, Left: 0-->No drift, leg holds 30 degree position for  "full 5 secs  6b. Motor Leg, Right: 1-->Drift, leg falls by the end of the 5-sec period but does not hit bed  7. Limb Ataxia: 0-->Absent  8. Sensory: 1-->Mild-to-moderate sensory loss, patient feels pinprick is less sharp or is dull on the affected side, or there is a loss of superficial pain with pinprick, but patient is aware of being touched  9. Best Language: 0-->No aphasia, normal  10. Dysarthria: 0-->Normal  11. Extinction and Inattention (formerly Neglect): 0-->No abnormality  Total (NIH Stroke Scale): 3     Modified [place]    [place] Coma Scale:    ABCD2 Score:    [id number] Score:   HAS -BLED Score:   ICH Score:   Hunt & Garner Classification:       Blood pressure 132/82, pulse 80, temperature 97.5 °F (36.4 °C), temperature source Oral, resp. rate 18, height 5' 6" (1.676 m), weight 99.7 kg (219 lb 12.8 oz), last menstrual period 09/11/2021, SpO2 97 %, currently breastfeeding.  Eligible for thrombolytic therapy?: Yes  Thrombolytic therapy recomended: Thrombolytic therapy not recommended due to Unknown/unclear onset   Possible Interventional Revascularization Candidate? Awaiting CTA results from [facility] for determination     Disposition Recommendation: pending further studies  admit to inpatient    Subjective:     History of Present Illness:  Patient at [facility], presenting with R sided numbness and tingling, R facial droop, HA, R unilateral weakness. RN denied aphasia, confusion, neglect, visual deficit . LKN- Unknown      Woke up with symptoms?: no    Recent bleeding noted: no  Does the patient take any Blood Thinners? unknown  Medications: No relevant medications    Current Facility-Administered Medications:     0.9%  NaCl infusion, , Intravenous, Continuous, Gwendolyn Cohen NP, Last Rate: 125 mL/hr at 01/04/23 1219, New Bag at 01/04/23 1219    acetaminophen tablet 650 mg, 650 mg, Oral, Q6H PRN, Gwendolyn Cohen NP    aspirin chewable tablet 81 mg, 81 mg, Per NG tube, Daily, Josefina Camarena NP, 81 mg at " "01/04/23 0850    atorvastatin tablet 10 mg, 10 mg, Oral, Daily, Josefina Camarena NP, 10 mg at 01/04/23 0850    butalbital-acetaminophen-caffeine -40 mg per tablet 1 tablet, 1 tablet, Oral, Q4H PRN, Josefina Camarena NP    diphenhydrAMINE injection 12.5 mg, 12.5 mg, Intravenous, Q6H PRN, Josefina Camarena NP, 12.5 mg at 01/04/23 0342    enoxaparin injection 40 mg, 40 mg, Subcutaneous, Daily, Josefina Camarena NP    labetaloL injection 10 mg, 10 mg, Intravenous, Q6H PRN, Josefina Camarena NP    loperamide capsule 4 mg, 4 mg, Oral, Once, Josefina Camarena NP    ondansetron injection 4 mg, 4 mg, Intravenous, Q8H PRN, Josefina Camarena NP    prochlorperazine tablet 10 mg, 10 mg, Oral, TID PRN, Josefina Camarena NP, 10 mg at 01/04/23 1114    senna-docusate 8.6-50 mg per tablet 1 tablet, 1 tablet, Oral, BID, Josefina Camarena NP, 1 tablet at 01/04/23 0850    sodium chloride 0.9% flush 10 mL, 10 mL, Intravenous, PRN, Josefina Camarena NP      Past Medical History: ICAD?    Past Surgical History: no major surgeries within the last 2 weeks    Family History: no relevant history    Social History: unable to obtain    Allergies: Lactose  Gluten Protein No relevant allergies    Review of Systems  Objective:   Vitals: Blood pressure 132/82, pulse 80, temperature 97.5 °F (36.4 °C), temperature source Oral, resp. rate 18, height 5' 6" (1.676 m), weight 99.7 kg (219 lb 12.8 oz), last menstrual period 09/11/2021, SpO2 97 %, currently breastfeeding. BP: 132/82    CT READ: Yes  No hemmorhage. No mass effect. No early infarct signs.     Physical Exam        Recommended the emergency room physician to have a brief discussion with the patient and/or family if available regarding the  risks and benefits of treatment, and to briefly document the occurrence of that discussion in his clinical encounter note.     The attending portion of this evaluation, treatment, and documentation was performed per Naina Velasco MD via audiovisual.    Billing code:  " (non-intervention mild to moderate stroke, TIA, some mimics)      This patient has a critical neurological condition/illness, with some potential for high morbidity and mortality.  There is a moderate probability for acute neurological change leading to clinical and possibly life-threatening deterioration requiring highest level of physician preparedness for urgent intervention.  Care was coordinated with other physicians involved in the patient's care.  Radiologic studies and laboratory data were reviewed and interpreted, and plan of care was re-assessed based on the results.  Diagnosis, treatment options and prognosis may have been discussed with the patient and/or family members or caregiver.    In your opinion, this was a: Tier 1 Van Negative    Consult End Time: 1:16 PM     Naina Velasco MD  Comprehensive Stroke Center  Vascular Neurology   [facility]

## 2023-01-04 NOTE — H&P
[facility] Medicine  History & Physical    Patient Name: Carlos Camarena  MRN: [id number]  Patient Class: OP- Observation  Admission Date: 1/3/2023  Attending Physician: Mina Yanez, *   Primary Care Provider: Azalea Mireles MD         Patient information was obtained from patient and ER records.     Subjective:     Principal Problem:TIA (transient ischemic attack)    Chief Complaint:   Chief Complaint   Patient presents with    Numbness     Numbness and tingling to right side of face along with right sided facial droop that began about 20-30 min pta. Was seen [facility] on 12/31 for numbness and heaviness to right arm and had CT done and was told it was normal . Gave birth on 12/8 and had gestational HTN Hx of migraines and currently complains of headache x multiple days        HPI: Carlos  is a 32-year-old female who presents to the emergency department with numbness and tingling to the right side of her face along with right-sided facial droop today. Patient has a past medical history pregnancy induced hypertension and migraines. Patient was seen at [facility] on new year's [facility] for numbness and heaviness to her right arm and was told she had a normal CT scan.  The patient is postpartum, delivery was 12/08/2022. Patient complains of intermittent headaches since delivery. Patient denies fever or chills. Denies nausea, vomiting or diarrhea. Denies chest pain or shortness of breath.       Upon arrival to the emergency department patient had a stroke work up. Patient results from [facility] on new year's [place] MRV Brain results shows superior sagittal sinus appears adequately maintained.  Dominant venous flow is to the left.  The right transverse and sagittal sinus are small but patent, likely a chronic change.  No acute thrombus is detected.  The straight sinus is patent though small in caliber. MRA Brain results shows There is subtle asymmetric diminished flow signal in the  left distal ICA and left MCA distribution of indeterminate etiology. This should be correlated clinically. Proximal M2 segments on the left are slightly diminished in caliber compared to the right also. CTA head and neck follow-up may be helpful. Recommend neurovascular consultation and follow-up.  No dissection flap is detected. MRI Brain results shows no acute intracranial process. Carotid US results shows no evidence of a hemodynamically significant carotid stenosis. CTA head and neck results shows No acute abnormality. No high-grade stenosis or major vessel occlusion. Patient lab work results shows an elevated Cholesterol of 223, all other labs unremarkable.    On assessment patient alert and oriented x4, Patient has positive right side weakness, no facial droopiness noted. Patient continue to voiced a headache without photophobia. Patient endorsed right arm numbness and tingling. Patient examined by Reyes.    Patient will be admitted to hospital medicine for further evaluation and medical management.       Past Medical History:   Diagnosis Date    ADD (attention deficit disorder)     Asthma     Chronic back pain     Fibromyalgia     Moderate persistent asthma        Past Surgical History:   Procedure Laterality Date    BACK SURGERY       SECTION N/A 2022    Procedure:  SECTION;  Surgeon: Glory Fink MD;  Location: [facility] L&D;  Service: OB/GYN;  Laterality: N/A;    INJECTION OF JOINT Right 2022    Procedure: INJECTION, JOINT RIGHT SI NEEDS CONSENT;  Surgeon: Cristopher Simon MD;  Location: [facility] PAIN MGT;  Service: Pain Management;  Laterality: Right;    SPINE SURGERY  14       Review of patient's allergies indicates:   Allergen Reactions    Lactose Diarrhea    Gluten protein Itching     Inflammation, bloating, diarrhea and pain all over body         No current facility-administered medications on file prior to encounter.     Current Outpatient Medications on File  Prior to Encounter   Medication Sig    albuterol (VENTOLIN HFA) 90 mcg/actuation inhaler Inhale 2 puffs into the lungs every 6 (six) hours as needed for Wheezing. Rescue    butalbital-acetaminophen-caffeine -40 mg (FIORICET, ESGIC) -40 mg per tablet Take 1 tablet by mouth every 4 (four) hours as needed for Headaches.    cyclobenzaprine (FLEXERIL) 5 MG tablet Take 1 tablet (5 mg total) by mouth 3 (three) times daily as needed (headaches).    diphenhydrAMINE (BENADRYL) 50 MG capsule Take 1 capsule (50 mg total) by mouth every 6 (six) hours as needed (headaches).    ferrous sulfate 325 (65 FE) MG EC tablet Take 1 tablet (325 mg total) by mouth 2 (two) times daily.    fluticasone-salmeterol 500-50 mcg/dose (ADVAIR DISKUS) 500-50 mcg/dose DsDv diskus inhaler Inhale 1 puff into the lungs 2 (two) times daily. Controller    ibuprofen (ADVIL,MOTRIN) 600 MG tablet Take 1 tablet (600 mg total) by mouth every 6 (six) hours.    oxyCODONE-acetaminophen (PERCOCET) 5-325 mg per tablet Take 1 tablet by mouth every 4 (four) hours as needed for Pain.    prochlorperazine (COMPAZINE) 10 MG tablet Take 1 tablet (10 mg total) by mouth 3 (three) times daily as needed (headaches).     Family History       Problem Relation (Age of Onset)    Alcohol abuse Mother    Anxiety disorder Mother    Cancer Mother    Depression Mother    Diabetes Sister    Hypertension Maternal Grandmother    No Known Problems Father    Ovarian cancer Mother          Tobacco Use    Smoking status: Never     Passive exposure: Never    Smokeless tobacco: Never   Substance and Sexual Activity    Alcohol use: Yes     Alcohol/week: 3.0 standard drinks     Types: 3 Glasses of wine per week     Comment: 1-3 drinks every 2 weeks    Drug use: No    Sexual activity: Yes     Partners: Male     Birth control/protection: Coitus interruptus     Review of Systems   Constitutional:  Positive for activity change and fatigue.   HENT: Negative.     Eyes:  Negative.    Respiratory: Negative.     Cardiovascular: Negative.    Gastrointestinal: Negative.    Endocrine: Negative.    Genitourinary: Negative.    Musculoskeletal: Negative.    Skin: Negative.    Neurological:  Positive for dizziness, facial asymmetry, light-headedness and headaches.   Psychiatric/Behavioral: Negative.     Objective:     Vital Signs (Most Recent):  Temp: 98.9 °F (37.2 °C) (01/03/23 1910)  Pulse: 80 (01/03/23 2302)  Resp: 19 (01/03/23 2302)  BP: 109/61 (01/03/23 2302)  SpO2: 98 % (01/03/23 2302) Vital Signs (24h Range):  Temp:  [98.9 °F (37.2 °C)] 98.9 °F (37.2 °C)  Pulse:  [76-94] 80  Resp:  [13-20] 19  SpO2:  [97 %-99 %] 98 %  BP: (105-148)/(58-81) 109/61     Weight: 102.1 kg (225 lb)  Body mass index is 36.32 kg/m².    Physical Exam  Constitutional:       Appearance: Normal appearance.   HENT:      Head: Normocephalic and atraumatic.      Mouth/Throat:      Mouth: Mucous membranes are moist.   Eyes:      Pupils: Pupils are equal, round, and reactive to light.   Cardiovascular:      Rate and Rhythm: Normal rate and regular rhythm.      Pulses: Normal pulses.      Heart sounds: Normal heart sounds.   Pulmonary:      Effort: Pulmonary effort is normal.      Breath sounds: Normal breath sounds.   Abdominal:      General: Bowel sounds are normal.      Palpations: Abdomen is soft.   Musculoskeletal:         General: Normal range of motion.      Cervical back: Normal range of motion.   Skin:     General: Skin is warm.   Neurological:      Mental Status: She is alert and oriented to person, place, and time.      Motor: Weakness present.   Psychiatric:         Mood and Affect: Mood normal.         Behavior: Behavior normal.         CRANIAL NERVES     CN II   Visual acuity: normal    CN III, IV, VI   Pupils are equal, round, and reactive to light.  Right pupil: Size: 4 mm. Shape: regular. Reactivity: brisk. Consensual response: intact. Accommodation: intact.   Left pupil: Size: 3 mm. Shape: regular.  Reactivity: brisk. Consensual response: intact. Accommodation: intact.   CN III: no CN III palsy  CN VI: no CN VI palsy  Diplopia: none    CN V   Right facial sensation deficit: none  Left facial sensation deficit: none    CN VII   Right facial weakness: none  Left facial weakness: none    CN VIII   Hearing: intact     Significant Labs: All pertinent labs within the past 24 hours have been reviewed.    Significant Imaging: I have reviewed all pertinent imaging results/findings within the past 24 hours.  I have reviewed and interpreted all pertinent imaging results/findings within the past 24 hours.    Assessment/Plan:     * TIA (transient ischemic attack)  -Patient started having numbness, tingling and facial droopiness since 12/31/2022.     -Patient had a complete stroke work up and atient results from [facility] on new year's [facility] MRV Brain results shows superior sagittal sinus appears adequately maintained.  Dominant venous flow is to the left.  The right transverse and sagittal sinus are small but patent, likely a chronic change.  No acute thrombus is detected.  The straight sinus is patent though small in caliber. MRA Brain results shows There is subtle asymmetric diminished flow signal in the left distal ICA and left MCA distribution of indeterminate etiology. This should be correlated clinically. Proximal M2 segments on the left are slightly diminished in caliber compared to the right also.No dissection flap is detected. MRI Brain results shows no acute intracranial process. Carotid US results shows no evidence of a hemodynamically significant carotid stenosis. CTA head and neck results shows No acute abnormality. No high-grade stenosis or major vessel occlusion. Patient lab work results shows an elevated Cholesterol of 223.    -Patient examined by Vidyo.with recommendation of No TPA as last seen normal not clear.   -Recommend aspirin now and if CTA is with no occlusion and NIHSS stays below 4, can give  Plavix 300 mg  -Head of bed flat   -Recent MRI reviewed   -Depending on CTA  results, admit for MRI brain and neuro consult    -Will repeat MRI brain in a.m.    -Started Atorvastatin 10 mg patient Cholesterol 223      -Started ASA 81 mg    -Continue Neuro checks Q4 hrs     -Will consult Neuro for further assistance appreciated their assistance         Migraine  -Patient complains of intermittent headaches since delivery.   -CT scan shows no acute finding   -Will start patient back on home medication chlorpromazine       VTE Risk Mitigation (From admission, onward)         Ordered     enoxaparin injection 40 mg  Daily         01/03/23 2339     IP VTE HIGH RISK PATIENT  Once         01/03/23 2339     Place sequential compression device  Until discontinued         01/03/23 2339                   Josefina Camarena NP  Department of Hospital Medicine   [facility] - Telemetry

## 2023-01-04 NOTE — SUBJECTIVE & OBJECTIVE
"  Woke up with symptoms?: no    Recent bleeding noted: no  Does the patient take any Blood Thinners? unknown  Medications: No relevant medications    Current Facility-Administered Medications:     0.9%  NaCl infusion, , Intravenous, Continuous, Gwendolyn Cohen NP, Last Rate: 125 mL/hr at 01/04/23 1219, New Bag at 01/04/23 1219    acetaminophen tablet 650 mg, 650 mg, Oral, Q6H PRN, Gwendolyn Cohen NP    aspirin chewable tablet 81 mg, 81 mg, Per NG tube, Daily, Josefina Camarena NP, 81 mg at 01/04/23 0850    atorvastatin tablet 10 mg, 10 mg, Oral, Daily, Josefina Camarena NP, 10 mg at 01/04/23 0850    butalbital-acetaminophen-caffeine -40 mg per tablet 1 tablet, 1 tablet, Oral, Q4H PRN, Josefina Camarena NP    diphenhydrAMINE injection 12.5 mg, 12.5 mg, Intravenous, Q6H PRN, Josefina Camarena NP, 12.5 mg at 01/04/23 0342    enoxaparin injection 40 mg, 40 mg, Subcutaneous, Daily, Josefina Camarena NP    labetaloL injection 10 mg, 10 mg, Intravenous, Q6H PRN, Josefina Camarena NP    loperamide capsule 4 mg, 4 mg, Oral, Once, Josefina Camarena NP    ondansetron injection 4 mg, 4 mg, Intravenous, Q8H PRN, Josefina Camarena NP    prochlorperazine tablet 10 mg, 10 mg, Oral, TID PRN, Josefina Camarena NP, 10 mg at 01/04/23 1114    senna-docusate 8.6-50 mg per tablet 1 tablet, 1 tablet, Oral, BID, Josefina Camarena NP, 1 tablet at 01/04/23 0850    sodium chloride 0.9% flush 10 mL, 10 mL, Intravenous, PRN, Josefina Camarena NP      Past Medical History: ICAD?    Past Surgical History: no major surgeries within the last 2 weeks    Family History: no relevant history    Social History: unable to obtain    Allergies: Lactose  Gluten Protein No relevant allergies    Review of Systems  Objective:   Vitals: Blood pressure 132/82, pulse 80, temperature 97.5 °F (36.4 °C), temperature source Oral, resp. rate 18, height 5' 6" (1.676 m), weight 99.7 kg (219 lb 12.8 oz), last menstrual period 09/11/2021, SpO2 97 %, currently breastfeeding. BP: " 132/82    CT READ: Yes  No hemmorhage. No mass effect. No early infarct signs.     Physical Exam

## 2023-01-04 NOTE — SUBJECTIVE & OBJECTIVE
Past Medical History:   Diagnosis Date    ADD (attention deficit disorder)     Asthma     Chronic back pain     Fibromyalgia     Moderate persistent asthma        Past Surgical History:   Procedure Laterality Date    BACK SURGERY  2013     SECTION N/A 2022    Procedure:  SECTION;  Surgeon: Glory Fink MD;  Location: [facility] L&D;  Service: OB/GYN;  Laterality: N/A;    INJECTION OF JOINT Right 2022    Procedure: INJECTION, JOINT RIGHT SI NEEDS CONSENT;  Surgeon: Cristopher Simon MD;  Location: [facility] [place] PAIN T;  Service: Pain Management;  Laterality: Right;    SPINE SURGERY  14       Review of patient's allergies indicates:   Allergen Reactions    Lactose Diarrhea    Gluten protein Itching     Inflammation, bloating, diarrhea and pain all over body         No current facility-administered medications on file prior to encounter.     Current Outpatient Medications on File Prior to Encounter   Medication Sig    albuterol (VENTOLIN HFA) 90 mcg/actuation inhaler Inhale 2 puffs into the lungs every 6 (six) hours as needed for Wheezing. Rescue    butalbital-acetaminophen-caffeine -40 mg (FIORICET, ESGIC) -40 mg per tablet Take 1 tablet by mouth every 4 (four) hours as needed for Headaches.    cyclobenzaprine (FLEXERIL) 5 MG tablet Take 1 tablet (5 mg total) by mouth 3 (three) times daily as needed (headaches).    diphenhydrAMINE (BENADRYL) 50 MG capsule Take 1 capsule (50 mg total) by mouth every 6 (six) hours as needed (headaches).    ferrous sulfate 325 (65 FE) MG EC tablet Take 1 tablet (325 mg total) by mouth 2 (two) times daily.    fluticasone-salmeterol 500-50 mcg/dose (ADVAIR DISKUS) 500-50 mcg/dose DsDv diskus inhaler Inhale 1 puff into the lungs 2 (two) times daily. Controller    ibuprofen (ADVIL,MOTRIN) 600 MG tablet Take 1 tablet (600 mg total) by mouth every 6 (six) hours.    oxyCODONE-acetaminophen (PERCOCET) 5-325 mg per tablet Take 1 tablet by mouth every 4 (four)  hours as needed for Pain.    prochlorperazine (COMPAZINE) 10 MG tablet Take 1 tablet (10 mg total) by mouth 3 (three) times daily as needed (headaches).     Family History       Problem Relation (Age of Onset)    Alcohol abuse Mother    Anxiety disorder Mother    Cancer Mother    Depression Mother    Diabetes Sister    Hypertension Maternal Grandmother    No Known Problems Father    Ovarian cancer Mother          Tobacco Use    Smoking status: Never     Passive exposure: Never    Smokeless tobacco: Never   Substance and Sexual Activity    Alcohol use: Yes     Alcohol/week: 3.0 standard drinks     Types: 3 Glasses of wine per week     Comment: 1-3 drinks every 2 weeks    Drug use: No    Sexual activity: Yes     Partners: Male     Birth control/protection: Coitus interruptus     Review of Systems   Constitutional:  Positive for activity change and fatigue.   HENT: Negative.     Eyes: Negative.    Respiratory: Negative.     Cardiovascular: Negative.    Gastrointestinal: Negative.    Endocrine: Negative.    Genitourinary: Negative.    Musculoskeletal: Negative.    Skin: Negative.    Neurological:  Positive for dizziness, facial asymmetry, light-headedness and headaches.   Psychiatric/Behavioral: Negative.     Objective:     Vital Signs (Most Recent):  Temp: 98.9 °F (37.2 °C) (01/03/23 1910)  Pulse: 80 (01/03/23 2302)  Resp: 19 (01/03/23 2302)  BP: 109/61 (01/03/23 2302)  SpO2: 98 % (01/03/23 2302) Vital Signs (24h Range):  Temp:  [98.9 °F (37.2 °C)] 98.9 °F (37.2 °C)  Pulse:  [76-94] 80  Resp:  [13-20] 19  SpO2:  [97 %-99 %] 98 %  BP: (105-148)/(58-81) 109/61     Weight: 102.1 kg (225 lb)  Body mass index is 36.32 kg/m².    Physical Exam  Constitutional:       Appearance: Normal appearance.   HENT:      Head: Normocephalic and atraumatic.      Mouth/Throat:      Mouth: Mucous membranes are moist.   Eyes:      Pupils: Pupils are equal, round, and reactive to light.   Cardiovascular:      Rate and Rhythm: Normal rate and  regular rhythm.      Pulses: Normal pulses.      Heart sounds: Normal heart sounds.   Pulmonary:      Effort: Pulmonary effort is normal.      Breath sounds: Normal breath sounds.   Abdominal:      General: Bowel sounds are normal.      Palpations: Abdomen is soft.   Musculoskeletal:         General: Normal range of motion.      Cervical back: Normal range of motion.   Skin:     General: Skin is warm.   Neurological:      Mental Status: She is alert and oriented to person, place, and time.      Motor: Weakness present.   Psychiatric:         Mood and Affect: Mood normal.         Behavior: Behavior normal.         CRANIAL NERVES     CN II   Visual acuity: normal    CN III, IV, VI   Pupils are equal, round, and reactive to light.  Right pupil: Size: 4 mm. Shape: regular. Reactivity: brisk. Consensual response: intact. Accommodation: intact.   Left pupil: Size: 3 mm. Shape: regular. Reactivity: brisk. Consensual response: intact. Accommodation: intact.   CN III: no CN III palsy  CN VI: no CN VI palsy  Diplopia: none    CN V   Right facial sensation deficit: none  Left facial sensation deficit: none    CN VII   Right facial weakness: none  Left facial weakness: none    CN VIII   Hearing: intact     Significant Labs: All pertinent labs within the past 24 hours have been reviewed.    Significant Imaging: I have reviewed all pertinent imaging results/findings within the past 24 hours.  I have reviewed and interpreted all pertinent imaging results/findings within the past 24 hours.

## 2023-01-04 NOTE — ASSESSMENT & PLAN NOTE
-Patient complains of intermittent headaches since delivery.   -CT scan shows no acute finding   -Will start patient back on home medication chlorpromazine

## 2023-01-04 NOTE — PLAN OF CARE
SW met with pt at bedside to complete assessment. Pt is AxOx3  and able to verbally answer assessment questions.  Pt able to confirm demographic information. Pt reports to live at home with spouse and child. Pt has support of mother in law who is watching baby currently while pt is in hospital. Pt reports being independent of ADL's and no DME in use. Pt reports usually able to drive. At time of discharge pt spouse at bedside to transport home.  SW updated whiteboard with SWCM name and contact information. SW confirmed pt understanding of Observation unit and expected discharge plan. SW will continue to follow pt throughout care and assist with any discharge needs.         01/04/23 0848   Discharge Planning   Assessment Type Discharge Planning Brief Assessment   Resource/Environmental Concerns none   Support Systems Spouse/significant other;Children;Family members;Parent;Friends/neighbors   Equipment Currently Used at Home none   Current Living Arrangements home   Patient/Family Anticipates Transition to home with family   Patient/Family Anticipated Services at Transition none   DME Needed Upon Discharge  none   Discharge Plan A Home with family       Future Appointments   Date Time Provider Department Center   1/17/2023  9:30 AM Shima Sandy MD [facility] LEILA [facility]   1/17/2023 11:15 AM Glory Fink MD [facility] OBJUDITH [facility]   1/18/2023  9:30 AM Jeanette Quezada PA-C [facility] NEURO [facility] Case Management  [phone]

## 2023-01-04 NOTE — PLAN OF CARE
"  Problem: Physical Therapy  Goal: Physical Therapy Goal  Description: Goals to be met by: 23     Patient will increase functional independence with mobility by performin. Supine <> sit with Modified [place]  2. Sit to stand transfer with mod I  3. Bed to chair transfer with Modified [place]   4. Gait  x 150 feet with Supervision   5. Ascend/descend 8 stairs with bilateral Handrails Stand-by Assistance     Outcome: Ongoing, Progressing     PT evaluation completed, note to follow.  Pt presenting with mild impairment in RUE/R face sensation reported at ~85-90% of her normal. Pt presenting with dizziness and head feeling "fuzzy" during sitting/standing activity with pt's eyes closing and pt with sway and posterior leaning. Required min A to take side steps towards HOB 2/2 posterior leaning and further gait limited by pt's dizziness. BP assessed and no noted orthostatics. Recommendations ongoing pending pt's progress as pt unable to tolerate further ambulation at this time and was independent prior to admission- pt with recent childbirth ~3 weeks ago.   "

## 2023-01-04 NOTE — ASSESSMENT & PLAN NOTE
-Patient complains of intermittent headaches since delivery.   -CT scan shows no acute finding   -Will start patient back on home medication Prochlorperazine 10 mg

## 2023-01-04 NOTE — PROGRESS NOTES
Evaluated patient.  Patient reports persistent 8/10 headache associated with lightheadedness dizziness photophobia.  She reports headache is greatest to the right side and radiates to the occipital.  She reports worsening of headache with sitting up in bed.  Patient with recent epidural approximately 4 weeks ago.  She reports worsening of headache and symptoms since birth of her daughter.     Physical exam   Strength is equal to bilateral upper and lower extremities.  No visual deficits noted.  Sensation and tacked to all facial nerve distribution.    Discussed with physical therapy.  Patient with unsteady gait and ataxia when attempts to stand up and ambulate.  Orthostatic vitals negative.    Discussed plan of care with Neurology fellow at length.  Awaiting for recommendations from attending.  Add Reglan 10 mg IV.  Consider blood patch.  Consult anesthesia.

## 2023-01-05 VITALS
DIASTOLIC BLOOD PRESSURE: 83 MMHG | TEMPERATURE: 98 F | BODY MASS INDEX: 35.32 KG/M2 | OXYGEN SATURATION: 97 % | HEART RATE: 81 BPM | RESPIRATION RATE: 18 BRPM | WEIGHT: 219.81 LBS | HEIGHT: 66 IN | SYSTOLIC BLOOD PRESSURE: 132 MMHG

## 2023-01-05 PROCEDURE — G0378 HOSPITAL OBSERVATION PER HR: HCPCS

## 2023-01-05 PROCEDURE — 94640 AIRWAY INHALATION TREATMENT: CPT

## 2023-01-05 PROCEDURE — 25000003 PHARM REV CODE 250: Performed by: CLINICAL NURSE SPECIALIST

## 2023-01-05 PROCEDURE — 97116 GAIT TRAINING THERAPY: CPT

## 2023-01-05 PROCEDURE — 96372 THER/PROPH/DIAG INJ SC/IM: CPT | Performed by: CLINICAL NURSE SPECIALIST

## 2023-01-05 PROCEDURE — 63600175 PHARM REV CODE 636 W HCPCS: Performed by: CLINICAL NURSE SPECIALIST

## 2023-01-05 PROCEDURE — 25000242 PHARM REV CODE 250 ALT 637 W/ HCPCS: Performed by: NURSE PRACTITIONER

## 2023-01-05 PROCEDURE — 94761 N-INVAS EAR/PLS OXIMETRY MLT: CPT

## 2023-01-05 RX ORDER — PROCHLORPERAZINE MALEATE 10 MG
10 TABLET ORAL 3 TIMES DAILY PRN
Qty: 20 TABLET | Refills: 1 | Status: SHIPPED | OUTPATIENT
Start: 2023-01-05 | End: 2023-01-15

## 2023-01-05 RX ORDER — PNV NO.95/FERROUS FUM/FOLIC AC 28MG-0.8MG
100 TABLET ORAL DAILY
Qty: 30 TABLET | Refills: 0 | Status: SHIPPED | OUTPATIENT
Start: 2023-01-05 | End: 2023-02-04

## 2023-01-05 RX ORDER — SUMATRIPTAN 50 MG/1
50 TABLET, FILM COATED ORAL DAILY PRN
Qty: 15 TABLET | Refills: 0 | Status: SHIPPED | OUTPATIENT
Start: 2023-01-05 | End: 2023-01-20

## 2023-01-05 RX ADMIN — ASPIRIN 81 MG CHEWABLE TABLET 81 MG: 81 TABLET CHEWABLE at 08:01

## 2023-01-05 RX ADMIN — ATORVASTATIN CALCIUM 10 MG: 10 TABLET, FILM COATED ORAL at 08:01

## 2023-01-05 RX ADMIN — ENOXAPARIN SODIUM 40 MG: 40 INJECTION SUBCUTANEOUS at 04:01

## 2023-01-05 RX ADMIN — FLUTICASONE FUROATE AND VILANTEROL TRIFENATATE 1 PUFF: 200; 25 POWDER RESPIRATORY (INHALATION) at 08:01

## 2023-01-05 RX ADMIN — DOCUSATE SODIUM - SENNOSIDES 1 TABLET: 50; 8.6 TABLET, FILM COATED ORAL at 08:01

## 2023-01-05 NOTE — TELEPHONE ENCOUNTER
Called patient back today. No answer. Likely in route home as I see she has discharge instructions in chart    Glory Fink MD, FACOG  OB/GYN

## 2023-01-05 NOTE — PLAN OF CARE
SW noted discharge. SW to await consult notes and request recommended follow up appointments. SW to add to AVS once able. At time of discharge pt to have spouse at bedside transport.   Pt has PCC, OB, and Neuro scheduled already.     12:30pm- SW noted in chat neuro consult canceled and [facility] does not have ophthalmology at [facility]. SW to await discharge summary and final follow up recs to ensure all disciplines are covered.     Future Appointments   Date Time Provider Department Center   1/17/2023  9:30 AM Shima Sandy MD [facility] IMPRI [facility]   1/17/2023 11:15 AM Glory Fink MD [facility] OBGYN [facility]   1/18/2023  9:30 AM Jeanette Quezada PA-C [facility] NEURO [facility]  [place] Case Management  [phone]

## 2023-01-05 NOTE — PLAN OF CARE
Problem: Physical Therapy  Goal: Physical Therapy Goal  Description: Goals to be met by: 23     Patient will increase functional independence with mobility by performin. Supine <> sit with Modified [place] MET 2023  2. Sit to stand transfer with mod I  3. Bed to chair transfer with Modified [place]   4. Gait  x 150 feet with Supervision MET 2023  5. Ascend/descend 8 stairs with bilateral Handrails Stand-by Assistance     Outcome: Ongoing, Progressing     Pt making significant progress with functional mobility and activity tolerance as she ambulated 150 ft with no AD at supervision level. Pt reporting feeling much better today, with mild headache only. Recommending pt return to OP PT.

## 2023-01-05 NOTE — PLAN OF CARE
Problem: Adult Inpatient Plan of Care  Goal: Plan of Care Review  Outcome: Ongoing, Progressing  Goal: Patient-Specific Goal (Individualized)  Outcome: Ongoing, Progressing  Goal: Absence of Hospital-Acquired Illness or Injury  Outcome: Ongoing, Progressing  Goal: Optimal Comfort and Wellbeing  Outcome: Ongoing, Progressing  Goal: Readiness for Transition of Care  Outcome: Ongoing, Progressing     Problem: Infection  Goal: Absence of Infection Signs and Symptoms  Outcome: Ongoing, Progressing     Problem: Communication Impairment (Stroke, Ischemic/Transient Ischemic Attack)  Goal: Improved Communication Skills  Outcome: Ongoing, Progressing     Problem: Cerebral Tissue Perfusion (Stroke, Ischemic/Transient Ischemic Attack)  Goal: Optimal Cerebral Tissue Perfusion  Outcome: Ongoing, Progressing     Problem: Functional Ability Impaired (Stroke, Ischemic/Transient Ischemic Attack)  Goal: Optimal Functional Ability  Outcome: Ongoing, Progressing     Problem: Fall Injury Risk  Goal: Absence of Fall and Fall-Related Injury  Outcome: Ongoing, Progressing     Problem: Pain Chronic (Persistent) (Comorbidity Management)  Goal: Acceptable Pain Control and Functional Ability  Outcome: Ongoing, Progressing     Problem: Breastfeeding  Goal: Effective Breastfeeding  Outcome: Ongoing, Progressing

## 2023-01-05 NOTE — PLAN OF CARE
D/C recs noted. Pt to have neurology, Ophthalmology, PCP, OB, and Psych follow up appointments. Pharmacist will go over home medications and reasons for medications. VN and bedside nurse to reiterate final discharge instructions.       At time of discharge pt will be transported home by spouse/family.    DME at discharge: none  Home Health: none    Pt has follow up appointments added to AVS.         01/05/23 1604   Final Note   Assessment Type Final Discharge Note   Anticipated Discharge Disposition Home   What phone number can be called within the next 1-3 days to see how you are doing after discharge? [phone]   Hospital Resources/Appts/Education Provided Appointments scheduled by Navigator/Coordinator   Post-Acute Status   Discharge Delays None known at this time       Future Appointments   Date Time Provider Department Center   1/12/2023 10:00 AM Hallie Murphy OD [facility] OPTOMTY [place]   1/17/2023  9:30 AM Shima Sandy MD [facility] IMPRI [facility]   1/17/2023 11:15 AM Glory Fink MD [facility] OBGYN [facility]   1/18/2023  9:30 AM Jeanette Quezada PA-C [facility] NEURO [facility] [place] Case Management  [phone]

## 2023-01-05 NOTE — PLAN OF CARE
Patient seen and evaluated today. Symptoms has much improved but still complain of mild headache. She had an MRI brain yesterday which was unremarkable. Labs showed low Vit B.12. will recommend starting vitamin B12 supplement. Pending Ophthalmology consult. Patient also report history of untreated anxiety. This could be related to her symptoms. Will recommend the following upon discharge:  Plan  -Recommend Nurtec  -Neurological deficit seems functional as her symptoms fluctuates.  -recommend Neurology follow up upon discharge.  -Can continue Tylenol as well for migraine treatment  -Recommend Vit B12 supplement  -Recommend psych consult upon discharge for further evaluation of anxiety.

## 2023-01-05 NOTE — PT/OT/SLP PROGRESS
Physical Therapy Treatment    Patient Name:  Carlos Camarena   MRN:  [id number]    Recommendations:     Discharge Recommendations: outpatient PT  Discharge Equipment Recommendations: shower chair  Barriers to Discharge: None    Assessment:     Carlos Camarena is a 32 y.o. female admitted with a medical diagnosis of TIA (transient ischemic attack). She presents with the following impairments/functional limitations: weakness, impaired balance, pain. Pt making significant progress with functional mobility and activity tolerance as she ambulated 150 ft with no AD at supervision level. Pt reporting feeling much better today, with mild headache only. Recommending pt return to OP PT.    Rehab Prognosis: Good; patient would benefit from acute skilled PT services to address these deficits and reach maximum level of function.    Recent Surgery: * No surgery found *      Plan:     During this hospitalization, patient to be seen 2 x/week to address the identified rehab impairments via gait training, therapeutic activities, therapeutic exercises, neuromuscular re-education and progress toward the following goals:    Plan of Care Expires:  01/18/23    Subjective     Chief Complaint: mild headache  Patient/Family Comments/goals: pt reporting feeling much better today  Pain/Comfort:  Pain Rating 1: 4/10  Location 1: head  Pain Rating Post-Intervention 1:  (not rated)      Objective:     Communicated with nurse Briones prior to session.  Patient found HOB elevated with bed alarm upon PT entry to room.     General Precautions: Standard, fall  Orthopedic Precautions: N/A  Braces: N/A  Respiratory Status: Room air     Functional Mobility:  Bed Mobility:     Scooting: modified independence  Supine to Sit: modified independence  Sit to Supine: modified independence  Transfers:     Sit to Stand:  modified independence with no AD  Toilet Transfer: modified independence and supervision with  no AD  using  Step Transfer  Gait: 150 ft with no AD at  supervision level - ambulating at slow pace with decreased UE swing, improved with cues       AM-PAC 6 CLICK MOBILITY  Turning over in bed (including adjusting bedclothes, sheets and blankets)?: 4  Sitting down on and standing up from a chair with arms (e.g., wheelchair, bedside commode, etc.): 3  Moving from lying on back to sitting on the side of the bed?: 4  Moving to and from a bed to a chair (including a wheelchair)?: 3  Need to walk in hospital room?: 3  Climbing 3-5 steps with a railing?: 3  Basic Mobility Total Score: 20       Treatment & Education:  Pt pleasant and agreeable to participate in therapy session.  Pt transitioned to sit EOB and was instructed to sit for at least 1 minutes, completing Aps prior to standing ambulating to ensure no dizziness.  Denied dizziness this date, ambulated in halls as above then ambulated to the restroom.  Completed toileting without assist then ambulated back to bed.  Educated on car transfers and again on log roll method.    Patient left HOB elevated with all lines intact, call button in reach, nurse notified, and pt's  present.    GOALS:   Multidisciplinary Problems       Physical Therapy Goals          Problem: Physical Therapy    Goal Priority Disciplines Outcome Goal Variances Interventions   Physical Therapy Goal     PT, PT/OT Ongoing, Progressing     Description: Goals to be met by: 23     Patient will increase functional independence with mobility by performin. Supine <> sit with Modified [place] MET 2023  2. Sit to stand transfer with mod I  3. Bed to chair transfer with Modified [place]   4. Gait  x 150 feet with Supervision MET 2023  5. Ascend/descend 8 stairs with bilateral Handrails Stand-by Assistance                          Time Tracking:     PT Received On: 23  PT Start Time: 1005     PT Stop Time: 1020  PT Total Time (min): 15 min     Billable Minutes: Gait Training 15    Treatment Type: Treatment  PT/PTA: PT      [facility] Visit Number: 0     01/05/2023

## 2023-01-05 NOTE — CONSULTS
32 year old lady with c/o headaches, double vision , blurred vision was admitted for r/o TIA also had numbness tingling of right side of face, doing better now  POH - none, wear glasses  PMH - Migraines, MRI and MRA no lesions seen  Exam - Va sc near 20/20, EOM - full, Pupils - wnl  SLE - conj - clear OU, Cornea - clear OU, AC - dep OU, Lens - clear OU  Fundus - C:D = 0.2, no papilledema seen, normal macula , vessels, periphery  Imp/ Plan  No Papilledema seen, migraine vs hormonal changes, F/u with neurology.  Thanks for consult

## 2023-01-05 NOTE — PROGRESS NOTES
[facility]                    Pharmacy       Discharge Medication Education    Patient ACCEPTED medication education. Pharmacy has provided education on the name, indication, and possible side effects of the medication(s) prescribed, using teach-back method.     The following medications have also been discussed, during this admission.        Medication List        CONTINUE taking these medications      ADVAIR DISKUS 500-50 mcg/dose Dsdv diskus inhaler  Generic drug: fluticasone-salmeterol 500-50 mcg/dose  Inhale 1 puff into the lungs 2 (two) times daily. Controller     albuterol 90 mcg/actuation inhaler  Commonly known as: VENTOLIN HFA  Inhale 2 puffs into the lungs every 6 (six) hours as needed for Wheezing. Rescue     butalbital-acetaminophen-caffeine -40 mg -40 mg per tablet  Commonly known as: FIORICET, ESGIC  Take 1 tablet by mouth every 4 (four) hours as needed for Headaches.     cyclobenzaprine 5 MG tablet  Commonly known as: FLEXERIL  Take 1 tablet (5 mg total) by mouth 3 (three) times daily as needed (headaches).     diphenhydrAMINE 50 MG capsule  Commonly known as: BENADRYL  Take 1 capsule (50 mg total) by mouth every 6 (six) hours as needed (headaches).     ferrous sulfate 325 (65 FE) MG EC tablet  Take 1 tablet (325 mg total) by mouth 2 (two) times daily.     ibuprofen 600 MG tablet  Commonly known as: ADVIL,MOTRIN  Take 1 tablet (600 mg total) by mouth every 6 (six) hours.     oxyCODONE-acetaminophen 5-325 mg per tablet  Commonly known as: PERCOCET  Take 1 tablet by mouth every 4 (four) hours as needed for Pain.     prochlorperazine 10 MG tablet  Commonly known as: COMPAZINE  Take 1 tablet (10 mg total) by mouth 3 (three) times daily as needed (headaches).               Thank you  Sin Hernandez, PharmD  [phone]

## 2023-01-05 NOTE — PLAN OF CARE
Problem: Adult Inpatient Plan of Care  Goal: Plan of Care Review  1/5/2023 1741 by Sandra Kahn RN  Outcome: Met  1/5/2023 0724 by Sandra Kahn RN  Outcome: Ongoing, Progressing  Goal: Patient-Specific Goal (Individualized)  Outcome: Met  Goal: Absence of Hospital-Acquired Illness or Injury  Outcome: Met  Goal: Optimal Comfort and Wellbeing  Outcome: Met  Goal: Readiness for Transition of Care  Outcome: Met     Problem: Infection  Goal: Absence of Infection Signs and Symptoms  Outcome: Met     Problem: Adjustment to Illness (Stroke, Ischemic/Transient Ischemic Attack)  Goal: Optimal Coping  Outcome: Met     Problem: Bowel Elimination Impaired (Stroke, Ischemic/Transient Ischemic Attack)  Goal: Effective Bowel Elimination  Outcome: Met     Problem: Cerebral Tissue Perfusion (Stroke, Ischemic/Transient Ischemic Attack)  Goal: Optimal Cerebral Tissue Perfusion  Outcome: Met     Problem: Cognitive Impairment (Stroke, Ischemic/Transient Ischemic Attack)  Goal: Optimal Cognitive Function  Outcome: Met     Problem: Communication Impairment (Stroke, Ischemic/Transient Ischemic Attack)  Goal: Improved Communication Skills  Outcome: Met     Problem: Functional Ability Impaired (Stroke, Ischemic/Transient Ischemic Attack)  Goal: Optimal Functional Ability  Outcome: Met     Problem: Respiratory Compromise (Stroke, Ischemic/Transient Ischemic Attack)  Goal: Effective Oxygenation and Ventilation  Outcome: Met     Problem: Swallowing Impairment (Stroke, Ischemic/Transient Ischemic Attack)  Goal: Optimal Eating and Swallowing without Aspiration  Outcome: Met     Problem: Urinary Elimination Impaired (Stroke, Ischemic/Transient Ischemic Attack)  Goal: Effective Urinary Elimination  Outcome: Met     Problem: Fall Injury Risk  Goal: Absence of Fall and Fall-Related Injury  Outcome: Met     Problem: Asthma Comorbidity  Goal: Maintenance of Asthma Control  Outcome: Met     Problem: Pain Chronic (Persistent) (Comorbidity Management)  Goal:  Acceptable Pain Control and Functional Ability  Outcome: Met     Problem: Breastfeeding  Goal: Effective Breastfeeding  Outcome: Met     Problem: Physical Therapy  Goal: Physical Therapy Goal  Description: Goals to be met by: 23     Patient will increase functional independence with mobility by performin. Supine <> sit with Modified Elysian MET 2023  2. Sit to stand transfer with mod I  3. Bed to chair transfer with Modified Elysian   4. Gait  x 150 feet with Supervision MET 2023  5. Ascend/descend 8 stairs with bilateral Handrails Stand-by Assistance     Outcome: Met     Problem: Occupational Therapy  Goal: Occupational Therapy Goal  Description: Goals to be met by: 2023     Patient will increase functional independence with ADLs by performing:    LE Dressing with Modified Elysian.  Toileting from toilet with Modified Elysian for hygiene and clothing management.   Toilet transfer to toilet with Modified Elysian.  In room functional mobility without assistive device to access closet/bathroom to prepare for ADL morning regimen with modified independence  100% reciprocation for recommendations/task modifications to support integration of joint protection/fatigue management in ADL/IADL tasks to support return occupations of wife/mother    Outcome: Met

## 2023-01-05 NOTE — CONSULTS
Inpatient consult to Lactation  Consult performed by: Mina Yanez MD  Consult ordered by: Mina Yanez MD  Reason for consult: breastfeeding/pumping; medication review  Assessment/Recommendations: Rounded on mother. States at home she was pumping and sometimes direct breastfeeding as well using nipple shield. Supplementing with formula as well. Reports pumping 2-4 oz approx 4x/day. Discussed supply/demand. Mom states she had been dumping milk s/t receiving plavix yesterday morning. Reviewed iformation from [facility]. Discussed no longer dumping milk, but labeling all milk with any medications taken and when along with date/time of pumping. Discussed storing millk until reviewed with infant's pediatrician. Discussed importance of discussing medications with infant's pediatrician. Reviewed medication list. Discussed that although most are L3s (flexeril, compazine, imitrex) sometimes they can be timed appropriately to minimize transfer into milk. Discussed half-lives, peaks, etc. Discussed that benadryl, as an antihistamine, could potentially decrease milk supply, especially if taken regularly. Discussed that benefit vs risk has to be taken into consideration. Encouraged to send [employer] message to infant's pediatrician listing medications and seeking clearance to give milk. Discussed alternative uses for milk that cannot be fed. Lactation center phone number given and encouraged to call for any questions/needs. Discussed that copies of all medications' information from [facility] can be provided to her to help make an informed decision with pediatrician. Pt and significant other verbalized understanding and thankful for consult.

## 2023-01-09 ENCOUNTER — PATIENT MESSAGE (OUTPATIENT)
Dept: OBSTETRICS AND GYNECOLOGY | Facility: CLINIC | Age: 33
End: 2023-01-09
Payer: COMMERCIAL

## 2023-01-09 ENCOUNTER — PATIENT MESSAGE (OUTPATIENT)
Dept: NEUROLOGY | Facility: CLINIC | Age: 33
End: 2023-01-09
Payer: COMMERCIAL

## 2023-01-10 ENCOUNTER — PATIENT MESSAGE (OUTPATIENT)
Dept: OBSTETRICS AND GYNECOLOGY | Facility: CLINIC | Age: 33
End: 2023-01-10
Payer: COMMERCIAL

## 2023-01-10 RX ORDER — NIFEDIPINE 30 MG/1
30 TABLET, EXTENDED RELEASE ORAL DAILY
Qty: 30 TABLET | Refills: 11 | Status: SHIPPED | OUTPATIENT
Start: 2023-01-10 | End: 2023-08-16

## 2023-01-11 ENCOUNTER — OFFICE VISIT (OUTPATIENT)
Dept: NEUROLOGY | Facility: CLINIC | Age: 33
End: 2023-01-11
Payer: COMMERCIAL

## 2023-01-11 ENCOUNTER — OFFICE VISIT (OUTPATIENT)
Dept: INTERNAL MEDICINE | Facility: CLINIC | Age: 33
End: 2023-01-11
Payer: COMMERCIAL

## 2023-01-11 VITALS
SYSTOLIC BLOOD PRESSURE: 125 MMHG | WEIGHT: 228.31 LBS | HEIGHT: 66 IN | HEART RATE: 90 BPM | DIASTOLIC BLOOD PRESSURE: 80 MMHG | BODY MASS INDEX: 36.69 KG/M2

## 2023-01-11 DIAGNOSIS — G43.109 MIGRAINE WITH AURA AND WITHOUT STATUS MIGRAINOSUS, NOT INTRACTABLE: Primary | ICD-10-CM

## 2023-01-11 DIAGNOSIS — R42 VERTIGO: ICD-10-CM

## 2023-01-11 DIAGNOSIS — O13.3 GESTATIONAL HYPERTENSION, THIRD TRIMESTER: ICD-10-CM

## 2023-01-11 DIAGNOSIS — Z00.00 ANNUAL PHYSICAL EXAM: ICD-10-CM

## 2023-01-11 DIAGNOSIS — G45.9 TIA (TRANSIENT ISCHEMIC ATTACK): ICD-10-CM

## 2023-01-11 DIAGNOSIS — G43.909 MIGRAINE WITHOUT STATUS MIGRAINOSUS, NOT INTRACTABLE, UNSPECIFIED MIGRAINE TYPE: Primary | ICD-10-CM

## 2023-01-11 PROBLEM — E66.9 CLASS 2 OBESITY WITHOUT SERIOUS COMORBIDITY IN ADULT: Chronic | Status: ACTIVE | Noted: 2019-11-26

## 2023-01-11 PROBLEM — F98.8 ADD (ATTENTION DEFICIT DISORDER): Chronic | Status: ACTIVE | Noted: 2022-03-16

## 2023-01-11 PROBLEM — G89.29 CHRONIC PAIN: Chronic | Status: ACTIVE | Noted: 2022-01-29

## 2023-01-11 PROBLEM — F33.1 MODERATE EPISODE OF RECURRENT MAJOR DEPRESSIVE DISORDER: Chronic | Status: ACTIVE | Noted: 2019-11-26

## 2023-01-11 PROBLEM — K21.9 GERD (GASTROESOPHAGEAL REFLUX DISEASE): Chronic | Status: ACTIVE | Noted: 2019-11-26

## 2023-01-11 PROBLEM — E66.812 CLASS 2 OBESITY WITHOUT SERIOUS COMORBIDITY IN ADULT: Chronic | Status: ACTIVE | Noted: 2019-11-26

## 2023-01-11 PROCEDURE — 99215 OFFICE O/P EST HI 40 MIN: CPT | Mod: 95,,, | Performed by: INTERNAL MEDICINE

## 2023-01-11 PROCEDURE — 3008F PR BODY MASS INDEX (BMI) DOCUMENTED: ICD-10-PCS | Mod: CPTII,S$GLB,, | Performed by: STUDENT IN AN ORGANIZED HEALTH CARE EDUCATION/TRAINING PROGRAM

## 2023-01-11 PROCEDURE — 99999 PR PBB SHADOW E&M-EST. PATIENT-LVL III: ICD-10-PCS | Mod: PBBFAC,,, | Performed by: STUDENT IN AN ORGANIZED HEALTH CARE EDUCATION/TRAINING PROGRAM

## 2023-01-11 PROCEDURE — 99215 PR OFFICE/OUTPT VISIT, EST, LEVL V, 40-54 MIN: ICD-10-PCS | Mod: 95,,, | Performed by: INTERNAL MEDICINE

## 2023-01-11 PROCEDURE — 1160F RVW MEDS BY RX/DR IN RCRD: CPT | Mod: CPTII,95,, | Performed by: INTERNAL MEDICINE

## 2023-01-11 PROCEDURE — 3074F PR MOST RECENT SYSTOLIC BLOOD PRESSURE < 130 MM HG: ICD-10-PCS | Mod: CPTII,S$GLB,, | Performed by: STUDENT IN AN ORGANIZED HEALTH CARE EDUCATION/TRAINING PROGRAM

## 2023-01-11 PROCEDURE — 99214 PR OFFICE/OUTPT VISIT, EST, LEVL IV, 30-39 MIN: ICD-10-PCS | Mod: S$GLB,,, | Performed by: STUDENT IN AN ORGANIZED HEALTH CARE EDUCATION/TRAINING PROGRAM

## 2023-01-11 PROCEDURE — 99214 OFFICE O/P EST MOD 30 MIN: CPT | Mod: S$GLB,,, | Performed by: STUDENT IN AN ORGANIZED HEALTH CARE EDUCATION/TRAINING PROGRAM

## 2023-01-11 PROCEDURE — 1160F PR REVIEW ALL MEDS BY PRESCRIBER/CLIN PHARMACIST DOCUMENTED: ICD-10-PCS | Mod: CPTII,95,, | Performed by: INTERNAL MEDICINE

## 2023-01-11 PROCEDURE — 3079F DIAST BP 80-89 MM HG: CPT | Mod: CPTII,S$GLB,, | Performed by: STUDENT IN AN ORGANIZED HEALTH CARE EDUCATION/TRAINING PROGRAM

## 2023-01-11 PROCEDURE — 3074F SYST BP LT 130 MM HG: CPT | Mod: CPTII,S$GLB,, | Performed by: STUDENT IN AN ORGANIZED HEALTH CARE EDUCATION/TRAINING PROGRAM

## 2023-01-11 PROCEDURE — 3079F PR MOST RECENT DIASTOLIC BLOOD PRESSURE 80-89 MM HG: ICD-10-PCS | Mod: CPTII,S$GLB,, | Performed by: STUDENT IN AN ORGANIZED HEALTH CARE EDUCATION/TRAINING PROGRAM

## 2023-01-11 PROCEDURE — 3008F BODY MASS INDEX DOCD: CPT | Mod: CPTII,S$GLB,, | Performed by: STUDENT IN AN ORGANIZED HEALTH CARE EDUCATION/TRAINING PROGRAM

## 2023-01-11 PROCEDURE — 1159F PR MEDICATION LIST DOCUMENTED IN MEDICAL RECORD: ICD-10-PCS | Mod: CPTII,95,, | Performed by: INTERNAL MEDICINE

## 2023-01-11 PROCEDURE — 99999 PR PBB SHADOW E&M-EST. PATIENT-LVL III: CPT | Mod: PBBFAC,,, | Performed by: STUDENT IN AN ORGANIZED HEALTH CARE EDUCATION/TRAINING PROGRAM

## 2023-01-11 PROCEDURE — 1159F MED LIST DOCD IN RCRD: CPT | Mod: CPTII,95,, | Performed by: INTERNAL MEDICINE

## 2023-01-11 RX ORDER — SUMATRIPTAN 50 MG/1
50 TABLET, FILM COATED ORAL 2 TIMES DAILY PRN
Qty: 60 TABLET | Refills: 2 | Status: SHIPPED | OUTPATIENT
Start: 2023-01-11 | End: 2023-08-16

## 2023-01-11 RX ORDER — TOPIRAMATE 50 MG/1
50 TABLET, FILM COATED ORAL NIGHTLY
Qty: 30 TABLET | Refills: 2 | Status: SHIPPED | OUTPATIENT
Start: 2023-01-11 | End: 2023-04-03

## 2023-01-11 NOTE — PROGRESS NOTES
[facility] - NEUROLOGY 7TH FL [facility], [place]    Date: 1/11/23  Patient Name: Carlos Camarena   MRN: [id number]   PCP: Azalea Mireles  Referring Provider: No ref. provider found    Assessment:   Carlos Camarena is a 32 y.o. female with history of migraines presenting for headaches. Was previously on topiramate and sumatriptan prior to pregnancy, delivered on 12/8/22. Since then has had frequent right sided headaches. Associated symptoms of photophobia and phonophobia consistent with migraine. No autonomic symptoms. Previously headaches were well controlled on topiramate. Currently breastfeeding.    - restart topiramate 50 mg QHS for headache prevention  - continue PRN sumatriptan 50 mg, can take second dose 2-3 hours later, max 3 days per week    To follow up in 3 months.    Plan:     Problem List Items Addressed This Visit          Neuro    Migraine - Primary    Relevant Medications    topiramate (TOPAMAX) 50 MG tablet    sumatriptan (IMITREX) 50 MG tablet       Chacha Blackwell MD    Subjective:   Patient seen in consultation at the request of No ref. provider found for the evaluation of headaches.      HPI:   Ms. Carlos Camarena is a 32 y.o. female with PMH ADD, depression, migraines, chronic R low back pain with sciatica, s/p delivery via C section 12/8/22, postpartum preeclampsia presenting for headaches. These headaches started after her C section. Located on right face and neck, sharp and throbbing, usually 6/10 and can be up to 10/10. Can last for several hours to several days at a time. Was admitted for a weeklong headache in December. Associated with photophobia and phonophobia. No associated nausea, eye redness, or facial redness. Taking sumatriptan PRN, cyclobenzaprine, Tylenol, vitamin B12. Headaches made worse by sleep deprivation and bright lights.    Had history of migraines prior to delivery although were less severe, was having one a month prior to pregnancy. Was  holocephalic without neck pain. Previously took topiramate (not recently).     She was admitted 22-23 for preeclampsia, received magnesium. Was admitted -23 for headache with right facial droop and diplopia. Concern for TIA vs hemiplegic migraine at that time. Ophthalmology evaluated with no papilledema on fundoscopy. MRI w/wo contrast ordered, however was unable to complete post-contrast sequences, no abnormality noted.     Not currently working outside the home.    PAST MEDICAL HISTORY:  Past Medical History:   Diagnosis Date    ADD (attention deficit disorder)     Asthma     Chronic back pain     Fibromyalgia     Moderate persistent asthma        PAST SURGICAL HISTORY:  Past Surgical History:   Procedure Laterality Date    BACK SURGERY       SECTION N/A 2022    Procedure:  SECTION;  Surgeon: Glory Fink MD;  Location: [facility] L&D;  Service: OB/GYN;  Laterality: N/A;     SECTION  22    INJECTION OF JOINT Right 2022    Procedure: INJECTION, JOINT RIGHT SI NEEDS CONSENT;  Surgeon: Cristopher Simon MD;  Location: [facility] [place], [place];  Service: Pain Management;  Laterality: Right;    SPINE SURGERY  14       CURRENT MEDS:  Current Outpatient Medications   Medication Sig Dispense Refill    albuterol (VENTOLIN HFA) 90 mcg/actuation inhaler Inhale 2 puffs into the lungs every 6 (six) hours as needed for Wheezing. Rescue 18 g 0    cyanocobalamin (VITAMIN B-12) 100 MCG tablet Take 1 tablet (100 mcg total) by mouth once daily. 30 tablet 0    cyclobenzaprine (FLEXERIL) 5 MG tablet Take 1 tablet (5 mg total) by mouth 3 (three) times daily as needed (headaches). 30 tablet 1    diphenhydrAMINE (BENADRYL) 50 MG capsule Take 1 capsule (50 mg total) by mouth every 6 (six) hours as needed (headaches). 30 capsule 2    ferrous sulfate 325 (65 FE) MG EC tablet Take 1 tablet (325 mg total) by mouth 2 (two) times daily. 30 tablet 11    fluticasone-salmeterol 500-50 mcg/dose  (ADVAIR DISKUS) 500-50 mcg/dose DsDv diskus inhaler Inhale 1 puff into the lungs 2 (two) times daily. Controller 60 each 11    ibuprofen (ADVIL,MOTRIN) 600 MG tablet Take 1 tablet (600 mg total) by mouth every 6 (six) hours. 60 tablet 0    NIFEdipine (PROCARDIA-XL) 30 MG (OSM) 24 hr tablet Take 1 tablet (30 mg total) by mouth once daily. 30 tablet 11    prochlorperazine (COMPAZINE) 10 MG tablet Take 1 tablet (10 mg total) by mouth 3 (three) times daily as needed (headache/ nausea). 20 tablet 1    sumatriptan (IMITREX) 50 MG tablet Take 1 tablet (50 mg total) by mouth daily as needed for Migraine (should not exceed more than 2 tabs in 1 week). 15 tablet 0    sumatriptan (IMITREX) 50 MG tablet Take 1 tablet (50 mg total) by mouth 2 (two) times daily as needed for Migraine. Take 1 tablet at onset of severe headache. Can take another tablet in 2-3 hours if needed. Max 3 days per week. 60 tablet 2    topiramate (TOPAMAX) 50 MG tablet Take 1 tablet (50 mg total) by mouth every evening. 30 tablet 2     No current facility-administered medications for this visit.       ALLERGIES:  Review of patient's allergies indicates:   Allergen Reactions    Lactose Diarrhea    Gluten protein Itching     Inflammation, bloating, diarrhea and pain all over body         FAMILY HISTORY:  Family History   Problem Relation Age of Onset    Depression Mother     Anxiety disorder Mother     Ovarian cancer Mother     Alcohol abuse Mother     Cancer Mother     No Known Problems Father     Diabetes Sister     Hypertension Maternal Grandmother     Breast cancer Neg Hx     Colon cancer Neg Hx        SOCIAL HISTORY:  Social History     Tobacco Use    Smoking status: Never     Passive exposure: Never    Smokeless tobacco: Never   Substance Use Topics    Alcohol use: Yes     Alcohol/week: 1.0 standard drink     Types: 1 Glasses of wine per week     Comment: 1-3 drinks every 2 weeks    Drug use: No       Review of Systems:  12 system review of systems is  "negative except for the symptoms mentioned in HPI.      Objective:     Vitals:    01/11/23 1415   BP: 125/80   Pulse: 90   Weight: 103.5 kg (228 lb 4.6 oz)   Height: 5' 6" (1.676 m)     General: NAD, well nourished   Eyes: no tearing, discharge, no erythema   ENT: moist mucous membranes of the oral cavity, nares patent    Neck: Supple, full range of motion  Cardiovascular: Warm and well perfused, pulses equal and symmetrical  Lungs: Normal work of breathing, normal chest wall excursions  Skin: No rash, lesions, or breakdown on exposed skin  Psychiatry: Mood and affect are appropriate   Abdomen: soft, non tender, non distended  Extremities: No cyanosis, clubbing or edema.    Neurological   MENTAL STATUS: Alert and oriented to person, place, and time. Attention and concentration within normal limits. Speech without dysarthria, able to name and repeat without difficulty. Recent and remote memory within normal limits   CRANIAL NERVES: Visual fields intact. PERRL. EOMI. Facial sensation intact. Face symmetrical. Hearing grossly intact. Full shoulder shrug bilaterally. Tongue protrudes midline   SENSORY: Sensation is intact to light touch throughout.    MOTOR: Normal bulk and tone. No pronator drift.  5/5 shoulder abduction, elbow flexion/extension, finger abduction bilaterally. 5/5 hip flexion, knee extension/flexion, foot dorsiflexion bilaterally.    REFLEXES: Symmetric and 2+ throughout.   CEREBELLAR/COORDINATION/GAIT: Gait steady with normal arm swing and stride length. Finger to nose intact. Normal rapid alternating movements.      Personally reviewed MRI Brain 1/4/23 which shows no intracranial mass, diffusion restriction, or hemorrhage. Negative for significant abnormality.  "

## 2023-01-11 NOTE — PROGRESS NOTES
The patient location is: [place]  The chief complaint leading to consultation is: Hospital Follow Up    Visit type: Virtual visit with synchronous audio and video  Contact time spent with patient: 25 minutes  Each patient to whom he or she provides medical services by telemedicine is:  (1) informed of the relationship between the physician and patient and the respective role of any other health care provider with respect to management of the patient; and (2) notified that he or she may decline to receive medical services by telemedicine and may withdraw from such care at any time.  Subjective:       Patient ID: Carlos Camarena is a 32 y.o. female who  has a past medical history of ADD (attention deficit disorder), Asthma, Chronic back pain, Fibromyalgia, and Moderate persistent asthma.    Chief Complaint: Hospital Follow Up     History was obtained from the patient and supplemented through chart review  Reviewed admission for TIA.    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     HPI    Postpartum preeclampsia:  OBGYN started Procardia 30 during her second trimester for gestational HTN, but was able to discontinue.  S/p c/s  with spinal tap. (Declined blood patch d/t R/B.) No fever.  Then started to have intense HA daily since her c/s. Following with OBGYN for postpartum care and c/o occipital HA, neck pain, vertigo that worsened.  No relief with Fioricet.  Also c facial droop, difficulty walking    Was admitted last week for severe postpartum pre eclampsia, TIA given persistently severe HA.  CBC, CMP, TSH WNL.  FLP borderline high, but was at 8 PM.  B12 borderline low; started 100 mcg.  Was on magnesium for seizure ppx.    MRI/A H&N without acute abnormalities.   Tele vascular neurology recommended carotid ultrasound, which was negative.  Consider indomethacin if headache persists.  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    Had some improvement with Compazine, Benadryl, so was  discharged home with this and sumatriptan.    Has follow-up with neurology today and ENT for vertigo.    Denies CP, SOB, MARTINEZ.  Still feeling dizzy/vertigo, but less severe.  No further episodes of facial paralysis. Slurred speech was persistent after discharge and has improved today.    Still getting HA daily, but not as intense. Trying to avoid PRN meds since to save the sumatriptan. Taking Tylenol PRN. Hasn't been taking other PRN meds, NSAIDs.    She is breastfeeding. The pediatrician reviewed her meds, discussed pumping or delaying breastfeeding after Sumatriptan.    Is taking nifedipine 30.    Home BP: avg is 130/78, sometimes 140/90.  HR: 81    Migraine:    Saw seeing Neurology. H/o med overuse HA c NSAIDs. Was on Topamax for PPX (made her feel sick), Maxalt for abortive. Was planning on occipital nerve block.  Was doing well s meds until her C/S 12/2022 as above.               Not addressed today.  Obesity:    Was on phentermine by Endo. Was on Wellbutrin for mood, Topamax for migraine PPX.  Stable. Will discuss lifestyle changes during future visit.  BMI Readings from Last 3 Encounters:   01/04/23 35.48 kg/m²   12/14/22 38.38 kg/m²   12/07/22 39.57 kg/m²     Lab Results   Component Value Date/Time    HGBA1C 5.3 11/30/2019 09:56 AM     H/o Dysphonia:  Was treated with antibiotics, steroids, but persistent hoarseness in the morning.  No SOB, dysphagia.  Following with ENT.  Performed laryngoscopy.  Thought to be functional.  Rec SLP.  Follow-up with ENT p.r.n.  GERD as below.  Started Protonix q.a.m. with great improvement.  No longer taking PPI    GERD:   Drinks coffee daily.  No epigastric pain.  Not daily.  Sour taste in her throat, some vomit.  No particular food triggers.  Started Protonix q.a.m. with improvement.  Takes ibuprofen sparingly for back pain.    Improved with Protonix. Likely contributing to asthma, dysphonia.      Asthma:    Cough, occurs with weather changes.  Improved with Advair BID. The  patient does not smoke.    Follows with pulmonology.  CTA s PE. TTE with no heart strain or evidence of cardiomyopathy.    Cyanocobalamin deficiency:    On 100 mcg B12.  Lab Results   Component Value Date    AACNCXHH32 267 01/04/2023     Normocytic anemia:   Hematocrit slightly decreased.  +Menorrhagia, endometriosis.  Reports regular cycles.  Was on OCPs, IUD, but stopped due to weight gain.  Uses super tampons every 1.5 hours per day; uses tampons + pads sometimes.  Follows with OBGYN.  Mild, resolved. Likely 2/2 endometriosis/menorrhagia.    F/u with OBGYN.  On iron supplement.  Lab Results   Component Value Date    IRON 38 09/27/2022    TIBC 614 (H) 09/27/2022    FERRITIN 11 (L) 09/27/2022     Lab Results   Component Value Date    OAXTPYIZ74 267 01/04/2023     Lab Results   Component Value Date    FOLATE 11.7 01/04/2023     Endometriosis:    Off birth control due to concerns for weight. Regular cycles. TVUS normal. Following c OBGYN.     Saw Endo for low DHEA during OBGYN eval for PCOS, fatigue, weight gain. The rest of hormones were wnl.   Not c/w PCOS. ACTH was normal so not suggestive of an adrenal gland insufficiency. Low DHEA-S would just be from the inhaled steroids  Lab Results   Component Value Date    TSH 1.058 01/03/2023    FREET4 0.87 01/20/2022     Lumbar DDD, FMA:  Chronic R low back pain c RLE sciatica.  S/p fusion in 2013.     Had nephrolithiasis in 10/2021, which exacerbated FMA with prolonged extreme pain. Saw several specialists. Saw Rheumatology for polyarthralgia.  Low suspicion for autoimmune disorder. Labs neg. Completed functional restoration program, which helped with exercises. Pain improved 10->6-7/10, but still with intense R leg pain, paresthesias, sharp pain, foot feels numb.  Has realistic expectations regarding pain control.     MRI s stenosis.  Following c Pain Clinic.  Improved with SI injection, but paused due to her recent pregnancy.  Rx Cymbalta for mood, chronic pain, but  never started this since she was about to join a study for FMA and discovered that she was pregnant.      She is going to therapy.  Mental health is much better since she's no longer working, so she would like to remain off meds.  Improved. Completed FRP. F/u c Pain Clinic PRN.    Depression, PMDD, anxiety:  Chronic.  Worries a lot, hard time relaxing.  Stress, worries, fear of failure. Guilt, self blame.  Concerned about weight gain.    FHx Mom with depression, anxiety on unknown med.      Previous meds:  -Was on Paxil for PMDD, but felt sick with severe nausea for multiple days, so quickly discontinued it.   -Also tried luteal phase Celexa for PMDD  -Was on Topamax for migraine prophylaxis.   -never tried Cymbalta d/t chronic pain, mood as above.    Wellbutrin 450 helped c focus/concentration, but was not taking consistently due to traveling and had entirely stopped during her pregnancy. She feels well, especially since she is no longer working.    Following with therapy at [facility], [facility], Dr. Barbour at [facility].  Going to therapy 2/month. Completed functional restoration program.    Doing well. Monitor for sx off antidepressants. Could consider Zoloft as it is preferred in breastfeeding women. Cont therapy.     ADD:  Difficulty with day to day responsibilities, chores, bills, organization. Forgetful, easily irritated. Mental health as above. Started Adderral. Follows c Dr. Adenike Barbour with [facility], but she left the practice and establish care with a new Psychiatrist.  She stopped stimulants d/t pregnancy as above.  Stopped Adderall d/t pregnancy. If she wishes to restart, could consider lower dose. F/u c [facility].    Review of Systems   Constitutional:  Negative for activity change and unexpected weight change.   HENT:  Negative for hearing loss, rhinorrhea and trouble swallowing.    Eyes:  Negative for discharge and visual disturbance.   Respiratory:  Negative for chest tightness, shortness of  breath and wheezing.    Cardiovascular:  Negative for chest pain and palpitations.   Gastrointestinal:  Negative for blood in stool, constipation, diarrhea and vomiting.   Endocrine: Negative for polydipsia and polyuria.   Genitourinary:  Negative for difficulty urinating, dysuria, hematuria and menstrual problem.   Musculoskeletal:  Positive for neck pain. Negative for arthralgias and joint swelling.   Neurological:  Positive for weakness and headaches.   Psychiatric/Behavioral:  Negative for confusion and dysphoric mood.        I personally reviewed Past Medical History, Past Surgical History, Social History, and Family History.    Objective:      There were no vitals filed for this visit.     Physical Exam  Constitutional:       General: She is not in acute distress.     Appearance: She is well-developed. She is not diaphoretic.   HENT:      Head: Normocephalic and atraumatic.   Eyes:      General:         Right eye: No discharge.         Left eye: No discharge.   Pulmonary:      Effort: Pulmonary effort is normal. No respiratory distress.   Skin:     Coloration: Skin is not pale.      Findings: No erythema.   Neurological:      Mental Status: She is alert.      Comments: Normal speech. No facial droop.   Psychiatric:         Behavior: Behavior normal.         Lab Results   Component Value Date    WBC 8.83 01/03/2023    HGB 12.8 01/03/2023    HCT 40.4 01/03/2023     01/03/2023    CHOL 223 (H) 01/03/2023    TRIG 107 01/03/2023    HDL 52 01/03/2023    ALT 26 01/03/2023    AST 18 01/03/2023     01/03/2023    K 4.4 01/03/2023     01/03/2023    CREATININE 0.8 01/03/2023    BUN 8 01/03/2023    CO2 25 01/03/2023    TSH 1.058 01/03/2023    INR 1.0 01/03/2023    HGBA1C 5.3 11/30/2019       The ASCVD Risk score ([place] DK, et al., 2019) failed to calculate for the following reasons:    The 2019 ASCVD risk score is only valid for ages 40 to 79    The patient has a prior MI or stroke diagnosis    (Imaging  have been independently reviewed)  MRI brain without acute abnormality.      Assessment:       1. Migraine with aura and without status migrainosus, not intractable    2. Preeclampsia in postpartum period    3. Vertigo    4. TIA (transient ischemic attack)    5. Annual physical exam            Plan:       Carlos was seen today for hospital follow up.    Diagnoses and all orders for this visit:    Migraine with aura and without status migrainosus, not intractable  Comments:  Improved, but persistent. Consider BB for BP and migraine ppx, but complicated by breastfeeding. She will discuss tx options with Neuro today.    Preeclampsia in postpartum period  Comments:  Cont nifedpine for now. Consider adding BB as above. Monitor BP.    Vertigo  Comments:  Has f/u c Neuro, ENT. Consider Epley/malcolm Hallpike maneuvers, Vestibular PT.    TIA (transient ischemic attack)  Comments:  MRI, carotid US were normal. FLP borderline high, but was nonfasting. Not on statin. Repeat FLP. Has f/u c Neuro.  Orders:  -     Lipid Panel; Future    Annual physical exam  -     Lipid Panel; Future           Side effects of medication(s) were discussed in detail and patient voiced understanding.  Patient will call back for any issues or complications.     I have spent a total of 50 minutes with the patient as well as reviewing the chart/medical record and placing orders on the day of the visit. Discussed HTN, migraine, TIA, hospital f/u.     RTC in 2-4 week(s) or sooner PRN for hospital f/u, HTN, migraines, coordination of care. In person.

## 2023-01-11 NOTE — PATIENT INSTRUCTIONS
For headache prevention, take topiramate (Topamax) 50 mg every night. You can switch it to during the day if you prefer. It takes about 6 weeks to see effect so keep taking it to that point.    For headache rescue, continue the sumatriptan (Imitrex). Take 50 mg at the start of a severe headache. You can take a second dose 2-3 hours later if needed. Use it maximum 3 days out of the week.

## 2023-01-12 ENCOUNTER — PATIENT MESSAGE (OUTPATIENT)
Dept: INTERNAL MEDICINE | Facility: CLINIC | Age: 33
End: 2023-01-12
Payer: COMMERCIAL

## 2023-01-12 ENCOUNTER — OFFICE VISIT (OUTPATIENT)
Dept: OPTOMETRY | Facility: CLINIC | Age: 33
End: 2023-01-12
Payer: COMMERCIAL

## 2023-01-12 ENCOUNTER — TELEPHONE (OUTPATIENT)
Dept: INTERNAL MEDICINE | Facility: CLINIC | Age: 33
End: 2023-01-12
Payer: COMMERCIAL

## 2023-01-12 DIAGNOSIS — H52.7 REFRACTIVE ERROR: ICD-10-CM

## 2023-01-12 DIAGNOSIS — G43.109 MIGRAINE WITH AURA AND WITHOUT STATUS MIGRAINOSUS, NOT INTRACTABLE: Primary | ICD-10-CM

## 2023-01-12 PROCEDURE — 1159F PR MEDICATION LIST DOCUMENTED IN MEDICAL RECORD: ICD-10-PCS | Mod: CPTII,S$GLB,, | Performed by: OPTOMETRIST

## 2023-01-12 PROCEDURE — 1159F MED LIST DOCD IN RCRD: CPT | Mod: CPTII,S$GLB,, | Performed by: OPTOMETRIST

## 2023-01-12 PROCEDURE — 92015 DETERMINE REFRACTIVE STATE: CPT | Mod: S$GLB,,, | Performed by: OPTOMETRIST

## 2023-01-12 PROCEDURE — 99999 PR PBB SHADOW E&M-EST. PATIENT-LVL III: ICD-10-PCS | Mod: PBBFAC,,, | Performed by: OPTOMETRIST

## 2023-01-12 PROCEDURE — 92004 PR EYE EXAM, NEW PATIENT,COMPREHESV: ICD-10-PCS | Mod: S$GLB,,, | Performed by: OPTOMETRIST

## 2023-01-12 PROCEDURE — 92015 PR REFRACTION: ICD-10-PCS | Mod: S$GLB,,, | Performed by: OPTOMETRIST

## 2023-01-12 PROCEDURE — 92004 COMPRE OPH EXAM NEW PT 1/>: CPT | Mod: S$GLB,,, | Performed by: OPTOMETRIST

## 2023-01-12 PROCEDURE — 99999 PR PBB SHADOW E&M-EST. PATIENT-LVL III: CPT | Mod: PBBFAC,,, | Performed by: OPTOMETRIST

## 2023-01-12 NOTE — PROGRESS NOTES
Subjective:       Patient ID: Carlos Camarena is a 32 y.o. female      Chief Complaint   Patient presents with    Concerns About Ocular Health     History of Present Illness  Dls: 1-2 yrs      33 y/o female presents today with c/o x 1 month ha's  Pt c/o blurry vision at distance ou. Pt wears single vision glasses for distance.      No tearing  No itching  No burning  No pain  + ha's  + od>os floaters  No flashes    Eye meds       Assessment/Plan:     1. Migraine with aura and without status migrainosus, not intractable  DFE with normal ONH, no edema or pallor. No ocular origin for headaches. Patient to continue care with neurology for headaches.    2. Refractive error  Updated Rx.     Eyeglass Final Rx       Eyeglass Final Rx         Sphere Cylinder    Right -0.25 Sphere    Left -0.25 Sphere      Type: SVL    Expiration Date: 1/12/2024                      Follow up if symptoms worsen or fail to improve.

## 2023-01-12 NOTE — TELEPHONE ENCOUNTER
----- Message from Azalea Mireles MD sent at 1/11/2023  9:34 AM CST -----  1. Please schedule visit in 2-4 week(s) for hospital f/u, HTN, migraines, coordination of care. In person.  2. Schedule fasting lab.  Thanks!

## 2023-01-17 ENCOUNTER — POSTPARTUM VISIT (OUTPATIENT)
Dept: OBSTETRICS AND GYNECOLOGY | Facility: CLINIC | Age: 33
End: 2023-01-17
Payer: COMMERCIAL

## 2023-01-17 VITALS — BODY MASS INDEX: 37.1 KG/M2 | DIASTOLIC BLOOD PRESSURE: 84 MMHG | WEIGHT: 229.81 LBS | SYSTOLIC BLOOD PRESSURE: 120 MMHG

## 2023-01-17 PROCEDURE — 99999 PR PBB SHADOW E&M-EST. PATIENT-LVL III: CPT | Mod: PBBFAC,,, | Performed by: OBSTETRICS & GYNECOLOGY

## 2023-01-17 PROCEDURE — 0503F PR POSTPARTUM CARE VISIT: ICD-10-PCS | Mod: CPTII,S$GLB,, | Performed by: OBSTETRICS & GYNECOLOGY

## 2023-01-17 PROCEDURE — 99999 PR PBB SHADOW E&M-EST. PATIENT-LVL III: ICD-10-PCS | Mod: PBBFAC,,, | Performed by: OBSTETRICS & GYNECOLOGY

## 2023-01-17 PROCEDURE — 0503F POSTPARTUM CARE VISIT: CPT | Mod: CPTII,S$GLB,, | Performed by: OBSTETRICS & GYNECOLOGY

## 2023-01-17 NOTE — HOSPITAL COURSE
Patient monitor closely during observation stay.  She was placed on telemetry with no arrhythmias noted.  She underwent complete neurological workup which is negative for acute ischemia.  Neurology consulted.  It was felt patient with hemiplegic migraine.  She was initiated on IV fluids and sumatriptan.  Patient was noted have improvement of her migraine and is medically stable for from a neurological standpoint.

## 2023-01-17 NOTE — PROGRESS NOTES
CC: Post-partum follow-up    Carlos Camarena is a 32 y.o. female  who presents for post-partum visit- 3week telemed..  Pregnancy was complicated by GHTN and postpartum preeclampsia. She is S/P a . Was admitted for neurologic concerns multiple times. Recently started on topamax. Has ENT f/u scheduled next week. Currently taking procardia 30mg daily. BP well controlled.       Delivery Date: 2022  Delivery MD: Aleks  Gender: female  Birth Weight: 7 pounds 0 ounces  Breast Feeding: YES  Depression: NO  - reports good support system,  Contraception: no method-declines. Discussed non hormonal options    /84   Wt 104.2 kg (229 lb 13.3 oz)   LMP 2021 (Exact Date)   Breastfeeding Yes Comment: pumping and bottles  BMI 37.10 kg/m²       ROS:  GENERAL: Denies fever or chills.   SKIN: Denies rash or lesions.   HEAD: +headache.   CHEST: Denies chest pain or shortness of breath.   CARDIOVASCULAR: Denies palpitations or chest pain.   ABDOMEN: No constipation, diarrhea, nausea, vomiting or rectal bleeding.   URINARY: see HPI  REPRODUCTIVE: See HPI.   BREASTS: see HPI  NEUROLOGIC: Denies syncope or weakness.     Physical Exam:  GENERAL: alert, appears stated age and cooperative  NEUROLOGIC: orientated to person, place and time, normal mood and affect   CHEST: Normal respiratory effort  NECK: normal appearance  SKIN: no acne, hirsutism  BREAST EXAM: not examined  ABDOMEN: abdomen is soft without significant tenderness, masses  INCISION- c/d/i        ASSESSMENT/PLAN:  1. Postpartum state        Given precautions. Continue procardia. F/u with neurology and ENT as scheduled               Glory Fink MD  OB/GYN

## 2023-01-17 NOTE — DISCHARGE SUMMARY
[facility] Medicine  Discharge Summary      Patient Name: Carlos Camarena  MRN: [id number]  GRACIE: [id number]  Patient Class: OP- Observation  Admission Date: 1/3/2023  Hospital Length of Stay: 0 days  Discharge Date and Time: 1/5/2023  6:04 PM  Attending Physician: No att. providers found   Discharging Provider: Gwendolyn Cohen NP  Primary Care Provider: Azalea Mireles MD    Primary Care Team: Networked reference to record PCT     HPI:   Carlos  is a 32-year-old female who presents to the emergency department with numbness and tingling to the right side of her face along with right-sided facial droop today. Patient has a past medical history pregnancy induced hypertension and migraines. Patient was seen at [facility] on new year's Annmarie for numbness and heaviness to her right arm and was told she had a normal CT scan.  The patient is postpartum, delivery was 12/08/2022. Patient complains of intermittent headaches since delivery. Patient denies fever or chills. Denies nausea, vomiting or diarrhea. Denies chest pain or shortness of breath.       Upon arrival to the emergency department patient had a stroke work up. Patient results from [facility] on new year's Annmarie MRV Brain results shows superior sagittal sinus appears adequately maintained.  Dominant venous flow is to the left.  The right transverse and sagittal sinus are small but patent, likely a chronic change.  No acute thrombus is detected.  The straight sinus is patent though small in caliber. MRA Brain results shows There is subtle asymmetric diminished flow signal in the left distal ICA and left MCA distribution of indeterminate etiology. This should be correlated clinically. Proximal M2 segments on the left are slightly diminished in caliber compared to the right also. CTA head and neck follow-up may be helpful. Recommend neurovascular consultation and follow-up.  No dissection flap is detected. MRI Brain results shows no acute  intracranial process. Carotid US results shows no evidence of a hemodynamically significant carotid stenosis. CTA head and neck results shows No acute abnormality. No high-grade stenosis or major vessel occlusion. Patient lab work results shows an elevated Cholesterol of 223, all other labs unremarkable.    On assessment patient alert and oriented x4, Patient has positive right side weakness, no facial droopiness noted. Patient continue to voiced a headache without photophobia. Patient endorsed right arm numbness and tingling. Patient examined by Reyes.    Patient will be admitted to hospital medicine for further evaluation and medical management.       * No surgery found *      Hospital Course:   Patient monitor closely during observation stay.  She was placed on telemetry with no arrhythmias noted.  She underwent complete neurological workup which is negative for acute ischemia.  Neurology consulted.  It was felt patient with hemiplegic migraine.  She was initiated on IV fluids and sumatriptan.  She was recommended for outpatient psychiatric evaluation for possible conversion disorder versus postpartum depression.  Patient was noted have improvement of her migraine and is medically stable for from a neurological standpoint.       Goals of Care Treatment Preferences:  Code Status: Full Code      Consults:   Consults (From admission, onward)        Status Ordering Provider     Inpatient consult to Ophthalmology  Once        Provider:  (Not yet assigned)    Completed KADE VELASQUEZ     Inpatient consult to [facility]  Once        Provider:  (Not yet assigned)    Completed MYRIAM TRIPP     Inpatient consult to Lactation  Once        Provider:  (Not yet assigned)    Completed SILVINA SINGER          * TIA (transient ischemic attack)  -Patient started having numbness, tingling and facial droopiness since 12/31/2022.     -Patient had a complete stroke work up and atient results from [facility] on  new year's Annmarie MRV Brain results shows superior sagittal sinus appears adequately maintained.  Dominant venous flow is to the left.  The right transverse and sagittal sinus are small but patent, likely a chronic change.  No acute thrombus is detected.  The straight sinus is patent though small in caliber. MRA Brain results shows There is subtle asymmetric diminished flow signal in the left distal ICA and left MCA distribution of indeterminate etiology. This should be correlated clinically. Proximal M2 segments on the left are slightly diminished in caliber compared to the right also.No dissection flap is detected. MRI Brain results shows no acute intracranial process. Carotid US results shows no evidence of a hemodynamically significant carotid stenosis. CTA head and neck results shows No acute abnormality. No high-grade stenosis or major vessel occlusion. Patient lab work results shows an elevated Cholesterol of 223.    -Patient examined by Vidyo.with recommendation of No TPA as last seen normal not clear.   -Recommend aspirin now and if CTA is with no occlusion and NIHSS stays below 4, can give Plavix 300 mg  -Head of bed flat   -Recent MRI reviewed   -Depending on CTA  results, admit for MRI brain and neuro consult    -Will repeat MRI brain in a.m.    -Started Atorvastatin 10 mg patient Cholesterol 223      -Started ASA 81 mg    -Continue Neuro checks Q4 hrs     -Will consult Neuro for further assistance appreciated their assistance         Migraine  -Patient complains of intermittent headaches since delivery.   -CT scan shows no acute finding   -Will start patient back on home medication Prochlorperazine 10 mg      Final Active Diagnoses:    Diagnosis Date Noted POA    PRINCIPAL PROBLEM:  TIA (transient ischemic attack) [G45.9] 01/04/2023 Yes    Migraine [G43.909] 11/26/2019 Yes     Chronic      Problems Resolved During this Admission:       Discharged Condition: stable    Disposition: Home or Self  Care    Follow Up:   Follow-up Information     Azalea Mireles MD Follow up in 1 week(s).    Specialty: Internal Medicine  Contact information:  [address]  [address]  [place] [address]  [phone]             [facility] - NEUROLOGY Follow up.    Specialty: Neurology  Contact information:  [address] [address]  [phone]                       Patient Instructions:      Ambulatory referral/consult to Psychiatry   Standing Status: Future   Referral Priority: Routine Referral Type: Psychiatric   Referral Reason: Specialty Services Required   Requested Specialty: Psychiatry   Number of Visits Requested: 1     Ambulatory referral/consult to Ophthalmology   Standing Status: Future   Referral Priority: Routine Referral Type: Consultation   Referral Reason: Specialty Services Required   Requested Specialty: Ophthalmology   Number of Visits Requested: 1     Diet Cardiac     Notify your health care provider if you experience any of the following:  severe uncontrolled pain     Notify your health care provider if you experience any of the following:  persistent nausea and vomiting or diarrhea     Notify your health care provider if you experience any of the following:  increased confusion or weakness     Activity as tolerated       Significant Diagnostic Studies: Labs:   BMP: No results for input(s): GLU, NA, K, CL, CO2, BUN, CREATININE, CALCIUM, MG in the last 48 hours., CMP No results for input(s): NA, K, CL, CO2, GLU, BUN, CREATININE, CALCIUM, PROT, ALBUMIN, BILITOT, ALKPHOS, AST, ALT, ANIONGAP, ESTGFRAFRICA, EGFRNONAA in the last 48 hours. and Lipid Panel   Lab Results   Component Value Date    CHOL 223 (H) 01/03/2023    HDL 52 01/03/2023    LDLCALC 149.6 01/03/2023    TRIG 107 01/03/2023    CHOLHDL 23.3 01/03/2023     Radiology:   Procedure Component Value Units Date/Time   MRI BRAIN W WO CONTRAST [[id number]] Resulted: 01/04/23 1538   Order Status: Completed Updated: 01/04/23  1541   Narrative:     EXAMINATION:   MRI BRAIN W WO CONTRAST     CLINICAL HISTORY:   Stroke, follow up;     TECHNIQUE:   Multiplanar multisequence MR imaging of the brain was performed before and after the administration of 10 mL Gadavist intravenous contrast.  Examination terminated shortly after contrast administration.  Exam terminated mid FLAIR is degraded by motion.  No postcontrast T1 weighted images performed.  Obtained sequences include axial diffusion, axial Mascot, axial T1 and sagittal T1.     COMPARISON:   CTA 01/03/2023 MRI 01/01/2023     FINDINGS:   Ventricles remain normal in size for age.  No hydrocephalus.     The brain parenchyma appears unchanged.  No diffusion restriction to suggest an acute infarction.  No remote major vascular distribution infarct.  No evidence of recent or remote hemorrhage.  No new intracranial mass effect or midline shift.  No compelling evidence of enhancement on the limited postcontrast series.     No extra-axial blood or fluid collections.     Bone marrow signal intensity is unremarkable.    Impression:       Limited exam as above, without compelling evidence of acute intracranial pathology.       Electronically signed by: Mayank Rodriguez MD   Date: 01/04/2023   Time: 15:38   CTA Head and Neck (xpd) [[id number]] Resulted: 01/03/23 2146   Order Status: Completed Updated: 01/03/23 2149   Narrative:     EXAMINATION:   CTA HEAD AND NECK (XPD)     CLINICAL HISTORY:   Stroke/TIA, determine embolic source;     TECHNIQUE:   Non contrast low dose axial images were obtained through the head. CT angiogram was performed from the level of the erik to the top of the head following the IV administration of 100mL of Omnipaque 350.   Sagittal and coronal reconstructions and maximum intensity projection reconstructions were performed. Arterial stenosis percentages are based on NASCET measurement criteria.     COMPARISON:   None     FINDINGS:   Intracranial Compartment:     Ventricles and  sulci are stable in size for age without evidence of hydrocephalus. No extra-axial blood or fluid collections.     The brain parenchyma appears stable.  No parenchymal mass, hemorrhage, edema, or major vascular distribution infarct.     Sinus disease is stable.     Non-Vascular Structures of the Neck/Thoracic Inlet (limited evaluation): Normal.     Aorta: Normal 3 vessel arch.     Extracranial carotid circulation: No hemodynamically significant stenosis, aneurysmal dilatation, or dissection.     Extracranial vertebral circulation: No hemodynamically significant stenosis, aneurysmal dilatation, or dissection.     Intracranial Arteries: No focal high-grade stenosis, occlusion, or aneurysm.     Venous structures (limited evaluation): Normal.    Impression:       No acute abnormality.     No high-grade stenosis or major vessel occlusion.       Electronically signed by: Tez Roberts   Date: 01/03/2023   Time: 21:46   CT Head Without Contrast [[id number]] Resulted: 01/03/23 1934   Order Status: Completed Updated: 01/03/23 1936   Narrative:     EXAMINATION:   CT HEAD WITHOUT CONTRAST     CLINICAL HISTORY:   Neuro deficit, acute, stroke suspected;Headache, new or worsening, neuro deficit (Age 19-49y);     TECHNIQUE:   Low dose axial images were obtained through the head.  Coronal and sagittal reformations were also performed. Contrast was not administered.     COMPARISON:   01/01/2023     FINDINGS:   There is motion artifact.  There is no evidence of acute major vascular territory infarct, hemorrhage, or mass.  There is no hydrocephalus.  There are no abnormal extra-axial fluid collections.  There is a mucous retention cyst or polyp within the left maxillary sinus, otherwise the visualized paranasal sinuses and mastoid air cells are clear, and there is no evidence of calvarial fracture.  The visualized soft tissues are unremarkable.    Impression:       1. Allowing for motion artifact, no acute intracranial abnormalities.    2. Sinus disease as above.          Pending Diagnostic Studies:     None         Medications:  Reconciled Home Medications:      Medication List      START taking these medications    cyanocobalamin 100 MCG tablet  Commonly known as: VITAMIN B-12  Take 1 tablet (100 mcg total) by mouth once daily.     sumatriptan 50 MG tablet  Commonly known as: IMITREX  Take 1 tablet (50 mg total) by mouth daily as needed for Migraine (should not exceed more than 2 tabs in 1 week).        CONTINUE taking these medications    ADVAIR DISKUS 500-50 mcg/dose Dsdv diskus inhaler  Generic drug: fluticasone-salmeterol 500-50 mcg/dose  Inhale 1 puff into the lungs 2 (two) times daily. Controller     albuterol 90 mcg/actuation inhaler  Commonly known as: VENTOLIN HFA  Inhale 2 puffs into the lungs every 6 (six) hours as needed for Wheezing. Rescue     diphenhydrAMINE 50 MG capsule  Commonly known as: BENADRYL  Take 1 capsule (50 mg total) by mouth every 6 (six) hours as needed (headaches).     ferrous sulfate 325 (65 FE) MG EC tablet  Take 1 tablet (325 mg total) by mouth 2 (two) times daily.     ibuprofen 600 MG tablet  Commonly known as: ADVIL,MOTRIN  Take 1 tablet (600 mg total) by mouth every 6 (six) hours.        STOP taking these medications    butalbital-acetaminophen-caffeine -40 mg -40 mg per tablet  Commonly known as: FIORICET, ESGIC     oxyCODONE-acetaminophen 5-325 mg per tablet  Commonly known as: PERCOCET     prochlorperazine 10 MG tablet  Commonly known as: COMPAZINE        ASK your doctor about these medications    cyclobenzaprine 5 MG tablet  Commonly known as: FLEXERIL  Take 1 tablet (5 mg total) by mouth 3 (three) times daily as needed (headaches).  Ask about: Should I take this medication?     prochlorperazine 10 MG tablet  Commonly known as: COMPAZINE  Take 1 tablet (10 mg total) by mouth 3 (three) times daily as needed (headache/ nausea).  Ask about: Should I take this medication?            Indwelling  Lines/Drains at time of discharge:   Lines/Drains/Airways     None                 Time spent on the discharge of patient: 50 minutes         Gwendolyn Cohen NP  Department of [facility] Medicine  [facility]

## 2023-01-26 ENCOUNTER — OFFICE VISIT (OUTPATIENT)
Dept: OTOLARYNGOLOGY | Facility: CLINIC | Age: 33
End: 2023-01-26
Payer: COMMERCIAL

## 2023-01-26 VITALS
WEIGHT: 229 LBS | BODY MASS INDEX: 36.8 KG/M2 | HEIGHT: 66 IN | SYSTOLIC BLOOD PRESSURE: 96 MMHG | HEART RATE: 94 BPM | DIASTOLIC BLOOD PRESSURE: 64 MMHG

## 2023-01-26 DIAGNOSIS — G43.109 VERTIGINOUS MIGRAINE: Primary | ICD-10-CM

## 2023-01-26 PROCEDURE — 99203 OFFICE O/P NEW LOW 30 MIN: CPT | Mod: S$GLB,,, | Performed by: OTOLARYNGOLOGY

## 2023-01-26 PROCEDURE — 1159F MED LIST DOCD IN RCRD: CPT | Mod: CPTII,S$GLB,, | Performed by: OTOLARYNGOLOGY

## 2023-01-26 PROCEDURE — 1159F PR MEDICATION LIST DOCUMENTED IN MEDICAL RECORD: ICD-10-PCS | Mod: CPTII,S$GLB,, | Performed by: OTOLARYNGOLOGY

## 2023-01-26 PROCEDURE — 1160F PR REVIEW ALL MEDS BY PRESCRIBER/CLIN PHARMACIST DOCUMENTED: ICD-10-PCS | Mod: CPTII,S$GLB,, | Performed by: OTOLARYNGOLOGY

## 2023-01-26 PROCEDURE — 3074F SYST BP LT 130 MM HG: CPT | Mod: CPTII,S$GLB,, | Performed by: OTOLARYNGOLOGY

## 2023-01-26 PROCEDURE — 1160F RVW MEDS BY RX/DR IN RCRD: CPT | Mod: CPTII,S$GLB,, | Performed by: OTOLARYNGOLOGY

## 2023-01-26 PROCEDURE — 99203 PR OFFICE/OUTPT VISIT, NEW, LEVL III, 30-44 MIN: ICD-10-PCS | Mod: S$GLB,,, | Performed by: OTOLARYNGOLOGY

## 2023-01-26 PROCEDURE — 3078F DIAST BP <80 MM HG: CPT | Mod: CPTII,S$GLB,, | Performed by: OTOLARYNGOLOGY

## 2023-01-26 PROCEDURE — 3074F PR MOST RECENT SYSTOLIC BLOOD PRESSURE < 130 MM HG: ICD-10-PCS | Mod: CPTII,S$GLB,, | Performed by: OTOLARYNGOLOGY

## 2023-01-26 PROCEDURE — 3008F BODY MASS INDEX DOCD: CPT | Mod: CPTII,S$GLB,, | Performed by: OTOLARYNGOLOGY

## 2023-01-26 PROCEDURE — 3078F PR MOST RECENT DIASTOLIC BLOOD PRESSURE < 80 MM HG: ICD-10-PCS | Mod: CPTII,S$GLB,, | Performed by: OTOLARYNGOLOGY

## 2023-01-26 PROCEDURE — 3008F PR BODY MASS INDEX (BMI) DOCUMENTED: ICD-10-PCS | Mod: CPTII,S$GLB,, | Performed by: OTOLARYNGOLOGY

## 2023-01-26 NOTE — PATIENT INSTRUCTIONS
Migraine Diet   Items are arranged from highest to lowest priority from top to bottom, based on their likelihood of causing migraines.  Start by eliminating all foods listed for at least 2 months.  Then you may slowly introduce foods from bottom to top.       Caffeine: Causes constriction of blood vessels in the brain.  This can lead to rebound headaches.  Caffeine-withdrawal symptoms can reduce efficacy of migraine medications.  Recommend total elimination including decaf coffee.     Chocolate: contains 10mgcaffeine/oz and has other migraine-triggering chemicals.  White chocolate without cocoa is safe.     MSG: Hidden under multiple names such as hydrolized protein, yeast extract, natural flavorings, textured protein, whey protein, malt extract, maltodextrin, carrageenan, kombu, sodium or calcium caseinate, glutamic acid, gelatin, fermented or cultured items, etc.     Artificial Sweeteners: aka aspartame or Splenda, stevia is okay     Alcohol: Red wine most likely, vodka least likely.  High in Tyramine.       Foods High in Tyramine: Tyramine is produced in food from the natural breakdown of the amino acid tyrosine.  Levels increase when foods are aged, fermented, or are not fresh.   Processed meats and fish: aged, dried, salted or smoked - or preserved with nitrates or nitrites.  Examples are hot dogs, sausage, salami, packaged sandwich meats.   Nuts: Avoid all kinds including walnuts, pecans, almonds, and peanut butter. Seeds are okay.   Dairy: Avoid aged cheese such as Rudy, Roquefort, Brie, Gruyere, Cheddar, Rell, Mozzarella, Parmesan, Boursalt, Kruger, Provolone.  Limit yogurt, buttermilk and sour cream to ½ cup per day.   Vegetables: Avoid onions, sauerkraut, snow peas, pickles, olives and certain beans (broad, lima, sabrina, navy, and lentils)   Fruits: Avoid bananas avocado (guacamole), figs, raisins, dried fruit, papaya, passion fruit, red plums, raspberries, Limit citrus fruits to ½ cup per day: orange,  grapefruit, tangerine, pineapple, lemon and lime.     Notes   The migraine prevention program may not be maximally effective until you have been on it for at least 1 month. Caffeine withdrawal may be associated with temporarily increasing headaches.   In some cases, this dietary program alone may not adequately control migraine symptoms. In such cases, avoidance of certain other medications (e.g., birth control pills) and addition of migraine-preventive medication may be advisable.   Even if you take migraine medication, you should follow this program. Without this program, migraine-preventive medication may not work to its full potential.   You should strictly follow this program until your migraine symptoms are adequately controlled. Then, you may wish to 'experiment' with an item you have been avoiding, one item at a time, so that you can assess its individual effect on your symptoms. If eating or drinking an item is associated with recurrent symptoms, you should continue avoiding that item.     Migraine Supplements: option supplement options that help prevent migraine     Magnesium Glycinate   400-600mg Daily   or   Magnesium L-threonate   2000mg daily - penetrates blood brain barrier, better for brain fog symptoms     Riboflavin   400mg daily   Coenzyme Q10   150mg BID     Migraine References   Victory of Vestibular Migraine - By Shin C. Beh, MD.  Best book on vestibular migraine.  Very in depth dive for the vestibular migraine sufferer.

## 2023-01-26 NOTE — PROGRESS NOTES
Subjective:     Chief Complaint:   Chief Complaint   Patient presents with    Follow-up       Carlos Camarena is a 32 y.o. female who was referred to me by Dr. Glory Fink in consultation for vertigo.    Patient severe dizzy spells and headaches that started after her .  She has history of migraines. Dizzy spells last from seconds to minutes. No true vertigo. Reports problem keeping balance. Was admitted and neurological workup was performed including MRI/MRA.  On migraine medication with rescue meds. Still getting headaches daily. Headaches worse with scents and sounds.  No triggers for dizziness. No vertigo with head movement. No hearing changes.     There is not a prior history of sinonasal surgery.  She is not an active smoker.    Past Medical History  She has a past medical history of ADD (attention deficit disorder), Asthma, Chronic back pain, Fibromyalgia, and Moderate persistent asthma.    Past Surgical History  She has a past surgical history that includes Back surgery (); Injection of joint (Right, 2022); Spine surgery (14);  section (N/A, 2022); and  section (22).    Family History  Her family history includes Alcohol abuse in her mother; Anxiety disorder in her mother; Cancer in her mother; Depression in her mother; Diabetes in her sister; Glaucoma in her mother; Hypertension in her maternal grandmother; No Known Problems in her brother, father, maternal aunt, maternal grandfather, maternal uncle, paternal aunt, paternal grandfather, paternal grandmother, paternal uncle, and another family member; Ovarian cancer in her mother.    Social History  She reports that she has never smoked. She has never been exposed to tobacco smoke. She has never used smokeless tobacco. She reports current alcohol use of about 1.0 standard drink per week. She reports that she does not use drugs.    Allergies  She is allergic to lactose and gluten  "protein.    Medications  She has a current medication list which includes the following prescription(s): albuterol, cyanocobalamin, diphenhydramine, ferrous sulfate, advair diskus, ibuprofen, nifedipine, sumatriptan, and topiramate.    Review of Systems  Neg except for HPI     Objective:     BP 96/64 (BP Location: Left arm, Patient Position: Sitting, BP Method: Medium (Automatic))   Pulse 94   Ht 5' 6" (1.676 m)   Wt 103.9 kg (229 lb)   LMP 09/11/2021 (Exact Date)   BMI 36.96 kg/m²      Constitutional:   Vital signs are normal. She appears well-nourished. Normal speech.      Head:  Normocephalic and atraumatic. Salivary glands normal.  Facial strength is normal.      Ears:  Right ear hearing normal to normal and whispered voice; external ear normal without scars, lesions, or masses; ear canal, tympanic membrane, and middle ear normal. and left ear hearing normal to normal and whispered voice; external ear normal without scars, lesions, or masses; ear canal, tympanic membrane, and middle ear normal..     Nose:  No mucosal edema, rhinorrhea, nose lacerations, sinus tenderness or septal deviation. No epistaxis. Turbinates normal.      Mouth/Throat  Lips, teeth, and gums normal and oropharynx normal.     Neck:  Neck normal without thyromegaly masses, asymmetry, normal tracheal structure, crepitus, and tenderness, thyroid normal, trachea normal, phonation normal, full range of motion with neck supple and no adenopathy. No stridor present.     Pulmonary/Chest:   Effort normal. No stridor. No apnea, no tachypnea and no bradypnea. No respiratory distress.     Psychiatric:   She has a normal mood and affect. Her speech is normal and behavior is normal.     Neurological:   She has neurological normal, alert and oriented.     Procedure    None    Data Reviewed     MRI/CT reviewed:  Sinuses clear, small left max sinus non-obstructive retention cyst       Assessment:     1. Vertiginous migraine    2. Pre-eclampsia in " postpartum period          Plan:     I had a long discussion with the patient regarding her condition and the further workup and management options.  Discussed symptoms likely related to her migraine headaches. Discussed diet and supplemental options to help control migraine. Discussed continued follow up with neurology. Discussed option for audio/VNG if symptoms fail to improve. All questions answered and patient encouraged to call with any questions or concerns.

## 2023-03-13 PROBLEM — O13.3 GESTATIONAL HYPERTENSION, THIRD TRIMESTER: Status: RESOLVED | Noted: 2022-12-07 | Resolved: 2023-03-13

## 2023-04-03 ENCOUNTER — OFFICE VISIT (OUTPATIENT)
Dept: NEUROLOGY | Facility: CLINIC | Age: 33
End: 2023-04-03
Payer: COMMERCIAL

## 2023-04-03 VITALS
DIASTOLIC BLOOD PRESSURE: 83 MMHG | HEART RATE: 84 BPM | SYSTOLIC BLOOD PRESSURE: 121 MMHG | WEIGHT: 238.19 LBS | BODY MASS INDEX: 38.45 KG/M2

## 2023-04-03 DIAGNOSIS — G43.909 MIGRAINE WITHOUT STATUS MIGRAINOSUS, NOT INTRACTABLE, UNSPECIFIED MIGRAINE TYPE: Primary | ICD-10-CM

## 2023-04-03 PROCEDURE — 3079F PR MOST RECENT DIASTOLIC BLOOD PRESSURE 80-89 MM HG: ICD-10-PCS | Mod: CPTII,S$GLB,, | Performed by: STUDENT IN AN ORGANIZED HEALTH CARE EDUCATION/TRAINING PROGRAM

## 2023-04-03 PROCEDURE — 3074F SYST BP LT 130 MM HG: CPT | Mod: CPTII,S$GLB,, | Performed by: STUDENT IN AN ORGANIZED HEALTH CARE EDUCATION/TRAINING PROGRAM

## 2023-04-03 PROCEDURE — 3008F BODY MASS INDEX DOCD: CPT | Mod: CPTII,S$GLB,, | Performed by: STUDENT IN AN ORGANIZED HEALTH CARE EDUCATION/TRAINING PROGRAM

## 2023-04-03 PROCEDURE — 99214 OFFICE O/P EST MOD 30 MIN: CPT | Mod: S$GLB,,, | Performed by: STUDENT IN AN ORGANIZED HEALTH CARE EDUCATION/TRAINING PROGRAM

## 2023-04-03 PROCEDURE — 99214 PR OFFICE/OUTPT VISIT, EST, LEVL IV, 30-39 MIN: ICD-10-PCS | Mod: S$GLB,,, | Performed by: STUDENT IN AN ORGANIZED HEALTH CARE EDUCATION/TRAINING PROGRAM

## 2023-04-03 PROCEDURE — 99999 PR PBB SHADOW E&M-EST. PATIENT-LVL III: ICD-10-PCS | Mod: PBBFAC,,, | Performed by: STUDENT IN AN ORGANIZED HEALTH CARE EDUCATION/TRAINING PROGRAM

## 2023-04-03 PROCEDURE — 3079F DIAST BP 80-89 MM HG: CPT | Mod: CPTII,S$GLB,, | Performed by: STUDENT IN AN ORGANIZED HEALTH CARE EDUCATION/TRAINING PROGRAM

## 2023-04-03 PROCEDURE — 3074F PR MOST RECENT SYSTOLIC BLOOD PRESSURE < 130 MM HG: ICD-10-PCS | Mod: CPTII,S$GLB,, | Performed by: STUDENT IN AN ORGANIZED HEALTH CARE EDUCATION/TRAINING PROGRAM

## 2023-04-03 PROCEDURE — 99999 PR PBB SHADOW E&M-EST. PATIENT-LVL III: CPT | Mod: PBBFAC,,, | Performed by: STUDENT IN AN ORGANIZED HEALTH CARE EDUCATION/TRAINING PROGRAM

## 2023-04-03 PROCEDURE — 3008F PR BODY MASS INDEX (BMI) DOCUMENTED: ICD-10-PCS | Mod: CPTII,S$GLB,, | Performed by: STUDENT IN AN ORGANIZED HEALTH CARE EDUCATION/TRAINING PROGRAM

## 2023-04-03 RX ORDER — TOPIRAMATE 50 MG/1
50 TABLET, FILM COATED ORAL 2 TIMES DAILY
Qty: 60 TABLET | Refills: 3 | Status: SHIPPED | OUTPATIENT
Start: 2023-04-03 | End: 2023-08-16

## 2023-04-03 NOTE — PROGRESS NOTES
[facility] - NEUROLOGY [address]TH [address] [facility], [place]    Date: 4/3/23  Patient Name: Carlos Camarena   MRN: [id number]   PCP: Azalea Mireles  Referring Provider: No ref. provider found    Assessment:   Carlos Camarena is a 33 y.o. female with history of migraines presenting for headaches. Has had some improvement with starting topiramate 50 mg QD. Headache remains the same in character, right-sided with photophobia and phonophobia consistent with migraine. No autonomic symptoms. Currently breastfeeding.    - increase topiramate to 50 mg BID for headache prevention  - continue PRN sumatriptan 50 mg, can take second dose 2-3 hours later, max 3 days per week  - in future can consider venlafaxine as headache preventive if needed given associated dizziness for vestibular migraine    To follow up in 3 months.    Plan:     Problem List Items Addressed This Visit          Neuro    Migraine - Primary (Chronic)    Relevant Medications    topiramate (TOPAMAX) 50 MG tablet     Chacha Blackwell MD    Subjective:   Patient seen in consultation for the evaluation of headaches.      HPI 1/11/23:   Ms. Carlos Camarena is a 33 y.o. female with PMH ADD, depression, migraines, chronic R low back pain with sciatica, s/p delivery via C section 12/8/22, postpartum preeclampsia presenting for headaches. These headaches started after her C section. Located on right face and neck, sharp and throbbing, usually 6/10 and can be up to 10/10. Can last for several hours to several days at a time. Was admitted for a weeklong headache in December. Associated with photophobia and phonophobia. No associated nausea, eye redness, or facial redness. Taking sumatriptan PRN, cyclobenzaprine, Tylenol, vitamin B12. Headaches made worse by sleep deprivation, bright lights, strong scents.    Had history of migraines prior to delivery although were less severe, was having one a month prior to pregnancy. Was holocephalic without neck pain.  Previously took topiramate (not recently).     She was admitted 22-23 for preeclampsia, received magnesium. Was admitted -23 for headache with right facial droop and diplopia. Concern for TIA vs hemiplegic migraine at that time. Ophthalmology evaluated with no papilledema on fundoscopy. MRI w/wo contrast ordered, however was unable to complete post-contrast sequences, no abnormality noted.     Not currently working outside the home.    Interval History 4/3/23:  Evaluated by ENT in 2023 for dizzy spells, thought to be related to migraines.    Headaches are less severe after starting topiramate, 5-6/10 typically, at worst 8/10. Gets at least a brief headache daily. Has a severe headache >4 hours about once per week. Taking topiramate 50 mg QD, no missed doses. Takes sumatriptan sparingly (once per month) due to concern about her breastmilk supply. Continues to take nifedipine for BP although misses doses sometimes. Continues to have occasional dizziness, but this has improved with her headaches. Has not noticed side effects from topiramate.    PAST MEDICAL HISTORY:  Past Medical History:   Diagnosis Date    ADD (attention deficit disorder)     Asthma     Chronic back pain     Fibromyalgia     Migraine     Moderate persistent asthma        PAST SURGICAL HISTORY:  Past Surgical History:   Procedure Laterality Date    BACK SURGERY       SECTION N/A 2022    Procedure:  SECTION;  Surgeon: Glory Fink MD;  Location: [facility] L&D;  Service: OB/GYN;  Laterality: N/A;     SECTION  22    INJECTION OF JOINT Right 2022    Procedure: INJECTION, JOINT RIGHT SI NEEDS CONSENT;  Surgeon: Cristopher Simon MD;  Location: [facility] PAIN MGT;  Service: Pain Management;  Laterality: Right;    SPINE SURGERY  14       CURRENT MEDS:  Current Outpatient Medications   Medication Sig Dispense Refill    albuterol (VENTOLIN HFA) 90 mcg/actuation inhaler Inhale 2 puffs into the lungs  every 6 (six) hours as needed for Wheezing. Rescue 18 g 0    diphenhydrAMINE (BENADRYL) 50 MG capsule Take 1 capsule (50 mg total) by mouth every 6 (six) hours as needed (headaches). 30 capsule 2    ferrous sulfate 325 (65 FE) MG EC tablet Take 1 tablet (325 mg total) by mouth 2 (two) times daily. 30 tablet 11    fluticasone-salmeterol 500-50 mcg/dose (ADVAIR DISKUS) 500-50 mcg/dose DsDv diskus inhaler Inhale 1 puff into the lungs 2 (two) times daily. Controller 60 each 11    ibuprofen (ADVIL,MOTRIN) 600 MG tablet Take 1 tablet (600 mg total) by mouth every 6 (six) hours. 60 tablet 0    NIFEdipine (PROCARDIA-XL) 30 MG (OSM) 24 hr tablet Take 1 tablet (30 mg total) by mouth once daily. 30 tablet 11    sumatriptan (IMITREX) 50 MG tablet Take 1 tablet (50 mg total) by mouth 2 (two) times daily as needed for Migraine. Take 1 tablet at onset of severe headache. Can take another tablet in 2-3 hours if needed. Max 3 days per week. 60 tablet 2    topiramate (TOPAMAX) 50 MG tablet Take 1 tablet (50 mg total) by mouth 2 (two) times daily. 60 tablet 3     No current facility-administered medications for this visit.       ALLERGIES:  Review of patient's allergies indicates:   Allergen Reactions    Lactose Diarrhea    Gluten protein Itching     Inflammation, bloating, diarrhea and pain all over body         FAMILY HISTORY:  Family History   Problem Relation Age of Onset    Depression Mother     Anxiety disorder Mother     Ovarian cancer Mother     Alcohol abuse Mother     Cancer Mother     Glaucoma Mother     No Known Problems Father     Diabetes Sister     No Known Problems Brother     No Known Problems Maternal Aunt     No Known Problems Maternal Uncle     No Known Problems Paternal Aunt     No Known Problems Paternal Uncle     Hypertension Maternal Grandmother     No Known Problems Maternal Grandfather     No Known Problems Paternal Grandmother     No Known Problems Paternal Grandfather     No Known Problems Other     Breast  cancer Neg Hx     Colon cancer Neg Hx        SOCIAL HISTORY:  Social History     Tobacco Use    Smoking status: Never     Passive exposure: Never    Smokeless tobacco: Never   Substance Use Topics    Alcohol use: Yes     Alcohol/week: 1.0 standard drink     Types: 1 Glasses of wine per week     Comment: 1-3 drinks every 2 weeks    Drug use: No       Review of Systems:  12 system review of systems is negative except for the symptoms mentioned in HPI.      Objective:     Vitals:    04/03/23 1541   BP: 121/83   Pulse: 84   Weight: 108 kg (238 lb 3.3 oz)       General: NAD, well nourished   Eyes: no tearing, discharge, no erythema   ENT: moist mucous membranes of the oral cavity, nares patent    Neck: Supple, full range of motion  Cardiovascular: Warm and well perfused, pulses equal and symmetrical  Lungs: Normal work of breathing, normal chest wall excursions  Skin: No rash, lesions, or breakdown on exposed skin  Psychiatry: Mood and affect are appropriate   Abdomen: soft, non tender, non distended  Extremities: No cyanosis, clubbing or edema.    Neurological   MENTAL STATUS: Alert and oriented to person, place, and time. Attention and concentration within normal limits. Speech without dysarthria, able to name and repeat without difficulty. Recent and remote memory within normal limits   CRANIAL NERVES: Visual fields intact. PERRL. EOMI. Facial sensation intact. Face symmetrical. Hearing grossly intact. Full shoulder shrug bilaterally. Tongue protrudes midline   SENSORY: Sensation is intact to light touch throughout.    MOTOR: Normal bulk and tone. No pronator drift.  5/5 shoulder abduction, elbow flexion/extension, finger abduction bilaterally. 5/5 hip flexion, knee extension/flexion, foot dorsiflexion bilaterally.    REFLEXES: Symmetric and 2+ throughout.   CEREBELLAR/COORDINATION/GAIT: Gait steady with normal arm swing and stride length. Finger to nose intact.

## 2023-04-10 PROBLEM — G45.9 TIA (TRANSIENT ISCHEMIC ATTACK): Status: RESOLVED | Noted: 2023-01-04 | Resolved: 2023-04-10

## 2023-04-20 ENCOUNTER — PATIENT MESSAGE (OUTPATIENT)
Dept: OBSTETRICS AND GYNECOLOGY | Facility: CLINIC | Age: 33
End: 2023-04-20
Payer: COMMERCIAL

## 2023-04-20 RX ORDER — FLUCONAZOLE 150 MG/1
TABLET ORAL
Qty: 2 TABLET | Refills: 1 | Status: SHIPPED | OUTPATIENT
Start: 2023-04-20 | End: 2023-08-16

## 2023-05-15 ENCOUNTER — HOSPITAL ENCOUNTER (EMERGENCY)
Facility: HOSPITAL | Age: 33
Discharge: HOME | End: 2023-05-16
Attending: STUDENT IN AN ORGANIZED HEALTH CARE EDUCATION/TRAINING PROGRAM
Payer: COMMERCIAL

## 2023-05-15 ENCOUNTER — PATIENT MESSAGE (OUTPATIENT)
Dept: RHEUMATOLOGY | Facility: CLINIC | Age: 33
End: 2023-05-15
Payer: COMMERCIAL

## 2023-05-15 ENCOUNTER — NURSE TRIAGE (OUTPATIENT)
Dept: ADMINISTRATIVE | Facility: CLINIC | Age: 33
End: 2023-05-15
Payer: COMMERCIAL

## 2023-05-15 ENCOUNTER — PATIENT MESSAGE (OUTPATIENT)
Dept: NEUROLOGY | Facility: CLINIC | Age: 33
End: 2023-05-15
Payer: COMMERCIAL

## 2023-05-15 DIAGNOSIS — G43.401 HEMIPLEGIC MIGRAINE WITH STATUS MIGRAINOSUS, NOT INTRACTABLE: Primary | ICD-10-CM

## 2023-05-15 LAB
ANION GAP SERPL CALC-SCNC: 8 MMOL/L (ref 8–16)
B-HCG UR QL: NEGATIVE
BASOPHILS # BLD AUTO: 0.02 K/UL (ref 0–0.2)
BASOPHILS NFR BLD: 0.3 % (ref 0–1.9)
BILIRUB UR QL STRIP: NEGATIVE
BUN SERPL-MCNC: 12 MG/DL (ref 6–20)
CALCIUM SERPL-MCNC: 9.6 MG/DL (ref 8.7–10.5)
CHLORIDE SERPL-SCNC: 107 MMOL/L (ref 95–110)
CLARITY UR REFRACT.AUTO: CLEAR
CO2 SERPL-SCNC: 22 MMOL/L (ref 23–29)
COLOR UR AUTO: COLORLESS
CREAT SERPL-MCNC: 0.7 MG/DL (ref 0.5–1.4)
CTP QC/QA: YES
DIFFERENTIAL METHOD: ABNORMAL
EOSINOPHIL # BLD AUTO: 0.1 K/UL (ref 0–0.5)
EOSINOPHIL NFR BLD: 1.8 % (ref 0–8)
ERYTHROCYTE [DISTWIDTH] IN BLOOD BY AUTOMATED COUNT: 13 % (ref 11.5–14.5)
EST. GFR  (NO RACE VARIABLE): >60 ML/MIN/1.73 M^2
GLUCOSE SERPL-MCNC: 116 MG/DL (ref 70–110)
GLUCOSE UR QL STRIP: NEGATIVE
HCT VFR BLD AUTO: 35.6 % (ref 37–48.5)
HGB BLD-MCNC: 12 G/DL (ref 12–16)
HGB UR QL STRIP: NEGATIVE
IMM GRANULOCYTES # BLD AUTO: 0.01 K/UL (ref 0–0.04)
IMM GRANULOCYTES NFR BLD AUTO: 0.1 % (ref 0–0.5)
KETONES UR QL STRIP: NEGATIVE
LEUKOCYTE ESTERASE UR QL STRIP: NEGATIVE
LYMPHOCYTES # BLD AUTO: 1.7 K/UL (ref 1–4.8)
LYMPHOCYTES NFR BLD: 23.7 % (ref 18–48)
MCH RBC QN AUTO: 28.5 PG (ref 27–31)
MCHC RBC AUTO-ENTMCNC: 33.7 G/DL (ref 32–36)
MCV RBC AUTO: 85 FL (ref 82–98)
MONOCYTES # BLD AUTO: 0.5 K/UL (ref 0.3–1)
MONOCYTES NFR BLD: 6.9 % (ref 4–15)
NEUTROPHILS # BLD AUTO: 4.8 K/UL (ref 1.8–7.7)
NEUTROPHILS NFR BLD: 67.2 % (ref 38–73)
NITRITE UR QL STRIP: NEGATIVE
NRBC BLD-RTO: 0 /100 WBC
PH UR STRIP: 7 [PH] (ref 5–8)
PLATELET # BLD AUTO: 310 K/UL (ref 150–450)
PMV BLD AUTO: 10.4 FL (ref 9.2–12.9)
POTASSIUM SERPL-SCNC: 3.9 MMOL/L (ref 3.5–5.1)
PROT UR QL STRIP: NEGATIVE
RBC # BLD AUTO: 4.21 M/UL (ref 4–5.4)
SODIUM SERPL-SCNC: 137 MMOL/L (ref 136–145)
SP GR UR STRIP: 1 (ref 1–1.03)
URN SPEC COLLECT METH UR: ABNORMAL
WBC # BLD AUTO: 7.12 K/UL (ref 3.9–12.7)

## 2023-05-15 PROCEDURE — 63600175 PHARM REV CODE 636 W HCPCS: Performed by: EMERGENCY MEDICINE

## 2023-05-15 PROCEDURE — 80048 BASIC METABOLIC PNL TOTAL CA: CPT | Performed by: EMERGENCY MEDICINE

## 2023-05-15 PROCEDURE — 25000003 PHARM REV CODE 250: Performed by: EMERGENCY MEDICINE

## 2023-05-15 PROCEDURE — 99284 EMERGENCY DEPT VISIT MOD MDM: CPT | Mod: GC,,, | Performed by: STUDENT IN AN ORGANIZED HEALTH CARE EDUCATION/TRAINING PROGRAM

## 2023-05-15 PROCEDURE — 96375 TX/PRO/DX INJ NEW DRUG ADDON: CPT

## 2023-05-15 PROCEDURE — 99284 PR EMERGENCY DEPT VISIT,LEVEL IV: ICD-10-PCS | Mod: GC,,, | Performed by: STUDENT IN AN ORGANIZED HEALTH CARE EDUCATION/TRAINING PROGRAM

## 2023-05-15 PROCEDURE — 25000003 PHARM REV CODE 250: Performed by: STUDENT IN AN ORGANIZED HEALTH CARE EDUCATION/TRAINING PROGRAM

## 2023-05-15 PROCEDURE — 63600175 PHARM REV CODE 636 W HCPCS: Performed by: STUDENT IN AN ORGANIZED HEALTH CARE EDUCATION/TRAINING PROGRAM

## 2023-05-15 PROCEDURE — 81003 URINALYSIS AUTO W/O SCOPE: CPT | Performed by: EMERGENCY MEDICINE

## 2023-05-15 PROCEDURE — 96374 THER/PROPH/DIAG INJ IV PUSH: CPT

## 2023-05-15 PROCEDURE — 85025 COMPLETE CBC W/AUTO DIFF WBC: CPT | Performed by: EMERGENCY MEDICINE

## 2023-05-15 PROCEDURE — 96372 THER/PROPH/DIAG INJ SC/IM: CPT | Performed by: STUDENT IN AN ORGANIZED HEALTH CARE EDUCATION/TRAINING PROGRAM

## 2023-05-15 PROCEDURE — 99284 EMERGENCY DEPT VISIT MOD MDM: CPT | Mod: 25

## 2023-05-15 PROCEDURE — 81025 URINE PREGNANCY TEST: CPT | Performed by: EMERGENCY MEDICINE

## 2023-05-15 PROCEDURE — 96361 HYDRATE IV INFUSION ADD-ON: CPT

## 2023-05-15 RX ORDER — KETOROLAC TROMETHAMINE 30 MG/ML
10 INJECTION, SOLUTION INTRAMUSCULAR; INTRAVENOUS
Status: COMPLETED | OUTPATIENT
Start: 2023-05-15 | End: 2023-05-15

## 2023-05-15 RX ORDER — MAGNESIUM SULFATE HEPTAHYDRATE 40 MG/ML
2 INJECTION, SOLUTION INTRAVENOUS
Status: COMPLETED | OUTPATIENT
Start: 2023-05-15 | End: 2023-05-16

## 2023-05-15 RX ORDER — SUMATRIPTAN 6 MG/.5ML
6 INJECTION, SOLUTION SUBCUTANEOUS
Status: COMPLETED | OUTPATIENT
Start: 2023-05-15 | End: 2023-05-15

## 2023-05-15 RX ORDER — ACETAMINOPHEN 500 MG
1000 TABLET ORAL
Status: COMPLETED | OUTPATIENT
Start: 2023-05-15 | End: 2023-05-15

## 2023-05-15 RX ORDER — BUTALBITAL, ACETAMINOPHEN AND CAFFEINE 50; 325; 40 MG/1; MG/1; MG/1
1 TABLET ORAL
Status: COMPLETED | OUTPATIENT
Start: 2023-05-15 | End: 2023-05-15

## 2023-05-15 RX ORDER — METOCLOPRAMIDE HYDROCHLORIDE 5 MG/ML
10 INJECTION INTRAMUSCULAR; INTRAVENOUS
Status: COMPLETED | OUTPATIENT
Start: 2023-05-15 | End: 2023-05-15

## 2023-05-15 RX ORDER — DEXAMETHASONE SODIUM PHOSPHATE 4 MG/ML
10 INJECTION, SOLUTION INTRA-ARTICULAR; INTRALESIONAL; INTRAMUSCULAR; INTRAVENOUS; SOFT TISSUE
Status: COMPLETED | OUTPATIENT
Start: 2023-05-15 | End: 2023-05-15

## 2023-05-15 RX ADMIN — BUTALBITAL, ACETAMINOPHEN, AND CAFFEINE 1 TABLET: 325; 50; 40 TABLET ORAL at 09:05

## 2023-05-15 RX ADMIN — SUMATRIPTAN 6 MG: 6 INJECTION, SOLUTION SUBCUTANEOUS at 11:05

## 2023-05-15 RX ADMIN — MAGNESIUM SULFATE 2 G: 2 INJECTION INTRAVENOUS at 11:05

## 2023-05-15 RX ADMIN — SODIUM CHLORIDE 1000 ML: 9 INJECTION, SOLUTION INTRAVENOUS at 08:05

## 2023-05-15 RX ADMIN — ACETAMINOPHEN 1000 MG: 500 TABLET ORAL at 08:05

## 2023-05-15 RX ADMIN — METOCLOPRAMIDE 10 MG: 5 INJECTION, SOLUTION INTRAMUSCULAR; INTRAVENOUS at 08:05

## 2023-05-15 RX ADMIN — KETOROLAC TROMETHAMINE 10 MG: 30 INJECTION, SOLUTION INTRAMUSCULAR; INTRAVENOUS at 09:05

## 2023-05-15 RX ADMIN — DEXAMETHASONE SODIUM PHOSPHATE 10 MG: 4 INJECTION INTRA-ARTICULAR; INTRALESIONAL; INTRAMUSCULAR; INTRAVENOUS; SOFT TISSUE at 08:05

## 2023-05-15 NOTE — TELEPHONE ENCOUNTER
"Pt calling states that since her daughter was born 5 months ago she has been having problems. Reports currently she "spinning and stumbling when walking" reports she cannot walk normally r/t slumping on her R side and tingling to her R leg and arm. Reports that this happened before but had gone away and is now back. Per protocol advised to CALL 911 NOW. Pt states she wasn't calling 911 but understands dispo and would go to ED. Reinforced dispo. verbalized understanding. Denies any further questions or concerns at this time, advised to call back if they have any that come up. Advised pt to call back with any other concerns or worsening symptoms. Verbalized understanding and will route message to provider.     Reason for Disposition   [1] SEVERE weakness (i.e., unable to walk or barely able to walk, requires support) AND [2] new-onset or worsening    Protocols used: Neurologic Deficit-A-AH    "

## 2023-05-16 VITALS
OXYGEN SATURATION: 96 % | DIASTOLIC BLOOD PRESSURE: 65 MMHG | WEIGHT: 245 LBS | TEMPERATURE: 98 F | BODY MASS INDEX: 39.37 KG/M2 | SYSTOLIC BLOOD PRESSURE: 133 MMHG | HEIGHT: 66 IN | HEART RATE: 74 BPM | RESPIRATION RATE: 16 BRPM

## 2023-05-16 NOTE — DISCHARGE INSTRUCTIONS
Please return to the ER if you develop: fevers (temperature 100.4 and greater), return of your weakness, or any falls associated with dizziness.

## 2023-05-16 NOTE — ED NOTES
Carlos Camarena, a 33 y.o. female presents to the ED w/ complaint of right sided numbness since last Saturday 5/13. 5 months post partum.     Triage note:  Chief Complaint   Patient presents with    Extremity Weakness     Headache/difficulty walking with right sided numbness since sat     Review of patient's allergies indicates:   Allergen Reactions    Lactose Diarrhea    Gluten protein Itching     Inflammation, bloating, diarrhea and pain all over body       Past Medical History:   Diagnosis Date    ADD (attention deficit disorder)     Asthma     Chronic back pain     Fibromyalgia     Migraine     Moderate persistent asthma

## 2023-05-16 NOTE — FIRST PROVIDER EVALUATION
"Medical screening examination initiated.  I have conducted a focused provider triage encounter, findings are as follows:    Brief history of present illness:      Slumping to the side, difficulty walking, headache, spinning started a couple of days ago  Gave birth 5 months ago  Hx of migraine headache, today feels similar to the episode in January    Also associated pain right side of the body    Chart review: hx of vertiginous migraine    Vitals:    05/15/23 1945   BP: (!) 143/91   Pulse: 78   Resp: 14   Temp: 98.2 °F (36.8 °C)   SpO2: 100%   Weight: 111.1 kg (245 lb)   Height: 5' 6" (1.676 m)       Pertinent physical exam:  aaox3,normal speech, face symmetric, gait normal    Brief workup plan:  reglan, tylenol,dexamethasone, IVF, UA, UPT, basic labs    Preliminary workup initiated; this workup will be continued and followed by the physician or advanced practice provider that is assigned to the patient when roomed.  "

## 2023-05-16 NOTE — PROVIDER PROGRESS NOTES - EMERGENCY DEPT.
Patient signed out to me pending reassessment after she completed magnesium for headaches.  Her vitals were stable.  Her gait was stable.  Patient discharged home in accordance with off going team's plan.

## 2023-05-16 NOTE — ED PROVIDER NOTES
Encounter Date: 5/15/2023       History     Chief Complaint   Patient presents with    Extremity Weakness     Headache/difficulty walking with right sided numbness since sat     Patient is a 33-year-old female with a PMHx of asthma, ADD and hemiplegic migraines presenting to the ED for complaints of a migraine. She reports she has been having migraine like headaches for the past week. She then developed right sided numbness/weakness with difficulty walking secondary to vertigo 2 days ago, which she says is typical for her hemiplegic migraines, which she initially developed in her postpartum period 5 months ago. Typically Imitrex works for her but has been giving her minimal relief for the past week. No associated fevers. There are no changes to presentation. No associated falls.    Review of patient's allergies indicates:   Allergen Reactions    Lactose Diarrhea    Gluten protein Itching     Inflammation, bloating, diarrhea and pain all over body       Past Medical History:   Diagnosis Date    ADD (attention deficit disorder)     Asthma     Chronic back pain     Fibromyalgia     Migraine     Moderate persistent asthma      Past Surgical History:   Procedure Laterality Date    BACK SURGERY       SECTION N/A 2022    Procedure:  SECTION;  Surgeon: Glory Fink MD;  Location: [facility] L&D;  Service: OB/GYN;  Laterality: N/A;     SECTION  22    INJECTION OF JOINT Right 2022    Procedure: INJECTION, JOINT RIGHT SI NEEDS CONSENT;  Surgeon: Cristopher Simon MD;  Location: [facility] PAIN T;  Service: Pain Management;  Laterality: Right;    SPINE SURGERY  14     Family History   Problem Relation Age of Onset    Depression Mother     Anxiety disorder Mother     Ovarian cancer Mother     Alcohol abuse Mother     Cancer Mother     Glaucoma Mother     No Known Problems Father     Diabetes Sister     No Known Problems Brother     No Known Problems Maternal Aunt     No Known Problems  Maternal Uncle     No Known Problems Paternal Aunt     No Known Problems Paternal Uncle     Hypertension Maternal Grandmother     No Known Problems Maternal Grandfather     No Known Problems Paternal Grandmother     No Known Problems Paternal Grandfather     No Known Problems Other     Breast cancer Neg Hx     Colon cancer Neg Hx      Social History     Tobacco Use    Smoking status: Never     Passive exposure: Never    Smokeless tobacco: Never   Substance Use Topics    Alcohol use: Yes     Alcohol/week: 1.0 standard drink     Types: 1 Glasses of wine per week     Comment: 1-3 drinks every 2 weeks    Drug use: No     Review of Systems    Physical Exam     Initial Vitals [05/15/23 1945]   BP Pulse Resp Temp SpO2   (!) 143/91 78 14 98.2 °F (36.8 °C) 100 %      MAP       --         Physical Exam    Nursing note and vitals reviewed.  Constitutional: She appears well-developed and well-nourished. She is not diaphoretic. No distress.   Well-appearing.  Speaking full sentences.  No acute distress.   HENT:   Head: Normocephalic and atraumatic.   Right Ear: External ear normal.   Left Ear: External ear normal.   Neck: Neck supple.   Cardiovascular:  Normal rate, regular rhythm, normal heart sounds and intact distal pulses.           Pulmonary/Chest: Breath sounds normal. No respiratory distress. She has no wheezes. She has no rhonchi. She has no rales.   Abdominal: Abdomen is soft. She exhibits no distension. There is no abdominal tenderness. There is no rebound and no guarding.   Musculoskeletal:      Cervical back: Neck supple.     Neurological: She is alert and oriented to person, place, and time. GCS score is 15. GCS eye subscore is 4. GCS verbal subscore is 5. GCS motor subscore is 6.   Cranial nerves 2-12 intact.  Intact motor/strength and sensation to upper and lower extremities bilaterally.  Observed ambulating in the ED without difficulty.   Skin: Skin is warm. Capillary refill takes less than 2 seconds. No rash  noted.   Psychiatric: She has a normal mood and affect.       ED Course   Procedures  Labs Reviewed   URINALYSIS, REFLEX TO URINE CULTURE - Abnormal; Notable for the following components:       Result Value    Color, UA Colorless (*)     All other components within normal limits    Narrative:     Specimen Source->Urine   CBC W/ AUTO DIFFERENTIAL - Abnormal; Notable for the following components:    Hematocrit 35.6 (*)     All other components within normal limits   BASIC METABOLIC PANEL - Abnormal; Notable for the following components:    CO2 22 (*)     Glucose 116 (*)     All other components within normal limits   POCT URINE PREGNANCY          Imaging Results    None          Medications   SUMAtriptan succinate injection 6 mg (has no administration in time range)   sodium chloride 0.9% bolus 1,000 mL 1,000 mL (0 mLs Intravenous Stopped 5/15/23 2145)   metoclopramide HCl injection 10 mg (10 mg Intravenous Given 5/15/23 2014)   acetaminophen tablet 1,000 mg (1,000 mg Oral Given 5/15/23 2010)   dexAMETHasone injection 10 mg (10 mg Intravenous Given 5/15/23 2018)   ketorolac injection 9.999 mg (9.999 mg Intravenous Given 5/15/23 2144)   butalbital-acetaminophen-caffeine -40 mg per tablet 1 tablet (1 tablet Oral Given 5/15/23 2144)     Medical Decision Making:   History:   Old Medical Records: I decided to obtain old medical records.  Initial Assessment:   Emergent evaluation of migraine headaches.  She is afebrile and hemodynamically stable.  Differential Diagnosis:   Atypical/hemiplegic migraine, status migrainosus, electrolyte abnormality, unlikely CVA  Clinical Tests:   Lab Tests: Ordered and Reviewed  Radiological Study: Ordered and Reviewed  Medical Tests: Ordered and Reviewed  ED Management:  Presentation consistent with prior hemiplegic migraines. Labs unremarkable. Given migraine cocktail with minimal relief. She was observed ambulating in the ER and continues to appear well on multiple reassessments. I  do not have suspicion for CVA and imaging is not warranted at this time. Will plan to discharge after IV magnesium. Encouraged to continue follow up with neurology for her vertiginous migraines. Discussed ED return precautions, and she expressed understanding.                        Clinical Impression:   Final diagnoses:  [G43.401] Hemiplegic migraine with status migrainosus, not intractable (Primary)        ED Disposition Condition    Discharge Stable          ED Prescriptions    None       Follow-up Information    None          Manolo Castorena MD  Resident  05/16/23 0019

## 2023-05-16 NOTE — PROVIDER PROGRESS NOTES - EMERGENCY DEPT.
Signout Note  I received signout from the previous providers.     Chief complaint:  Extremity Weakness (Headache/difficulty walking with right sided numbness since sat)    Pertinent History, ED course, Disposition:    Per sign out and chart review: Carlos Camarena is a 33 y.o. female with pertinent PMH of ***     ***,who presented to emergency department with complaint of ***    Pt signed out to me pending: ***    Update/ Disposition: ***    ***Patient,caregiver and/or family understands the plan and verbalized agreement. All questions answered.     Diagnostic Impression:    1. Hemiplegic migraine with status migrainosus, not intractable         ED Disposition Condition    Discharge Stable          ED Prescriptions    None       Follow-up Information    None                    Vitals:    05/15/23 1945   BP: (!) 143/91   Pulse: 78   Resp: 14   Temp: 98.2 °F (36.8 °C)       Labs Reviewed   URINALYSIS, REFLEX TO URINE CULTURE - Abnormal; Notable for the following components:       Result Value    Color, UA Colorless (*)     All other components within normal limits    Narrative:     Specimen Source->Urine   CBC W/ AUTO DIFFERENTIAL - Abnormal; Notable for the following components:    Hematocrit 35.6 (*)     All other components within normal limits   BASIC METABOLIC PANEL - Abnormal; Notable for the following components:    CO2 22 (*)     Glucose 116 (*)     All other components within normal limits   POCT URINE PREGNANCY       Imaging Studies:    No orders to display       Medications Given:  Medications   magnesium sulfate 2g in water 50mL IVPB (premix) (2 g Intravenous New Bag 5/15/23 0548)   sodium chloride 0.9% bolus 1,000 mL 1,000 mL (0 mLs Intravenous Stopped 5/15/23 2145)   metoclopramide HCl injection 10 mg (10 mg Intravenous Given 5/15/23 2014)   acetaminophen tablet 1,000 mg (1,000 mg Oral Given 5/15/23 2010)   dexAMETHasone injection 10 mg (10 mg Intravenous Given 5/15/23 2018)   ketorolac injection 9.999 mg  (9.999 mg Intravenous Given 5/15/23 2144)   butalbital-acetaminophen-caffeine -40 mg per tablet 1 tablet (1 tablet Oral Given 5/15/23 2144)   SUMAtriptan succinate injection 6 mg (6 mg Subcutaneous Given 5/15/23 2306)                Please note that this dictation was completed with computer voice recognition software.  Quite often unanticipated grammatical, syntax, homophones, and other interpretive errors are inadvertently transcribed by the computer software.  Please disregard these errors.  Please excuse any errors that have escaped final proofreading.

## 2023-08-10 ENCOUNTER — PATIENT MESSAGE (OUTPATIENT)
Dept: OBSTETRICS AND GYNECOLOGY | Facility: CLINIC | Age: 33
End: 2023-08-10
Payer: COMMERCIAL

## 2023-08-14 ENCOUNTER — TELEPHONE (OUTPATIENT)
Dept: NEUROLOGY | Facility: CLINIC | Age: 33
End: 2023-08-14
Payer: COMMERCIAL

## 2023-08-14 NOTE — TELEPHONE ENCOUNTER
Spoke with pt in regards to rescheduling. She's having car troubles, which is why she requested virtual. After explaining that Dr. Blackwell can only be seen in person, we settled to re-schedule her follow up for 9/26/23 @ 3:00 pm.

## 2023-08-14 NOTE — TELEPHONE ENCOUNTER
----- Message from Stanley Murphy MA sent at 8/14/2023  2:46 PM CDT -----  Regarding: FW: virtual appt  Contact: pt  Reschedule patient to another day  ----- Message -----  From: Vaishali Gonzalez  Sent: 8/14/2023   2:41 PM CDT  To: [facility] Staff  Subject: virtual appt                                     Pt calling to get an virtual appt for today       Confirmed patient's contact info below:  Contact Name: Carlos Camarena  Phone Number: [phone]

## 2023-08-16 ENCOUNTER — CLINICAL SUPPORT (OUTPATIENT)
Dept: OBSTETRICS AND GYNECOLOGY | Facility: CLINIC | Age: 33
End: 2023-08-16
Payer: COMMERCIAL

## 2023-08-16 DIAGNOSIS — N91.2 AMENORRHEA: Primary | ICD-10-CM

## 2023-08-16 NOTE — PROGRESS NOTES
Spoke with patient for a total of 40 minutes.  Updated chart to reflect up to date patient demographics.  Medication, pharmacy, and family history updated.  Patient was guided through expectations of OB/GYN care throughout history of pregnancy.  Pregnancy confirmation, dating u/s & first routine OB appts scheduled for the pregnancy.  Education provided & questions answered. Encouraged to send message or call office if any concerns.     Patient is requesting the following items be discussed during next visit:    Taking PNV  Hx of migraine-stopped taking meds when +upt  Previous pt of Aleks

## 2023-08-22 ENCOUNTER — PATIENT MESSAGE (OUTPATIENT)
Dept: OBSTETRICS AND GYNECOLOGY | Facility: CLINIC | Age: 33
End: 2023-08-22
Payer: COMMERCIAL

## 2023-08-28 ENCOUNTER — PROCEDURE VISIT (OUTPATIENT)
Dept: MATERNAL FETAL MEDICINE | Facility: CLINIC | Age: 33
End: 2023-08-28
Payer: COMMERCIAL

## 2023-08-28 ENCOUNTER — NURSE TRIAGE (OUTPATIENT)
Dept: ADMINISTRATIVE | Facility: CLINIC | Age: 33
End: 2023-08-28
Payer: COMMERCIAL

## 2023-08-28 DIAGNOSIS — N91.2 AMENORRHEA: ICD-10-CM

## 2023-08-28 PROCEDURE — 76817 US OB/GYN PROCEDURE (VIEWPOINT): ICD-10-PCS | Mod: S$GLB,,, | Performed by: OBSTETRICS & GYNECOLOGY

## 2023-08-28 PROCEDURE — 76817 TRANSVAGINAL US OBSTETRIC: CPT | Mod: S$GLB,,, | Performed by: OBSTETRICS & GYNECOLOGY

## 2023-08-29 NOTE — TELEPHONE ENCOUNTER
"Patient  calling on behalf of patient. Patient reports intermittent spotting x 1 week and umbilical pain x 1 hour patient states is caused by nausea. Patient has vomited several times today. Denies soaking through any pads and only reports spotting on some days. Advised patient of dispo to see PCP within 24 hours. Verbalized understanding. Advised to call back if symptoms become worse or with further questions.        Reason for Disposition   SPOTTING lasts > 48 hours or spotting happens more than once in a week   MILD vaginal bleeding (e.g., < 1 pad / hour) or SPOTTING    Additional Information   Negative: Shock suspected (e.g., cold/pale/clammy skin, too weak to stand, low BP, rapid pulse)   Negative: Difficult to awaken or acting confused (e.g., disoriented, slurred speech)   Negative: Passed out (i.e., lost consciousness, collapsed and was not responding)   Negative: Sounds like a life-threatening emergency to the triager   Negative: SEVERE abdominal pain   Negative: SEVERE vaginal bleeding (e.g., soaking 2 pads per hour or large blood clots and present 2 or more hours)   Negative: SEVERE dizziness (e.g., unable to stand, requires support to walk, feels like passing out)   Negative: [1] MODERATE vaginal bleeding (e.g., soaking 1 pad per hour; clots) AND [2] present > 6 hours   Negative: [1] MODERATE vaginal bleeding (e.g., soaking 1 pad per hour; clots) AND [2] pregnant > 12 weeks   Negative: Passed tissue (e.g., gray-white)   Negative: Shoulder pain   Negative: Pale skin (pallor) of new-onset or worsening   Negative: Patient sounds very sick or weak to the triager   Negative: [1] Constant abdominal pain AND [2] present > 2 hours   Negative: Fever 100.4 F (38.0 C) or higher   Negative: [1] Intermittent lower abdominal pain (e.g., cramping) AND [2] present > 24 hours   Negative: Prior history of "ectopic pregnancy" or previous tubal surgery (e.g., tubal ligation)   Negative: Pain or burning with passing " "urine (urination)   Negative: Using heparin (e.g., Lovenox) or other strong blood thinner, or known bleeding disorder (e.g., thrombocytopenia)   Negative: MODERATE vaginal bleeding (e.g., soaking 1 pad per hour; clots)   Negative: Has IUD   Negative: MILD vaginal bleeding (i.e., less than 1 pad / hour; less than patient's usual menstrual bleeding; not just spotting)   Negative: Passed out (i.e., lost consciousness, collapsed and was not responding)   Negative: Shock suspected (e.g., cold/pale/clammy skin, too weak to stand, low BP, rapid pulse)   Negative: Difficult to awaken or acting confused (e.g., disoriented, slurred speech)   Negative: Sounds like a life-threatening emergency to the triager   Negative: MODERATE-SEVERE abdominal pain (e.g., interferes with normal activities, awakens from sleep)   Negative: SEVERE vaginal bleeding (e.g., soaking 2 pads per hour or large blood clots and present 2 or more hours)   Negative: [1] MODERATE vaginal bleeding (e.g., soaking 1 pad per hour; clots) AND [2] present > 6 hours   Negative: [1] MODERATE vaginal bleeding (e.g., soaking 1 pad per hour; clots) AND [2] pregnant > 12 weeks   Negative: Passed tissue (e.g., gray-white)   Negative: [1] Vomiting AND [2] contains red blood or black ("coffee ground") material  (Exception: Few red streaks in vomit that only happened once.)   Negative: Shoulder pain   Negative: Lightheadedness or dizziness (e.g., feels like passing out)   Negative: Patient sounds very sick or weak to the triager   Negative: [1] Constant abdominal pain AND [2] present > 2 hours   Negative: Blood in urine (red, pink, or tea-colored)   Negative: Fever 100.4 F (38.0 C) or higher   Negative: White of the eyes have turned yellow (i.e., jaundice)   Negative: [1] Intermittent lower abdominal pain (e.g., cramping) AND [2] present > 24 hours    Protocols used: Pregnancy - Vaginal Bleeding Less Than 20 Weeks EGA-A-, Pregnancy - Abdominal Pain Less Than 20 Weeks " CRISTOFER

## 2023-09-13 ENCOUNTER — OFFICE VISIT (OUTPATIENT)
Dept: OBSTETRICS AND GYNECOLOGY | Facility: CLINIC | Age: 33
End: 2023-09-13
Payer: COMMERCIAL

## 2023-09-13 ENCOUNTER — LAB VISIT (OUTPATIENT)
Dept: LAB | Facility: HOSPITAL | Age: 33
End: 2023-09-13
Attending: OBSTETRICS & GYNECOLOGY
Payer: COMMERCIAL

## 2023-09-13 VITALS
DIASTOLIC BLOOD PRESSURE: 83 MMHG | WEIGHT: 239.63 LBS | SYSTOLIC BLOOD PRESSURE: 128 MMHG | BODY MASS INDEX: 38.68 KG/M2

## 2023-09-13 DIAGNOSIS — Z00.00 ANNUAL PHYSICAL EXAM: ICD-10-CM

## 2023-09-13 DIAGNOSIS — R11.0 NAUSEA: ICD-10-CM

## 2023-09-13 DIAGNOSIS — G45.9 TIA (TRANSIENT ISCHEMIC ATTACK): ICD-10-CM

## 2023-09-13 DIAGNOSIS — Z34.81 ENCOUNTER FOR SUPERVISION OF OTHER NORMAL PREGNANCY IN FIRST TRIMESTER: ICD-10-CM

## 2023-09-13 DIAGNOSIS — N91.2 AMENORRHEA: ICD-10-CM

## 2023-09-13 DIAGNOSIS — N91.2 AMENORRHEA: Primary | ICD-10-CM

## 2023-09-13 LAB
ABO + RH BLD: NORMAL
BASOPHILS # BLD AUTO: 0.02 K/UL (ref 0–0.2)
BASOPHILS NFR BLD: 0.2 % (ref 0–1.9)
BLD GP AB SCN CELLS X3 SERPL QL: NORMAL
CHOLEST SERPL-MCNC: 158 MG/DL (ref 120–199)
CHOLEST/HDLC SERPL: 3.6 {RATIO} (ref 2–5)
DIFFERENTIAL METHOD: ABNORMAL
EOSINOPHIL # BLD AUTO: 0.1 K/UL (ref 0–0.5)
EOSINOPHIL NFR BLD: 0.7 % (ref 0–8)
ERYTHROCYTE [DISTWIDTH] IN BLOOD BY AUTOMATED COUNT: 13.5 % (ref 11.5–14.5)
HBV SURFACE AG SERPL QL IA: NORMAL
HCT VFR BLD AUTO: 36.8 % (ref 37–48.5)
HCV AB SERPL QL IA: NORMAL
HDLC SERPL-MCNC: 44 MG/DL (ref 40–75)
HDLC SERPL: 27.8 % (ref 20–50)
HGB BLD-MCNC: 12.1 G/DL (ref 12–16)
HIV 1+2 AB+HIV1 P24 AG SERPL QL IA: NORMAL
IMM GRANULOCYTES # BLD AUTO: 0.03 K/UL (ref 0–0.04)
IMM GRANULOCYTES NFR BLD AUTO: 0.3 % (ref 0–0.5)
LDLC SERPL CALC-MCNC: 100.6 MG/DL (ref 63–159)
LYMPHOCYTES # BLD AUTO: 1.7 K/UL (ref 1–4.8)
LYMPHOCYTES NFR BLD: 17.4 % (ref 18–48)
MCH RBC QN AUTO: 27.6 PG (ref 27–31)
MCHC RBC AUTO-ENTMCNC: 32.9 G/DL (ref 32–36)
MCV RBC AUTO: 84 FL (ref 82–98)
MONOCYTES # BLD AUTO: 0.6 K/UL (ref 0.3–1)
MONOCYTES NFR BLD: 6 % (ref 4–15)
NEUTROPHILS # BLD AUTO: 7.3 K/UL (ref 1.8–7.7)
NEUTROPHILS NFR BLD: 75.4 % (ref 38–73)
NONHDLC SERPL-MCNC: 114 MG/DL
NRBC BLD-RTO: 0 /100 WBC
PLATELET # BLD AUTO: 332 K/UL (ref 150–450)
PMV BLD AUTO: 10.1 FL (ref 9.2–12.9)
RBC # BLD AUTO: 4.38 M/UL (ref 4–5.4)
TRIGL SERPL-MCNC: 67 MG/DL (ref 30–150)
WBC # BLD AUTO: 9.65 K/UL (ref 3.9–12.7)

## 2023-09-13 PROCEDURE — 3074F PR MOST RECENT SYSTOLIC BLOOD PRESSURE < 130 MM HG: ICD-10-PCS | Mod: CPTII,S$GLB,, | Performed by: OBSTETRICS & GYNECOLOGY

## 2023-09-13 PROCEDURE — 86900 BLOOD TYPING SEROLOGIC ABO: CPT | Performed by: OBSTETRICS & GYNECOLOGY

## 2023-09-13 PROCEDURE — 87086 URINE CULTURE/COLONY COUNT: CPT | Performed by: OBSTETRICS & GYNECOLOGY

## 2023-09-13 PROCEDURE — 80061 LIPID PANEL: CPT | Performed by: INTERNAL MEDICINE

## 2023-09-13 PROCEDURE — 3079F PR MOST RECENT DIASTOLIC BLOOD PRESSURE 80-89 MM HG: ICD-10-PCS | Mod: CPTII,S$GLB,, | Performed by: OBSTETRICS & GYNECOLOGY

## 2023-09-13 PROCEDURE — 87491 CHLMYD TRACH DNA AMP PROBE: CPT | Performed by: OBSTETRICS & GYNECOLOGY

## 2023-09-13 PROCEDURE — 87591 N.GONORRHOEAE DNA AMP PROB: CPT | Performed by: OBSTETRICS & GYNECOLOGY

## 2023-09-13 PROCEDURE — 99999 PR PBB SHADOW E&M-EST. PATIENT-LVL III: CPT | Mod: PBBFAC,,, | Performed by: OBSTETRICS & GYNECOLOGY

## 2023-09-13 PROCEDURE — 3074F SYST BP LT 130 MM HG: CPT | Mod: CPTII,S$GLB,, | Performed by: OBSTETRICS & GYNECOLOGY

## 2023-09-13 PROCEDURE — 85025 COMPLETE CBC W/AUTO DIFF WBC: CPT | Performed by: OBSTETRICS & GYNECOLOGY

## 2023-09-13 PROCEDURE — 0500F PR INITIAL PRENATAL CARE VISIT: ICD-10-PCS | Mod: CPTII,S$GLB,, | Performed by: OBSTETRICS & GYNECOLOGY

## 2023-09-13 PROCEDURE — 3008F PR BODY MASS INDEX (BMI) DOCUMENTED: ICD-10-PCS | Mod: CPTII,S$GLB,, | Performed by: OBSTETRICS & GYNECOLOGY

## 2023-09-13 PROCEDURE — 86762 RUBELLA ANTIBODY: CPT | Performed by: OBSTETRICS & GYNECOLOGY

## 2023-09-13 PROCEDURE — 87340 HEPATITIS B SURFACE AG IA: CPT | Performed by: OBSTETRICS & GYNECOLOGY

## 2023-09-13 PROCEDURE — 86803 HEPATITIS C AB TEST: CPT | Performed by: OBSTETRICS & GYNECOLOGY

## 2023-09-13 PROCEDURE — 86592 SYPHILIS TEST NON-TREP QUAL: CPT | Performed by: OBSTETRICS & GYNECOLOGY

## 2023-09-13 PROCEDURE — 87389 HIV-1 AG W/HIV-1&-2 AB AG IA: CPT | Performed by: OBSTETRICS & GYNECOLOGY

## 2023-09-13 PROCEDURE — 1159F PR MEDICATION LIST DOCUMENTED IN MEDICAL RECORD: ICD-10-PCS | Mod: CPTII,S$GLB,, | Performed by: OBSTETRICS & GYNECOLOGY

## 2023-09-13 PROCEDURE — 1159F MED LIST DOCD IN RCRD: CPT | Mod: CPTII,S$GLB,, | Performed by: OBSTETRICS & GYNECOLOGY

## 2023-09-13 PROCEDURE — 3008F BODY MASS INDEX DOCD: CPT | Mod: CPTII,S$GLB,, | Performed by: OBSTETRICS & GYNECOLOGY

## 2023-09-13 PROCEDURE — 0500F INITIAL PRENATAL CARE VISIT: CPT | Mod: CPTII,S$GLB,, | Performed by: OBSTETRICS & GYNECOLOGY

## 2023-09-13 PROCEDURE — 3079F DIAST BP 80-89 MM HG: CPT | Mod: CPTII,S$GLB,, | Performed by: OBSTETRICS & GYNECOLOGY

## 2023-09-13 PROCEDURE — 99999 PR PBB SHADOW E&M-EST. PATIENT-LVL III: ICD-10-PCS | Mod: PBBFAC,,, | Performed by: OBSTETRICS & GYNECOLOGY

## 2023-09-13 RX ORDER — ONDANSETRON 4 MG/1
4 TABLET, FILM COATED ORAL EVERY 6 HOURS PRN
Qty: 30 TABLET | Refills: 12 | Status: SHIPPED | OUTPATIENT
Start: 2023-09-13 | End: 2024-01-23 | Stop reason: SDUPTHER

## 2023-09-13 NOTE — PROGRESS NOTES
"Patient presents for OB care.    Hand held: [facility] +FCA    AP: 32 yo  female @ 10.4 wks by 8 wk sono (EDC 2024)    1) [facility]  -s/p official dating scan  -new OB labs ordered  -rx for zofran sent  -mat 21 test ordered  -pap from2022 wnl    2) h/o LTCS for arrest of cervix at 2cm: IOL for gHTN at 37 weeks  Wants to     3) h/o migrines  Will discuss with neurology imitrex (spray)    4) asthma: monitor - was "bad" with last pregnancy    KRISS jefferson MD  "

## 2023-09-14 LAB
RPR SER QL: NORMAL
RUBV IGG SER-ACNC: 33.9 IU/ML
RUBV IGG SER-IMP: REACTIVE

## 2023-09-15 LAB
BACTERIA UR CULT: NO GROWTH
C TRACH DNA SPEC QL NAA+PROBE: NOT DETECTED
N GONORRHOEA DNA SPEC QL NAA+PROBE: NOT DETECTED

## 2023-10-09 ENCOUNTER — ROUTINE PRENATAL (OUTPATIENT)
Dept: OBSTETRICS AND GYNECOLOGY | Facility: CLINIC | Age: 33
End: 2023-10-09
Payer: COMMERCIAL

## 2023-10-09 VITALS
SYSTOLIC BLOOD PRESSURE: 114 MMHG | BODY MASS INDEX: 38.86 KG/M2 | DIASTOLIC BLOOD PRESSURE: 77 MMHG | WEIGHT: 240.75 LBS

## 2023-10-09 DIAGNOSIS — Z3A.14 14 WEEKS GESTATION OF PREGNANCY: ICD-10-CM

## 2023-10-09 DIAGNOSIS — Z34.82 ENCOUNTER FOR SUPERVISION OF OTHER NORMAL PREGNANCY IN SECOND TRIMESTER: Primary | ICD-10-CM

## 2023-10-09 PROCEDURE — 99999 PR PBB SHADOW E&M-EST. PATIENT-LVL III: CPT | Mod: PBBFAC,,, | Performed by: OBSTETRICS & GYNECOLOGY

## 2023-10-09 PROCEDURE — 99999 PR PBB SHADOW E&M-EST. PATIENT-LVL III: ICD-10-PCS | Mod: PBBFAC,,, | Performed by: OBSTETRICS & GYNECOLOGY

## 2023-10-09 PROCEDURE — 0502F PR SUBSEQUENT PRENATAL CARE: ICD-10-PCS | Mod: CPTII,S$GLB,, | Performed by: OBSTETRICS & GYNECOLOGY

## 2023-10-09 PROCEDURE — 0502F SUBSEQUENT PRENATAL CARE: CPT | Mod: CPTII,S$GLB,, | Performed by: OBSTETRICS & GYNECOLOGY

## 2023-10-09 NOTE — PROGRESS NOTES
"Patient presents for OB care.    Hand held: [facility] +FCA    AP: 34 yo  female @ 14.2 wks by 8 wk sono (EDC 2024)    1) [facility]  -s/p official dating scan  -rh positive   labs ordered  -mat 21 test pending  -pap from2022 wnl    2) h/o LTCS for arrest of cervix at 2cm: IOL for gHTN at 37 weeks  Wants to     3) h/o migrines  Will discuss with neurology imitrex (spray)    4) asthma: monitor - was "bad" with last pregnancy    F/u in 4 wks OB visit  Anatomy US ordered  KRISS jefferson MD  "

## 2023-10-11 ENCOUNTER — PATIENT MESSAGE (OUTPATIENT)
Dept: OBSTETRICS AND GYNECOLOGY | Facility: CLINIC | Age: 33
End: 2023-10-11
Payer: COMMERCIAL

## 2023-10-13 ENCOUNTER — TELEPHONE (OUTPATIENT)
Dept: OBSTETRICS AND GYNECOLOGY | Facility: CLINIC | Age: 33
End: 2023-10-13
Payer: COMMERCIAL

## 2023-10-13 NOTE — TELEPHONE ENCOUNTER
Called and spoke to pt about her [id number] results informed patient she is having a boy and her chromosomes are normal    ----- Message from Alana Morris sent at 10/12/2023  3:53 PM CDT -----  Type:  Patient Returning Call    Who Called: PT  Who Left Message for Patient:DR CALERO  Does the patient know what this is regarding?: RESULTS  Would the patient rather a call back or a response via [facility]?   Best Call Back Number:[phone] (M)   Additional Information:

## 2023-10-16 ENCOUNTER — TELEPHONE (OUTPATIENT)
Dept: INTERNAL MEDICINE | Facility: CLINIC | Age: 33
End: 2023-10-16
Payer: COMMERCIAL

## 2023-10-16 NOTE — TELEPHONE ENCOUNTER
----- Message from Maura Cardenas sent at 10/16/2023  3:53 PM CDT -----  Type:  Appointment Request    Name of Caller:SHARITA TRIPP [[id number]]  When is the first available appointment?No access  Symptoms:[facility] care   Would the patient rather a call back or a response via [facility]? Call back   Best Call Back Number:[phone]  Additional Information: Patient indicates she would like to schedule an appointment with the provider to [facility] as the patients new PCP. Patient indicates she would like to schedule for the soonest appointment available if possible. Patient indicates she would like to speak with someone in regards to scheduling. Please call patient back with further assistance and more information.

## 2023-10-21 ENCOUNTER — PATIENT MESSAGE (OUTPATIENT)
Dept: OTHER | Facility: OTHER | Age: 33
End: 2023-10-21
Payer: COMMERCIAL

## 2023-10-26 ENCOUNTER — PATIENT MESSAGE (OUTPATIENT)
Dept: ADMINISTRATIVE | Facility: OTHER | Age: 33
End: 2023-10-26
Payer: COMMERCIAL

## 2023-10-28 ENCOUNTER — PATIENT MESSAGE (OUTPATIENT)
Dept: OTHER | Facility: OTHER | Age: 33
End: 2023-10-28
Payer: COMMERCIAL

## 2023-11-03 ENCOUNTER — PATIENT MESSAGE (OUTPATIENT)
Dept: OBSTETRICS AND GYNECOLOGY | Facility: CLINIC | Age: 33
End: 2023-11-03
Payer: COMMERCIAL

## 2023-11-06 ENCOUNTER — ROUTINE PRENATAL (OUTPATIENT)
Dept: OBSTETRICS AND GYNECOLOGY | Facility: CLINIC | Age: 33
End: 2023-11-06
Payer: COMMERCIAL

## 2023-11-06 VITALS
SYSTOLIC BLOOD PRESSURE: 120 MMHG | DIASTOLIC BLOOD PRESSURE: 85 MMHG | WEIGHT: 239.19 LBS | BODY MASS INDEX: 38.61 KG/M2

## 2023-11-06 DIAGNOSIS — Z3A.18 18 WEEKS GESTATION OF PREGNANCY: ICD-10-CM

## 2023-11-06 DIAGNOSIS — Z34.82 ENCOUNTER FOR SUPERVISION OF OTHER NORMAL PREGNANCY IN SECOND TRIMESTER: Primary | ICD-10-CM

## 2023-11-06 DIAGNOSIS — J45.20 MILD INTERMITTENT ASTHMA WITHOUT COMPLICATION: ICD-10-CM

## 2023-11-06 PROCEDURE — 99999 PR PBB SHADOW E&M-EST. PATIENT-LVL III: CPT | Mod: PBBFAC,,, | Performed by: OBSTETRICS & GYNECOLOGY

## 2023-11-06 PROCEDURE — 0502F PR SUBSEQUENT PRENATAL CARE: ICD-10-PCS | Mod: CPTII,S$GLB,, | Performed by: OBSTETRICS & GYNECOLOGY

## 2023-11-06 PROCEDURE — 0502F SUBSEQUENT PRENATAL CARE: CPT | Mod: CPTII,S$GLB,, | Performed by: OBSTETRICS & GYNECOLOGY

## 2023-11-06 PROCEDURE — 99999 PR PBB SHADOW E&M-EST. PATIENT-LVL III: ICD-10-PCS | Mod: PBBFAC,,, | Performed by: OBSTETRICS & GYNECOLOGY

## 2023-11-06 RX ORDER — ASPIRIN 81 MG/1
81 TABLET ORAL DAILY
Refills: 0 | COMMUNITY
Start: 2023-11-06 | End: 2024-08-12

## 2023-11-06 RX ORDER — ALBUTEROL SULFATE 90 UG/1
2 AEROSOL, METERED RESPIRATORY (INHALATION) EVERY 6 HOURS PRN
Qty: 18 G | Refills: 5 | Status: SHIPPED | OUTPATIENT
Start: 2023-11-06 | End: 2024-11-05

## 2023-11-06 NOTE — PROGRESS NOTES
"Patient presents for OB care. Having more asthma issues with weather change. Wants new albuterol rx. Sent today.  Positive fetal movement.    Hand held: [facility] +FCA    AP: 34 yo  female @ 14.2 wks by 8 wk sono (EDC 2024)    1) [facility] (it's a boy - Simba)  -s/p official dating scan  -rh positive  -pap from2022 wnl  -mat 21 neg    2) h/o LTCS for arrest of cervix at 2cm: IOL for gHTN at 37 weeks  Wants to     3) h/o migrines  Will discuss with neurology imitrex (spray)    4) asthma: monitor - was "bad" with last pregnancy    F/u in 4 wks OB visit  Anatomy US ordered  KRISS jefferson MD  "

## 2023-11-08 ENCOUNTER — OFFICE VISIT (OUTPATIENT)
Dept: URGENT CARE | Facility: CLINIC | Age: 33
End: 2023-11-08
Payer: COMMERCIAL

## 2023-11-08 VITALS
WEIGHT: 239 LBS | OXYGEN SATURATION: 99 % | TEMPERATURE: 99 F | RESPIRATION RATE: 18 BRPM | DIASTOLIC BLOOD PRESSURE: 74 MMHG | HEART RATE: 88 BPM | SYSTOLIC BLOOD PRESSURE: 119 MMHG | HEIGHT: 66 IN | BODY MASS INDEX: 38.41 KG/M2

## 2023-11-08 DIAGNOSIS — T36.95XA ANTIBIOTIC-INDUCED YEAST INFECTION: ICD-10-CM

## 2023-11-08 DIAGNOSIS — R05.9 COUGH, UNSPECIFIED TYPE: ICD-10-CM

## 2023-11-08 DIAGNOSIS — R06.2 WHEEZING: ICD-10-CM

## 2023-11-08 DIAGNOSIS — B37.9 ANTIBIOTIC-INDUCED YEAST INFECTION: ICD-10-CM

## 2023-11-08 DIAGNOSIS — J18.9 PNEUMONIA DUE TO INFECTIOUS ORGANISM, UNSPECIFIED LATERALITY, UNSPECIFIED PART OF LUNG: Primary | ICD-10-CM

## 2023-11-08 PROCEDURE — 99213 PR OFFICE/OUTPT VISIT, EST, LEVL III, 20-29 MIN: ICD-10-PCS | Mod: S$GLB,,, | Performed by: NURSE PRACTITIONER

## 2023-11-08 PROCEDURE — 99213 OFFICE O/P EST LOW 20 MIN: CPT | Mod: S$GLB,,, | Performed by: NURSE PRACTITIONER

## 2023-11-08 RX ORDER — AMOXICILLIN 500 MG/1
1000 TABLET, FILM COATED ORAL 3 TIMES DAILY
Qty: 30 TABLET | Refills: 0 | Status: SHIPPED | OUTPATIENT
Start: 2023-11-08 | End: 2023-11-13

## 2023-11-08 RX ORDER — ASPIRIN 325 MG
1 TABLET, DELAYED RELEASE (ENTERIC COATED) ORAL NIGHTLY
Qty: 7 EACH | Refills: 0 | Status: SHIPPED | OUTPATIENT
Start: 2023-11-08 | End: 2023-11-15

## 2023-11-08 RX ORDER — AZITHROMYCIN 250 MG/1
TABLET, FILM COATED ORAL
Qty: 6 TABLET | Refills: 0 | Status: SHIPPED | OUTPATIENT
Start: 2023-11-08 | End: 2023-11-13

## 2023-11-08 NOTE — PROGRESS NOTES
"Subjective:      Patient ID: Carlos Camarena is a 33 y.o. female.    Vitals:  height is 5' 6" (1.676 m) and weight is 108.4 kg (239 lb). Her oral temperature is 98.8 °F (37.1 °C). Her blood pressure is 119/74 and her pulse is 88. Her respiration is 18 and oxygen saturation is 99%.     Chief Complaint: Sinus Problem    Pt is a 32 yo female w/ c/o cough, wheezing, tightness in her chest. She states her symptoms started 3 weeks ago. Pt reports frequent coughing fits. Symptoms are worse at night when lying down. Pt states she is 18 weeks pregnant and has been taking Mucinex.     Sinus Problem  This is a new problem. The current episode started 1 to 4 weeks ago. The problem is unchanged. Her pain is at a severity of 7/10. The pain is mild. Associated symptoms include coughing. Pertinent negatives include no chills, congestion, diaphoresis, ear pain, headaches, hoarse voice, neck pain, shortness of breath, sinus pressure, sneezing, sore throat or swollen glands. Treatments tried: Mucinex. The treatment provided no relief.       Constitution: Negative for chills and sweating.   HENT:  Negative for ear pain, congestion, sinus pressure and sore throat.    Neck: Negative for neck pain.   Respiratory:  Positive for cough. Negative for shortness of breath.    Allergic/Immunologic: Negative for sneezing.   Neurological:  Negative for headaches.      Objective:     Physical Exam   Constitutional: She is oriented to person, place, and time. She appears well-developed. She is cooperative.  Non-toxic appearance. She does not appear ill. No distress.   HENT:   Head: Normocephalic and atraumatic.   Ears:   Right Ear: Hearing, tympanic membrane, external ear and ear canal normal.   Left Ear: Hearing, tympanic membrane, external ear and ear canal normal.   Nose: Nose normal. No mucosal edema, rhinorrhea or nasal deformity. No epistaxis. Right sinus exhibits no maxillary sinus tenderness and no frontal sinus tenderness. Left sinus exhibits " no maxillary sinus tenderness and no frontal sinus tenderness.   Mouth/Throat: Uvula is midline, oropharynx is clear and moist and mucous membranes are normal. No trismus in the jaw. Normal dentition. No uvula swelling. No oropharyngeal exudate, posterior oropharyngeal edema or posterior oropharyngeal erythema.   Eyes: Conjunctivae and lids are normal. No scleral icterus.   Neck: Trachea normal and phonation normal. Neck supple. No edema present. No erythema present. No neck rigidity present.   Cardiovascular: Normal rate, regular rhythm, normal heart sounds and normal pulses.   Pulmonary/Chest: Effort normal. No tachypnea. No respiratory distress. She has no decreased breath sounds. She has wheezes. She has rhonchi.   Abdominal: Normal appearance.   Musculoskeletal: Normal range of motion.         General: No deformity. Normal range of motion.   Neurological: She is alert and oriented to person, place, and time. She exhibits normal muscle tone. Coordination normal.   Skin: Skin is warm, dry, intact, not diaphoretic and not pale.   Psychiatric: Her speech is normal and behavior is normal. Judgment and thought content normal.   Nursing note and vitals reviewed.      Assessment:     1. Pneumonia due to infectious organism, unspecified laterality, unspecified part of lung    2. Cough, unspecified type    3. Wheezing    4. Antibiotic-induced yeast infection      Plan:       Pneumonia due to infectious organism, unspecified laterality, unspecified part of lung  -     amoxicillin (AMOXIL) 500 MG Tab; Take 2 tablets (1,000 mg total) by mouth 3 (three) times daily. for 5 days  Dispense: 30 tablet; Refill: 0  -     azithromycin (Z-NICKIE) 250 MG tablet; Take 2 tablets by mouth on day 1; Take 1 tablet by mouth on days 2-5  Dispense: 6 tablet; Refill: 0    Cough, unspecified type    Wheezing    Antibiotic-induced yeast infection  -     clotrimazole (LOTRIMIN) 1 % Crea; Place 1 suppository (1 applicator total) vaginally every  evening. for 7 days  Dispense: 7 each; Refill: 0      Patient Instructions   - You must understand that you have received an Urgent Care treatment only and that you may be released before all of your medical problems are known or treated.   - You, the patient, will arrange for follow up care as instructed.   - If your condition worsens or fails to improve we recommend that you receive another evaluation at the ER immediately or contact your PCP to discuss your concerns.   - You can call [phone] or [phone] to help schedule an appointment with the appropriate provider.    Drink plenty of fluids   Get lots of rest  Tylenol or ibuprofen for pain/fever  Mucinex cough  Saline nasal rinses to irrigate sinus cavities  Warm salt water gargles for sore throat  Complete all antibiotics as prescribed  Continue albuterol inhaler as directed

## 2023-11-09 NOTE — PATIENT INSTRUCTIONS
- You must understand that you have received an Urgent Care treatment only and that you may be released before all of your medical problems are known or treated.   - You, the patient, will arrange for follow up care as instructed.   - If your condition worsens or fails to improve we recommend that you receive another evaluation at the ER immediately or contact your PCP to discuss your concerns.   - You can call [phone] or [phone] to help schedule an appointment with the appropriate provider.    Drink plenty of fluids   Get lots of rest  Tylenol or ibuprofen for pain/fever  Mucinex cough  Saline nasal rinses to irrigate sinus cavities  Warm salt water gargles for sore throat  Complete all antibiotics as prescribed  Continue albuterol inhaler as directed

## 2023-11-12 ENCOUNTER — PATIENT MESSAGE (OUTPATIENT)
Dept: OBSTETRICS AND GYNECOLOGY | Facility: CLINIC | Age: 33
End: 2023-11-12
Payer: COMMERCIAL

## 2023-11-13 ENCOUNTER — OFFICE VISIT (OUTPATIENT)
Dept: INTERNAL MEDICINE | Facility: CLINIC | Age: 33
End: 2023-11-13
Payer: COMMERCIAL

## 2023-11-13 DIAGNOSIS — F98.8 ATTENTION DEFICIT DISORDER, UNSPECIFIED HYPERACTIVITY PRESENCE: Chronic | ICD-10-CM

## 2023-11-13 DIAGNOSIS — F33.1 MODERATE EPISODE OF RECURRENT MAJOR DEPRESSIVE DISORDER: Primary | Chronic | ICD-10-CM

## 2023-11-13 DIAGNOSIS — Z34.90 PREGNANCY, UNSPECIFIED GESTATIONAL AGE: ICD-10-CM

## 2023-11-13 DIAGNOSIS — F41.1 GAD (GENERALIZED ANXIETY DISORDER): ICD-10-CM

## 2023-11-13 PROCEDURE — 1160F RVW MEDS BY RX/DR IN RCRD: CPT | Mod: CPTII,95,, | Performed by: INTERNAL MEDICINE

## 2023-11-13 PROCEDURE — 1159F PR MEDICATION LIST DOCUMENTED IN MEDICAL RECORD: ICD-10-PCS | Mod: CPTII,95,, | Performed by: INTERNAL MEDICINE

## 2023-11-13 PROCEDURE — 99215 PR OFFICE/OUTPT VISIT, EST, LEVL V, 40-54 MIN: ICD-10-PCS | Mod: 95,,, | Performed by: INTERNAL MEDICINE

## 2023-11-13 PROCEDURE — 1160F PR REVIEW ALL MEDS BY PRESCRIBER/CLIN PHARMACIST DOCUMENTED: ICD-10-PCS | Mod: CPTII,95,, | Performed by: INTERNAL MEDICINE

## 2023-11-13 PROCEDURE — 99215 OFFICE O/P EST HI 40 MIN: CPT | Mod: 95,,, | Performed by: INTERNAL MEDICINE

## 2023-11-13 PROCEDURE — 1159F MED LIST DOCD IN RCRD: CPT | Mod: CPTII,95,, | Performed by: INTERNAL MEDICINE

## 2023-11-13 NOTE — PROGRESS NOTES
The patient location is: [place]  The chief complaint leading to consultation is: Depression and Anxiety    Visit type: Virtual visit with synchronous audio and video  Contact time spent with patient: 22 minutes  Each patient to whom he or she provides medical services by telemedicine is:  (1) informed of the relationship between the physician and patient and the respective role of any other health care provider with respect to management of the patient; and (2) notified that he or she may decline to receive medical services by telemedicine and may withdraw from such care at any time.  Subjective:       Patient ID: Carlos Camarena is a 33 y.o. female who  has a past medical history of ADD (attention deficit disorder), Asthma, Chronic back pain, Fibromyalgia, Migraine, and Moderate persistent asthma.    Chief Complaint: Depression and Anxiety     History was obtained from the patient and supplemented through chart review  The patient was seen in Urgent Care last week for pneumonia treated with Amoxil, azithromycin.    She is a teacher.     HPI    URI/PNA improving.    She is 19 weeks pregnant.  Following with OBGYN.  Hasn't been taking prenatal vitamins consistently lately.    Depression, PMDD, anxiety:  Chronic.  She is unsure about her mental health lately since this was a surprise pregnancy and she has been working full-time.  She has not been happy with her work situation since she returned to teaching.  Unsure if she is experiencing depression or if she is dissatisfied with work. Feels stuck in her job.  Anxiety varies.  Has difficulty being able to control worrying.  Panic attacks occur q2-3 months.    Sleep:  no big issues.  Replaying thoughts.    Anhedonia:  yes  Guilt: sometimes to herself d/t her job situation.  Energy: fatigue  Concentration: decreased, harder to remember appts.  Appetite: h/o stress eating, but early satiety d/t pregnancy.  Psychomotor agitation: sometimes restless and slowed.  SI: no    FHx Mom  with depression, anxiety on unknown med.      Previous meds:  -Was on Paxil for PMDD, but felt sick with severe nausea for multiple days, so quickly discontinued it.   -Also tried luteal phase Celexa for PMDD  -h/o chronic pain. Never tried Cymbalta  -Wellbutrin 450 helped c focus/concentration, but was not taking consistently due to traveling and had entirely stopped during her previous pregnancy.     -Was on Topamax for migraine prophylaxis as below.      Following with therapy monthly.  Was seeing OSH Psych, Dr. Barbour at [facility], who left the practice.  She has an appointment with a new psychiatrist next month.  Completed functional restoration program for chronic pain.      ADD:  As above.   Was on Adderral. Was seeing Dr. Adenike Barbour with [facility], but she left the practice. Has been off stimulants since her previous pregnancy.            Not addressed today.  H/o Postpartum preeclampsia, TIA:  See HPI 1/11/2023.    OBGYN started Procardia 30 during her second trimester for gestational HTN, but was able to discontinue.      S/p c/s  with spinal tap. Then started to have intense HA daily since her c/s. Occipital HA, neck pain, vertigo that worsened.  No relief with Fioricet.  Also c facial droop, difficulty walking.  Was admitted for severe postpartum pre eclampsia, TIA given persistently severe HA.  B12 borderline low; started 100 mcg.  Was on magnesium for seizure ppx.    MRI/A H&N without acute abnormalities.   Carotid ultrasound was negative.    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    Following c neurology and ENT for vertigo.    Was on nifedipine 30.      Is currently pregnant and following c OBGYN.    TIA (transient ischemic attack)  MRI, carotid US were normal. FLP borderline high, but was nonfasting. Improved on repeat.  Not on statin. Currently pregnant.  F/u c Neuro.    Migraine:    Saw seeing Neurology. H/o med overuse HA c NSAIDs.   Was on Topamax  for PPX (made her feel sick). Stopped during her pregnancy.  Considering venlafaxine for headache prevention.  Was on Maxalt for abortive. Was planning on occipital nerve block.    Obesity:    Was on phentermine by Endo. Was on Wellbutrin for mood, Topamax for migraine PPX.  BMI Readings from Last 3 Encounters:   11/08/23 38.58 kg/m²   11/06/23 38.61 kg/m²   10/09/23 38.86 kg/m²     Lab Results   Component Value Date/Time    HGBA1C 5.3 11/30/2019 09:56 AM     H/o Dysphonia:  Was treated with antibiotics, steroids, but persistent hoarseness in the morning.  No SOB, dysphagia.  Following with ENT.  Performed laryngoscopy.  Thought to be functional.  Rec SLP.  Follow-up with ENT p.r.n.  GERD as below.  Started Protonix q.a.m. with great improvement.  No longer taking PPI. No issues.    GERD:   Drinks coffee daily.  No epigastric pain.  Not daily.  Sour taste in her throat, some vomit.  No particular food triggers.  Was on Protonix q.a.m. with improvement.  Takes ibuprofen sparingly for back pain.    No issues.     Asthma:    Cough, occurs with weather changes.  Improved with Advair BID. The patient does not smoke.    Follows with pulmonology.  CTA s PE. TTE with no heart strain or evidence of cardiomyopathy.    Cyanocobalamin deficiency:    On 100 mcg B12.  Lab Results   Component Value Date    IHGWLKWM36 267 01/04/2023     Normocytic anemia:   Hematocrit slightly decreased.  +Menorrhagia, endometriosis.  Reports regular cycles.  Was on OCPs, IUD, but stopped due to weight gain.  Uses super tampons every 1.5 hours per day; uses tampons + pads sometimes.  Follows with OBGYN.  Mild, resolved. Likely 2/2 endometriosis/menorrhagia.    F/u with OBGYN.  On iron supplement.  Lab Results   Component Value Date    IRON 38 09/27/2022    TIBC 614 (H) 09/27/2022    FERRITIN 11 (L) 09/27/2022     Lab Results   Component Value Date    WAXYWVMO95 267 01/04/2023     Lab Results   Component Value Date    FOLATE 11.7 01/04/2023      Endometriosis:    Off birth control due to concerns for weight. Regular cycles. TVUS normal. Following c OBGYN.     Saw Endo for low DHEA during OBGYN eval for PCOS, fatigue, weight gain. The rest of hormones were wnl.   Not c/w PCOS. ACTH was normal so not suggestive of an adrenal gland insufficiency. Low DHEA-S would just be from the inhaled steroids  Lab Results   Component Value Date    TSH 1.058 01/03/2023    FREET4 0.87 01/20/2022     Lumbar DDD, FMA:  Chronic R low back pain c RLE sciatica.  S/p fusion in 2013.     Had nephrolithiasis in 10/2021, which exacerbated FMA with prolonged extreme pain. Saw several specialists. Saw Rheumatology for polyarthralgia.  Low suspicion for autoimmune disorder. Labs neg. Completed functional restoration program, which helped with exercises. Pain improved 10->6-7/10, but still with intense R leg pain, paresthesias, sharp pain, foot feels numb.  Has realistic expectations regarding pain control.     MRI s stenosis.  Following c Pain Clinic.  Improved with SI injection.  Rx Cymbalta for mood, chronic pain, but never started this since she was about to join a study for FMA and discovered that she was pregnant.      She is going to therapy.  Mental health is much better since she's no longer working, so she would like to remain off meds.  Improved. Completed FRP. F/u c Pain Clinic PRN.    Review of Systems   Constitutional:  Negative for activity change and unexpected weight change.   HENT:  Negative for hearing loss, rhinorrhea and trouble swallowing.    Eyes:  Negative for discharge and visual disturbance.   Respiratory:  Negative for chest tightness and wheezing.    Cardiovascular:  Negative for chest pain and palpitations.   Gastrointestinal:  Negative for blood in stool, constipation, diarrhea and vomiting.   Endocrine: Negative for polydipsia and polyuria.   Genitourinary:  Negative for difficulty urinating, dysuria, hematuria and menstrual problem.   Musculoskeletal:   Negative for arthralgias, joint swelling and neck pain.   Neurological:  Positive for headaches. Negative for weakness.   Psychiatric/Behavioral:  Positive for dysphoric mood. Negative for confusion.        I personally reviewed Past Medical History, Past Surgical History, Social History, and Family History.    Objective:      There were no vitals filed for this visit.     Physical Exam  Constitutional:       General: She is not in acute distress.     Appearance: She is well-developed. She is not diaphoretic.   HENT:      Head: Normocephalic and atraumatic.   Eyes:      General:         Right eye: No discharge.         Left eye: No discharge.   Pulmonary:      Effort: Pulmonary effort is normal. No respiratory distress.   Skin:     Coloration: Skin is not pale.      Findings: No erythema.   Neurological:      Mental Status: She is alert.   Psychiatric:         Behavior: Behavior normal.         Lab Results   Component Value Date    WBC 9.65 09/13/2023    HGB 12.1 09/13/2023    HCT 36.8 (L) 09/13/2023     09/13/2023    CHOL 158 09/13/2023    TRIG 67 09/13/2023    HDL 44 09/13/2023    ALT 26 01/03/2023    AST 18 01/03/2023     05/15/2023    K 3.9 05/15/2023     05/15/2023    CREATININE 0.7 05/15/2023    BUN 12 05/15/2023    CO2 22 (L) 05/15/2023    TSH 1.058 01/03/2023    INR 1.0 01/03/2023    HGBA1C 5.3 11/30/2019       The ASCVD Risk score (Susan DK, et al., 2019) failed to calculate for the following reasons:    The 2019 ASCVD risk score is only valid for ages 40 to 79    The patient has a prior MI or stroke diagnosis    Assessment:       1. Moderate episode of recurrent major depressive disorder    2. BEATRIZ (generalized anxiety disorder)    3. Attention deficit disorder, unspecified hyperactivity presence    4. Pregnancy, unspecified gestational age          Plan:       Carlos was seen today for depression and anxiety.    Diagnoses and all orders for this visit:    Moderate episode of recurrent  major depressive disorder  Comments:  Recurrent. She will have more frequent visits with her therapist. Discussed tx options. Monitor sx. Has appt c Psych next mo. Consider Zoloft.    BEATRIZ (generalized anxiety disorder)  Comments:  Plan as above.    Attention deficit disorder, unspecified hyperactivity presence  Comments:  Persistent sx, but mental health contributing. ADD sx might improve when optimizing mental health. Caution with stimulant d/t pregnancy, anxiety. F/u c Psych.    Pregnancy, unspecified gestational age  Comments:  Discussed lack of studies in pregnant/breastfeeding women.  Encouraged to restart prenatal vitamin.  Follow-up with OBGYN.         Side effects of medication(s) were discussed in detail and patient voiced understanding.  Patient will call back for any issues or complications.     I have spent a total of 50 minutes with the patient as well as reviewing the chart/medical record and placing orders on the day of the visit. Discussed mental health, ADHD, pregnancy.     RTC PRN. Establish care with a new PCP.

## 2023-11-13 NOTE — PATIENT INSTRUCTIONS
Yunier:  Wong Landrum [facility]    [facility]:  Radha Jose Rios (part time)     Riya/Carson:  Sherry Maldonado    All of your core healthy metrics are met.      Eric Gonzalez,     If you are due for any health screening(s) below please notify me so we can arrange them to be ordered and scheduled to maintain your health. Most healthy patients complete it. Don't lose out on improving your health.     All of your core healthy metrics are met.              Dear Ms. Migue:    Your [facility] Care Team is dedicated to helping you stay healthy with regularly scheduled recommended screenings.  Scheduling routine screenings is important to maintaining good health. Our records indicate that you may be overdue for your screening pap smear. A pap smear screening can help identify patients at risk for developing cervical cancer at an early stage, when it is most likely to be successfully treated.    We encourage you to schedule your appointment with your womens health provider or some primary care providers also perform this screening.    If you have completed or scheduled your pap smear screening outside of [facility], please notify your primary care team so we can update your health record.      If you have questions or would like to schedule your screening, please contact your primary care clinic.    Sincerely,    Your [facility] Team

## 2023-11-21 ENCOUNTER — PROCEDURE VISIT (OUTPATIENT)
Dept: MATERNAL FETAL MEDICINE | Facility: CLINIC | Age: 33
End: 2023-11-21
Payer: COMMERCIAL

## 2023-11-21 DIAGNOSIS — Z3A.14 14 WEEKS GESTATION OF PREGNANCY: ICD-10-CM

## 2023-11-21 DIAGNOSIS — Z34.82 ENCOUNTER FOR SUPERVISION OF OTHER NORMAL PREGNANCY IN SECOND TRIMESTER: ICD-10-CM

## 2023-11-21 PROCEDURE — 76811 US OB/GYN PROCEDURE (VIEWPOINT): ICD-10-PCS | Mod: S$GLB,,, | Performed by: OBSTETRICS & GYNECOLOGY

## 2023-11-21 PROCEDURE — 76811 OB US DETAILED SNGL FETUS: CPT | Mod: S$GLB,,, | Performed by: OBSTETRICS & GYNECOLOGY

## 2023-11-22 ENCOUNTER — TELEPHONE (OUTPATIENT)
Dept: OBSTETRICS AND GYNECOLOGY | Facility: CLINIC | Age: 33
End: 2023-11-22
Payer: COMMERCIAL

## 2023-11-22 ENCOUNTER — ROUTINE PRENATAL (OUTPATIENT)
Dept: OBSTETRICS AND GYNECOLOGY | Facility: CLINIC | Age: 33
End: 2023-11-22
Payer: COMMERCIAL

## 2023-11-22 VITALS — WEIGHT: 238.56 LBS | SYSTOLIC BLOOD PRESSURE: 111 MMHG | DIASTOLIC BLOOD PRESSURE: 76 MMHG | BODY MASS INDEX: 38.5 KG/M2

## 2023-11-22 DIAGNOSIS — Z3A.20 20 WEEKS GESTATION OF PREGNANCY: ICD-10-CM

## 2023-11-22 DIAGNOSIS — G43.001 MIGRAINE WITHOUT AURA AND WITH STATUS MIGRAINOSUS, NOT INTRACTABLE: ICD-10-CM

## 2023-11-22 DIAGNOSIS — Z34.82 ENCOUNTER FOR SUPERVISION OF OTHER NORMAL PREGNANCY IN SECOND TRIMESTER: Primary | ICD-10-CM

## 2023-11-22 PROCEDURE — 0502F SUBSEQUENT PRENATAL CARE: CPT | Mod: S$GLB,,, | Performed by: OBSTETRICS & GYNECOLOGY

## 2023-11-22 PROCEDURE — 99999 PR PBB SHADOW E&M-EST. PATIENT-LVL III: CPT | Mod: PBBFAC,,, | Performed by: OBSTETRICS & GYNECOLOGY

## 2023-11-22 PROCEDURE — 0502F PR SUBSEQUENT PRENATAL CARE: ICD-10-PCS | Mod: S$GLB,,, | Performed by: OBSTETRICS & GYNECOLOGY

## 2023-11-22 PROCEDURE — 81514 NFCT DS BV&VAGINITIS DNA ALG: CPT | Performed by: OBSTETRICS & GYNECOLOGY

## 2023-11-22 PROCEDURE — 99999 PR PBB SHADOW E&M-EST. PATIENT-LVL III: ICD-10-PCS | Mod: PBBFAC,,, | Performed by: OBSTETRICS & GYNECOLOGY

## 2023-11-22 RX ORDER — SUMATRIPTAN 5 MG/1
SPRAY NASAL
Qty: 6 EACH | Refills: 1 | Status: SHIPPED | OUTPATIENT
Start: 2023-11-22

## 2023-11-22 NOTE — PROGRESS NOTES
"Having migraines. Will try imitrex spray  Positive fetal movement.    AP: 32 yo  female @ 20.2 wks by 8 wk sono (EDC 2024)    1) SIUP (it's a boy - Simba)  -suboptimal anatomy   -rh positive  -pap from2022 wnl  -mat 21 neg    2) h/o LTCS for arrest of cervix at 2cm: IOL for gHTN at 37 weeks, wants rLTCS now  Consents for rLTCS and blood signed 2023    3) ho migrines  Will discuss with neurology imitrex (spray)    4) asthma: monitor - was "bad" with last pregnancy    F/u in 4 wks OB visit, f/u Anatomy US ordered, 1 hr gtt at next visit    KRISS jefferson MD  "

## 2023-11-22 NOTE — TELEPHONE ENCOUNTER
Didn't see msg, came to clinic and we roomed her.     ----- Message from Mae Delgado sent at 11/22/2023  3:33 PM CST -----  Type:  Needs Medical Advice/running late    Who Called: pt    Would the patient rather a call back or a response via [facility]? call  Best Call Back Number: [phone]  Additional Information: pt running 15 minutes late

## 2023-12-07 ENCOUNTER — PATIENT MESSAGE (OUTPATIENT)
Dept: OBSTETRICS AND GYNECOLOGY | Facility: CLINIC | Age: 33
End: 2023-12-07
Payer: COMMERCIAL

## 2023-12-16 ENCOUNTER — PATIENT MESSAGE (OUTPATIENT)
Dept: OTHER | Facility: OTHER | Age: 33
End: 2023-12-16
Payer: COMMERCIAL

## 2023-12-19 ENCOUNTER — ROUTINE PRENATAL (OUTPATIENT)
Dept: OBSTETRICS AND GYNECOLOGY | Facility: CLINIC | Age: 33
End: 2023-12-19
Payer: COMMERCIAL

## 2023-12-19 ENCOUNTER — PROCEDURE VISIT (OUTPATIENT)
Dept: MATERNAL FETAL MEDICINE | Facility: CLINIC | Age: 33
End: 2023-12-19
Payer: COMMERCIAL

## 2023-12-19 VITALS
WEIGHT: 241.19 LBS | SYSTOLIC BLOOD PRESSURE: 106 MMHG | BODY MASS INDEX: 38.93 KG/M2 | DIASTOLIC BLOOD PRESSURE: 71 MMHG

## 2023-12-19 DIAGNOSIS — Z34.82 ENCOUNTER FOR SUPERVISION OF OTHER NORMAL PREGNANCY IN SECOND TRIMESTER: Primary | ICD-10-CM

## 2023-12-19 DIAGNOSIS — Z34.82 ENCOUNTER FOR SUPERVISION OF OTHER NORMAL PREGNANCY IN SECOND TRIMESTER: ICD-10-CM

## 2023-12-19 DIAGNOSIS — Z3A.24 24 WEEKS GESTATION OF PREGNANCY: ICD-10-CM

## 2023-12-19 DIAGNOSIS — Z3A.20 20 WEEKS GESTATION OF PREGNANCY: ICD-10-CM

## 2023-12-19 PROCEDURE — 0502F PR SUBSEQUENT PRENATAL CARE: ICD-10-PCS | Mod: CPTII,S$GLB,, | Performed by: OBSTETRICS & GYNECOLOGY

## 2023-12-19 PROCEDURE — 76816 OB US FOLLOW-UP PER FETUS: CPT | Mod: S$GLB,,, | Performed by: OBSTETRICS & GYNECOLOGY

## 2023-12-19 PROCEDURE — 0502F SUBSEQUENT PRENATAL CARE: CPT | Mod: CPTII,S$GLB,, | Performed by: OBSTETRICS & GYNECOLOGY

## 2023-12-19 PROCEDURE — 99999 PR PBB SHADOW E&M-EST. PATIENT-LVL III: CPT | Mod: PBBFAC,,, | Performed by: OBSTETRICS & GYNECOLOGY

## 2023-12-19 PROCEDURE — 76816 US OB/GYN PROCEDURE (VIEWPOINT): ICD-10-PCS | Mod: S$GLB,,, | Performed by: OBSTETRICS & GYNECOLOGY

## 2023-12-19 PROCEDURE — 99999 PR PBB SHADOW E&M-EST. PATIENT-LVL III: ICD-10-PCS | Mod: PBBFAC,,, | Performed by: OBSTETRICS & GYNECOLOGY

## 2023-12-19 NOTE — PROGRESS NOTES
"  Having migraines.  imitrex spray helping  Positive fetal movement.  Working on chemical burn on vaginal area - will try hydrocortisone cream    AP: 32 yo  female @ 24.3 wks by 8 wk sono (EDC 2024)    1) SIUP (it's a boy - Simba)  -suboptimal anatomy   -rh positive  -pap from2022 wnl  -mat 21 neg    2) h/o LTCS for arrest of cervix at 2cm: IOL for gHTN at 37 weeks, wants rLTCS now  Consents for rLTCS and blood signed 2023    3) ho migrines  Will discuss with neurology imitrex (spray)    4) asthma: monitor - was "bad" with last pregnancy  --using albuterol prn for wheezing right now.    Needs US at 32 weeks for growth    KRISS jefferson MD  "

## 2023-12-21 ENCOUNTER — LAB VISIT (OUTPATIENT)
Dept: LAB | Facility: HOSPITAL | Age: 33
End: 2023-12-21
Attending: OBSTETRICS & GYNECOLOGY
Payer: COMMERCIAL

## 2023-12-21 DIAGNOSIS — Z3A.24 24 WEEKS GESTATION OF PREGNANCY: ICD-10-CM

## 2023-12-21 DIAGNOSIS — Z34.82 ENCOUNTER FOR SUPERVISION OF OTHER NORMAL PREGNANCY IN SECOND TRIMESTER: ICD-10-CM

## 2023-12-21 LAB
BASOPHILS # BLD AUTO: 0.01 K/UL (ref 0–0.2)
BASOPHILS NFR BLD: 0.1 % (ref 0–1.9)
DIFFERENTIAL METHOD: ABNORMAL
EOSINOPHIL # BLD AUTO: 0.1 K/UL (ref 0–0.5)
EOSINOPHIL NFR BLD: 1.3 % (ref 0–8)
ERYTHROCYTE [DISTWIDTH] IN BLOOD BY AUTOMATED COUNT: 14.4 % (ref 11.5–14.5)
GLUCOSE SERPL-MCNC: 119 MG/DL (ref 70–140)
HCT VFR BLD AUTO: 33.9 % (ref 37–48.5)
HGB BLD-MCNC: 11.4 G/DL (ref 12–16)
IMM GRANULOCYTES # BLD AUTO: 0.05 K/UL (ref 0–0.04)
IMM GRANULOCYTES NFR BLD AUTO: 0.5 % (ref 0–0.5)
LYMPHOCYTES # BLD AUTO: 1.6 K/UL (ref 1–4.8)
LYMPHOCYTES NFR BLD: 15.4 % (ref 18–48)
MCH RBC QN AUTO: 28.4 PG (ref 27–31)
MCHC RBC AUTO-ENTMCNC: 33.6 G/DL (ref 32–36)
MCV RBC AUTO: 84 FL (ref 82–98)
MONOCYTES # BLD AUTO: 0.6 K/UL (ref 0.3–1)
MONOCYTES NFR BLD: 5.6 % (ref 4–15)
NEUTROPHILS # BLD AUTO: 7.8 K/UL (ref 1.8–7.7)
NEUTROPHILS NFR BLD: 77.1 % (ref 38–73)
NRBC BLD-RTO: 0 /100 WBC
PLATELET # BLD AUTO: 285 K/UL (ref 150–450)
PMV BLD AUTO: 10.3 FL (ref 9.2–12.9)
RBC # BLD AUTO: 4.02 M/UL (ref 4–5.4)
WBC # BLD AUTO: 10.15 K/UL (ref 3.9–12.7)

## 2023-12-21 PROCEDURE — 36415 COLL VENOUS BLD VENIPUNCTURE: CPT | Performed by: OBSTETRICS & GYNECOLOGY

## 2023-12-21 PROCEDURE — 82950 GLUCOSE TEST: CPT | Performed by: OBSTETRICS & GYNECOLOGY

## 2023-12-21 PROCEDURE — 85025 COMPLETE CBC W/AUTO DIFF WBC: CPT | Performed by: OBSTETRICS & GYNECOLOGY

## 2023-12-30 ENCOUNTER — PATIENT MESSAGE (OUTPATIENT)
Dept: OTHER | Facility: OTHER | Age: 33
End: 2023-12-30
Payer: COMMERCIAL

## 2024-01-23 ENCOUNTER — ROUTINE PRENATAL (OUTPATIENT)
Dept: OBSTETRICS AND GYNECOLOGY | Facility: CLINIC | Age: 34
End: 2024-01-23
Payer: COMMERCIAL

## 2024-01-23 ENCOUNTER — TELEPHONE (OUTPATIENT)
Dept: OBSTETRICS AND GYNECOLOGY | Facility: CLINIC | Age: 34
End: 2024-01-23

## 2024-01-23 VITALS
DIASTOLIC BLOOD PRESSURE: 76 MMHG | SYSTOLIC BLOOD PRESSURE: 116 MMHG | WEIGHT: 242.31 LBS | BODY MASS INDEX: 39.11 KG/M2

## 2024-01-23 DIAGNOSIS — R11.2 NAUSEA AND VOMITING, UNSPECIFIED VOMITING TYPE: ICD-10-CM

## 2024-01-23 DIAGNOSIS — R82.998 LEUKOCYTES IN URINE: ICD-10-CM

## 2024-01-23 DIAGNOSIS — Z34.93 PRENATAL CARE IN THIRD TRIMESTER: Primary | ICD-10-CM

## 2024-01-23 PROCEDURE — 99999 PR PBB SHADOW E&M-EST. PATIENT-LVL III: CPT | Mod: PBBFAC,,,

## 2024-01-23 PROCEDURE — 81001 URINALYSIS AUTO W/SCOPE: CPT

## 2024-01-23 PROCEDURE — 0502F SUBSEQUENT PRENATAL CARE: CPT | Mod: CPTII,S$GLB,,

## 2024-01-23 RX ORDER — NITROFURANTOIN 25; 75 MG/1; MG/1
100 CAPSULE ORAL 2 TIMES DAILY
Qty: 10 CAPSULE | Refills: 0 | Status: SHIPPED | OUTPATIENT
Start: 2024-01-23 | End: 2024-01-28

## 2024-01-23 RX ORDER — ONDANSETRON 4 MG/1
4 TABLET, FILM COATED ORAL EVERY 6 HOURS PRN
Qty: 30 TABLET | Refills: 12 | Status: SHIPPED | OUTPATIENT
Start: 2024-01-23 | End: 2025-01-22

## 2024-01-23 RX ORDER — CLOTRIMAZOLE 1 %
CREAM (GRAM) TOPICAL 2 TIMES DAILY
Qty: 14 G | Refills: 0 | Status: SHIPPED | OUTPATIENT
Start: 2024-01-23 | End: 2024-01-30

## 2024-01-23 NOTE — PROGRESS NOTES
Here for routine OB appt. GA 29w3d. Complains of nausea and vomiting some days. Reports good fetal movement.  Denies loss of fluid, vaginal bleeding or contractions.     Urine +leuks - start macrobid. Pt requested rx also for clotrimazole in case of yeast infection s/p abx. Rx sent.    Bilat lower extremities mildly swollen. Recommended to elevate legs as much as possible and increase water intake. Notify me if swelling does not improve - can send rx for compression stockings.    Reviewed warning signs of labor and preeclampsia.     Patient is considering to transfer OB care to different hospital. Will continue care here until she decides. Growth US at 32w - order placed. Follow up MARICHUY Dr Ortiz.    All questions answered, pt verbalizes understanding

## 2024-01-24 LAB
AMORPH CRY UR QL COMP ASSIST: ABNORMAL
BACTERIA #/AREA URNS AUTO: ABNORMAL /HPF
BILIRUB UR QL STRIP: NEGATIVE
CLARITY UR REFRACT.AUTO: ABNORMAL
COLOR UR AUTO: YELLOW
GLUCOSE UR QL STRIP: NEGATIVE
HGB UR QL STRIP: NEGATIVE
KETONES UR QL STRIP: NEGATIVE
LEUKOCYTE ESTERASE UR QL STRIP: ABNORMAL
MICROSCOPIC COMMENT: ABNORMAL
NITRITE UR QL STRIP: NEGATIVE
PH UR STRIP: 7 [PH] (ref 5–8)
PROT UR QL STRIP: ABNORMAL
SP GR UR STRIP: 1.02 (ref 1–1.03)
SQUAMOUS #/AREA URNS AUTO: 3 /HPF
URN SPEC COLLECT METH UR: ABNORMAL
WBC #/AREA URNS AUTO: 4 /HPF (ref 0–5)

## 2024-01-27 ENCOUNTER — PATIENT MESSAGE (OUTPATIENT)
Dept: OTHER | Facility: OTHER | Age: 34
End: 2024-01-27
Payer: COMMERCIAL

## 2024-01-29 ENCOUNTER — PATIENT MESSAGE (OUTPATIENT)
Dept: OBSTETRICS AND GYNECOLOGY | Facility: CLINIC | Age: 34
End: 2024-01-29
Payer: COMMERCIAL

## 2024-01-31 ENCOUNTER — PATIENT MESSAGE (OUTPATIENT)
Dept: OBSTETRICS AND GYNECOLOGY | Facility: CLINIC | Age: 34
End: 2024-01-31
Payer: COMMERCIAL

## 2024-01-31 ENCOUNTER — ON-DEMAND VIRTUAL (OUTPATIENT)
Dept: URGENT CARE | Facility: CLINIC | Age: 34
End: 2024-01-31
Payer: COMMERCIAL

## 2024-01-31 DIAGNOSIS — J98.8 VIRAL RESPIRATORY ILLNESS: Primary | ICD-10-CM

## 2024-01-31 DIAGNOSIS — B97.89 VIRAL RESPIRATORY ILLNESS: Primary | ICD-10-CM

## 2024-01-31 PROCEDURE — 99213 OFFICE O/P EST LOW 20 MIN: CPT | Mod: 95,,,

## 2024-01-31 NOTE — LETTER
January 31, 2024    Carlos Camarena  [address] [facility] [id number]             Virtual Visit - Urgent Care  Urgent Care  [address] [facility] [address]   January 31, 2024     Patient: Carlos Camarena   YOB: 1990   Date of Visit: 1/31/2024       To Whom it May Concern:    Carlos Camarena was seen virtually on 1/31/2024. She may return to work on 02/03/2024 .    Please excuse her from any classes or work missed.    If you have any questions or concerns, please don't hesitate to call.    Sincerely,         Sylvia Bonilla NP

## 2024-01-31 NOTE — PROGRESS NOTES
Subjective:      Patient ID: Carlos Camarena is a 33 y.o. female.    Vitals:  vitals were not taken for this visit.     Chief Complaint: Sinus Problem      Visit Type: TELE AUDIOVISUAL    Present with the patient at the time of consultation: TELEMED PRESENT WITH PATIENT: None and none    Past Medical History:   Diagnosis Date    ADD (attention deficit disorder)     Asthma     Chronic back pain     Fibromyalgia     Migraine     Moderate persistent asthma      Past Surgical History:   Procedure Laterality Date    BACK SURGERY  2013     SECTION N/A 2022    Procedure:  SECTION;  Surgeon: Glory Fink MD;  Location: [facility] L&D;  Service: OB/GYN;  Laterality: N/A;     SECTION  22    INJECTION OF JOINT Right 2022    Procedure: INJECTION, JOINT RIGHT SI NEEDS CONSENT;  Surgeon: Cristopher Simon MD;  Location: [facility];  Service: Pain Management;  Laterality: Right;    SPINE SURGERY  14     Review of patient's allergies indicates:   Allergen Reactions    Lactose Diarrhea    Gluten protein Itching     Inflammation, bloating, diarrhea and pain all over body       Current Outpatient Medications on File Prior to Visit   Medication Sig Dispense Refill    albuterol (PROVENTIL/VENTOLIN HFA) 90 mcg/actuation inhaler Inhale 2 puffs into the lungs every 6 (six) hours as needed for Wheezing. 18 g 5    aspirin (ECOTRIN) 81 MG EC tablet Take 1 tablet (81 mg total) by mouth once daily. (Patient not taking: Reported on 2024)  0    clotrimazole (LOTRIMIN) 1 % cream Apply topically 2 (two) times daily. for 7 days 14 g 0    ferrous sulfate 325 (65 FE) MG EC tablet Take 1 tablet (325 mg total) by mouth 2 (two) times daily. (Patient not taking: Reported on 2024) 30 tablet 11    fluticasone-salmeterol 500-50 mcg/dose (ADVAIR DISKUS) 500-50 mcg/dose DsDv diskus inhaler Inhale 1 puff into the lungs 2 (two) times daily. Controller (Patient not taking: Reported on 2023) 60 each 11     ondansetron (ZOFRAN) 4 MG tablet Take 1 tablet (4 mg total) by mouth every 6 (six) hours as needed for Nausea (nausea). 30 tablet 12    SUMAtriptan (IMITREX) 5 mg/actuation nasal spray Administer 1 spray in each nostril. If headache continues after 2 hours,  repeat dose every 2 hours. Don't use more than 8 sprays per day 6 each 1     No current facility-administered medications on file prior to visit.     Family History   Problem Relation Age of Onset    Depression Mother     Anxiety disorder Mother     Ovarian cancer Mother     Alcohol abuse Mother     Cancer Mother     Glaucoma Mother     No Known Problems Father     Diabetes Sister     No Known Problems Brother     No Known Problems Maternal Aunt     No Known Problems Maternal Uncle     No Known Problems Paternal Aunt     No Known Problems Paternal Uncle     Hypertension Maternal Grandmother     No Known Problems Maternal Grandfather     No Known Problems Paternal Grandmother     No Known Problems Paternal Grandfather     No Known Problems Other     Breast cancer Neg Hx     Colon cancer Neg Hx            Ohs Peq Odvv Intake    1/31/2024  2:11 PM CST - Filed by Patient   What is your current physical address in the event of a medical emergency? [address] [place] [id number]   Are you able to take your vital signs? Yes   Systolic Blood Pressure: 125   Diastolic Blood Pressure: 79   Weight: 140   Height: 66   Pulse: 94   Temperature: 97.8   Respiration rate:    Pulse Oxygen:    Please attach any relevant images or files          Patient states that last week she began to have a cough with congestion, headaches, sore throat, nasal pressure. Patient any fever, chest pain or other symptoms at this time patient states that she does have a history of asthma and when she has colds this does flare up. Patient states that she has been taking otc medications and they are not helping her at this time.     Sinus Problem  Associated symptoms include congestion, coughing,  sinus pressure and a sore throat.       Constitution: Negative.   HENT:  Positive for congestion, postnasal drip, sinus pain, sinus pressure and sore throat.    Neck: neck negative.   Cardiovascular: Negative.    Eyes: Negative.    Respiratory:  Positive for cough.    Gastrointestinal: Negative.    Endocrine: negative.   Genitourinary: Negative.    Musculoskeletal: Negative.    Skin: Negative.    Allergic/Immunologic: Negative.    Neurological: Negative.    Hematologic/Lymphatic: Negative.         Objective:   The physical exam was conducted virtually.  Physical Exam   Constitutional: She is cooperative.   HENT:   Head: Normocephalic and atraumatic.   Nose: Rhinorrhea and congestion present. Right sinus exhibits maxillary sinus tenderness and frontal sinus tenderness. Left sinus exhibits maxillary sinus tenderness and frontal sinus tenderness.   Eyes: Conjunctivae are normal. Pupils are equal, round, and reactive to light.   Neck: Neck supple.   Pulmonary/Chest: Effort normal.   Abdominal: Normal appearance.   Musculoskeletal: Normal range of motion.         General: Normal range of motion.   Neurological: no focal deficit. She is alert and at baseline.   Skin: Skin is warm.   Psychiatric: Her behavior is normal. Mood, judgment and thought content normal.       Assessment:     1. Viral respiratory illness        Plan:       Viral respiratory illness        Discuss with patient OTC medication   Make sure you are increasing fluids  Make sure you are getting plenty of rest

## 2024-02-10 ENCOUNTER — PATIENT MESSAGE (OUTPATIENT)
Dept: OTHER | Facility: OTHER | Age: 34
End: 2024-02-10
Payer: COMMERCIAL

## 2024-02-11 NOTE — NURSING
Oriented to room, instructed to shower.    BIB [facility]: from home, non-verbal autistic female, non-compliant with meds, increasing physical aggression over several days, arrives restrained to EMS stretcher/versed on board, received to Trauma A, Dr. Raman present bedside, pt moved to ED stretcher/continues to be combative, changed into hospital gown, 4pt restraints applied, ID confirmed w/ mother, applied to right wrist.

## 2024-02-14 ENCOUNTER — PROCEDURE VISIT (OUTPATIENT)
Dept: MATERNAL FETAL MEDICINE | Facility: CLINIC | Age: 34
End: 2024-02-14
Payer: COMMERCIAL

## 2024-02-14 DIAGNOSIS — Z34.93 PRENATAL CARE IN THIRD TRIMESTER: ICD-10-CM

## 2024-02-14 PROCEDURE — 76816 OB US FOLLOW-UP PER FETUS: CPT | Mod: S$GLB,,, | Performed by: OBSTETRICS & GYNECOLOGY

## 2024-03-02 ENCOUNTER — PATIENT MESSAGE (OUTPATIENT)
Dept: OTHER | Facility: OTHER | Age: 34
End: 2024-03-02
Payer: COMMERCIAL

## 2024-04-11 ENCOUNTER — PATIENT MESSAGE (OUTPATIENT)
Dept: OBSTETRICS AND GYNECOLOGY | Facility: CLINIC | Age: 34
End: 2024-04-11
Payer: COMMERCIAL

## 2024-04-12 NOTE — DISCHARGE INSTRUCTIONS

## 2024-06-21 ENCOUNTER — ON-DEMAND VIRTUAL (OUTPATIENT)
Dept: URGENT CARE | Facility: CLINIC | Age: 34
End: 2024-06-21
Payer: COMMERCIAL

## 2024-06-21 DIAGNOSIS — H00.015 HORDEOLUM EXTERNUM OF LEFT LOWER EYELID: Primary | ICD-10-CM

## 2024-06-21 RX ORDER — ERYTHROMYCIN 5 MG/G
OINTMENT OPHTHALMIC EVERY 6 HOURS
Qty: 3.5 G | Refills: 0 | Status: SHIPPED | OUTPATIENT
Start: 2024-06-21

## 2024-06-21 NOTE — PROGRESS NOTES
Subjective:      Patient ID: Carlos Camarena is a 34 y.o. female at home    Vitals:  vitals were not taken for this visit.     Chief Complaint: Eye Problem      Visit Type: TELE AUDIOVISUAL    Present with the patient at the time of consultation: TELEMED PRESENT WITH PATIENT: None    Past Medical History:   Diagnosis Date    ADD (attention deficit disorder)     Asthma     Chronic back pain     Fibromyalgia     Migraine     Moderate persistent asthma      Past Surgical History:   Procedure Laterality Date    BACK SURGERY  2013     SECTION N/A 2022    Procedure:  SECTION;  Surgeon: Glory Fink MD;  Location: [facility] L&D;  Service: OB/GYN;  Laterality: N/A;     SECTION  22    INJECTION OF JOINT Right 2022    Procedure: INJECTION, JOINT RIGHT SI NEEDS CONSENT;  Surgeon: Cristopher Simon MD;  [place] [facility] [place] [facility] [place]  Service: Pain Management;  Laterality: Right;    SPINE SURGERY  14     Review of patient's allergies indicates:   Allergen Reactions    Lactose Diarrhea    Gluten protein Itching     Inflammation, bloating, diarrhea and pain all over body       Current Outpatient Medications on File Prior to Visit   Medication Sig Dispense Refill    albuterol (PROVENTIL/VENTOLIN HFA) 90 mcg/actuation inhaler Inhale 2 puffs into the lungs every 6 (six) hours as needed for Wheezing. 18 g 5    aspirin (ECOTRIN) 81 MG EC tablet Take 1 tablet (81 mg total) by mouth once daily. (Patient not taking: Reported on 2024)  0    clotrimazole (LOTRIMIN) 1 % cream Apply topically 2 (two) times daily. for 7 days 14 g 0    ferrous sulfate 325 (65 FE) MG EC tablet Take 1 tablet (325 mg total) by mouth 2 (two) times daily. (Patient not taking: Reported on 2024) 30 tablet 11    fluticasone-salmeterol 500-50 mcg/dose (ADVAIR DISKUS) 500-50 mcg/dose DsDv diskus inhaler Inhale 1 puff into the lungs 2 (two) times daily. Controller (Patient not taking: Reported on 2023) 60 each 11     ondansetron (ZOFRAN) 4 MG tablet Take 1 tablet (4 mg total) by mouth every 6 (six) hours as needed for Nausea (nausea). 30 tablet 12    SUMAtriptan (IMITREX) 5 mg/actuation nasal spray Administer 1 spray in each nostril. If headache continues after 2 hours,  repeat dose every 2 hours. Don't use more than 8 sprays per day 6 each 1     No current facility-administered medications on file prior to visit.     Family History   Problem Relation Name Age of Onset    Depression Mother Carleen Snell     Anxiety disorder Mother Carleen Snell     Ovarian cancer Mother Carleen Snell     Alcohol abuse Mother Carleen Snell     Cancer Mother Carleen Snell     Glaucoma Mother Carleen Snell     No Known Problems Father      Diabetes Sister Oriana Barkley     No Known Problems Brother      No Known Problems Maternal Aunt      No Known Problems Maternal Uncle      No Known Problems Paternal Aunt      No Known Problems Paternal Uncle      Hypertension Maternal Grandmother Emades Santizo     No Known Problems Maternal Grandfather      No Known Problems Paternal Grandmother      No Known Problems Paternal Grandfather      No Known Problems Other      Breast cancer Neg Hx      Colon cancer Neg Hx         Medications Ordered                [id number] - [facility] [address]   [address] [facility] [address]    Telephone: [phone]   Fax: [phone]   Hours: Not open 24 hours                         E-Prescribed (1 of 1)              erythromycin (ROMYCIN) ophthalmic ointment    Sig: Place into the left eye every 6 (six) hours.       Start: 6/21/24     Quantity: 3.5 g Refills: 0                           Ohs Peq Odvv Intake    6/21/2024  3:00 PM CDT - Filed by Patient   What is your current physical address in the event of a medical emergency? [address] [address]   Are you able to take your vital signs? No   Please attach any  relevant images or files          Patient states that yesterday she began to notice pain in her left eye when she woke up with some swelling to her lower eye lid. Patient states that the pain and swelling have continued to get worse. Patient denies wearing contacts. Patient denies any eye trauma or debris in eye. Patient denies any loss of vision or vision concerns.         Constitution: Negative.   HENT: Negative.     Neck: neck negative.   Cardiovascular: Negative.    Eyes:  Positive for eye pain and eyelid swelling.   Respiratory: Negative.     Gastrointestinal: Negative.    Endocrine: negative.   Genitourinary: Negative.    Musculoskeletal: Negative.    Skin: Negative.    Allergic/Immunologic: Negative.    Neurological: Negative.    Hematologic/Lymphatic: Negative.    Psychiatric/Behavioral: Negative.          Objective:   The physical exam was conducted virtually.  Physical Exam   Constitutional: She is oriented to person, place, and time.   HENT:   Head: Normocephalic and atraumatic.   Nose: Nose normal.   Eyes: Left eye exhibits hordeolum.      Comments: See images   Neck: Neck supple.   Pulmonary/Chest: Effort normal.   Abdominal: Normal appearance.   Musculoskeletal: Normal range of motion.         General: Normal range of motion.   Neurological: no focal deficit. She is alert, oriented to person, place, and time and at baseline.   Skin: Skin is warm.   Psychiatric: Her behavior is normal. Mood, judgment and thought content normal.       Assessment:     1. Hordeolum externum of left lower eyelid        Plan:       Hordeolum externum of left lower eyelid  -     erythromycin (ROMYCIN) ophthalmic ointment; Place into the left eye every 6 (six) hours.  Dispense: 3.5 g; Refill: 0

## 2025-02-25 ENCOUNTER — OFFICE VISIT (OUTPATIENT)
Dept: URGENT CARE | Facility: CLINIC | Age: 35
End: 2025-02-25
Payer: COMMERCIAL

## 2025-02-25 VITALS
WEIGHT: 242 LBS | HEIGHT: 66 IN | RESPIRATION RATE: 16 BRPM | TEMPERATURE: 97 F | BODY MASS INDEX: 38.89 KG/M2 | SYSTOLIC BLOOD PRESSURE: 127 MMHG | DIASTOLIC BLOOD PRESSURE: 84 MMHG | OXYGEN SATURATION: 98 % | HEART RATE: 81 BPM

## 2025-02-25 DIAGNOSIS — Z20.818 STREP THROAT EXPOSURE: Primary | ICD-10-CM

## 2025-02-25 DIAGNOSIS — R68.89 FLU-LIKE SYMPTOMS: ICD-10-CM

## 2025-02-25 LAB
CTP QC/QA: YES
MOLECULAR STREP A: NEGATIVE

## 2025-02-25 PROCEDURE — 99214 OFFICE O/P EST MOD 30 MIN: CPT | Mod: S$GLB,,, | Performed by: PHYSICIAN ASSISTANT

## 2025-02-25 PROCEDURE — 87651 STREP A DNA AMP PROBE: CPT | Mod: QW,S$GLB,, | Performed by: PHYSICIAN ASSISTANT

## 2025-02-25 RX ORDER — BUPROPION HYDROCHLORIDE 150 MG/1
150 TABLET ORAL
COMMUNITY
Start: 2025-01-31

## 2025-02-25 RX ORDER — CETIRIZINE HYDROCHLORIDE 10 MG/1
10 TABLET ORAL DAILY PRN
Qty: 30 TABLET | Refills: 0 | Status: SHIPPED | OUTPATIENT
Start: 2025-02-25 | End: 2026-02-25

## 2025-02-25 RX ORDER — FLUTICASONE PROPIONATE 50 MCG
1 SPRAY, SUSPENSION (ML) NASAL 2 TIMES DAILY PRN
Qty: 16 G | Refills: 0 | Status: SHIPPED | OUTPATIENT
Start: 2025-02-25

## 2025-02-25 RX ORDER — DEXTROAMPHETAMINE SACCHARATE, AMPHETAMINE ASPARTATE MONOHYDRATE, DEXTROAMPHETAMINE SULFATE AND AMPHETAMINE SULFATE 2.5; 2.5; 2.5; 2.5 MG/1; MG/1; MG/1; MG/1
CAPSULE, EXTENDED RELEASE ORAL EVERY MORNING
COMMUNITY
Start: 2024-12-02

## 2025-02-25 RX ORDER — BENZONATATE 100 MG/1
200 CAPSULE ORAL 3 TIMES DAILY PRN
Qty: 30 CAPSULE | Refills: 0 | Status: SHIPPED | OUTPATIENT
Start: 2025-02-25

## 2025-02-25 NOTE — PROGRESS NOTES
"Subjective:      Patient ID: Carlos Camarena is a 34 y.o. female.    Vitals:  height is 5' 6" (1.676 m) and weight is 109.8 kg (242 lb). Her tympanic temperature is 97.4 °F (36.3 °C). Her blood pressure is 127/84 and her pulse is 81. Her respiration is 16 and oxygen saturation is 98%.     Chief Complaint: Sore Throat      34-year-old female presents to urgent care clinic for evaluation.  Was seen via telemedicine by [facility] urgent care 02/22/2025 for flu-like symptoms and prescribed Tamiflu since she had fever 101.7, chills, congestion, body aches x1 day.  COVID test at that time negative.  Has productive cough, nasal/sinus congestion, sore throat, and postnasal drip.  Not taking anything for symptoms.   came in earlier today and tested positive for strep; she is requesting strep testing done.    Sore Throat   This is a new problem. The current episode started in the past 7 days. The problem has been gradually worsening. The pain is worse on the right side. There has been no fever. The pain is at a severity of 6/10. The pain is moderate. Associated symptoms include congestion, coughing and headaches. Pertinent negatives include no abdominal pain, diarrhea, drooling, ear discharge, ear pain, hoarse voice, plugged ear sensation, neck pain, shortness of breath, stridor, swollen glands, trouble swallowing or vomiting. She has had exposure to strep. She has tried nothing for the symptoms. The treatment provided no relief.       Constitution: Negative for activity change, appetite change, chills, sweating, fatigue, fever and generalized weakness.   HENT:  Positive for congestion, postnasal drip, sinus pressure and sore throat. Negative for ear pain, ear discharge, hearing loss, drooling, facial swelling, sinus pain, trouble swallowing and voice change.    Neck: Negative for neck pain, neck stiffness and painful lymph nodes.   Cardiovascular:  Negative for chest pain, leg swelling, palpitations, sob on exertion and " passing out.   Eyes:  Negative for eye discharge, eye pain, photophobia, vision loss, double vision and blurred vision.   Respiratory:  Positive for cough and sputum production. Negative for chest tightness, bloody sputum, COPD, shortness of breath, stridor, wheezing and asthma.    Gastrointestinal:  Negative for abdominal pain, nausea, vomiting, constipation, diarrhea, bright red blood in stool, rectal bleeding, heartburn and bowel incontinence.   Genitourinary:  Negative for dysuria, frequency, urgency, urine decreased, flank pain, bladder incontinence and hematuria.   Musculoskeletal:  Negative for trauma, joint pain, joint swelling, abnormal ROM of joint, muscle cramps and muscle ache.   Skin:  Negative for color change, pale, rash and wound.   Allergic/Immunologic: Negative for seasonal allergies, asthma and immunocompromised state.   Neurological:  Positive for headaches. Negative for dizziness, history of vertigo, light-headedness, passing out, facial drooping, speech difficulty, coordination disturbances, loss of balance, disorientation, altered mental status, loss of consciousness, numbness, tingling and seizures.   Hematologic/Lymphatic: Negative for swollen lymph nodes, easy bruising/bleeding and trouble clotting. Does not bruise/bleed easily.   Psychiatric/Behavioral:  Negative for altered mental status and disorientation.       Objective:     Physical Exam   Constitutional: She is oriented to person, place, and time. She appears well-developed. She does not appear ill. No distress.   HENT:   Head: Normocephalic.   Ears:   Right Ear: Hearing, external ear and ear canal normal. No no drainage, swelling or tenderness. No mastoid tenderness.   Left Ear: Hearing, external ear and ear canal normal. No no drainage, swelling or tenderness. No mastoid tenderness.   Nose: Rhinorrhea and congestion present. Right sinus exhibits no maxillary sinus tenderness and no frontal sinus tenderness. Left sinus exhibits no  maxillary sinus tenderness and no frontal sinus tenderness.   Mouth/Throat: Oropharynx is clear and moist and mucous membranes are normal. Mucous membranes are moist. No oral lesions. No oropharyngeal exudate, posterior oropharyngeal edema, posterior oropharyngeal erythema or tonsillar abscesses. No tonsillar exudate. Oropharynx is clear.   Eyes: Conjunctivae, EOM and lids are normal. Right eye exhibits no discharge. Left eye exhibits no discharge. Right conjunctiva is not injected. Right conjunctiva has no hemorrhage. Left conjunctiva is not injected. Left conjunctiva has no hemorrhage. vision grossly intact gaze aligned appropriately   Neck: Phonation normal. Neck supple.   Cardiovascular: Normal rate, regular rhythm, normal heart sounds and normal pulses.   Pulmonary/Chest: Effort normal and breath sounds normal. No accessory muscle usage. No respiratory distress. She has no wheezes.   Abdominal: Normal appearance. She exhibits no distension. Soft. There is no abdominal tenderness. There is no rebound and no guarding.   Musculoskeletal: Normal range of motion.         General: Normal range of motion.      Comments: Moves all extremities with normal tone, strength, and ROM.  Gait normal.   Lymphadenopathy:     She has no cervical adenopathy.   Neurological: no focal deficit. She is alert, oriented to person, place, and time and at baseline. She has normal motor skills and normal sensation. She displays facial symmetry and no dysarthria. Gait and coordination normal. GCS eye subscore is 4. GCS verbal subscore is 5. GCS motor subscore is 6.   Skin: Skin is warm, dry and no rash. Capillary refill takes less than 2 seconds.   Psychiatric: She experiences Normal attention. Her speech is normal and behavior is normal. Thought content normal.   Nursing note and vitals reviewed.    Results for orders placed or performed in visit on 02/25/25   POCT Strep A, Molecular    Collection Time: 02/25/25  4:32 PM   Result Value Ref  Range    Molecular Strep A, POC Negative Negative     Acceptable Yes          Assessment:     1. Strep throat exposure    2. Flu-like symptoms      Note dictated with voice recognition software, please excuse any grammatical errors.    Patient presents with clinical exam findings and history consistent with above.  We discussed the differential diagnosis.    On exam, patient is nontoxic appearing and vitals are stable.  Patient is essentially neurovascularly intact on exam.  Currently being treated with Tamiflu by outside facility 02/22/2025.  Not treating her symptoms.   tested positive for strep today.    Diagnostic testing results were independently reviewed and interpreted, which were discussed in depth with patient.   Test ordered in clinic:  Molecular strep A negative  Additionally, previous progress notes/admissions/lab were reviewed and interpreted.  CMP 03/17/2024 and 03/01/2024 with Good kidney function and EGFR.  Outside facility progress note 02/22/2025 reviewed    We had shared medical decision making for patient's treatment and care.      Plan:     Emphasized importance of prescribed and OTC symptomatic treatment to prevent worsening of condition.      Strep throat exposure  -     POCT Strep A, Molecular    Flu-like symptoms  -     cetirizine (ZYRTEC) 10 MG tablet; Take 1 tablet (10 mg total) by mouth daily as needed for Allergies or Rhinitis.  Dispense: 30 tablet; Refill: 0  -     fluticasone propionate (FLONASE) 50 mcg/actuation nasal spray; 1 spray (50 mcg total) by Each Nostril route 2 (two) times daily as needed for Rhinitis or Allergies.  Dispense: 16 g; Refill: 0  -     dextromethorphan-guaiFENesin  mg (MUCINEX DM)  mg per 12 hr tablet; Take 1 tablet by mouth every 12 (twelve) hours as needed (cough and chest congestion). Take with 1 full glass of water.  Dispense: 30 tablet; Refill: 0  -     benzonatate (TESSALON PERLES) 100 MG capsule; Take 2 capsules (200 mg  total) by mouth 3 (three) times daily as needed for Cough.  Dispense: 30 capsule; Refill: 0      Note dictated with voice recognition software, please excuse any grammatical errors.    We had shared decision making for patient's treatment. We discussed side effects/alternatives/benefits/risk and patient would like to proceed with treatment plan. We also discussed other OTC treatment recommendations.    Patient was counseled, explained with the test results meaning, expected course, and answered all of questions. They can also receive results via my chart.      Patient was instructed to return for re-evaluation with urgent care or PCP for continued outpatient workup and management if symptoms do not improve/worsening symptoms. Strict ED versus clinic precautions given in depth.   Guideline, discharge and follow-up instructions given verbally/printed; patient will also receive via [employer]. Patient verbalized understanding and agreed with the entirety of plan of care.         Additional MDM:     Heart Failure Score:   COPD = No

## 2025-02-25 NOTE — PATIENT INSTRUCTIONS
PLEASE READ YOUR DISCHARGE INSTRUCTIONS ENTIRELY AS IT CONTAINS IMPORTANT INFORMATION.    - Reviewed radiographs and all diagnostic testing with patient/family.    - Rest.  Drink plenty of fluids.    - Tylenol OR anti-inflammatory (NSAIDs, ibuprofen, aleve, motrin) as directed as needed for fever/pain.  For Tylenol, do not exceed 3000 mg/ day. If no contraindication or allergies.  -OK to supplement with OTC  NyQuil at night as needed for cough and congestion.  Use caution of total amount of Tylenol/acetaminophen per day.  - take Tessalon as needed for cough suppression.     -Below are suggestions for symptomatic relief:              -Salt water gargles to soothe throat pain.              -Chloroseptic spray also helps to numb throat pain. Drink hot tea with honey or lemon to soothe your throat.              -Nasal saline spray reduces inflammation and dryness.              -Warm face compresses to help with facial sinus pain/pressure.              -Vicks vapor rub at night.              -Astepro/Azelastine NASAL SPRAY twice day for nasal/sinus congestion   **may also supplement with OTC nasal spray to help with inflammation and congestion.   Wean to off when you nose becomes to dry or bleed. Also use nasal saline twice a day to help with dryness.               -Flonase OTC or Nasacort OTC  once or twice a day for nasal/sinus congestion. DON'T USE IF YOU HAVE GLAUCOMA. CHECK WITH YOUR PHARMACIST/EYE PHYSICIAN.              -Simple foods like chicken noodle soup.              -Mucinex DM (ANY COUGH EXPECTORANT-- guaifenesin) for cough or chest congestion with mucus and (ANY COUGH SUPPRESSANT- dextromethorphan) helps with coughing every 12 hours. Mucinex-DM if you have chest congestion or sputum (caution if history of high blood pressure or palpitations).              -Zyrtec/Claritin/xyzal during the day time  & Benadryl at night (only if severe runny nose) may help with allergies and runny nose. Add decongestant if  you have nasal/sinus congestion/sinus pressure/ear fullness sensation. (see below)              -may take OTC meclizine as needed for dizziness or nausea.     Caution with use of Decongestant meds:  -If you DO NOT have Hypertension or any history of palpitations, it is ok to take over the counter Sudafed or Mucinex D or Allegra-D or Claritin-D or Zyrtec-D.  -If you do take one of the above, it is ok to combine that with plain over the counter Mucinex or Allegra or Claritin or Zyrtec. If, for example, you are taking Zyrtec -D, you can combine that with Mucinex, but not Mucinex-D.  If you are taking Mucinex-D, you can combine that with plain Allegra or Claritin or Zyrtec.   -Do not combine pseudophed or phenylephrine with any other brand allergy-D for DECONGESTANT.   -Or vice versa, you can you take plain allergy medications (allegra/claritin/zyrtec with NO Decongestant) and ADD OTC pseudophed or phenelyphrine 3 times a day (or every 4-6 hours needed). Avoid taking decongestant late at night or with caffeine as it can keep you up or cause jittery feeling.     -If you DO have Hypertension , anxiety, or palpitations, it is safe to take Coricidin HBP for relief of cough, congestion, or sinus symptoms every 4-6 hours.      For your GI symptoms:  -Use gatorade/pedialyte or rehydration packets to help stay hydrated. Vitamin water and plain water do not contain rehydrating electrolytes.  -Increase clear liquids (water, gatorade, pedialyte, broths, jello, etc). If nausea/vomiting/diarrhea, advance to BRAT diet (banana, rice, applesauce, tea, toast/crackers), then advance further to solid food as tolerated. Avoid spicy or fatty foods.   -Please go to the ER if you experience worsening abdominal pain, blood in your vomit or stool, high fever, dizziness, fainting, swelling of your abdomen, inability to pass gas or stool, or inability to urinate.       -You must understand that you've received an Urgent Care treatment only and  that you may be released before all your medical problems are known or treated. You, the patient, will arrange for follow up care as instructed. Please arrange follow up with your primary medical clinic within 2-5 days if your signs and symptoms have not resolved or worsen.     - Follow up with your PCP or specialty clinic as directed.  You can call [phone] or [phone] to schedule an appointment with the appropriate provider.  Schedule [facility] is open Mon-Friday 8-5pm (excluded holidays).    - If your condition worsens or fails to improve we recommend that you receive another evaluation at the emergency room immediately or contact your primary medical clinic to discuss your concerns.        Prevention steps for patients with confirmed or suspected COVID-19  Stay home and stay away from family members and friends. The CDC says, you can leave home after these three things have happened: 1) You have had no fever for at least 24 hours (that is one full day of no fever without the use of medicine that reduces fevers) BUT MUST WEAR A SURGICAL MASKS IN PUBLIC FOR 5 days passed from first positive test.  Separate yourself from other people and animals in your home.  Call ahead before visiting your doctor.  Wear a facemask.  Cover your coughs and sneezes.  Wash your hands often with soap and water; hand  can be used, too.  Avoid sharing personal household items.  Wipe down surfaces used daily.  Monitor your symptoms. Seek prompt medical attention if your illness is worsening (e.g., difficulty breathing).   Before seeking care, call your healthcare provider.  If you have a medical emergency and need to call 911, notify the dispatch personnel that you have, or are being evaluated for COVID-19. If possible, put on a facemask before emergency medical services arrive.      Recommended precautions for household members, intimate partners, and caregivers in a home setting of a patient with symptomatic  laboratory-confirmed COVID-19 or a patient under investigation.  Household members, intimate partners, and caregivers in the home setting awaiting tests results have close contact with a person with symptomatic, laboratory-confirmed COVID-19 or a person under investigation. Close contacts should monitor their health; they should call their provider right away if they develop symptoms suggestive of COVID-19 (e.g., fever, cough, shortness of breath).    Close contacts should also follow these recommendations:  Make sure that you understand and can help the patient follow their provider's instructions for medication(s) and care. You should help the patient with basic needs in the home and provide support for getting groceries, prescriptions, and other personal needs.  Monitor the patient's symptoms. If the patient is getting sicker, call his or her healthcare provider and tell them that the patient has laboratory-confirmed COVID-19. If the patient has a medical emergency and you need to call 911, notify the dispatch personnel that the patient has, or is being evaluated for COVID-19.  Household members should stay in another room or be  from the patient. Household members should use a separate bedroom and bathroom, if available.  Prohibit visitors.  Household members should care for any pets in the home.  Make sure that shared spaces in the home have good air flow, such as by an air conditioner or an opened window, weather permitting.  Perform hand hygiene frequently. Wash your hands often with soap and water for at least 20 seconds or use an alcohol-based hand  (that contains > 60% alcohol) covering all surfaces of your hands and rubbing them together until they feel dry. Soap and water should be used preferentially.  Avoid touching your eyes, nose, and mouth.  The patient should wear a facemask. If the patient is not able to wear a facemask (for example, because it causes trouble breathing),  caregivers should wear a mask when they are in the same room as the patient.  Wear a disposable facemask and gloves when you touch or have contact with the patient's blood, stool, or body fluids, such as saliva, sputum, nasal mucus, vomit, urine.  Throw out disposable facemasks and gloves after using them. Do not reuse.  When removing personal protective equipment, first remove and dispose of gloves. Then, immediately clean your hands with soap and water or alcohol-based hand . Next, remove and dispose of facemask, and immediately clean your hands again with soap and water or alcohol-based hand .  You should not share dishes, drinking glasses, cups, eating utensils, towels, bedding, or other items with the patient. After the patient uses these items, you should wash them thoroughly (see below Wash laundry thoroughly).  Clean all high-touch surfaces, such as counters, tabletops, doorknobs, bathroom fixtures, toilets, phones, keyboards, tablets, and bedside tables, every day. Also, clean any surfaces that may have blood, stool, or body fluids on them.  Use a household cleaning spray or wipe, according to the label instructions. Labels contain instructions for safe and effective use of the cleaning product including precautions you should take when applying the product, such as wearing gloves and making sure you have good ventilation during use of the product.  Wash laundry thoroughly.  Immediately remove and wash clothes or bedding that have blood, stool, or body fluids on them.  Wear disposable gloves while handling soiled items and keep soiled items away from your body. Clean your hands (with soap and water or an alcohol-based hand ) immediately after removing your gloves.  Read and follow directions on labels of laundry or clothing items and detergent. In general, using a normal laundry detergent according to washing machine instructions and dry thoroughly using the warmest  temperatures recommended on the clothing label.  Place all used disposable gloves, facemasks, and other contaminated items in a lined container before disposing of them with other household waste. Clean your hands (with soap and water or an alcohol-based hand ) immediately after handling these items. Soap and water should be used preferentially if hands are visibly dirty.  Discuss any additional questions with your state or local health department or healthcare provider. Check available hours when contacting your local health department.    For more information see CDC link below.      https://www.cdc.gov/coronavirus/2019-ncov/hcp/guidance-prevent-spread.html#precautions        Sources:  [facility], [place] [facility]          Instructions for Home Care of Patients and Caretakers with Coronavirus Disease 2019  Limit visitors to the home.  Older persons and those that have chronic medical conditions such as diabetes, lung and heart disease are at increased risk for illness.   If possible, patients should use a separate bedroom while recovering. Caregivers and household members should avoid prolonged contact with the patient which means to stay 6 feet away and avoid contact with cough droplets.  When close contact is necessary, wash your hands before and immediately after contact.   Perform hand hygiene frequently. Wash your hands often with soap and water for at least 20 seconds or use an alcohol-based hand , covering all surfaces of your hands and rubbing them together until they feel dry.   Avoid touching your eyes, nose, and mouth with unwashed hands.  Avoid sharing household items with the patient. You should not share dishes, drinking glasses, cups, eating utensils, towels, bedding, or other items. After the patient uses these items, you should wash them thoroughly.  Wash laundry thoroughly.   Immediately remove and wash clothes or bedding that have blood, stool, or body  fluids on them.  Clean all high-touch surfaces, such as counters, tabletops, doorknobs, bathroom fixtures, toilets, phones, keyboards, tablets, and bedside tables, every day.   Use a household cleaning spray or wipe, according to the label instructions. Labels contain instructions for safe and effective use of the cleaning product including precautions you should take when applying the product, such as wearing gloves and making sure you have good ventilation during use of the product.    For more information see CDC link below.      https://www.cdc.gov/coronavirus/2019-ncov/hcp/guidance-prevent-spread.html#precautions               If your symptoms worsen or if you have any other concerns, please contact [facility] at [phone].

## 2025-03-07 ENCOUNTER — OFFICE VISIT (OUTPATIENT)
Dept: URGENT CARE | Facility: CLINIC | Age: 35
End: 2025-03-07
Payer: COMMERCIAL

## 2025-03-07 VITALS
SYSTOLIC BLOOD PRESSURE: 130 MMHG | RESPIRATION RATE: 16 BRPM | WEIGHT: 242 LBS | OXYGEN SATURATION: 98 % | HEART RATE: 110 BPM | DIASTOLIC BLOOD PRESSURE: 80 MMHG | BODY MASS INDEX: 38.89 KG/M2 | HEIGHT: 66 IN | TEMPERATURE: 101 F

## 2025-03-07 DIAGNOSIS — R50.9 FEVER, UNSPECIFIED FEVER CAUSE: Primary | ICD-10-CM

## 2025-03-07 DIAGNOSIS — J02.0 STREPTOCOCCAL SORE THROAT: ICD-10-CM

## 2025-03-07 LAB
CTP QC/QA: YES
CTP QC/QA: YES
MOLECULAR STREP A: POSITIVE
SARS CORONAVIRUS 2 ANTIGEN: NEGATIVE

## 2025-03-07 RX ORDER — FLUCONAZOLE 150 MG/1
150 TABLET ORAL DAILY
Qty: 3 TABLET | Refills: 0 | Status: SHIPPED | OUTPATIENT
Start: 2025-03-07 | End: 2025-03-10

## 2025-03-07 RX ORDER — ACETAMINOPHEN 500 MG
1000 TABLET ORAL
Status: COMPLETED | OUTPATIENT
Start: 2025-03-07 | End: 2025-03-07

## 2025-03-07 RX ORDER — IBUPROFEN 600 MG/1
600 TABLET ORAL EVERY 6 HOURS PRN
COMMUNITY
Start: 2025-02-23

## 2025-03-07 RX ORDER — AMOXICILLIN AND CLAVULANATE POTASSIUM 875; 125 MG/1; MG/1
1 TABLET, FILM COATED ORAL 2 TIMES DAILY
Qty: 20 TABLET | Refills: 0 | Status: SHIPPED | OUTPATIENT
Start: 2025-03-07 | End: 2025-03-17

## 2025-03-07 RX ADMIN — Medication 1000 MG: at 04:03

## 2025-03-07 NOTE — LETTER
March 7, 2025      [facility] [address] [place] [facility] [address]  Phone: [phone]  Fax: [phone]       Patient: Carlos Camarena   YOB: 1990  Date of Visit: 03/07/2025    To Whom It May Concern:    Hernandez Camarena  was at [facility] on 03/07/2025. The patient may return to work/school on 03/ /2025 with no restrictions. She was dx with Strep throat on 3/7/2025. If you have any questions or concerns, or if I can be of further assistance, please do not hesitate to contact me.    Sincerely,    Shasta Agee NP

## 2025-03-07 NOTE — PROGRESS NOTES
"Subjective:      Patient ID: Carlos Camarena is a 34 y.o. female.    Vitals:  height is 5' 6" (1.676 m) and weight is 109.8 kg (242 lb). Her tympanic temperature is 101 °F (38.3 °C) (abnormal). Her blood pressure is 130/80 and her pulse is 110. Her respiration is 16 and oxygen saturation is 98%.     Chief Complaint: Sore Throat    Patient reports had Flu for 2 weeks and is getting worse.Patient reports headache,chills, and trouble swallowing.Patient took Ibuprofen 600 mg and cough pearls..Patient finished Tamiflu     Sore Throat   This is a recurrent problem. The current episode started 1 to 4 weeks ago. The problem has been gradually worsening. The pain is worse on the left side. There has been no fever. The pain is at a severity of 8/10. Associated symptoms include coughing (sometimes productive), headaches, a hoarse voice and trouble swallowing. She has had no exposure to strep.       Constitution: Positive for appetite change and chills.   HENT:  Positive for sore throat and trouble swallowing.    Respiratory:  Positive for cough (sometimes productive).    Musculoskeletal:  Positive for muscle ache.   Neurological:  Positive for headaches.      Objective:     Physical Exam   Constitutional: She is oriented to person, place, and time. She appears well-developed. She is cooperative.  Non-toxic appearance. She does not appear ill. No distress.   HENT:   Head: Normocephalic and atraumatic.   Ears:   Right Ear: Hearing, tympanic membrane, external ear and ear canal normal.   Left Ear: Hearing, tympanic membrane, external ear and ear canal normal.   Nose: Nose normal. No mucosal edema, rhinorrhea or nasal deformity. No epistaxis. Right sinus exhibits no maxillary sinus tenderness and no frontal sinus tenderness. Left sinus exhibits no maxillary sinus tenderness and no frontal sinus tenderness.   Mouth/Throat: Uvula is midline and mucous membranes are normal. No trismus in the jaw. Normal dentition. No uvula swelling. " Posterior oropharyngeal erythema present. No oropharyngeal exudate or posterior oropharyngeal edema.   Eyes: Conjunctivae and lids are normal. No scleral icterus.   Neck: Trachea normal and phonation normal. Neck supple. No edema present. No erythema present. No neck rigidity present.   Cardiovascular: Normal rate, regular rhythm, normal heart sounds and normal pulses.   Pulmonary/Chest: Effort normal. No respiratory distress. She has no decreased breath sounds. She has wheezes in the right upper field and the left upper field. She has no rhonchi.   Abdominal: Normal appearance.   Musculoskeletal: Normal range of motion.         General: No deformity. Normal range of motion.   Neurological: She is alert and oriented to person, place, and time. She exhibits normal muscle tone. Coordination normal.   Skin: Skin is warm, dry, intact, not diaphoretic and not pale.   Psychiatric: Her speech is normal and behavior is normal. Judgment and thought content normal.   Nursing note and vitals reviewed.      Assessment:     1. Fever, unspecified fever cause    2. Streptococcal sore throat      Results for orders placed or performed in visit on 03/07/25   POCT Strep A, Molecular    Collection Time: 03/07/25  5:04 PM   Result Value Ref Range    Molecular Strep A, POC Positive (A) Negative     Acceptable Yes    SARS Coronavirus 2 Antigen, POCT Manual Read    Collection Time: 03/07/25  5:05 PM   Result Value Ref Range    SARS Coronavirus 2 Antigen Negative Negative, Presumptive Negative     Acceptable Yes        Plan:       Fever, unspecified fever cause  -     SARS Coronavirus 2 Antigen, POCT Manual Read  -     POCT Strep A, Molecular  -     acetaminophen tablet 1,000 mg    Streptococcal sore throat  -     amoxicillin-clavulanate 875-125mg (AUGMENTIN) 875-125 mg per tablet; Take 1 tablet by mouth 2 (two) times daily. for 10 days  Dispense: 20 tablet; Refill: 0  -     diphenhydrAMINE-aluminum-magnesium  hydroxide-simethicone-LIDOcaine viscous HCl 2%; Swish and spit 15 mLs every 4 (four) hours as needed.  Dispense: 100 each; Refill: 0    Other orders  -     fluconazole (DIFLUCAN) 150 MG Tab; Take 1 tablet (150 mg total) by mouth once daily. for 3 days  Dispense: 3 tablet; Refill: 0    Please drink plenty of fluids.  Please get plenty of rest.  Please return here or go to the Emergency Department for any concerns or worsening of condition.  Tamiflu prescription has been discussed and if prescribed, please take to completion unless you cannot tolerate the side effects.   If you were prescribed a narcotic medication, do not drive or operate heavy equipment or machinery while taking these medications.    Take tylenol (acetominophen) for fever, chills or body aches every 4 hours. do not exceed 4000 mg/ day.  Take Motrin (Ibuprofen) every 4 hours for fever, chills, pain or inflammation.  Use an antihistmine such as claritin or zyrtec to dry you out. Use pseudoephedrine (behind the counter) to decongest (beware this can raise your blood pressure). Use mucinex (guaifenisin) to break up mucous

## 2025-03-07 NOTE — PATIENT INSTRUCTIONS
Please drink plenty of fluids.  Please get plenty of rest.  Please return here or go to the Emergency Department for any concerns or worsening of condition.  Tamiflu prescription has been discussed and if prescribed, please take to completion unless you cannot tolerate the side effects.   If you were prescribed a narcotic medication, do not drive or operate heavy equipment or machinery while taking these medications.    Take tylenol (acetominophen) for fever, chills or body aches every 4 hours. do not exceed 4000 mg/ day.  Take Motrin (Ibuprofen) every 4 hours for fever, chills, pain or inflammation.  Use an antihistmine such as claritin or zyrtec to dry you out. Use pseudoephedrine (behind the counter) to decongest (beware this can raise your blood pressure). Use mucinex (guaifenisin) to break up mucous

## 2025-03-10 ENCOUNTER — OFFICE VISIT (OUTPATIENT)
Dept: URGENT CARE | Facility: CLINIC | Age: 35
End: 2025-03-10
Payer: COMMERCIAL

## 2025-03-10 VITALS
TEMPERATURE: 99 F | WEIGHT: 242 LBS | SYSTOLIC BLOOD PRESSURE: 135 MMHG | DIASTOLIC BLOOD PRESSURE: 86 MMHG | RESPIRATION RATE: 16 BRPM | BODY MASS INDEX: 38.89 KG/M2 | HEIGHT: 66 IN | OXYGEN SATURATION: 98 % | HEART RATE: 100 BPM

## 2025-03-10 DIAGNOSIS — R11.0 NAUSEA: ICD-10-CM

## 2025-03-10 DIAGNOSIS — J03.00 STREPTOCOCCAL TONSILLITIS: Primary | ICD-10-CM

## 2025-03-10 PROCEDURE — 99214 OFFICE O/P EST MOD 30 MIN: CPT | Mod: 25,S$GLB,, | Performed by: PHYSICIAN ASSISTANT

## 2025-03-10 RX ORDER — LIDOCAINE HYDROCHLORIDE 10 MG/ML
2.1 INJECTION, SOLUTION INFILTRATION; PERINEURAL
Status: COMPLETED | OUTPATIENT
Start: 2025-03-10 | End: 2025-03-10

## 2025-03-10 RX ORDER — ONDANSETRON 4 MG/1
4 TABLET, ORALLY DISINTEGRATING ORAL EVERY 8 HOURS PRN
Qty: 12 TABLET | Refills: 0 | Status: SHIPPED | OUTPATIENT
Start: 2025-03-10

## 2025-03-10 RX ORDER — CEFTRIAXONE 1 G/1
1 INJECTION, POWDER, FOR SOLUTION INTRAMUSCULAR; INTRAVENOUS ONCE
Status: COMPLETED | OUTPATIENT
Start: 2025-03-10 | End: 2025-03-10

## 2025-03-10 RX ORDER — DEXAMETHASONE SODIUM PHOSPHATE 10 MG/ML
8 INJECTION INTRAMUSCULAR; INTRAVENOUS
Status: COMPLETED | OUTPATIENT
Start: 2025-03-10 | End: 2025-03-10

## 2025-03-10 RX ADMIN — CEFTRIAXONE 1 G: 1 INJECTION, POWDER, FOR SOLUTION INTRAMUSCULAR; INTRAVENOUS at 09:03

## 2025-03-10 RX ADMIN — LIDOCAINE HYDROCHLORIDE 2.1 ML: 10 INJECTION, SOLUTION INFILTRATION; PERINEURAL at 09:03

## 2025-03-10 RX ADMIN — DEXAMETHASONE SODIUM PHOSPHATE 8 MG: 10 INJECTION INTRAMUSCULAR; INTRAVENOUS at 09:03

## 2025-03-10 NOTE — PROGRESS NOTES
"Subjective:      Patient ID: Carlos Camarena is a 34 y.o. female.    Vitals:  height is 5' 6" (1.676 m) and weight is 109.8 kg (242 lb). Her tympanic temperature is 98.7 °F (37.1 °C). Her blood pressure is 135/86 and her pulse is 100. Her respiration is 16 and oxygen saturation is 98%.     Chief Complaint: Sore Throat    Patient is a 34 y.o. F presenting to clinic with persistent sore throat.   Pt had flu like illness over 2 weeks ago, was given tamiflu, URI symptoms persisted. Pt was seen at  3/7/25 and tested positive for strep (had exposure by ).  She was treated with Augmentin. She has taken 6 doses of her Abx. She reports that she still has throat pain, lymph node pain of neck, pain with swallowing, and 2 episodes of vomiting. She states that she vomited the night 3/7 and 3/8. She reports decreased PO intake due to the sore throat pain.  Still having cough as well from prior URI.  She reports attempted treatment with Ibuprofen and Zyrtec without improvement.      Sore Throat   This is a new problem. The current episode started 1 to 4 weeks ago. The problem has been unchanged. Neither side of throat is experiencing more pain than the other. Associated symptoms include congestion (nasal), coughing (dry), headaches and a hoarse voice. Pertinent negatives include no abdominal pain, diarrhea, shortness of breath, trouble swallowing or vomiting. Treatments tried: antibiotics. The treatment provided no relief.       Constitution: Positive for fatigue and fever (subjective). Negative for chills and sweating.   HENT:  Positive for congestion (nasal), postnasal drip, sinus pressure and sore throat. Negative for sinus pain and trouble swallowing.    Neck: Negative for neck stiffness.   Cardiovascular:  Negative for leg swelling, palpitations and sob on exertion.   Eyes:  Negative for eye itching, eye pain and eye redness.   Respiratory:  Positive for cough (dry). Negative for sputum production and shortness of " breath.    Gastrointestinal:  Negative for abdominal pain, nausea, vomiting and diarrhea.   Genitourinary:  Negative for dysuria, frequency and urgency.   Musculoskeletal:  Negative for pain and joint swelling.   Skin:  Negative for color change and rash.   Neurological:  Positive for headaches. Negative for dizziness, disorientation, altered mental status, numbness and tingling.   Psychiatric/Behavioral:  Negative for altered mental status and disorientation.       Objective:     Physical Exam   Constitutional: She is oriented to person, place, and time. She appears well-developed. She is cooperative.  Non-toxic appearance. No distress.   HENT:   Head: Normocephalic and atraumatic.   Ears:   Right Ear: Hearing, tympanic membrane, external ear and ear canal normal.   Left Ear: Hearing, tympanic membrane, external ear and ear canal normal.   Nose: Mucosal edema present. No rhinorrhea, nasal deformity or congestion. No epistaxis. Right sinus exhibits no maxillary sinus tenderness and no frontal sinus tenderness. Left sinus exhibits no maxillary sinus tenderness and no frontal sinus tenderness.   Mouth/Throat: Uvula is midline and mucous membranes are normal. Mucous membranes are moist. No trismus in the jaw. Normal dentition. No uvula swelling. Posterior oropharyngeal erythema (mild) present. No oropharyngeal exudate, posterior oropharyngeal edema or tonsillar abscesses. Tonsils are 2+ on the right. Tonsils are 2+ on the left. Tonsillar exudate.   Uvula midline, tonsils symmetric in size, no apparent peritonsillar abscess. There is tonsillar exudate though tonsils/pharynx not as erythematous as typical strep, so suspect it is improving. No apparent pharyngeal edema.  Patient tolerating secretions.       Comments: Uvula midline, tonsils symmetric in size, no apparent peritonsillar abscess. There is tonsillar exudate though tonsils/pharynx not as erythematous as typical strep, so suspect it is improving. No apparent  pharyngeal edema.  Patient tolerating secretions.   Eyes: Conjunctivae and lids are normal. Pupils are equal, round, and reactive to light. No scleral icterus. Extraocular movement intact   Neck: Trachea normal and phonation normal. Neck supple.      Comments: Slightly decreased extension, full flexion,  and 45 degree rotation to left and right.   No edema present. No erythema present. No neck rigidity present.   Cardiovascular: Normal rate, regular rhythm, normal heart sounds and normal pulses.   No murmur heard.  Pulmonary/Chest: Effort normal and breath sounds normal. No accessory muscle usage or stridor. No tachypnea and no bradypnea. No respiratory distress. She has no decreased breath sounds. She has no wheezes. She has no rhonchi. She has no rales.   Abdominal: Normal appearance. She exhibits no distension.   Musculoskeletal: Normal range of motion.         General: No deformity. Normal range of motion.   Lymphadenopathy:     She has cervical adenopathy (tender anterior).        Right cervical: Superficial cervical adenopathy present.        Left cervical: Superficial cervical adenopathy present.   Neurological: She is alert and oriented to person, place, and time. She exhibits normal muscle tone. Coordination normal.   Skin: Skin is warm, dry, intact, not diaphoretic and not pale.   Psychiatric: Her speech is normal and behavior is normal. Judgment and thought content normal.   Nursing note and vitals reviewed.    Strep test positive 3 days ago.     Assessment:     1. Streptococcal tonsillitis    2. Nausea        Plan:       Streptococcal tonsillitis  -     cefTRIAXone injection 1 g  -     LIDOcaine HCL 10 mg/ml (1%) injection 2.1 mL  -     dexAMETHasone injection 8 mg    Nausea  -     ondansetron (ZOFRAN-ODT) 4 MG TbDL; Take 1 tablet (4 mg total) by mouth every 8 (eight) hours as needed (nausea).  Dispense: 12 tablet; Refill: 0        Patient being treated for strep with augmentin which should treat  appropriately. She has vomited twice, so unclear how much of the medication she has gotten. She states she has not vomited in the past 24 hours, so would like to continue augmentin rather than switching to a different medication. I offered steroid for symptomatic relief as well as rocephin injection and patient wishes to proceed with this treatment. Continue augmentin x 10 days. At this time, there is no evidence of peritonsillar abscess and I detect no other acute concerning findings other than those consistent with strep pharyngitis, as well as post viral cough from prior URI.  Patient prescribed Zofran prn for nausea and vomiting, which may have likely been due to augmentin. Patient instructed to seek medical attn for new, worsening, or persistent symptoms. ED precautions discussed. Patient verbalized understanding and agrees with current plan of care.Discussed ddx, home care, tx options, and given follow up precautions.  I have reviewed the patient's chart to view previous visits, labs, and imaging to assess PMH and look for any trends or previous treatments.

## 2025-03-10 NOTE — LETTER
March 10, 2025      [facility] [address] [place] [facility] [address]  Phone: [phone]  Fax: [phone]       Patient: Carlos Camarena   YOB: 1990  Date of Visit: 03/10/2025    To Whom It May Concern:    Hernandez Camarena  was at [facility] on 03/10/2025. The patient may return to work/school when they are without fever for 24 hours (without the use of fever-reducing medication) and have improvement in symptoms.    If you have any questions or concerns, or if I can be of further assistance, please do not hesitate to contact me.    Sincerely,    John Mcqueen PA-C

## 2025-03-10 NOTE — PATIENT INSTRUCTIONS
- Rest.    - Drink plenty of fluids.    - Tylenol or Ibuprofen as directed as needed for fever/pain. Avoid tylenol if you have a history of liver disease. Do not take ibuprofen if you have a history of GI bleeding, kidney disease, or if you take blood thinners.     -warm salt water gargles can help with sore throat    - You have been given an antibiotic (amoxicillin-clavulanate) to treat your condition today.    - Please complete the antibiotic as directed on the bottle.   - If you are female and on oral birth control pills, use additional methods to prevent pregnancy while on antibiotics and for one cycle after.   - you can take otc probiotic to limit upset stomach    - You received a steroid shot today.  This can elevate your blood pressure, elevate your blood sugar, water weight gain, nervous energy, redness to the face and dimpling of the skin where the shot goes in.   - Do not use steroids more than 3 times per year.   - If you have diabetes, please check you blood sugar frequently.  - If you have high blood pressure, please check your blood pressure frequently.     - you also received an antibiotic injection today (ceftriaxone)    - change toothbrush after resolution of symptoms and completion of antibiotic therapy    - Follow up with your PCP or specialty clinic as directed in the next 1-2 weeks if not improved or as needed.  You can call [phone] to schedule an appointment with the appropriate provider.      - Go to the ER if you develop new or worsening symptoms.     - You must understand that you have received an Urgent Care treatment only and that you may be released before all of your medical problems are known or treated.   - You, the patient, will arrange for follow up care as instructed.   - If your condition worsens or fails to improve we recommend that you receive another evaluation at the ER immediately or contact your PCP to discuss your concerns or return here.         Pharyngitis: Strep  (Confirmed)    You have had a positive test for strep throat. Strep throat is a contagious illness. It is spread by coughing, kissing or by touching others after touching your mouth or nose. Symptoms include throat pain that is worse with swallowing, aching all over, headache, and fever. It is treated with antibiotic medicine. This should help you start to feel better in 1 to 2 days.  Home care  Rest at home. Drink plenty of fluids to you won't get dehydrated.  No work or school for the first 2 days of taking the antibiotics. After this time, you will not be contagious. You can then return to school or work if you are feeling better.   Take antibiotic medicine for the full 10 days, even if you feel better. This is very important to ensure the infection is treated. It is also important to prevent medicine-resistant germs from developing. If you were given an antibiotic shot, you don't need any more antibiotics.  You may use acetaminophen or ibuprofen to control pain or fever, unless another medicine was prescribed for this. Talk with your doctor before taking these medicines if you have chronic liver or kidney disease. Also talk with your doctor if you have had a stomach ulcer or GI bleeding.  Throat lozenges or sprays help reduce pain. Gargling with warm saltwater will also reduce throat pain. Dissolve 1/2 teaspoon of salt in 1 glass of warm water. This may be useful just before meals.   Soft foods are OK. Avoid salty or spicy foods.  Follow-up care  Follow up with your healthcare provider or our staff if you don't get better over the next week.  When to seek medical advice  Call your healthcare provider right away if any of these occur:  Fever of 100.4ºF (38ºC) or higher, or as directed by your healthcare provider  New or worsening ear pain, sinus pain, or headache  Painful lumps in the back of neck  Stiff neck  Lymph nodes getting larger or becoming soft in the middle  You can't swallow liquids or you can't open  your mouth wide because of throat pain  Signs of dehydration. These include very dark urine or no urine, sunken eyes, and dizziness.  Trouble breathing or noisy breathing  Muffled voice  Rash  Date Last Reviewed: 4/13/2015  © 8614-3931 [employer]. [address] [place] [address], [place] [address]. All rights reserved. This information is not intended as a substitute for professional medical care. Always follow your healthcare professional's instructions.        Self-Care for Sore Throats    Sore throats happen for many reasons, such as colds, allergies, and infections caused by viruses or bacteria. In any case, your throat becomes red and sore. Your goal for self-care is to reduce your discomfort while giving your throat a chance to heal.  Moisten and soothe your throat  Tips include the following:  Try a sip of water first thing after waking up.  Keep your throat moist by drinking 6 or more glasses of clear liquids every day.  Run a cool-air humidifier in your room overnight.  Avoid cigarette smoke.   Suck on throat lozenges, cough drops, hard candy, ice chips, or frozen fruit-juice bars. Use the sugar-free versions if your diet or medical condition requires them.  Gargle to ease irritation  Gargling every hour or 2 can ease irritation. Try gargling with 1 of these solutions:  1/4 teaspoon of salt in 1/2 cup of warm water  An over-the-counter anesthetic gargle  Use medicine for more relief  Over-the-counter medicine can reduce sore throat symptoms. Ask your pharmacist if you have questions about which medicine to use:  Ease pain with anesthetic sprays. Aspirin or an aspirin substitute also helps. Remember, never give aspirin to anyone 18 or younger, or if you are already taking blood thinners.   For sore throats caused by allergies, try antihistamines to block the allergic reaction.  Remember: unless a sore throat is caused by a bacterial infection, antibiotics wont help you.  Prevent future sore  throats  Prevention tips include the following:  Stop smoking or reduce contact with secondhand smoke. Smoke irritates the tender throat lining.  Limit contact with pets and with allergy-causing substances, such as pollen and mold.  When youre around someone with a sore throat or cold, wash your hands often to keep viruses or bacteria from spreading.  Dont strain your vocal cords.  Call your healthcare provider  Contact your healthcare provider if you have:  A temperature over 101°F (38.3°C)  White spots on the throat  Great difficulty swallowing  Trouble breathing  A skin rash  Recent exposure to someone else with strep bacteria  Severe hoarseness and swollen glands in the neck or jaw   Date Last Reviewed: 8/1/2016  © 2303-0000 [employer]. [address] [place] [address], [place] [address]. All rights reserved. This information is not intended as a substitute for professional medical care. Always follow your healthcare professional's instructions.

## 2025-05-20 ENCOUNTER — OFFICE VISIT (OUTPATIENT)
Dept: URGENT CARE | Facility: CLINIC | Age: 35
End: 2025-05-20
Payer: COMMERCIAL

## 2025-05-20 VITALS
HEART RATE: 77 BPM | RESPIRATION RATE: 18 BRPM | WEIGHT: 242 LBS | TEMPERATURE: 97 F | DIASTOLIC BLOOD PRESSURE: 94 MMHG | OXYGEN SATURATION: 96 % | SYSTOLIC BLOOD PRESSURE: 136 MMHG | HEIGHT: 66 IN | BODY MASS INDEX: 38.89 KG/M2

## 2025-05-20 DIAGNOSIS — M54.17 LUMBOSACRAL RADICULOPATHY: Primary | ICD-10-CM

## 2025-05-20 DIAGNOSIS — Z98.1 S/P LUMBAR FUSION: ICD-10-CM

## 2025-05-20 DIAGNOSIS — T36.95XA ANTIBIOTIC-INDUCED YEAST INFECTION: ICD-10-CM

## 2025-05-20 DIAGNOSIS — B37.9 ANTIBIOTIC-INDUCED YEAST INFECTION: ICD-10-CM

## 2025-05-20 DIAGNOSIS — F41.1 GAD (GENERALIZED ANXIETY DISORDER): ICD-10-CM

## 2025-05-20 DIAGNOSIS — T14.8XXA SKIN ABRASION: ICD-10-CM

## 2025-05-20 PROCEDURE — 99215 OFFICE O/P EST HI 40 MIN: CPT | Mod: 25,S$GLB,, | Performed by: PHYSICIAN ASSISTANT

## 2025-05-20 PROCEDURE — 96372 THER/PROPH/DIAG INJ SC/IM: CPT | Mod: S$GLB,,, | Performed by: PHYSICIAN ASSISTANT

## 2025-05-20 RX ORDER — KETOROLAC TROMETHAMINE 30 MG/ML
30 INJECTION, SOLUTION INTRAMUSCULAR; INTRAVENOUS
Status: COMPLETED | OUTPATIENT
Start: 2025-05-20 | End: 2025-05-20

## 2025-05-20 RX ORDER — GABAPENTIN 300 MG/1
CAPSULE ORAL
Qty: 90 CAPSULE | Refills: 1 | Status: SHIPPED | OUTPATIENT
Start: 2025-05-20 | End: 2025-06-21

## 2025-05-20 RX ORDER — METHOCARBAMOL 750 MG/1
750 TABLET, FILM COATED ORAL EVERY 8 HOURS PRN
Qty: 30 TABLET | Refills: 0 | Status: SHIPPED | OUTPATIENT
Start: 2025-05-20

## 2025-05-20 RX ORDER — FLUCONAZOLE 150 MG/1
150 TABLET ORAL
Qty: 1 TABLET | Refills: 0 | Status: SHIPPED | OUTPATIENT
Start: 2025-05-20 | End: 2025-05-21

## 2025-05-20 RX ORDER — MUPIROCIN 20 MG/G
OINTMENT TOPICAL 2 TIMES DAILY
Qty: 22 G | Refills: 1 | Status: SHIPPED | OUTPATIENT
Start: 2025-05-20 | End: 2025-05-27

## 2025-05-20 RX ORDER — CEPHALEXIN 500 MG/1
500 CAPSULE ORAL EVERY 12 HOURS
Qty: 14 CAPSULE | Refills: 0 | Status: SHIPPED | OUTPATIENT
Start: 2025-05-20 | End: 2025-05-27

## 2025-05-20 RX ORDER — MELOXICAM 7.5 MG/1
7.5 TABLET ORAL DAILY PRN
Qty: 30 TABLET | Refills: 0 | Status: SHIPPED | OUTPATIENT
Start: 2025-05-20

## 2025-05-20 RX ADMIN — KETOROLAC TROMETHAMINE 30 MG: 30 INJECTION, SOLUTION INTRAMUSCULAR; INTRAVENOUS at 04:05

## 2025-05-20 NOTE — PATIENT INSTRUCTIONS
PLEASE READ YOUR DISCHARGE INSTRUCTIONS ENTIRELY AS IT CONTAINS IMPORTANT INFORMATION.    FOR YOUR SKIN INFECTION:  Please take the antibiotics to completion. Please supplement with OTC probiotics and yogurt.   Keep open to air.  Use the antibiotic ointment to the wound 2x to open wound as directed for 1-2 weeks.   Please return here/ED or see your primary care doctor for signs of worsening infection (purulent drainage, fever, worsening swelling/redness/pain, inability to move your extremity).  Hold off on Diflucan until after you complete antibiotic course if you develop vaginal yeast infection    FOR YOUR BACK PAIN:  - OTC Tylenol/anti-inflammatory as needed for pain (see below). These medications can be obtained over the counter at any local pharmacy without requiring a prescription.   OTC ORAL medications for pain reliever or fever reducing:  - Acetaminophen (tylenol 650mg every 8 hour as needed with food) or Ibuprofen (advil,motrin 400-600mg every 8 hour with food as needed) as directed as needed for fever/pain. Avoid tylenol if you have a history of liver disease or allergic reactions. Do not take ibuprofen if you have a history of allergic reactions, stomach bleeding ulcers, kidney disease, or if you take blood thinners.  -The patient was advised that NSAID-type medications have two very important potential side effects: gastrointestinal irritation including hemorrhage and renal injuries. patient was asked to take the medication with food and to stop if patient experiences any GI upset. I asked patient to call for vomiting, abdominal pain or black/bloody stools. The patient expresses understanding of these issues and all questions were answered.  OTC TOPICAL meds for pain reliever :  -You can apply OTC diclofenac or Volatren Gel 2-3 times daily to muscle or joints.  -You can also apply OTC lidocaine 4-5% with OR without menthol ointment 2-3 daily to muscle or joints.  -Please let one absorb before using the  other. Do not use both at the same time as it may decrease efficacy. Please stop using if you develop any skin rash or irritation.  -Please wash your hands after application of these topical products.     - do not use OTC anti-inflammatory if taking prescribed (Rx--MELOXICAM) anti-inflammatory. Start Rx inflammatory in 6-12 hours from clinic visit because you were given a concentrated anti-inflammatory injection in clinic.  - no operating machinery with muscle relaxant as it may cause drowsiness.  - continue heat (muscle) /ice (bone/joint) compression, rice therapy, and muscle stretches.   - Radiographs and all diagnostic testing reviewed with patient  - if no improvement or worsening symptoms, recommend follow-up with *orthopedics/pain managed or PCP for further evaluation.  Please call the number below to set up appointment; if a referral has been placed.  - Follow up with your PCP or specialty clinic as directed.  You can call [phone] or [phone] to schedule an appointment with the appropriate provider.      -You must understand that you've received an Urgent Care treatment only and that you may be released before all your medical problems are known or treated. You, the patient, will arrange for follow up care as instructed. Please arrange follow up with your primary medical clinic within 2-5 days if your signs and symptoms have not resolved or worsen.   - If your condition worsens or fails to improve we recommend that you receive another evaluation at the emergency room immediately or contact your primary medical clinic to discuss your concerns in next 2-5 days.  Strict ER versus clinic precautions given.      RED FLAGS/WARNING SYMPTOMS DISCUSSED WITH PATIENT THAT WOULD WARRANT EMERGENT MEDICAL ATTENTION. Patient aware and verbalized understanding.      RICE     Rest an injury, elevate it, and use ice and compression as directed.   RICE stands for rest, ice, compression, and elevation. These can limit  pain and swelling after an injury. RICE may be recommended to help treat fractures, sprains, strains, and bruises or bumps.   Home care  The following explain the details of RICE:  Rest. Limit the use of the injured body part. This helps prevent further damage to the body part and gives it time to heal. In some cases, you may need a sling, brace, splint, or cast to help keep the body part still until it has healed.  Ice. Applying ice right after an injury helps relieve pain and swelling. Wrap a bag of ice in a thin towel. Then, place it over the injured area. Do this for 10 to 15 minutes every 3 to 4 hours. Continue for the next 1 to 3 days or until your symptoms improve. Never put ice directly on your skin or ice an area longer than 15 minutes at a time.  Compression. Putting pressure on an injury helps reduce swelling and provides support. Wrap the injured area firmly with an elastic bandage/wrap. Make sure not to wrap the bandage too tightly or you will cut off blood flow to the injured area. If your bandage loosens, rewrap it.  Elevation. Keeping an injury raised above the level of your heart reduces swelling, pain, and throbbing. For instance, if you have a broken leg, it may help to rest your leg on several pillows when sitting or lying down. Try to keep the injured area elevated for at least 2 to 3 hours per day.  Follow-up care  Follow up with your healthcare provider, or as advised.  When to seek medical advice  Call your healthcare provider right away if any of these occur:  Fever of 100.4°F (38°C) or higher, or as directed by your healthcare provider  Increased pain or swelling in the injured body part  Injured body part becomes cold, blue, numb, or tingly  Signs of infection. These include warmth in the skin, redness, drainage, or bad smell coming from the injured body part.  Date Last Reviewed: 1/18/2016  © 1141-6419 [employer] [address] [place] [address], [place] [address]. All rights  reserved. This information is not intended as a substitute for professional medical care. Always follow your healthcare professional's instructions.

## 2025-05-20 NOTE — PROGRESS NOTES
"Subjective:      Patient ID: Carlos Camarena is a 35 y.o. female.    Vitals:  height is 5' 6" (1.676 m) and weight is 109.8 kg (242 lb). Her tympanic temperature is 96.8 °F (36 °C). Her blood pressure is 136/94 (abnormal) and her pulse is 77. Her respiration is 18 and oxygen saturation is 96%.     Chief Complaint: Back Pain    35-year-old female with a history of anxiety/depression, previous L5-S1 ALIF 2014 at outside facility, migraine, asthma, GERD, and other comorbidities who presents to urgent care clinic for evaluation.  She is here with multiple issues.  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  She is compliant with her Wellbutrin 150 mg once daily but was recently started on additional Lexapro 10 mg once daily by psychiatrist.  She has no fever, wound drainage, or swelling on her breasts abrasion.  Request Diflucan if she is prescribed antibiotics since she was develops yeast infection post antibiotic.    Additionally, she has side job with the vintage store.  Was doing heavy lifting a lot of box moving 2 weeks ago.  Since then has flare-up of her chronic low back pain radiating to right posterior thigh with numbness/tingling/pins/needles sensation.  Previously was treated with gabapentin 300 mg 3 times a day and was scheduled to undergo epidural injection but both were canceled since she was pregnant.  Denies any bladder bowel incontinence, saddle anesthesia, gait instability, leg weakness, or urinary symptoms.  Has taken OTC Tylenol and ibuprofen with no significant relief.    Back Pain  This is a recurrent problem. The current episode started in the past 7 days. The problem has been gradually worsening since onset. The quality of the pain is described as aching and shooting. The pain radiates to the right thigh. The pain is at a severity of 8/10. The pain is mild. " The pain is The same all the time. The symptoms are aggravated by bending, lying down, sitting, twisting and standing. Stiffness is present All day. Associated symptoms include leg pain, numbness, paresthesias and tingling. Pertinent negatives include no abdominal pain, bladder incontinence, bowel incontinence, chest pain, dysuria, fever, headaches, paresis, pelvic pain, perianal numbness, weakness or weight loss. She has tried bed rest, NSAIDs and analgesics for the symptoms. The treatment provided no relief.       Constitution: Negative for activity change, appetite change, chills, sweating, fatigue, fever and generalized weakness.   HENT:  Negative for ear pain, hearing loss, facial swelling, congestion, postnasal drip, sinus pain, sinus pressure, sore throat, trouble swallowing and voice change.    Neck: Negative for neck pain, neck stiffness and painful lymph nodes.   Cardiovascular:  Negative for chest pain, leg swelling, palpitations, sob on exertion and passing out.   Eyes:  Negative for eye discharge, eye pain, photophobia, vision loss, double vision and blurred vision.   Respiratory:  Negative for chest tightness, cough, sputum production, bloody sputum, COPD, shortness of breath, stridor, wheezing and asthma.    Gastrointestinal:  Negative for abdominal pain, nausea, vomiting, constipation, diarrhea, bright red blood in stool, rectal bleeding, heartburn and bowel incontinence.   Genitourinary:  Negative for dysuria, frequency, urgency, urine decreased, flank pain, bladder incontinence, hematuria and pelvic pain.   Musculoskeletal:  Positive for back pain, muscle ache and history of spine disorder. Negative for trauma, joint pain, joint swelling, abnormal ROM of joint and muscle cramps.   Skin:  Positive for wound. Negative for color change, pale and rash.   Allergic/Immunologic: Negative for seasonal allergies, asthma and immunocompromised state.   Neurological:  Positive for numbness and tingling.  Negative for dizziness, history of vertigo, light-headedness, passing out, facial drooping, speech difficulty, coordination disturbances, loss of balance, headaches, disorientation, altered mental status, loss of consciousness and seizures.   Hematologic/Lymphatic: Negative for swollen lymph nodes, easy bruising/bleeding and trouble clotting. Does not bruise/bleed easily.   Psychiatric/Behavioral:  Positive for nervous/anxious. Negative for altered mental status and disorientation. The patient is nervous/anxious.       Objective:     Physical Exam   Constitutional: She appears well-developed. She is cooperative.  Non-toxic appearance. She does not appear ill. No distress. obesity  HENT:   Head: Normocephalic and atraumatic.   Ears:   Right Ear: Hearing, external ear and ear canal normal.   Left Ear: Hearing, external ear and ear canal normal.   Nose: Nose normal.   Mouth/Throat: Oropharynx is clear and moist. Mucous membranes are moist. Oropharynx is clear.   Eyes: Conjunctivae, EOM and lids are normal. Right eye exhibits no discharge. Left eye exhibits no discharge. vision grossly intact gaze aligned appropriately   Neck: Neck supple. No neck rigidity present.   Cardiovascular: Normal rate, regular rhythm and normal pulses.   Pulmonary/Chest: Effort normal. No accessory muscle usage. No respiratory distress. She has no wheezes. She exhibits no tenderness.   Abdominal: Normal appearance. She exhibits no distension. There is no abdominal tenderness.   Musculoskeletal: Normal range of motion.         General: No tenderness. Normal range of motion.      Right lower leg: No edema.      Left lower leg: No edema.      Comments: Spinal exam:   Moves all extremities with good strength 5/5  Standing and sitting posture normal.  Gait normal.      Positive straight leg raise on the right  Sensation intact to light touch throughout.  2+ DTR bilaterally.  Full back ROM; pain with all ROM  There is no tenderness to palpation of  midline spine.  There is no bony step-off, crepitus, or bony tenderness to palpation.     Neurological: no focal deficit. She is alert and at baseline. She has normal motor skills and normal sensation. She displays no weakness, facial symmetry and normal reflexes. No cranial nerve deficit or sensory deficit. She exhibits normal muscle tone. Gait and coordination normal. Coordination normal.   Skin: Skin is warm, dry, not diaphoretic and rash. Capillary refill takes less than 2 seconds.         Comments: Right breast with multiple inflamed follicles.  Mild surrounding erythema around follicles but no induration, fluctuance, or drainage.   Psychiatric: Her behavior is normal. Mood and thought content normal.   Nursing note and vitals reviewed.    I spent greater than 60 minutes reviewing patient records, examining, and counseling the patient with greater than 50% of the time spent with direct patient care, counseling, and coordination.     Assessment:     1. Lumbosacral radiculopathy    2. S/P lumbar fusion    3. Skin abrasion    4. Antibiotic-induced yeast infection    5. BEATRIZ (generalized anxiety disorder)      Note dictated with voice recognition software, please excuse any grammatical errors.    Patient presents with clinical exam findings and history consistent with above.  We discussed the differential diagnosis.    On exam, patient is nontoxic appearing and vitals are stable.  Patient is essentially neurovascularly intact on exam.      Diagnostic testing results were independently reviewed and interpreted, which were discussed in depth with patient.   Test ordered in clinic:  None  Additionally, previous progress notes/admissions/lab were reviewed and interpreted.  CMP 03/17/2024 and 03/01/2024 with good kidney function and EGFR.  Previous pain management progress note 04/14/2022 , 03/23/2022 , and 03/10/2022 reviewed  Previous MRI lumbar spine without contrast 12/15/2021 reviewed   Previous x-ray lumbar spine  11/23/2021 reviewed    We had shared medical decision making for patient's treatment and care.  Patient has acute exacerbation of chronic illness /acute complicated illness. We did discuss the risk of morbidity without treatment is significant.     Patient has chronic medical conditions that affected my medical decision making and treatment plan for today's visit.  It also impacted the medications that were able to be prescribed for the visit today.    Plan:     Note dictated with voice recognition software, please excuse any grammatical errors.    Patient was given Toradol injection clinic for the pain. Patient was prescribed meds and recommended OTC treatments for their symptoms. Patient was treated conservatively with activity modification, OTC pain reliever, muscle stretches, and Warm vs. RICE therapy. Patient was referred to orthopedics/pain management for evaluation or if they fail conservative treatment.     If symptoms do not improve/worsens, patient was referred back to PCP for continued outpatient workup and management.     Patient was instructed to return for re-evaluation for any worsening or change in current symptoms. Strict ED versus clinic precautions given in depth. Discharge and follow-up instructions given verbally/printed with the patient who expressed understanding and willingness to comply with my recommendations.  Patient verbalized understanding and agreed with the entirety of plan of care.    Lumbosacral radiculopathy  -     gabapentin (NEURONTIN) 300 MG capsule; Take 1 capsule (300 mg total) by mouth once daily for 2 days, THEN 1 capsule (300 mg total) 2 (two) times daily for 2 days, THEN 1 capsule (300 mg total) 3 (three) times daily.  Dispense: 90 capsule; Refill: 1  -     meloxicam (MOBIC) 7.5 MG tablet; Take 1 tablet (7.5 mg total) by mouth daily as needed for Pain (Take with food in AM).  Dispense: 30 tablet; Refill: 0  -     methocarbamoL (ROBAXIN) 750 MG Tab; Take 1 tablet (750 mg  total) by mouth every 8 (eight) hours as needed (muscle spasms).  Dispense: 30 tablet; Refill: 0  -     ketorolac injection 30 mg  -     Ambulatory referral/consult to Pain Clinic    S/P lumbar fusion  -     gabapentin (NEURONTIN) 300 MG capsule; Take 1 capsule (300 mg total) by mouth once daily for 2 days, THEN 1 capsule (300 mg total) 2 (two) times daily for 2 days, THEN 1 capsule (300 mg total) 3 (three) times daily.  Dispense: 90 capsule; Refill: 1  -     meloxicam (MOBIC) 7.5 MG tablet; Take 1 tablet (7.5 mg total) by mouth daily as needed for Pain (Take with food in AM).  Dispense: 30 tablet; Refill: 0  -     methocarbamoL (ROBAXIN) 750 MG Tab; Take 1 tablet (750 mg total) by mouth every 8 (eight) hours as needed (muscle spasms).  Dispense: 30 tablet; Refill: 0  -     ketorolac injection 30 mg  -     Ambulatory referral/consult to Pain Clinic    Skin abrasion  -     mupirocin (BACTROBAN) 2 % ointment; Apply topically 2 (two) times daily. for 7 days  Dispense: 22 g; Refill: 1  -     cephALEXin (KEFLEX) 500 MG capsule; Take 1 capsule (500 mg total) by mouth every 12 (twelve) hours. Take with food and increased oral hydration while on this medication; supplement with probiotics/yogurt for 7 days  Dispense: 14 capsule; Refill: 0    Antibiotic-induced yeast infection  -     fluconazole (DIFLUCAN) 150 MG Tab; Take 1 tablet (150 mg total) by mouth as needed (take if get yeast infection after antibiotics).  Dispense: 1 tablet; Refill: 0    BEATRIZ (generalized anxiety disorder)    -continue Wellbutrin 150 mg once daily and Lexapro 10 mg once daily.  Close follow up with Psychiatry and PCP for chronic medical management.  Anxiety and stress reduction techniques reviewed in clinic today.  No SI or HI concerns.         Additional MDM:     Heart Failure Score:   COPD = No

## 2025-08-22 ENCOUNTER — TELEPHONE (OUTPATIENT)
Dept: PAIN MEDICINE | Facility: CLINIC | Age: 35
End: 2025-08-22
Payer: COMMERCIAL

## (undated) DEVICE — DRESSING LEUKOPLAST FLEX 1X3IN